# Patient Record
Sex: FEMALE | Race: WHITE | HISPANIC OR LATINO | Employment: UNEMPLOYED | URBAN - METROPOLITAN AREA
[De-identification: names, ages, dates, MRNs, and addresses within clinical notes are randomized per-mention and may not be internally consistent; named-entity substitution may affect disease eponyms.]

---

## 2017-01-04 ENCOUNTER — ALLSCRIPTS OFFICE VISIT (OUTPATIENT)
Dept: OTHER | Facility: OTHER | Age: 22
End: 2017-01-04

## 2017-01-04 ENCOUNTER — LAB REQUISITION (OUTPATIENT)
Dept: LAB | Facility: HOSPITAL | Age: 22
End: 2017-01-04
Payer: COMMERCIAL

## 2017-01-04 DIAGNOSIS — R10.9 ABDOMINAL PAIN: ICD-10-CM

## 2017-01-04 DIAGNOSIS — M54.41 LOW BACK PAIN WITH RIGHT-SIDED SCIATICA: ICD-10-CM

## 2017-01-04 DIAGNOSIS — M54.42 LOW BACK PAIN WITH LEFT-SIDED SCIATICA: ICD-10-CM

## 2017-01-04 DIAGNOSIS — R10.2 PELVIC AND PERINEAL PAIN: ICD-10-CM

## 2017-01-04 DIAGNOSIS — Z11.3 ENCOUNTER FOR SCREENING FOR INFECTIONS WITH PREDOMINANTLY SEXUAL MODE OF TRANSMISSION: ICD-10-CM

## 2017-01-04 DIAGNOSIS — N93.9 ABNORMAL UTERINE AND VAGINAL BLEEDING, UNSPECIFIED: ICD-10-CM

## 2017-01-04 LAB
CHLAMYDIA DNA CVX QL NAA+PROBE: NORMAL
CLUE CELL (HISTORICAL): NORMAL
HYPHAL YEAST (HISTORICAL): NORMAL
N GONORRHOEA DNA GENITAL QL NAA+PROBE: NORMAL
TRICHOMONAS (HISTORICAL): NORMAL
YEAST (HISTORICAL): NORMAL

## 2017-01-04 PROCEDURE — 87591 N.GONORRHOEAE DNA AMP PROB: CPT | Performed by: OBSTETRICS & GYNECOLOGY

## 2017-01-04 PROCEDURE — 87491 CHLMYD TRACH DNA AMP PROBE: CPT | Performed by: OBSTETRICS & GYNECOLOGY

## 2017-01-09 ENCOUNTER — HOSPITAL ENCOUNTER (EMERGENCY)
Facility: HOSPITAL | Age: 22
Discharge: HOME | End: 2017-01-09
Attending: EMERGENCY MEDICINE | Admitting: EMERGENCY MEDICINE
Payer: COMMERCIAL

## 2017-01-09 VITALS
DIASTOLIC BLOOD PRESSURE: 74 MMHG | TEMPERATURE: 98.6 F | BODY MASS INDEX: 16.76 KG/M2 | WEIGHT: 83 LBS | SYSTOLIC BLOOD PRESSURE: 131 MMHG | HEART RATE: 84 BPM | RESPIRATION RATE: 16 BRPM | OXYGEN SATURATION: 100 %

## 2017-01-09 DIAGNOSIS — B34.9 VIRAL ILLNESS: Primary | ICD-10-CM

## 2017-01-09 LAB
BACTERIA UR QL AUTO: ABNORMAL /HPF
BILIRUB UR QL STRIP: NEGATIVE
CLARITY UR: ABNORMAL
COLOR UR: YELLOW
GLUCOSE UR STRIP-MCNC: NEGATIVE MG/DL
HCG UR QL: NEGATIVE
HGB UR QL STRIP.AUTO: ABNORMAL
KETONES UR STRIP-MCNC: NEGATIVE MG/DL
LEUKOCYTE ESTERASE UR QL STRIP: ABNORMAL
NITRITE UR QL STRIP: NEGATIVE
NON-SQ EPI CELLS URNS QL MICRO: ABNORMAL /HPF
PH UR STRIP.AUTO: 7 [PH] (ref 4.5–8)
PROT UR STRIP-MCNC: NEGATIVE MG/DL
RBC #/AREA URNS AUTO: ABNORMAL /HPF
SP GR UR STRIP.AUTO: 1.02 (ref 1–1.03)
UROBILINOGEN UR QL STRIP.AUTO: 0.2 E.U./DL
WBC #/AREA URNS AUTO: ABNORMAL /HPF

## 2017-01-09 PROCEDURE — 81001 URINALYSIS AUTO W/SCOPE: CPT

## 2017-01-09 PROCEDURE — 87086 URINE CULTURE/COLONY COUNT: CPT

## 2017-01-09 PROCEDURE — 81002 URINALYSIS NONAUTO W/O SCOPE: CPT | Performed by: EMERGENCY MEDICINE

## 2017-01-09 PROCEDURE — 81025 URINE PREGNANCY TEST: CPT | Performed by: EMERGENCY MEDICINE

## 2017-01-09 PROCEDURE — 99283 EMERGENCY DEPT VISIT LOW MDM: CPT

## 2017-01-09 RX ORDER — METHOCARBAMOL 500 MG/1
500 TABLET, FILM COATED ORAL ONCE
Status: COMPLETED | OUTPATIENT
Start: 2017-01-09 | End: 2017-01-09

## 2017-01-09 RX ORDER — ACETAMINOPHEN 325 MG/1
650 TABLET ORAL EVERY 6 HOURS PRN
Status: DISCONTINUED | OUTPATIENT
Start: 2017-01-09 | End: 2017-01-10 | Stop reason: HOSPADM

## 2017-01-09 RX ORDER — METHOCARBAMOL 500 MG/1
500 TABLET, FILM COATED ORAL 2 TIMES DAILY
Qty: 10 TABLET | Refills: 0 | Status: ON HOLD | OUTPATIENT
Start: 2017-01-09 | End: 2017-01-19

## 2017-01-09 RX ORDER — ACETAMINOPHEN 500 MG
500 TABLET ORAL EVERY 6 HOURS PRN
Qty: 10 TABLET | Refills: 0 | Status: SHIPPED | OUTPATIENT
Start: 2017-01-09 | End: 2017-01-27

## 2017-01-09 RX ADMIN — ACETAMINOPHEN 650 MG: 325 TABLET, FILM COATED ORAL at 23:40

## 2017-01-09 RX ADMIN — METHOCARBAMOL 500 MG: 500 TABLET ORAL at 23:40

## 2017-01-10 ENCOUNTER — ALLSCRIPTS OFFICE VISIT (OUTPATIENT)
Dept: OTHER | Facility: OTHER | Age: 22
End: 2017-01-10

## 2017-01-11 ENCOUNTER — ALLSCRIPTS OFFICE VISIT (OUTPATIENT)
Dept: OTHER | Facility: OTHER | Age: 22
End: 2017-01-11

## 2017-01-12 LAB — BACTERIA UR CULT: NORMAL

## 2017-01-17 ENCOUNTER — APPOINTMENT (OUTPATIENT)
Dept: LAB | Facility: HOSPITAL | Age: 22
End: 2017-01-17
Attending: INTERNAL MEDICINE
Payer: COMMERCIAL

## 2017-01-17 ENCOUNTER — ALLSCRIPTS OFFICE VISIT (OUTPATIENT)
Dept: OTHER | Facility: OTHER | Age: 22
End: 2017-01-17

## 2017-01-17 ENCOUNTER — GENERIC CONVERSION - ENCOUNTER (OUTPATIENT)
Dept: OTHER | Facility: OTHER | Age: 22
End: 2017-01-17

## 2017-01-17 DIAGNOSIS — R10.9 ABDOMINAL PAIN: ICD-10-CM

## 2017-01-17 LAB
ALBUMIN SERPL BCP-MCNC: 4 G/DL (ref 3.5–5)
ALP SERPL-CCNC: 131 U/L (ref 46–116)
ALT SERPL W P-5'-P-CCNC: 15 U/L (ref 12–78)
ANION GAP SERPL CALCULATED.3IONS-SCNC: 8 MMOL/L (ref 4–13)
AST SERPL W P-5'-P-CCNC: 12 U/L (ref 5–45)
BASOPHILS # BLD AUTO: 0.04 THOUSANDS/ΜL (ref 0–0.1)
BASOPHILS NFR BLD AUTO: 0 % (ref 0–1)
BILIRUB SERPL-MCNC: 0.19 MG/DL (ref 0.2–1)
BUN SERPL-MCNC: 12 MG/DL (ref 5–25)
CALCIUM SERPL-MCNC: 8.9 MG/DL (ref 8.3–10.1)
CHLORIDE SERPL-SCNC: 102 MMOL/L (ref 100–108)
CO2 SERPL-SCNC: 28 MMOL/L (ref 21–32)
CREAT SERPL-MCNC: 0.57 MG/DL (ref 0.6–1.3)
EOSINOPHIL # BLD AUTO: 0.05 THOUSAND/ΜL (ref 0–0.61)
EOSINOPHIL NFR BLD AUTO: 1 % (ref 0–6)
ERYTHROCYTE [DISTWIDTH] IN BLOOD BY AUTOMATED COUNT: 12.3 % (ref 11.6–15.1)
GFR SERPL CREATININE-BSD FRML MDRD: >60 ML/MIN/1.73SQ M
GLUCOSE SERPL-MCNC: 99 MG/DL (ref 65–140)
HCT VFR BLD AUTO: 37.8 % (ref 34.8–46.1)
HGB BLD-MCNC: 13.1 G/DL (ref 11.5–15.4)
LYMPHOCYTES # BLD AUTO: 2.07 THOUSANDS/ΜL (ref 0.6–4.47)
LYMPHOCYTES NFR BLD AUTO: 21 % (ref 14–44)
MCH RBC QN AUTO: 32.4 PG (ref 26.8–34.3)
MCHC RBC AUTO-ENTMCNC: 34.7 G/DL (ref 31.4–37.4)
MCV RBC AUTO: 94 FL (ref 82–98)
MONOCYTES # BLD AUTO: 0.53 THOUSAND/ΜL (ref 0.17–1.22)
MONOCYTES NFR BLD AUTO: 5 % (ref 4–12)
NEUTROPHILS # BLD AUTO: 7.31 THOUSANDS/ΜL (ref 1.85–7.62)
NEUTS SEG NFR BLD AUTO: 73 % (ref 43–75)
PLATELET # BLD AUTO: 339 THOUSANDS/UL (ref 149–390)
PMV BLD AUTO: 9.8 FL (ref 8.9–12.7)
POTASSIUM SERPL-SCNC: 3.9 MMOL/L (ref 3.5–5.3)
PROT SERPL-MCNC: 7.8 G/DL (ref 6.4–8.2)
RBC # BLD AUTO: 4.04 MILLION/UL (ref 3.81–5.12)
SODIUM SERPL-SCNC: 138 MMOL/L (ref 136–145)
WBC # BLD AUTO: 10 THOUSAND/UL (ref 4.31–10.16)

## 2017-01-17 PROCEDURE — 85025 COMPLETE CBC W/AUTO DIFF WBC: CPT

## 2017-01-17 PROCEDURE — 83516 IMMUNOASSAY NONANTIBODY: CPT

## 2017-01-17 PROCEDURE — 80053 COMPREHEN METABOLIC PANEL: CPT

## 2017-01-17 PROCEDURE — 87389 HIV-1 AG W/HIV-1&-2 AB AG IA: CPT

## 2017-01-17 PROCEDURE — 36415 COLL VENOUS BLD VENIPUNCTURE: CPT

## 2017-01-18 ENCOUNTER — ANESTHESIA EVENT (OUTPATIENT)
Dept: GASTROENTEROLOGY | Facility: MEDICAL CENTER | Age: 22
End: 2017-01-18
Payer: COMMERCIAL

## 2017-01-18 ENCOUNTER — GENERIC CONVERSION - ENCOUNTER (OUTPATIENT)
Dept: OTHER | Facility: OTHER | Age: 22
End: 2017-01-18

## 2017-01-18 LAB
HIV 1+2 AB+HIV1 P24 AG SERPL QL IA: NORMAL
TTG IGA SER-ACNC: <2 U/ML (ref 0–3)

## 2017-01-19 ENCOUNTER — ANESTHESIA (OUTPATIENT)
Dept: GASTROENTEROLOGY | Facility: MEDICAL CENTER | Age: 22
End: 2017-01-19
Payer: COMMERCIAL

## 2017-01-19 ENCOUNTER — HOSPITAL ENCOUNTER (OUTPATIENT)
Facility: MEDICAL CENTER | Age: 22
Setting detail: OUTPATIENT SURGERY
Discharge: HOME | End: 2017-01-19
Attending: INTERNAL MEDICINE | Admitting: INTERNAL MEDICINE
Payer: COMMERCIAL

## 2017-01-19 ENCOUNTER — GENERIC CONVERSION - ENCOUNTER (OUTPATIENT)
Dept: GASTROENTEROLOGY | Facility: MEDICAL CENTER | Age: 22
End: 2017-01-19

## 2017-01-19 VITALS
HEART RATE: 64 BPM | WEIGHT: 85 LBS | BODY MASS INDEX: 17.14 KG/M2 | OXYGEN SATURATION: 100 % | HEIGHT: 59 IN | DIASTOLIC BLOOD PRESSURE: 72 MMHG | RESPIRATION RATE: 16 BRPM | TEMPERATURE: 97.8 F | SYSTOLIC BLOOD PRESSURE: 114 MMHG

## 2017-01-19 DIAGNOSIS — R10.9 ABDOMINAL PAIN: ICD-10-CM

## 2017-01-19 PROCEDURE — 88305 TISSUE EXAM BY PATHOLOGIST: CPT | Performed by: INTERNAL MEDICINE

## 2017-01-19 PROCEDURE — 88342 IMHCHEM/IMCYTCHM 1ST ANTB: CPT | Performed by: INTERNAL MEDICINE

## 2017-01-19 RX ORDER — SODIUM CHLORIDE 9 MG/ML
125 INJECTION, SOLUTION INTRAVENOUS CONTINUOUS
Status: DISCONTINUED | OUTPATIENT
Start: 2017-01-19 | End: 2017-01-19 | Stop reason: HOSPADM

## 2017-01-19 RX ORDER — PROPOFOL 10 MG/ML
INJECTION, EMULSION INTRAVENOUS AS NEEDED
Status: DISCONTINUED | OUTPATIENT
Start: 2017-01-19 | End: 2017-01-19 | Stop reason: SURG

## 2017-01-19 RX ADMIN — SODIUM CHLORIDE 125 ML/HR: 0.9 INJECTION, SOLUTION INTRAVENOUS at 12:10

## 2017-01-19 RX ADMIN — PROPOFOL 50 MG: 10 INJECTION, EMULSION INTRAVENOUS at 12:57

## 2017-01-19 RX ADMIN — PROPOFOL 50 MG: 10 INJECTION, EMULSION INTRAVENOUS at 12:52

## 2017-01-19 RX ADMIN — PROPOFOL 150 MG: 10 INJECTION, EMULSION INTRAVENOUS at 12:45

## 2017-01-19 RX ADMIN — PROPOFOL 50 MG: 10 INJECTION, EMULSION INTRAVENOUS at 12:47

## 2017-01-19 RX ADMIN — PROPOFOL 20 MG: 10 INJECTION, EMULSION INTRAVENOUS at 13:00

## 2017-01-23 ENCOUNTER — ALLSCRIPTS OFFICE VISIT (OUTPATIENT)
Dept: OTHER | Facility: OTHER | Age: 22
End: 2017-01-23

## 2017-01-24 ENCOUNTER — GENERIC CONVERSION - ENCOUNTER (OUTPATIENT)
Dept: OTHER | Facility: OTHER | Age: 22
End: 2017-01-24

## 2017-01-25 ENCOUNTER — GENERIC CONVERSION - ENCOUNTER (OUTPATIENT)
Dept: OTHER | Facility: OTHER | Age: 22
End: 2017-01-25

## 2017-01-26 ENCOUNTER — HOSPITAL ENCOUNTER (EMERGENCY)
Facility: HOSPITAL | Age: 22
Discharge: HOME | End: 2017-01-27
Attending: EMERGENCY MEDICINE
Payer: COMMERCIAL

## 2017-01-26 DIAGNOSIS — N76.0 BACTERIAL VAGINOSIS: ICD-10-CM

## 2017-01-26 DIAGNOSIS — K52.9 ENTERITIS: Primary | ICD-10-CM

## 2017-01-26 DIAGNOSIS — B96.89 BACTERIAL VAGINOSIS: ICD-10-CM

## 2017-01-26 LAB
ALBUMIN SERPL BCP-MCNC: 4.3 G/DL (ref 3.5–5)
ALP SERPL-CCNC: 106 U/L (ref 46–116)
ALT SERPL W P-5'-P-CCNC: 15 U/L (ref 12–78)
ANION GAP SERPL CALCULATED.3IONS-SCNC: 10 MMOL/L (ref 4–13)
AST SERPL W P-5'-P-CCNC: 16 U/L (ref 5–45)
BASOPHILS # BLD AUTO: 0.03 THOUSANDS/ΜL (ref 0–0.1)
BASOPHILS NFR BLD AUTO: 0 % (ref 0–1)
BILIRUB SERPL-MCNC: 0.44 MG/DL (ref 0.2–1)
BILIRUB UR QL STRIP: ABNORMAL
BUN SERPL-MCNC: 10 MG/DL (ref 5–25)
CALCIUM SERPL-MCNC: 9.4 MG/DL (ref 8.3–10.1)
CHLORIDE SERPL-SCNC: 102 MMOL/L (ref 100–108)
CLARITY UR: ABNORMAL
CO2 SERPL-SCNC: 27 MMOL/L (ref 21–32)
COLOR UR: YELLOW
COLOR, POC: NORMAL
CREAT SERPL-MCNC: 0.52 MG/DL (ref 0.6–1.3)
EOSINOPHIL # BLD AUTO: 0.04 THOUSAND/ΜL (ref 0–0.61)
EOSINOPHIL NFR BLD AUTO: 1 % (ref 0–6)
ERYTHROCYTE [DISTWIDTH] IN BLOOD BY AUTOMATED COUNT: 12.3 % (ref 11.6–15.1)
GFR SERPL CREATININE-BSD FRML MDRD: >60 ML/MIN/1.73SQ M
GLUCOSE SERPL-MCNC: 85 MG/DL (ref 65–140)
GLUCOSE UR STRIP-MCNC: NEGATIVE MG/DL
HCT VFR BLD AUTO: 36.5 % (ref 34.8–46.1)
HGB BLD-MCNC: 12.9 G/DL (ref 11.5–15.4)
HGB UR QL STRIP.AUTO: NEGATIVE
KETONES UR STRIP-MCNC: ABNORMAL MG/DL
LEUKOCYTE ESTERASE UR QL STRIP: NEGATIVE
LIPASE SERPL-CCNC: 55 U/L (ref 73–393)
LYMPHOCYTES # BLD AUTO: 1.54 THOUSANDS/ΜL (ref 0.6–4.47)
LYMPHOCYTES NFR BLD AUTO: 21 % (ref 14–44)
MCH RBC QN AUTO: 32.3 PG (ref 26.8–34.3)
MCHC RBC AUTO-ENTMCNC: 35.3 G/DL (ref 31.4–37.4)
MCV RBC AUTO: 91 FL (ref 82–98)
MONOCYTES # BLD AUTO: 0.32 THOUSAND/ΜL (ref 0.17–1.22)
MONOCYTES NFR BLD AUTO: 4 % (ref 4–12)
NEUTROPHILS # BLD AUTO: 5.47 THOUSANDS/ΜL (ref 1.85–7.62)
NEUTS SEG NFR BLD AUTO: 74 % (ref 43–75)
NITRITE UR QL STRIP: NEGATIVE
NRBC BLD AUTO-RTO: 0 /100 WBCS
PH UR STRIP.AUTO: 7 [PH] (ref 4.5–8)
PLATELET # BLD AUTO: 245 THOUSANDS/UL (ref 149–390)
PMV BLD AUTO: 10.1 FL (ref 8.9–12.7)
POTASSIUM SERPL-SCNC: 3.7 MMOL/L (ref 3.5–5.3)
PROT SERPL-MCNC: 7.9 G/DL (ref 6.4–8.2)
PROT UR STRIP-MCNC: ABNORMAL MG/DL
RBC # BLD AUTO: 4 MILLION/UL (ref 3.81–5.12)
SODIUM SERPL-SCNC: 139 MMOL/L (ref 136–145)
SP GR UR STRIP.AUTO: 1.02 (ref 1–1.03)
UROBILINOGEN UR QL STRIP.AUTO: 1 E.U./DL
WBC # BLD AUTO: 7.42 THOUSAND/UL (ref 4.31–10.16)

## 2017-01-26 PROCEDURE — 80053 COMPREHEN METABOLIC PANEL: CPT

## 2017-01-26 PROCEDURE — 85025 COMPLETE CBC W/AUTO DIFF WBC: CPT

## 2017-01-26 PROCEDURE — 81002 URINALYSIS NONAUTO W/O SCOPE: CPT

## 2017-01-26 PROCEDURE — 81001 URINALYSIS AUTO W/SCOPE: CPT

## 2017-01-26 PROCEDURE — 83690 ASSAY OF LIPASE: CPT

## 2017-01-26 PROCEDURE — 36415 COLL VENOUS BLD VENIPUNCTURE: CPT

## 2017-01-26 PROCEDURE — 96374 THER/PROPH/DIAG INJ IV PUSH: CPT

## 2017-01-26 RX ORDER — ONDANSETRON 2 MG/ML
4 INJECTION INTRAMUSCULAR; INTRAVENOUS ONCE AS NEEDED
Status: COMPLETED | OUTPATIENT
Start: 2017-01-26 | End: 2017-01-26

## 2017-01-26 RX ORDER — ONDANSETRON 2 MG/ML
INJECTION INTRAMUSCULAR; INTRAVENOUS
Status: COMPLETED
Start: 2017-01-26 | End: 2017-01-26

## 2017-01-26 RX ORDER — CLARITHROMYCIN 500 MG/1
500 TABLET, COATED ORAL 2 TIMES DAILY
COMMUNITY
End: 2017-02-06 | Stop reason: ALTCHOICE

## 2017-01-26 RX ORDER — PANTOPRAZOLE SODIUM 40 MG/1
40 TABLET, DELAYED RELEASE ORAL 2 TIMES DAILY
COMMUNITY
End: 2017-03-17

## 2017-01-26 RX ORDER — AMOXICILLIN 500 MG/1
500 CAPSULE ORAL 2 TIMES DAILY
COMMUNITY
End: 2017-02-06 | Stop reason: ALTCHOICE

## 2017-01-26 RX ADMIN — ONDANSETRON 4 MG: 2 INJECTION INTRAMUSCULAR; INTRAVENOUS at 23:13

## 2017-01-27 ENCOUNTER — APPOINTMENT (EMERGENCY)
Dept: RADIOLOGY | Facility: HOSPITAL | Age: 22
End: 2017-01-27
Payer: COMMERCIAL

## 2017-01-27 VITALS
BODY MASS INDEX: 17.17 KG/M2 | TEMPERATURE: 97.7 F | RESPIRATION RATE: 18 BRPM | DIASTOLIC BLOOD PRESSURE: 79 MMHG | OXYGEN SATURATION: 100 % | HEART RATE: 83 BPM | SYSTOLIC BLOOD PRESSURE: 118 MMHG | WEIGHT: 85 LBS

## 2017-01-27 LAB
BACTERIA UR QL AUTO: ABNORMAL /HPF
HCG UR QL: NEGATIVE
HYALINE CASTS #/AREA URNS LPF: ABNORMAL /LPF
NON-SQ EPI CELLS URNS QL MICRO: ABNORMAL /HPF
RBC #/AREA URNS AUTO: ABNORMAL /HPF
WBC #/AREA URNS AUTO: ABNORMAL /HPF

## 2017-01-27 PROCEDURE — 81025 URINE PREGNANCY TEST: CPT | Performed by: EMERGENCY MEDICINE

## 2017-01-27 PROCEDURE — 96375 TX/PRO/DX INJ NEW DRUG ADDON: CPT

## 2017-01-27 PROCEDURE — 96376 TX/PRO/DX INJ SAME DRUG ADON: CPT

## 2017-01-27 PROCEDURE — 74177 CT ABD & PELVIS W/CONTRAST: CPT

## 2017-01-27 PROCEDURE — 96361 HYDRATE IV INFUSION ADD-ON: CPT

## 2017-01-27 PROCEDURE — 99285 EMERGENCY DEPT VISIT HI MDM: CPT

## 2017-01-27 RX ORDER — SENNOSIDES 8.6 MG
650 CAPSULE ORAL EVERY 8 HOURS PRN
Qty: 30 TABLET | Refills: 0 | Status: SHIPPED | OUTPATIENT
Start: 2017-01-27 | End: 2017-02-10

## 2017-01-27 RX ORDER — METRONIDAZOLE 500 MG/1
500 TABLET ORAL 3 TIMES DAILY
Qty: 30 TABLET | Refills: 0 | Status: SHIPPED | OUTPATIENT
Start: 2017-01-27 | End: 2017-02-06 | Stop reason: ALTCHOICE

## 2017-01-27 RX ORDER — LORAZEPAM 2 MG/ML
0.5 INJECTION INTRAMUSCULAR ONCE
Status: COMPLETED | OUTPATIENT
Start: 2017-01-27 | End: 2017-01-27

## 2017-01-27 RX ORDER — MAGNESIUM HYDROXIDE/ALUMINUM HYDROXICE/SIMETHICONE 120; 1200; 1200 MG/30ML; MG/30ML; MG/30ML
15 SUSPENSION ORAL ONCE
Status: COMPLETED | OUTPATIENT
Start: 2017-01-27 | End: 2017-01-27

## 2017-01-27 RX ORDER — ONDANSETRON 4 MG/1
4 TABLET, FILM COATED ORAL EVERY 8 HOURS PRN
Qty: 8 TABLET | Refills: 0 | Status: SHIPPED | OUTPATIENT
Start: 2017-01-27 | End: 2017-02-06 | Stop reason: ALTCHOICE

## 2017-01-27 RX ADMIN — LORAZEPAM 0.5 MG: 2 INJECTION INTRAMUSCULAR; INTRAVENOUS at 00:27

## 2017-01-27 RX ADMIN — HYDROMORPHONE HYDROCHLORIDE 0.5 MG: 1 INJECTION, SOLUTION INTRAMUSCULAR; INTRAVENOUS; SUBCUTANEOUS at 00:09

## 2017-01-27 RX ADMIN — ALUMINUM HYDROXIDE, MAGNESIUM HYDROXIDE, AND SIMETHICONE 15 ML: 200; 200; 20 SUSPENSION ORAL at 00:26

## 2017-01-27 RX ADMIN — SODIUM CHLORIDE 1000 ML: 0.9 INJECTION, SOLUTION INTRAVENOUS at 00:09

## 2017-01-27 RX ADMIN — LIDOCAINE HYDROCHLORIDE 10 ML: 20 SOLUTION ORAL; TOPICAL at 00:26

## 2017-01-27 RX ADMIN — IOHEXOL 80 ML: 350 INJECTION, SOLUTION INTRAVENOUS at 00:45

## 2017-01-27 RX ADMIN — HYDROMORPHONE HYDROCHLORIDE 1 MG: 1 INJECTION, SOLUTION INTRAMUSCULAR; INTRAVENOUS; SUBCUTANEOUS at 01:55

## 2017-02-04 ENCOUNTER — HOSPITAL ENCOUNTER (EMERGENCY)
Facility: HOSPITAL | Age: 22
Discharge: HOME | End: 2017-02-04
Attending: EMERGENCY MEDICINE | Admitting: EMERGENCY MEDICINE
Payer: COMMERCIAL

## 2017-02-04 ENCOUNTER — APPOINTMENT (EMERGENCY)
Dept: CT IMAGING | Facility: HOSPITAL | Age: 22
End: 2017-02-04
Payer: COMMERCIAL

## 2017-02-04 VITALS
WEIGHT: 85 LBS | SYSTOLIC BLOOD PRESSURE: 126 MMHG | TEMPERATURE: 97.5 F | DIASTOLIC BLOOD PRESSURE: 68 MMHG | RESPIRATION RATE: 14 BRPM | OXYGEN SATURATION: 98 % | HEART RATE: 73 BPM | BODY MASS INDEX: 17.17 KG/M2

## 2017-02-04 DIAGNOSIS — R10.9 ABDOMINAL PAIN: ICD-10-CM

## 2017-02-04 DIAGNOSIS — K52.9 NONSPECIFIC COLITIS: Primary | ICD-10-CM

## 2017-02-04 DIAGNOSIS — R11.0 NAUSEA: ICD-10-CM

## 2017-02-04 DIAGNOSIS — E86.0 DEHYDRATION: ICD-10-CM

## 2017-02-04 LAB
ALBUMIN SERPL BCP-MCNC: 4.2 G/DL (ref 3.5–5)
ALP SERPL-CCNC: 108 U/L (ref 46–116)
ALT SERPL W P-5'-P-CCNC: 17 U/L (ref 12–78)
ANION GAP SERPL CALCULATED.3IONS-SCNC: 8 MMOL/L (ref 4–13)
AST SERPL W P-5'-P-CCNC: 15 U/L (ref 5–45)
BACTERIA UR QL AUTO: ABNORMAL /HPF
BASOPHILS # BLD AUTO: 0.03 THOUSANDS/ΜL (ref 0–0.1)
BASOPHILS NFR BLD AUTO: 1 % (ref 0–1)
BILIRUB SERPL-MCNC: 0.65 MG/DL (ref 0.2–1)
BILIRUB UR QL STRIP: ABNORMAL
BUN SERPL-MCNC: 13 MG/DL (ref 5–25)
CALCIUM SERPL-MCNC: 9.1 MG/DL (ref 8.3–10.1)
CHLORIDE SERPL-SCNC: 103 MMOL/L (ref 100–108)
CLARITY UR: CLEAR
CO2 SERPL-SCNC: 29 MMOL/L (ref 21–32)
COLOR UR: YELLOW
CREAT SERPL-MCNC: 0.57 MG/DL (ref 0.6–1.3)
EOSINOPHIL # BLD AUTO: 0.04 THOUSAND/ΜL (ref 0–0.61)
EOSINOPHIL NFR BLD AUTO: 1 % (ref 0–6)
ERYTHROCYTE [DISTWIDTH] IN BLOOD BY AUTOMATED COUNT: 12.8 % (ref 11.6–15.1)
GFR SERPL CREATININE-BSD FRML MDRD: >60 ML/MIN/1.73SQ M
GLUCOSE SERPL-MCNC: 87 MG/DL (ref 65–140)
GLUCOSE UR STRIP-MCNC: NEGATIVE MG/DL
HCG UR QL: NEGATIVE
HCT VFR BLD AUTO: 37.6 % (ref 34.8–46.1)
HGB BLD-MCNC: 13.5 G/DL (ref 11.5–15.4)
HGB UR QL STRIP.AUTO: ABNORMAL
KETONES UR STRIP-MCNC: NEGATIVE MG/DL
LACTATE SERPL-SCNC: 0.7 MMOL/L (ref 0.5–2)
LEUKOCYTE ESTERASE UR QL STRIP: ABNORMAL
LIPASE SERPL-CCNC: 63 U/L (ref 73–393)
LYMPHOCYTES # BLD AUTO: 1.13 THOUSANDS/ΜL (ref 0.6–4.47)
LYMPHOCYTES NFR BLD AUTO: 25 % (ref 14–44)
MCH RBC QN AUTO: 33.4 PG (ref 26.8–34.3)
MCHC RBC AUTO-ENTMCNC: 35.9 G/DL (ref 31.4–37.4)
MCV RBC AUTO: 93 FL (ref 82–98)
MONOCYTES # BLD AUTO: 0.38 THOUSAND/ΜL (ref 0.17–1.22)
MONOCYTES NFR BLD AUTO: 8 % (ref 4–12)
MUCOUS THREADS UR QL AUTO: ABNORMAL
NEUTROPHILS # BLD AUTO: 3.04 THOUSANDS/ΜL (ref 1.85–7.62)
NEUTS SEG NFR BLD AUTO: 65 % (ref 43–75)
NITRITE UR QL STRIP: NEGATIVE
NON-SQ EPI CELLS URNS QL MICRO: ABNORMAL /HPF
NRBC BLD AUTO-RTO: 0 /100 WBCS
PH UR STRIP.AUTO: 8.5 [PH] (ref 4.5–8)
PLATELET # BLD AUTO: 181 THOUSANDS/UL (ref 149–390)
PMV BLD AUTO: 10.3 FL (ref 8.9–12.7)
POTASSIUM SERPL-SCNC: 4 MMOL/L (ref 3.5–5.3)
PROT SERPL-MCNC: 7.6 G/DL (ref 6.4–8.2)
PROT UR STRIP-MCNC: ABNORMAL MG/DL
RBC # BLD AUTO: 4.04 MILLION/UL (ref 3.81–5.12)
RBC #/AREA URNS AUTO: ABNORMAL /HPF
SODIUM SERPL-SCNC: 140 MMOL/L (ref 136–145)
SP GR UR STRIP.AUTO: 1.01 (ref 1–1.03)
UROBILINOGEN UR QL STRIP.AUTO: 1 E.U./DL
WBC # BLD AUTO: 4.62 THOUSAND/UL (ref 4.31–10.16)
WBC #/AREA URNS AUTO: ABNORMAL /HPF

## 2017-02-04 PROCEDURE — 96375 TX/PRO/DX INJ NEW DRUG ADDON: CPT

## 2017-02-04 PROCEDURE — 81001 URINALYSIS AUTO W/SCOPE: CPT

## 2017-02-04 PROCEDURE — 81025 URINE PREGNANCY TEST: CPT | Performed by: EMERGENCY MEDICINE

## 2017-02-04 PROCEDURE — 80053 COMPREHEN METABOLIC PANEL: CPT | Performed by: EMERGENCY MEDICINE

## 2017-02-04 PROCEDURE — 96374 THER/PROPH/DIAG INJ IV PUSH: CPT

## 2017-02-04 PROCEDURE — 74177 CT ABD & PELVIS W/CONTRAST: CPT

## 2017-02-04 PROCEDURE — 83690 ASSAY OF LIPASE: CPT | Performed by: EMERGENCY MEDICINE

## 2017-02-04 PROCEDURE — 36415 COLL VENOUS BLD VENIPUNCTURE: CPT | Performed by: EMERGENCY MEDICINE

## 2017-02-04 PROCEDURE — 87147 CULTURE TYPE IMMUNOLOGIC: CPT

## 2017-02-04 PROCEDURE — 99284 EMERGENCY DEPT VISIT MOD MDM: CPT

## 2017-02-04 PROCEDURE — 87086 URINE CULTURE/COLONY COUNT: CPT

## 2017-02-04 PROCEDURE — 83605 ASSAY OF LACTIC ACID: CPT | Performed by: EMERGENCY MEDICINE

## 2017-02-04 PROCEDURE — 96376 TX/PRO/DX INJ SAME DRUG ADON: CPT

## 2017-02-04 PROCEDURE — 85025 COMPLETE CBC W/AUTO DIFF WBC: CPT | Performed by: EMERGENCY MEDICINE

## 2017-02-04 PROCEDURE — 96361 HYDRATE IV INFUSION ADD-ON: CPT

## 2017-02-04 RX ORDER — MORPHINE SULFATE 4 MG/ML
4 INJECTION, SOLUTION INTRAMUSCULAR; INTRAVENOUS ONCE
Status: COMPLETED | OUTPATIENT
Start: 2017-02-04 | End: 2017-02-04

## 2017-02-04 RX ORDER — ONDANSETRON 4 MG/1
4 TABLET, ORALLY DISINTEGRATING ORAL EVERY 8 HOURS PRN
Qty: 20 TABLET | Refills: 0 | Status: SHIPPED | OUTPATIENT
Start: 2017-02-04 | End: 2017-02-06 | Stop reason: ALTCHOICE

## 2017-02-04 RX ORDER — ONDANSETRON 2 MG/ML
4 INJECTION INTRAMUSCULAR; INTRAVENOUS ONCE
Status: COMPLETED | OUTPATIENT
Start: 2017-02-04 | End: 2017-02-04

## 2017-02-04 RX ORDER — FENTANYL CITRATE 50 UG/ML
50 INJECTION, SOLUTION INTRAMUSCULAR; INTRAVENOUS ONCE
Status: COMPLETED | OUTPATIENT
Start: 2017-02-04 | End: 2017-02-04

## 2017-02-04 RX ORDER — OXYCODONE HYDROCHLORIDE AND ACETAMINOPHEN 5; 325 MG/1; MG/1
1 TABLET ORAL EVERY 8 HOURS PRN
Qty: 15 TABLET | Refills: 0 | Status: SHIPPED | OUTPATIENT
Start: 2017-02-04 | End: 2017-02-11

## 2017-02-04 RX ORDER — DICYCLOMINE HCL 20 MG
20 TABLET ORAL EVERY 6 HOURS PRN
Qty: 30 TABLET | Refills: 0 | Status: SHIPPED | OUTPATIENT
Start: 2017-02-04 | End: 2017-03-22

## 2017-02-04 RX ADMIN — SODIUM CHLORIDE 1000 ML: 0.9 INJECTION, SOLUTION INTRAVENOUS at 10:28

## 2017-02-04 RX ADMIN — MORPHINE SULFATE 4 MG: 4 INJECTION, SOLUTION INTRAMUSCULAR; INTRAVENOUS at 10:32

## 2017-02-04 RX ADMIN — ONDANSETRON 4 MG: 2 INJECTION INTRAMUSCULAR; INTRAVENOUS at 10:30

## 2017-02-04 RX ADMIN — MORPHINE SULFATE 4 MG: 4 INJECTION, SOLUTION INTRAMUSCULAR; INTRAVENOUS at 11:34

## 2017-02-04 RX ADMIN — IOHEXOL 100 ML: 350 INJECTION, SOLUTION INTRAVENOUS at 12:24

## 2017-02-04 RX ADMIN — FENTANYL CITRATE 50 MCG: 50 INJECTION INTRAMUSCULAR; INTRAVENOUS at 12:32

## 2017-02-04 RX ADMIN — IOHEXOL 50 ML: 240 INJECTION, SOLUTION INTRATHECAL; INTRAVASCULAR; INTRAVENOUS; ORAL at 11:33

## 2017-02-06 ENCOUNTER — APPOINTMENT (EMERGENCY)
Dept: CT IMAGING | Facility: HOSPITAL | Age: 22
End: 2017-02-06
Payer: COMMERCIAL

## 2017-02-06 ENCOUNTER — HOSPITAL ENCOUNTER (EMERGENCY)
Facility: HOSPITAL | Age: 22
Discharge: HOME | End: 2017-02-06
Attending: EMERGENCY MEDICINE | Admitting: EMERGENCY MEDICINE
Payer: COMMERCIAL

## 2017-02-06 VITALS
HEART RATE: 84 BPM | WEIGHT: 85.32 LBS | DIASTOLIC BLOOD PRESSURE: 65 MMHG | TEMPERATURE: 97.5 F | OXYGEN SATURATION: 99 % | BODY MASS INDEX: 17.23 KG/M2 | SYSTOLIC BLOOD PRESSURE: 115 MMHG | RESPIRATION RATE: 16 BRPM

## 2017-02-06 DIAGNOSIS — R10.9 ABDOMINAL PAIN: Primary | ICD-10-CM

## 2017-02-06 LAB
ALBUMIN SERPL BCP-MCNC: 4 G/DL (ref 3.5–5)
ALP SERPL-CCNC: 98 U/L (ref 46–116)
ALT SERPL W P-5'-P-CCNC: 18 U/L (ref 12–78)
ANION GAP SERPL CALCULATED.3IONS-SCNC: 6 MMOL/L (ref 4–13)
APTT PPP: 39 SECONDS (ref 24–36)
AST SERPL W P-5'-P-CCNC: 13 U/L (ref 5–45)
BACTERIA UR CULT: NORMAL
BACTERIA UR CULT: NORMAL
BACTERIA UR QL AUTO: ABNORMAL /HPF
BASOPHILS # BLD AUTO: 0.02 THOUSANDS/ΜL (ref 0–0.1)
BASOPHILS NFR BLD AUTO: 1 % (ref 0–1)
BILIRUB SERPL-MCNC: 0.28 MG/DL (ref 0.2–1)
BILIRUB UR QL STRIP: NEGATIVE
BUN SERPL-MCNC: 9 MG/DL (ref 5–25)
CALCIUM SERPL-MCNC: 8.9 MG/DL (ref 8.3–10.1)
CHLORIDE SERPL-SCNC: 104 MMOL/L (ref 100–108)
CLARITY UR: ABNORMAL
CO2 SERPL-SCNC: 29 MMOL/L (ref 21–32)
COLOR UR: YELLOW
COLOR, POC: YELLOW
CREAT SERPL-MCNC: 0.59 MG/DL (ref 0.6–1.3)
EOSINOPHIL # BLD AUTO: 0.05 THOUSAND/ΜL (ref 0–0.61)
EOSINOPHIL NFR BLD AUTO: 1 % (ref 0–6)
ERYTHROCYTE [DISTWIDTH] IN BLOOD BY AUTOMATED COUNT: 12.7 % (ref 11.6–15.1)
GFR SERPL CREATININE-BSD FRML MDRD: >60 ML/MIN/1.73SQ M
GLUCOSE SERPL-MCNC: 91 MG/DL (ref 65–140)
GLUCOSE UR STRIP-MCNC: NEGATIVE MG/DL
HCG UR QL: NEGATIVE
HCT VFR BLD AUTO: 35.8 % (ref 34.8–46.1)
HGB BLD-MCNC: 12.5 G/DL (ref 11.5–15.4)
HGB UR QL STRIP.AUTO: NEGATIVE
INR PPP: 1.12 (ref 0.86–1.16)
KETONES UR STRIP-MCNC: NEGATIVE MG/DL
LACTATE SERPL-SCNC: 0.6 MMOL/L (ref 0.5–2)
LEUKOCYTE ESTERASE UR QL STRIP: NEGATIVE
LIPASE SERPL-CCNC: 86 U/L (ref 73–393)
LYMPHOCYTES # BLD AUTO: 1.23 THOUSANDS/ΜL (ref 0.6–4.47)
LYMPHOCYTES NFR BLD AUTO: 29 % (ref 14–44)
MCH RBC QN AUTO: 32.6 PG (ref 26.8–34.3)
MCHC RBC AUTO-ENTMCNC: 34.9 G/DL (ref 31.4–37.4)
MCV RBC AUTO: 93 FL (ref 82–98)
MONOCYTES # BLD AUTO: 0.32 THOUSAND/ΜL (ref 0.17–1.22)
MONOCYTES NFR BLD AUTO: 7 % (ref 4–12)
NEUTROPHILS # BLD AUTO: 2.69 THOUSANDS/ΜL (ref 1.85–7.62)
NEUTS SEG NFR BLD AUTO: 62 % (ref 43–75)
NITRITE UR QL STRIP: NEGATIVE
NON-SQ EPI CELLS URNS QL MICRO: ABNORMAL /HPF
NRBC BLD AUTO-RTO: 0 /100 WBCS
PH UR STRIP.AUTO: 7.5 [PH] (ref 4.5–8)
PLATELET # BLD AUTO: 159 THOUSANDS/UL (ref 149–390)
PMV BLD AUTO: 10.3 FL (ref 8.9–12.7)
POTASSIUM SERPL-SCNC: 4.1 MMOL/L (ref 3.5–5.3)
PROT SERPL-MCNC: 7.3 G/DL (ref 6.4–8.2)
PROT UR STRIP-MCNC: ABNORMAL MG/DL
PROTHROMBIN TIME: 14.4 SECONDS (ref 12–14.3)
RBC # BLD AUTO: 3.84 MILLION/UL (ref 3.81–5.12)
RBC #/AREA URNS AUTO: ABNORMAL /HPF
SODIUM SERPL-SCNC: 139 MMOL/L (ref 136–145)
SP GR UR STRIP.AUTO: 1.02 (ref 1–1.03)
UROBILINOGEN UR QL STRIP.AUTO: 2 E.U./DL
WBC # BLD AUTO: 4.31 THOUSAND/UL (ref 4.31–10.16)
WBC #/AREA URNS AUTO: ABNORMAL /HPF

## 2017-02-06 PROCEDURE — 81001 URINALYSIS AUTO W/SCOPE: CPT

## 2017-02-06 PROCEDURE — 85730 THROMBOPLASTIN TIME PARTIAL: CPT | Performed by: EMERGENCY MEDICINE

## 2017-02-06 PROCEDURE — 96375 TX/PRO/DX INJ NEW DRUG ADDON: CPT

## 2017-02-06 PROCEDURE — 99284 EMERGENCY DEPT VISIT MOD MDM: CPT

## 2017-02-06 PROCEDURE — 85610 PROTHROMBIN TIME: CPT | Performed by: EMERGENCY MEDICINE

## 2017-02-06 PROCEDURE — 96361 HYDRATE IV INFUSION ADD-ON: CPT

## 2017-02-06 PROCEDURE — 83690 ASSAY OF LIPASE: CPT | Performed by: EMERGENCY MEDICINE

## 2017-02-06 PROCEDURE — 81002 URINALYSIS NONAUTO W/O SCOPE: CPT | Performed by: EMERGENCY MEDICINE

## 2017-02-06 PROCEDURE — 74177 CT ABD & PELVIS W/CONTRAST: CPT

## 2017-02-06 PROCEDURE — 85025 COMPLETE CBC W/AUTO DIFF WBC: CPT | Performed by: EMERGENCY MEDICINE

## 2017-02-06 PROCEDURE — 80053 COMPREHEN METABOLIC PANEL: CPT | Performed by: EMERGENCY MEDICINE

## 2017-02-06 PROCEDURE — 81025 URINE PREGNANCY TEST: CPT | Performed by: EMERGENCY MEDICINE

## 2017-02-06 PROCEDURE — 36415 COLL VENOUS BLD VENIPUNCTURE: CPT | Performed by: EMERGENCY MEDICINE

## 2017-02-06 PROCEDURE — 83605 ASSAY OF LACTIC ACID: CPT | Performed by: EMERGENCY MEDICINE

## 2017-02-06 PROCEDURE — 96374 THER/PROPH/DIAG INJ IV PUSH: CPT

## 2017-02-06 RX ORDER — MORPHINE SULFATE 4 MG/ML
4 INJECTION, SOLUTION INTRAMUSCULAR; INTRAVENOUS ONCE
Status: COMPLETED | OUTPATIENT
Start: 2017-02-06 | End: 2017-02-06

## 2017-02-06 RX ORDER — ONDANSETRON 2 MG/ML
4 INJECTION INTRAMUSCULAR; INTRAVENOUS ONCE
Status: COMPLETED | OUTPATIENT
Start: 2017-02-06 | End: 2017-02-06

## 2017-02-06 RX ORDER — METOCLOPRAMIDE 10 MG/1
10 TABLET ORAL EVERY 6 HOURS PRN
Qty: 30 TABLET | Refills: 0 | Status: SHIPPED | OUTPATIENT
Start: 2017-02-06 | End: 2017-02-11

## 2017-02-06 RX ADMIN — IOHEXOL 100 ML: 350 INJECTION, SOLUTION INTRAVENOUS at 12:13

## 2017-02-06 RX ADMIN — SODIUM CHLORIDE 1000 ML: 0.9 INJECTION, SOLUTION INTRAVENOUS at 10:23

## 2017-02-06 RX ADMIN — MORPHINE SULFATE 4 MG: 4 INJECTION, SOLUTION INTRAMUSCULAR; INTRAVENOUS at 10:32

## 2017-02-06 RX ADMIN — ONDANSETRON 4 MG: 2 INJECTION INTRAMUSCULAR; INTRAVENOUS at 10:29

## 2017-02-06 RX ADMIN — IOHEXOL 50 ML: 240 INJECTION, SOLUTION INTRATHECAL; INTRAVASCULAR; INTRAVENOUS; ORAL at 12:13

## 2017-02-09 ENCOUNTER — HOSPITAL ENCOUNTER (EMERGENCY)
Facility: HOSPITAL | Age: 22
Discharge: HOME | End: 2017-02-10
Attending: EMERGENCY MEDICINE | Admitting: EMERGENCY MEDICINE
Payer: COMMERCIAL

## 2017-02-09 DIAGNOSIS — G89.29 CHRONIC ABDOMINAL PAIN: Primary | ICD-10-CM

## 2017-02-09 DIAGNOSIS — R10.9 CHRONIC ABDOMINAL PAIN: Primary | ICD-10-CM

## 2017-02-09 RX ORDER — ONDANSETRON 2 MG/ML
4 INJECTION INTRAMUSCULAR; INTRAVENOUS ONCE
Status: COMPLETED | OUTPATIENT
Start: 2017-02-10 | End: 2017-02-10

## 2017-02-09 RX ORDER — LORAZEPAM 2 MG/ML
1 INJECTION INTRAMUSCULAR ONCE
Status: COMPLETED | OUTPATIENT
Start: 2017-02-10 | End: 2017-02-10

## 2017-02-10 VITALS
RESPIRATION RATE: 20 BRPM | DIASTOLIC BLOOD PRESSURE: 68 MMHG | WEIGHT: 91.49 LBS | BODY MASS INDEX: 18.48 KG/M2 | TEMPERATURE: 98.2 F | SYSTOLIC BLOOD PRESSURE: 109 MMHG | HEART RATE: 85 BPM | OXYGEN SATURATION: 98 %

## 2017-02-10 LAB
ALBUMIN SERPL BCP-MCNC: 3.8 G/DL (ref 3.5–5)
ALP SERPL-CCNC: 83 U/L (ref 46–116)
ALT SERPL W P-5'-P-CCNC: 15 U/L (ref 12–78)
ANION GAP SERPL CALCULATED.3IONS-SCNC: 10 MMOL/L (ref 4–13)
AST SERPL W P-5'-P-CCNC: 13 U/L (ref 5–45)
BASOPHILS # BLD AUTO: 0.02 THOUSANDS/ΜL (ref 0–0.1)
BASOPHILS NFR BLD AUTO: 0 % (ref 0–1)
BILIRUB SERPL-MCNC: 0.49 MG/DL (ref 0.2–1)
BUN SERPL-MCNC: 7 MG/DL (ref 5–25)
CALCIUM SERPL-MCNC: 8.4 MG/DL (ref 8.3–10.1)
CHLORIDE SERPL-SCNC: 105 MMOL/L (ref 100–108)
CO2 SERPL-SCNC: 24 MMOL/L (ref 21–32)
CREAT SERPL-MCNC: 0.51 MG/DL (ref 0.6–1.3)
EOSINOPHIL # BLD AUTO: 0.05 THOUSAND/ΜL (ref 0–0.61)
EOSINOPHIL NFR BLD AUTO: 1 % (ref 0–6)
ERYTHROCYTE [DISTWIDTH] IN BLOOD BY AUTOMATED COUNT: 12.6 % (ref 11.6–15.1)
GFR SERPL CREATININE-BSD FRML MDRD: >60 ML/MIN/1.73SQ M
GLUCOSE SERPL-MCNC: 83 MG/DL (ref 65–140)
HCT VFR BLD AUTO: 34.2 % (ref 34.8–46.1)
HGB BLD-MCNC: 12.3 G/DL (ref 11.5–15.4)
LIPASE SERPL-CCNC: 65 U/L (ref 73–393)
LYMPHOCYTES # BLD AUTO: 2.11 THOUSANDS/ΜL (ref 0.6–4.47)
LYMPHOCYTES NFR BLD AUTO: 29 % (ref 14–44)
MCH RBC QN AUTO: 32.8 PG (ref 26.8–34.3)
MCHC RBC AUTO-ENTMCNC: 36 G/DL (ref 31.4–37.4)
MCV RBC AUTO: 91 FL (ref 82–98)
MONOCYTES # BLD AUTO: 0.43 THOUSAND/ΜL (ref 0.17–1.22)
MONOCYTES NFR BLD AUTO: 6 % (ref 4–12)
NEUTROPHILS # BLD AUTO: 4.79 THOUSANDS/ΜL (ref 1.85–7.62)
NEUTS SEG NFR BLD AUTO: 64 % (ref 43–75)
NRBC BLD AUTO-RTO: 0 /100 WBCS
PLATELET # BLD AUTO: 182 THOUSANDS/UL (ref 149–390)
PMV BLD AUTO: 10.2 FL (ref 8.9–12.7)
POTASSIUM SERPL-SCNC: 3.7 MMOL/L (ref 3.5–5.3)
PROT SERPL-MCNC: 6.8 G/DL (ref 6.4–8.2)
RBC # BLD AUTO: 3.75 MILLION/UL (ref 3.81–5.12)
SODIUM SERPL-SCNC: 139 MMOL/L (ref 136–145)
WBC # BLD AUTO: 7.4 THOUSAND/UL (ref 4.31–10.16)

## 2017-02-10 PROCEDURE — 80053 COMPREHEN METABOLIC PANEL: CPT | Performed by: EMERGENCY MEDICINE

## 2017-02-10 PROCEDURE — 96361 HYDRATE IV INFUSION ADD-ON: CPT

## 2017-02-10 PROCEDURE — 83690 ASSAY OF LIPASE: CPT | Performed by: EMERGENCY MEDICINE

## 2017-02-10 PROCEDURE — 96374 THER/PROPH/DIAG INJ IV PUSH: CPT

## 2017-02-10 PROCEDURE — 96375 TX/PRO/DX INJ NEW DRUG ADDON: CPT

## 2017-02-10 PROCEDURE — 99284 EMERGENCY DEPT VISIT MOD MDM: CPT

## 2017-02-10 PROCEDURE — 36415 COLL VENOUS BLD VENIPUNCTURE: CPT | Performed by: EMERGENCY MEDICINE

## 2017-02-10 PROCEDURE — 85025 COMPLETE CBC W/AUTO DIFF WBC: CPT | Performed by: EMERGENCY MEDICINE

## 2017-02-10 RX ADMIN — SODIUM CHLORIDE 1000 ML: 0.9 INJECTION, SOLUTION INTRAVENOUS at 00:07

## 2017-02-10 RX ADMIN — ONDANSETRON 4 MG: 2 INJECTION INTRAMUSCULAR; INTRAVENOUS at 00:07

## 2017-02-10 RX ADMIN — LORAZEPAM 1 MG: 2 INJECTION INTRAMUSCULAR; INTRAVENOUS at 00:07

## 2017-02-16 ENCOUNTER — TRANSCRIBE ORDERS (OUTPATIENT)
Dept: ADMINISTRATIVE | Facility: HOSPITAL | Age: 22
End: 2017-02-16

## 2017-02-16 ENCOUNTER — ALLSCRIPTS OFFICE VISIT (OUTPATIENT)
Dept: OTHER | Facility: OTHER | Age: 22
End: 2017-02-16

## 2017-02-16 DIAGNOSIS — K59.00 UNSPECIFIED CONSTIPATION: Primary | ICD-10-CM

## 2017-02-23 ENCOUNTER — GENERIC CONVERSION - ENCOUNTER (OUTPATIENT)
Dept: OTHER | Facility: OTHER | Age: 22
End: 2017-02-23

## 2017-02-28 ENCOUNTER — HOSPITAL ENCOUNTER (EMERGENCY)
Facility: HOSPITAL | Age: 22
Discharge: HOME | End: 2017-02-28
Attending: EMERGENCY MEDICINE
Payer: COMMERCIAL

## 2017-02-28 VITALS
RESPIRATION RATE: 16 BRPM | WEIGHT: 95 LBS | DIASTOLIC BLOOD PRESSURE: 58 MMHG | HEART RATE: 99 BPM | OXYGEN SATURATION: 99 % | SYSTOLIC BLOOD PRESSURE: 92 MMHG | BODY MASS INDEX: 19.19 KG/M2 | TEMPERATURE: 97.4 F

## 2017-02-28 DIAGNOSIS — R10.9 CHRONIC ABDOMINAL PAIN: ICD-10-CM

## 2017-02-28 DIAGNOSIS — Z33.1 PREGNANCY, INCIDENTAL: Primary | ICD-10-CM

## 2017-02-28 DIAGNOSIS — G43.909 MIGRAINE HEADACHE: ICD-10-CM

## 2017-02-28 DIAGNOSIS — J06.9 VIRAL URI WITH COUGH: ICD-10-CM

## 2017-02-28 DIAGNOSIS — G89.29 CHRONIC ABDOMINAL PAIN: ICD-10-CM

## 2017-02-28 LAB
BILIRUB UR QL STRIP: NEGATIVE
CLARITY UR: CLEAR
COLOR UR: YELLOW
GLUCOSE UR STRIP-MCNC: NEGATIVE MG/DL
HCG UR QL: POSITIVE
HGB UR QL STRIP.AUTO: NEGATIVE
KETONES UR STRIP-MCNC: NEGATIVE MG/DL
LEUKOCYTE ESTERASE UR QL STRIP: NEGATIVE
NITRITE UR QL STRIP: NEGATIVE
PH UR STRIP.AUTO: 5.5 [PH] (ref 4.5–8)
PROT UR STRIP-MCNC: NEGATIVE MG/DL
SP GR UR STRIP.AUTO: 1.02 (ref 1–1.03)
UROBILINOGEN UR QL STRIP.AUTO: 2 E.U./DL

## 2017-02-28 PROCEDURE — 81003 URINALYSIS AUTO W/O SCOPE: CPT

## 2017-02-28 PROCEDURE — 96374 THER/PROPH/DIAG INJ IV PUSH: CPT

## 2017-02-28 PROCEDURE — 81002 URINALYSIS NONAUTO W/O SCOPE: CPT | Performed by: EMERGENCY MEDICINE

## 2017-02-28 PROCEDURE — 81025 URINE PREGNANCY TEST: CPT | Performed by: EMERGENCY MEDICINE

## 2017-02-28 PROCEDURE — 96361 HYDRATE IV INFUSION ADD-ON: CPT

## 2017-02-28 PROCEDURE — 96375 TX/PRO/DX INJ NEW DRUG ADDON: CPT

## 2017-02-28 PROCEDURE — 99284 EMERGENCY DEPT VISIT MOD MDM: CPT

## 2017-02-28 RX ORDER — DICYCLOMINE HCL 20 MG
20 TABLET ORAL ONCE
Status: DISCONTINUED | OUTPATIENT
Start: 2017-02-28 | End: 2017-02-28

## 2017-02-28 RX ORDER — DIPHENHYDRAMINE HYDROCHLORIDE 50 MG/ML
25 INJECTION INTRAMUSCULAR; INTRAVENOUS ONCE
Status: COMPLETED | OUTPATIENT
Start: 2017-02-28 | End: 2017-02-28

## 2017-02-28 RX ORDER — METOCLOPRAMIDE HYDROCHLORIDE 5 MG/ML
10 INJECTION INTRAMUSCULAR; INTRAVENOUS ONCE
Status: COMPLETED | OUTPATIENT
Start: 2017-02-28 | End: 2017-02-28

## 2017-02-28 RX ADMIN — METOCLOPRAMIDE 10 MG: 5 INJECTION, SOLUTION INTRAMUSCULAR; INTRAVENOUS at 04:51

## 2017-02-28 RX ADMIN — DIPHENHYDRAMINE HYDROCHLORIDE 25 MG: 50 INJECTION, SOLUTION INTRAMUSCULAR; INTRAVENOUS at 04:51

## 2017-02-28 RX ADMIN — SODIUM CHLORIDE 1000 ML: 0.9 INJECTION, SOLUTION INTRAVENOUS at 04:44

## 2017-03-01 ENCOUNTER — GENERIC CONVERSION - ENCOUNTER (OUTPATIENT)
Dept: OTHER | Facility: OTHER | Age: 22
End: 2017-03-01

## 2017-03-01 ENCOUNTER — ALLSCRIPTS OFFICE VISIT (OUTPATIENT)
Dept: OTHER | Facility: OTHER | Age: 22
End: 2017-03-01

## 2017-03-01 ENCOUNTER — APPOINTMENT (OUTPATIENT)
Dept: LAB | Facility: HOSPITAL | Age: 22
End: 2017-03-01
Attending: OBSTETRICS & GYNECOLOGY
Payer: COMMERCIAL

## 2017-03-01 DIAGNOSIS — Z34.90 ENCOUNTER FOR SUPERVISION OF NORMAL PREGNANCY: ICD-10-CM

## 2017-03-01 DIAGNOSIS — Z33.1 PREGNANT STATE, INCIDENTAL: ICD-10-CM

## 2017-03-01 DIAGNOSIS — O26.899 OTHER SPECIFIED PREGNANCY RELATED CONDITIONS, UNSPECIFIED TRIMESTER: ICD-10-CM

## 2017-03-01 LAB
B-HCG SERPL-ACNC: ABNORMAL MIU/ML
HCG, QUALITATIVE (HISTORICAL): POSITIVE
PROGEST SERPL-MCNC: 17.5 NG/ML
TSH SERPL DL<=0.05 MIU/L-ACNC: 0.25 UIU/ML (ref 0.36–3.74)

## 2017-03-01 PROCEDURE — 36415 COLL VENOUS BLD VENIPUNCTURE: CPT

## 2017-03-01 PROCEDURE — 84144 ASSAY OF PROGESTERONE: CPT

## 2017-03-01 PROCEDURE — 84702 CHORIONIC GONADOTROPIN TEST: CPT

## 2017-03-01 PROCEDURE — 84443 ASSAY THYROID STIM HORMONE: CPT

## 2017-03-03 ENCOUNTER — GENERIC CONVERSION - ENCOUNTER (OUTPATIENT)
Dept: OTHER | Facility: OTHER | Age: 22
End: 2017-03-03

## 2017-03-17 ENCOUNTER — HOSPITAL ENCOUNTER (EMERGENCY)
Facility: HOSPITAL | Age: 22
Discharge: HOME | End: 2017-03-17
Attending: EMERGENCY MEDICINE | Admitting: EMERGENCY MEDICINE
Payer: COMMERCIAL

## 2017-03-17 VITALS
RESPIRATION RATE: 18 BRPM | OXYGEN SATURATION: 95 % | BODY MASS INDEX: 18.78 KG/M2 | DIASTOLIC BLOOD PRESSURE: 57 MMHG | TEMPERATURE: 97.4 F | WEIGHT: 93 LBS | HEART RATE: 85 BPM | SYSTOLIC BLOOD PRESSURE: 98 MMHG

## 2017-03-17 DIAGNOSIS — Z34.91 FIRST TRIMESTER PREGNANCY: Primary | ICD-10-CM

## 2017-03-17 LAB
ANION GAP SERPL CALCULATED.3IONS-SCNC: 8 MMOL/L (ref 4–13)
B-HCG SERPL-ACNC: ABNORMAL MIU/ML
BACTERIA UR QL AUTO: ABNORMAL /HPF
BASOPHILS # BLD AUTO: 0.01 THOUSANDS/ΜL (ref 0–0.1)
BASOPHILS NFR BLD AUTO: 0 % (ref 0–1)
BILIRUB UR QL STRIP: NEGATIVE
BUN SERPL-MCNC: 8 MG/DL (ref 5–25)
CALCIUM SERPL-MCNC: 9.2 MG/DL (ref 8.3–10.1)
CHLORIDE SERPL-SCNC: 102 MMOL/L (ref 100–108)
CLARITY UR: ABNORMAL
CO2 SERPL-SCNC: 27 MMOL/L (ref 21–32)
COLOR UR: YELLOW
CREAT SERPL-MCNC: 0.39 MG/DL (ref 0.6–1.3)
EOSINOPHIL # BLD AUTO: 0.03 THOUSAND/ΜL (ref 0–0.61)
EOSINOPHIL NFR BLD AUTO: 0 % (ref 0–6)
ERYTHROCYTE [DISTWIDTH] IN BLOOD BY AUTOMATED COUNT: 12.2 % (ref 11.6–15.1)
GFR SERPL CREATININE-BSD FRML MDRD: >60 ML/MIN/1.73SQ M
GLUCOSE SERPL-MCNC: 83 MG/DL (ref 65–140)
GLUCOSE UR STRIP-MCNC: NEGATIVE MG/DL
HCT VFR BLD AUTO: 33.6 % (ref 34.8–46.1)
HGB BLD-MCNC: 11.9 G/DL (ref 11.5–15.4)
HGB UR QL STRIP.AUTO: NEGATIVE
HYALINE CASTS #/AREA URNS LPF: ABNORMAL /LPF
KETONES UR STRIP-MCNC: NEGATIVE MG/DL
LEUKOCYTE ESTERASE UR QL STRIP: ABNORMAL
LYMPHOCYTES # BLD AUTO: 1.73 THOUSANDS/ΜL (ref 0.6–4.47)
LYMPHOCYTES NFR BLD AUTO: 22 % (ref 14–44)
MCH RBC QN AUTO: 32.4 PG (ref 26.8–34.3)
MCHC RBC AUTO-ENTMCNC: 35.4 G/DL (ref 31.4–37.4)
MCV RBC AUTO: 92 FL (ref 82–98)
MONOCYTES # BLD AUTO: 0.59 THOUSAND/ΜL (ref 0.17–1.22)
MONOCYTES NFR BLD AUTO: 7 % (ref 4–12)
NEUTROPHILS # BLD AUTO: 5.67 THOUSANDS/ΜL (ref 1.85–7.62)
NEUTS SEG NFR BLD AUTO: 71 % (ref 43–75)
NITRITE UR QL STRIP: NEGATIVE
NON-SQ EPI CELLS URNS QL MICRO: ABNORMAL /HPF
NRBC BLD AUTO-RTO: 0 /100 WBCS
PH UR STRIP.AUTO: 7.5 [PH] (ref 4.5–8)
PLATELET # BLD AUTO: 208 THOUSANDS/UL (ref 149–390)
PMV BLD AUTO: 10 FL (ref 8.9–12.7)
POTASSIUM SERPL-SCNC: 4 MMOL/L (ref 3.5–5.3)
PROT UR STRIP-MCNC: NEGATIVE MG/DL
RBC # BLD AUTO: 3.67 MILLION/UL (ref 3.81–5.12)
RBC #/AREA URNS AUTO: ABNORMAL /HPF
SODIUM SERPL-SCNC: 137 MMOL/L (ref 136–145)
SP GR UR STRIP.AUTO: 1.02 (ref 1–1.03)
UROBILINOGEN UR QL STRIP.AUTO: 0.2 E.U./DL
WBC # BLD AUTO: 8.04 THOUSAND/UL (ref 4.31–10.16)
WBC #/AREA URNS AUTO: ABNORMAL /HPF

## 2017-03-17 PROCEDURE — 87086 URINE CULTURE/COLONY COUNT: CPT

## 2017-03-17 PROCEDURE — 81001 URINALYSIS AUTO W/SCOPE: CPT

## 2017-03-17 PROCEDURE — 85025 COMPLETE CBC W/AUTO DIFF WBC: CPT | Performed by: EMERGENCY MEDICINE

## 2017-03-17 PROCEDURE — 36415 COLL VENOUS BLD VENIPUNCTURE: CPT | Performed by: EMERGENCY MEDICINE

## 2017-03-17 PROCEDURE — 99284 EMERGENCY DEPT VISIT MOD MDM: CPT

## 2017-03-17 PROCEDURE — 80048 BASIC METABOLIC PNL TOTAL CA: CPT | Performed by: EMERGENCY MEDICINE

## 2017-03-17 PROCEDURE — 96360 HYDRATION IV INFUSION INIT: CPT

## 2017-03-17 PROCEDURE — 84702 CHORIONIC GONADOTROPIN TEST: CPT | Performed by: EMERGENCY MEDICINE

## 2017-03-17 RX ORDER — PYRIDOXINE HCL (VITAMIN B6) 50 MG
50 TABLET ORAL ONCE
Status: COMPLETED | OUTPATIENT
Start: 2017-03-17 | End: 2017-03-17

## 2017-03-17 RX ORDER — PYRIDOXINE HCL (VITAMIN B6) 50 MG
50 TABLET ORAL EVERY 8 HOURS PRN
Qty: 30 TABLET | Refills: 0 | Status: SHIPPED | OUTPATIENT
Start: 2017-03-17 | End: 2017-03-22

## 2017-03-17 RX ADMIN — DOXYLAMINE SUCCINATE 12.5 MG: 25 TABLET ORAL at 03:20

## 2017-03-17 RX ADMIN — Medication 50 MG: at 03:04

## 2017-03-17 RX ADMIN — SODIUM CHLORIDE 1000 ML: 0.9 INJECTION, SOLUTION INTRAVENOUS at 02:38

## 2017-03-18 LAB — BACTERIA UR CULT: NORMAL

## 2017-03-22 ENCOUNTER — HOSPITAL ENCOUNTER (EMERGENCY)
Facility: HOSPITAL | Age: 22
Discharge: HOME | End: 2017-03-22
Attending: EMERGENCY MEDICINE | Admitting: EMERGENCY MEDICINE
Payer: COMMERCIAL

## 2017-03-22 ENCOUNTER — ALLSCRIPTS OFFICE VISIT (OUTPATIENT)
Dept: OTHER | Facility: OTHER | Age: 22
End: 2017-03-22

## 2017-03-22 VITALS
SYSTOLIC BLOOD PRESSURE: 92 MMHG | RESPIRATION RATE: 18 BRPM | HEART RATE: 86 BPM | TEMPERATURE: 98.4 F | WEIGHT: 89.2 LBS | BODY MASS INDEX: 18.02 KG/M2 | OXYGEN SATURATION: 99 % | DIASTOLIC BLOOD PRESSURE: 56 MMHG

## 2017-03-22 DIAGNOSIS — B34.9 VIRAL SYNDROME: ICD-10-CM

## 2017-03-22 DIAGNOSIS — N39.0 UTI (URINARY TRACT INFECTION): Primary | ICD-10-CM

## 2017-03-22 LAB
AMORPH URATE CRY URNS QL MICRO: ABNORMAL /HPF
ANION GAP SERPL CALCULATED.3IONS-SCNC: 8 MMOL/L (ref 4–13)
BACTERIA UR QL AUTO: ABNORMAL /HPF
BASOPHILS # BLD AUTO: 0 THOUSANDS/ΜL (ref 0–0.1)
BASOPHILS NFR BLD AUTO: 0 % (ref 0–1)
BILIRUB UR QL STRIP: NEGATIVE
BUN SERPL-MCNC: 6 MG/DL (ref 5–25)
CALCIUM SERPL-MCNC: 8.9 MG/DL (ref 8.3–10.1)
CHLORIDE SERPL-SCNC: 101 MMOL/L (ref 100–108)
CLARITY UR: CLEAR
CO2 SERPL-SCNC: 27 MMOL/L (ref 21–32)
COLOR UR: YELLOW
COLOR, POC: YELLOW
CREAT SERPL-MCNC: 0.4 MG/DL (ref 0.6–1.3)
EOSINOPHIL # BLD AUTO: 0.01 THOUSAND/ΜL (ref 0–0.61)
EOSINOPHIL NFR BLD AUTO: 0 % (ref 0–6)
ERYTHROCYTE [DISTWIDTH] IN BLOOD BY AUTOMATED COUNT: 11.9 % (ref 11.6–15.1)
GFR SERPL CREATININE-BSD FRML MDRD: >60 ML/MIN/1.73SQ M
GLUCOSE SERPL-MCNC: 97 MG/DL (ref 65–140)
GLUCOSE UR STRIP-MCNC: NEGATIVE MG/DL
HCT VFR BLD AUTO: 29.7 % (ref 34.8–46.1)
HGB BLD-MCNC: 10.7 G/DL (ref 11.5–15.4)
HGB UR QL STRIP.AUTO: NEGATIVE
KETONES UR STRIP-MCNC: NEGATIVE MG/DL
LEUKOCYTE ESTERASE UR QL STRIP: ABNORMAL
LYMPHOCYTES # BLD AUTO: 0.86 THOUSANDS/ΜL (ref 0.6–4.47)
LYMPHOCYTES NFR BLD AUTO: 10 % (ref 14–44)
MCH RBC QN AUTO: 32.2 PG (ref 26.8–34.3)
MCHC RBC AUTO-ENTMCNC: 36 G/DL (ref 31.4–37.4)
MCV RBC AUTO: 90 FL (ref 82–98)
MONOCYTES # BLD AUTO: 0.74 THOUSAND/ΜL (ref 0.17–1.22)
MONOCYTES NFR BLD AUTO: 8 % (ref 4–12)
MUCOUS THREADS UR QL AUTO: ABNORMAL
NEUTROPHILS # BLD AUTO: 7.45 THOUSANDS/ΜL (ref 1.85–7.62)
NEUTS SEG NFR BLD AUTO: 82 % (ref 43–75)
NITRITE UR QL STRIP: NEGATIVE
NON-SQ EPI CELLS URNS QL MICRO: ABNORMAL /HPF
NRBC BLD AUTO-RTO: 0 /100 WBCS
PH UR STRIP.AUTO: 6 [PH] (ref 4.5–8)
PLATELET # BLD AUTO: 198 THOUSANDS/UL (ref 149–390)
PMV BLD AUTO: 9.2 FL (ref 8.9–12.7)
POTASSIUM SERPL-SCNC: 3.7 MMOL/L (ref 3.5–5.3)
PROT UR STRIP-MCNC: NEGATIVE MG/DL
RBC # BLD AUTO: 3.32 MILLION/UL (ref 3.81–5.12)
RBC #/AREA URNS AUTO: ABNORMAL /HPF
S PYO AG THROAT QL: NEGATIVE
SODIUM SERPL-SCNC: 136 MMOL/L (ref 136–145)
SP GR UR STRIP.AUTO: 1.02 (ref 1–1.03)
UROBILINOGEN UR QL STRIP.AUTO: 0.2 E.U./DL
WBC # BLD AUTO: 9.06 THOUSAND/UL (ref 4.31–10.16)
WBC #/AREA URNS AUTO: ABNORMAL /HPF

## 2017-03-22 PROCEDURE — 87430 STREP A AG IA: CPT | Performed by: EMERGENCY MEDICINE

## 2017-03-22 PROCEDURE — 96374 THER/PROPH/DIAG INJ IV PUSH: CPT

## 2017-03-22 PROCEDURE — 36415 COLL VENOUS BLD VENIPUNCTURE: CPT | Performed by: EMERGENCY MEDICINE

## 2017-03-22 PROCEDURE — 81002 URINALYSIS NONAUTO W/O SCOPE: CPT | Performed by: EMERGENCY MEDICINE

## 2017-03-22 PROCEDURE — 85025 COMPLETE CBC W/AUTO DIFF WBC: CPT | Performed by: EMERGENCY MEDICINE

## 2017-03-22 PROCEDURE — 80048 BASIC METABOLIC PNL TOTAL CA: CPT | Performed by: EMERGENCY MEDICINE

## 2017-03-22 PROCEDURE — 87086 URINE CULTURE/COLONY COUNT: CPT

## 2017-03-22 PROCEDURE — 87070 CULTURE OTHR SPECIMN AEROBIC: CPT | Performed by: EMERGENCY MEDICINE

## 2017-03-22 PROCEDURE — 96375 TX/PRO/DX INJ NEW DRUG ADDON: CPT

## 2017-03-22 PROCEDURE — 99284 EMERGENCY DEPT VISIT MOD MDM: CPT

## 2017-03-22 PROCEDURE — 96361 HYDRATE IV INFUSION ADD-ON: CPT

## 2017-03-22 PROCEDURE — 81001 URINALYSIS AUTO W/SCOPE: CPT

## 2017-03-22 RX ORDER — ACETAMINOPHEN 325 MG/1
650 TABLET ORAL ONCE
Status: COMPLETED | OUTPATIENT
Start: 2017-03-22 | End: 2017-03-22

## 2017-03-22 RX ORDER — ACETAMINOPHEN 325 MG/1
TABLET ORAL
Status: COMPLETED
Start: 2017-03-22 | End: 2017-03-22

## 2017-03-22 RX ORDER — PROMETHAZINE HYDROCHLORIDE 25 MG/1
12.5 SUPPOSITORY RECTAL EVERY 6 HOURS PRN
Qty: 12 SUPPOSITORY | Refills: 0 | Status: SHIPPED | OUTPATIENT
Start: 2017-03-22 | End: 2017-05-05

## 2017-03-22 RX ORDER — CEPHALEXIN 500 MG/1
500 CAPSULE ORAL EVERY 12 HOURS
Qty: 14 CAPSULE | Refills: 0 | Status: SHIPPED | OUTPATIENT
Start: 2017-03-22 | End: 2017-03-29

## 2017-03-22 RX ORDER — ONDANSETRON 2 MG/ML
4 INJECTION INTRAMUSCULAR; INTRAVENOUS ONCE
Status: COMPLETED | OUTPATIENT
Start: 2017-03-22 | End: 2017-03-22

## 2017-03-22 RX ORDER — MORPHINE SULFATE 4 MG/ML
4 INJECTION, SOLUTION INTRAMUSCULAR; INTRAVENOUS ONCE
Status: COMPLETED | OUTPATIENT
Start: 2017-03-22 | End: 2017-03-22

## 2017-03-22 RX ADMIN — MORPHINE SULFATE 4 MG: 4 INJECTION, SOLUTION INTRAMUSCULAR; INTRAVENOUS at 10:24

## 2017-03-22 RX ADMIN — ONDANSETRON 4 MG: 2 INJECTION INTRAMUSCULAR; INTRAVENOUS at 10:22

## 2017-03-22 RX ADMIN — SODIUM CHLORIDE 1000 ML: 0.9 INJECTION, SOLUTION INTRAVENOUS at 10:17

## 2017-03-22 RX ADMIN — ACETAMINOPHEN 650 MG: 325 TABLET ORAL at 11:58

## 2017-03-22 RX ADMIN — ACETAMINOPHEN 650 MG: 325 TABLET, FILM COATED ORAL at 11:58

## 2017-03-23 LAB — BACTERIA UR CULT: NORMAL

## 2017-03-24 LAB — BACTERIA THROAT CULT: NORMAL

## 2017-03-29 ENCOUNTER — APPOINTMENT (OUTPATIENT)
Dept: LAB | Facility: HOSPITAL | Age: 22
End: 2017-03-29
Attending: OBSTETRICS & GYNECOLOGY
Payer: COMMERCIAL

## 2017-03-29 ENCOUNTER — ALLSCRIPTS OFFICE VISIT (OUTPATIENT)
Dept: OTHER | Facility: OTHER | Age: 22
End: 2017-03-29

## 2017-03-29 DIAGNOSIS — Z34.90 ENCOUNTER FOR SUPERVISION OF NORMAL PREGNANCY: ICD-10-CM

## 2017-03-29 LAB
ABO GROUP BLD: NORMAL
BACTERIA UR QL AUTO: ABNORMAL /HPF
BASOPHILS # BLD MANUAL: 0.08 THOUSAND/UL (ref 0–0.1)
BASOPHILS NFR MAR MANUAL: 1 % (ref 0–1)
BILIRUB UR QL STRIP: NEGATIVE
BLD GP AB SCN SERPL QL: NEGATIVE
CLARITY UR: ABNORMAL
COLOR UR: YELLOW
EOSINOPHIL # BLD MANUAL: 0 THOUSAND/UL (ref 0–0.4)
EOSINOPHIL NFR BLD MANUAL: 0 % (ref 0–6)
ERYTHROCYTE [DISTWIDTH] IN BLOOD BY AUTOMATED COUNT: 12.5 % (ref 11.6–15.1)
GLUCOSE UR STRIP-MCNC: NEGATIVE MG/DL
HCT VFR BLD AUTO: 33.2 % (ref 34.8–46.1)
HGB BLD-MCNC: 11.7 G/DL (ref 11.5–15.4)
HGB UR QL STRIP.AUTO: NEGATIVE
KETONES UR STRIP-MCNC: NEGATIVE MG/DL
LEUKOCYTE ESTERASE UR QL STRIP: ABNORMAL
LYMPHOCYTES # BLD AUTO: 1.72 THOUSAND/UL (ref 0.6–4.47)
LYMPHOCYTES # BLD AUTO: 22 % (ref 14–44)
MCH RBC QN AUTO: 31.8 PG (ref 26.8–34.3)
MCHC RBC AUTO-ENTMCNC: 35.2 G/DL (ref 31.4–37.4)
MCV RBC AUTO: 90 FL (ref 82–98)
MONOCYTES # BLD AUTO: 0.47 THOUSAND/UL (ref 0–1.22)
MONOCYTES NFR BLD: 6 % (ref 4–12)
NEUTROPHILS # BLD MANUAL: 5.55 THOUSAND/UL (ref 1.85–7.62)
NEUTS BAND NFR BLD MANUAL: 3 % (ref 0–8)
NEUTS SEG NFR BLD AUTO: 68 % (ref 43–75)
NITRITE UR QL STRIP: NEGATIVE
NON-SQ EPI CELLS URNS QL MICRO: ABNORMAL /HPF
NRBC BLD AUTO-RTO: 1 /100 WBCS
PH UR STRIP.AUTO: 6.5 [PH] (ref 4.5–8)
PLATELET # BLD AUTO: 289 THOUSANDS/UL (ref 149–390)
PLATELET BLD QL SMEAR: ADEQUATE
PMV BLD AUTO: 9.9 FL (ref 8.9–12.7)
PROT UR STRIP-MCNC: NEGATIVE MG/DL
RBC # BLD AUTO: 3.68 MILLION/UL (ref 3.81–5.12)
RBC #/AREA URNS AUTO: ABNORMAL /HPF
RH BLD: POSITIVE
SP GR UR STRIP.AUTO: 1.02 (ref 1–1.03)
UROBILINOGEN UR QL STRIP.AUTO: 1 E.U./DL
WBC # BLD AUTO: 7.81 THOUSAND/UL (ref 4.31–10.16)
WBC #/AREA URNS AUTO: ABNORMAL /HPF

## 2017-03-29 PROCEDURE — 87086 URINE CULTURE/COLONY COUNT: CPT

## 2017-03-29 PROCEDURE — 80081 OBSTETRIC PANEL INC HIV TSTG: CPT

## 2017-03-29 PROCEDURE — 81220 CFTR GENE COM VARIANTS: CPT

## 2017-03-29 PROCEDURE — 36415 COLL VENOUS BLD VENIPUNCTURE: CPT

## 2017-03-29 PROCEDURE — 81001 URINALYSIS AUTO W/SCOPE: CPT

## 2017-03-30 LAB
HBV SURFACE AG SER QL: NORMAL
RPR SER QL: NORMAL
RUBV IGG SERPL IA-ACNC: 13.9 IU/ML

## 2017-03-31 LAB
BACTERIA UR CULT: NORMAL
HIV 1+2 AB+HIV1 P24 AG SERPL QL IA: NORMAL

## 2017-04-04 LAB
CF COMMENT: NORMAL
CFTR MUT ANL BLD/T: NORMAL

## 2017-04-12 ENCOUNTER — ALLSCRIPTS OFFICE VISIT (OUTPATIENT)
Dept: OTHER | Facility: OTHER | Age: 22
End: 2017-04-12

## 2017-04-12 DIAGNOSIS — R55 SYNCOPE AND COLLAPSE: ICD-10-CM

## 2017-04-12 DIAGNOSIS — O09.211 SUPERVISION OF PREGNANCY WITH HISTORY OF PRE-TERM LABOR IN FIRST TRIMESTER: ICD-10-CM

## 2017-04-12 LAB
CLUE CELL (HISTORICAL): NORMAL
HYPHAL YEAST (HISTORICAL): NORMAL
PH UR STRIP.AUTO: 5 [PH]
TRICHOMONAS (HISTORICAL): NORMAL
YEAST (HISTORICAL): NORMAL

## 2017-04-12 PROCEDURE — G0145 SCR C/V CYTO,THINLAYER,RESCR: HCPCS | Performed by: OBSTETRICS & GYNECOLOGY

## 2017-04-13 ENCOUNTER — LAB REQUISITION (OUTPATIENT)
Dept: LAB | Facility: HOSPITAL | Age: 22
End: 2017-04-13
Payer: COMMERCIAL

## 2017-04-13 DIAGNOSIS — Z34.90 ENCOUNTER FOR SUPERVISION OF NORMAL PREGNANCY: ICD-10-CM

## 2017-04-13 DIAGNOSIS — Z11.3 ENCOUNTER FOR SCREENING FOR INFECTIONS WITH PREDOMINANTLY SEXUAL MODE OF TRANSMISSION: ICD-10-CM

## 2017-04-13 DIAGNOSIS — Z12.4 ENCOUNTER FOR SCREENING FOR MALIGNANT NEOPLASM OF CERVIX: ICD-10-CM

## 2017-04-13 DIAGNOSIS — N39.0 URINARY TRACT INFECTION: ICD-10-CM

## 2017-04-13 PROCEDURE — 87086 URINE CULTURE/COLONY COUNT: CPT | Performed by: OBSTETRICS & GYNECOLOGY

## 2017-04-13 PROCEDURE — 87591 N.GONORRHOEAE DNA AMP PROB: CPT | Performed by: OBSTETRICS & GYNECOLOGY

## 2017-04-13 PROCEDURE — 87491 CHLMYD TRACH DNA AMP PROBE: CPT | Performed by: OBSTETRICS & GYNECOLOGY

## 2017-04-14 LAB — BACTERIA UR CULT: NORMAL

## 2017-04-15 ENCOUNTER — HOSPITAL ENCOUNTER (EMERGENCY)
Facility: HOSPITAL | Age: 22
Discharge: HOME | End: 2017-04-16
Attending: EMERGENCY MEDICINE
Payer: COMMERCIAL

## 2017-04-15 DIAGNOSIS — R51 HEADACHE(784.0): ICD-10-CM

## 2017-04-15 DIAGNOSIS — M79.10 MYALGIA: Primary | ICD-10-CM

## 2017-04-16 VITALS
RESPIRATION RATE: 18 BRPM | TEMPERATURE: 98.4 F | HEART RATE: 80 BPM | OXYGEN SATURATION: 99 % | DIASTOLIC BLOOD PRESSURE: 51 MMHG | SYSTOLIC BLOOD PRESSURE: 106 MMHG

## 2017-04-16 LAB
ALBUMIN SERPL BCP-MCNC: 3.2 G/DL (ref 3.5–5)
ALP SERPL-CCNC: 95 U/L (ref 46–116)
ALT SERPL W P-5'-P-CCNC: 15 U/L (ref 12–78)
ANION GAP SERPL CALCULATED.3IONS-SCNC: 8 MMOL/L (ref 4–13)
AST SERPL W P-5'-P-CCNC: 14 U/L (ref 5–45)
BACTERIA UR QL AUTO: ABNORMAL /HPF
BASOPHILS # BLD AUTO: 0.02 THOUSANDS/ΜL (ref 0–0.1)
BASOPHILS NFR BLD AUTO: 0 % (ref 0–1)
BILIRUB SERPL-MCNC: 0.28 MG/DL (ref 0.2–1)
BILIRUB UR QL STRIP: NEGATIVE
BUN SERPL-MCNC: 10 MG/DL (ref 5–25)
CALCIUM SERPL-MCNC: 8.5 MG/DL (ref 8.3–10.1)
CHLORIDE SERPL-SCNC: 102 MMOL/L (ref 100–108)
CLARITY UR: CLEAR
CO2 SERPL-SCNC: 26 MMOL/L (ref 21–32)
COLOR UR: YELLOW
CREAT SERPL-MCNC: 0.38 MG/DL (ref 0.6–1.3)
EOSINOPHIL # BLD AUTO: 0.16 THOUSAND/ΜL (ref 0–0.61)
EOSINOPHIL NFR BLD AUTO: 3 % (ref 0–6)
ERYTHROCYTE [DISTWIDTH] IN BLOOD BY AUTOMATED COUNT: 13.9 % (ref 11.6–15.1)
GFR SERPL CREATININE-BSD FRML MDRD: >60 ML/MIN/1.73SQ M
GLUCOSE SERPL-MCNC: 105 MG/DL (ref 65–140)
GLUCOSE UR STRIP-MCNC: NEGATIVE MG/DL
HCT VFR BLD AUTO: 30.2 % (ref 34.8–46.1)
HGB BLD-MCNC: 10.7 G/DL (ref 11.5–15.4)
HGB UR QL STRIP.AUTO: NEGATIVE
KETONES UR STRIP-MCNC: NEGATIVE MG/DL
LEUKOCYTE ESTERASE UR QL STRIP: ABNORMAL
LYMPHOCYTES # BLD AUTO: 1.26 THOUSANDS/ΜL (ref 0.6–4.47)
LYMPHOCYTES NFR BLD AUTO: 23 % (ref 14–44)
MCH RBC QN AUTO: 32.6 PG (ref 26.8–34.3)
MCHC RBC AUTO-ENTMCNC: 35.4 G/DL (ref 31.4–37.4)
MCV RBC AUTO: 92 FL (ref 82–98)
MONOCYTES # BLD AUTO: 0.57 THOUSAND/ΜL (ref 0.17–1.22)
MONOCYTES NFR BLD AUTO: 10 % (ref 4–12)
NEUTROPHILS # BLD AUTO: 3.58 THOUSANDS/ΜL (ref 1.85–7.62)
NEUTS SEG NFR BLD AUTO: 64 % (ref 43–75)
NITRITE UR QL STRIP: NEGATIVE
NON-SQ EPI CELLS URNS QL MICRO: ABNORMAL /HPF
NRBC BLD AUTO-RTO: 0 /100 WBCS
PH UR STRIP.AUTO: 7 [PH] (ref 4.5–8)
PLATELET # BLD AUTO: 195 THOUSANDS/UL (ref 149–390)
PMV BLD AUTO: 9.6 FL (ref 8.9–12.7)
POTASSIUM SERPL-SCNC: 3.5 MMOL/L (ref 3.5–5.3)
PROT SERPL-MCNC: 6.9 G/DL (ref 6.4–8.2)
PROT UR STRIP-MCNC: NEGATIVE MG/DL
RBC # BLD AUTO: 3.28 MILLION/UL (ref 3.81–5.12)
RBC #/AREA URNS AUTO: ABNORMAL /HPF
SODIUM SERPL-SCNC: 136 MMOL/L (ref 136–145)
SP GR UR STRIP.AUTO: 1.01 (ref 1–1.03)
UROBILINOGEN UR QL STRIP.AUTO: 2 E.U./DL
WBC # BLD AUTO: 5.59 THOUSAND/UL (ref 4.31–10.16)
WBC #/AREA URNS AUTO: ABNORMAL /HPF

## 2017-04-16 PROCEDURE — 96361 HYDRATE IV INFUSION ADD-ON: CPT

## 2017-04-16 PROCEDURE — 96375 TX/PRO/DX INJ NEW DRUG ADDON: CPT

## 2017-04-16 PROCEDURE — 81002 URINALYSIS NONAUTO W/O SCOPE: CPT | Performed by: EMERGENCY MEDICINE

## 2017-04-16 PROCEDURE — 36415 COLL VENOUS BLD VENIPUNCTURE: CPT | Performed by: EMERGENCY MEDICINE

## 2017-04-16 PROCEDURE — 96374 THER/PROPH/DIAG INJ IV PUSH: CPT

## 2017-04-16 PROCEDURE — 81001 URINALYSIS AUTO W/SCOPE: CPT

## 2017-04-16 PROCEDURE — 80053 COMPREHEN METABOLIC PANEL: CPT | Performed by: EMERGENCY MEDICINE

## 2017-04-16 PROCEDURE — 85025 COMPLETE CBC W/AUTO DIFF WBC: CPT | Performed by: EMERGENCY MEDICINE

## 2017-04-16 PROCEDURE — 87086 URINE CULTURE/COLONY COUNT: CPT

## 2017-04-16 PROCEDURE — 99283 EMERGENCY DEPT VISIT LOW MDM: CPT

## 2017-04-16 RX ORDER — METOCLOPRAMIDE HYDROCHLORIDE 5 MG/ML
10 INJECTION INTRAMUSCULAR; INTRAVENOUS ONCE
Status: COMPLETED | OUTPATIENT
Start: 2017-04-16 | End: 2017-04-16

## 2017-04-16 RX ORDER — DIPHENHYDRAMINE HYDROCHLORIDE 50 MG/ML
25 INJECTION INTRAMUSCULAR; INTRAVENOUS ONCE
Status: COMPLETED | OUTPATIENT
Start: 2017-04-16 | End: 2017-04-16

## 2017-04-16 RX ADMIN — DIPHENHYDRAMINE HYDROCHLORIDE 25 MG: 50 INJECTION, SOLUTION INTRAMUSCULAR; INTRAVENOUS at 00:53

## 2017-04-16 RX ADMIN — METOCLOPRAMIDE 10 MG: 5 INJECTION, SOLUTION INTRAMUSCULAR; INTRAVENOUS at 00:58

## 2017-04-16 RX ADMIN — SODIUM CHLORIDE 1000 ML: 0.9 INJECTION, SOLUTION INTRAVENOUS at 00:53

## 2017-04-17 LAB
BACTERIA UR CULT: NORMAL
CHLAMYDIA DNA CVX QL NAA+PROBE: ABNORMAL
N GONORRHOEA DNA GENITAL QL NAA+PROBE: ABNORMAL

## 2017-04-18 ENCOUNTER — GENERIC CONVERSION - ENCOUNTER (OUTPATIENT)
Dept: OTHER | Facility: OTHER | Age: 22
End: 2017-04-18

## 2017-04-19 LAB
LAB AP GYN PRIMARY INTERPRETATION: NORMAL
Lab: NORMAL
PATH INTERP SPEC-IMP: NORMAL

## 2017-04-20 ENCOUNTER — GENERIC CONVERSION - ENCOUNTER (OUTPATIENT)
Dept: OTHER | Facility: OTHER | Age: 22
End: 2017-04-20

## 2017-04-20 ENCOUNTER — ALLSCRIPTS OFFICE VISIT (OUTPATIENT)
Dept: PERINATAL CARE | Facility: CLINIC | Age: 22
End: 2017-04-20
Payer: COMMERCIAL

## 2017-04-20 PROCEDURE — 76801 OB US < 14 WKS SINGLE FETUS: CPT | Performed by: OBSTETRICS & GYNECOLOGY

## 2017-04-20 PROCEDURE — 76813 OB US NUCHAL MEAS 1 GEST: CPT | Performed by: OBSTETRICS & GYNECOLOGY

## 2017-04-25 ENCOUNTER — GENERIC CONVERSION - ENCOUNTER (OUTPATIENT)
Dept: OTHER | Facility: OTHER | Age: 22
End: 2017-04-25

## 2017-05-05 ENCOUNTER — HOSPITAL ENCOUNTER (EMERGENCY)
Facility: HOSPITAL | Age: 22
Discharge: LEFT BEFORE DISCHARGE | End: 2017-05-05
Attending: EMERGENCY MEDICINE
Payer: COMMERCIAL

## 2017-05-05 VITALS
WEIGHT: 95.3 LBS | SYSTOLIC BLOOD PRESSURE: 107 MMHG | HEART RATE: 77 BPM | HEIGHT: 59 IN | BODY MASS INDEX: 19.21 KG/M2 | DIASTOLIC BLOOD PRESSURE: 64 MMHG | RESPIRATION RATE: 18 BRPM | OXYGEN SATURATION: 99 % | TEMPERATURE: 98.1 F

## 2017-05-05 DIAGNOSIS — R10.2 ACUTE SUPRAPUBIC PAIN: Primary | ICD-10-CM

## 2017-05-05 DIAGNOSIS — R11.2 NAUSEA AND VOMITING: ICD-10-CM

## 2017-05-05 DIAGNOSIS — R35.0 URINARY FREQUENCY: ICD-10-CM

## 2017-05-05 DIAGNOSIS — Z34.90 INTRAUTERINE PREGNANCY: ICD-10-CM

## 2017-05-05 LAB
ANION GAP BLD CALC-SCNC: 17 MMOL/L (ref 4–13)
BACTERIA UR QL AUTO: ABNORMAL /HPF
BILIRUB UR QL STRIP: NEGATIVE
BUN BLD-MCNC: <3 MG/DL (ref 5–25)
CA-I BLD-SCNC: 1.17 MMOL/L (ref 1.12–1.32)
CHLORIDE BLD-SCNC: 102 MMOL/L (ref 100–108)
CLARITY UR: ABNORMAL
COLOR UR: YELLOW
CREAT BLD-MCNC: 0.3 MG/DL (ref 0.6–1.3)
GFR SERPL CREATININE-BSD FRML MDRD: >60 ML/MIN/1.73SQ M
GLUCOSE SERPL-MCNC: 80 MG/DL (ref 65–140)
GLUCOSE UR STRIP-MCNC: NEGATIVE MG/DL
HCT VFR BLD CALC: 30 % (ref 34.8–46.1)
HGB BLDA-MCNC: 10.2 G/DL (ref 11.5–15.4)
HGB UR QL STRIP.AUTO: NEGATIVE
KETONES UR STRIP-MCNC: NEGATIVE MG/DL
LEUKOCYTE ESTERASE UR QL STRIP: ABNORMAL
NITRITE UR QL STRIP: NEGATIVE
NON-SQ EPI CELLS URNS QL MICRO: ABNORMAL /HPF
PCO2 BLD: 22 MMOL/L (ref 21–32)
PH UR STRIP.AUTO: 8.5 [PH] (ref 4.5–8)
POTASSIUM BLD-SCNC: 3.8 MMOL/L (ref 3.5–5.3)
PROT UR STRIP-MCNC: NEGATIVE MG/DL
RBC #/AREA URNS AUTO: ABNORMAL /HPF
SODIUM BLD-SCNC: 136 MMOL/L (ref 136–145)
SP GR UR STRIP.AUTO: 1.02 (ref 1–1.03)
SPECIMEN SOURCE: ABNORMAL
UROBILINOGEN UR QL STRIP.AUTO: 0.2 E.U./DL
WBC #/AREA URNS AUTO: ABNORMAL /HPF

## 2017-05-05 PROCEDURE — 81001 URINALYSIS AUTO W/SCOPE: CPT

## 2017-05-05 PROCEDURE — 85014 HEMATOCRIT: CPT

## 2017-05-05 PROCEDURE — 81002 URINALYSIS NONAUTO W/O SCOPE: CPT | Performed by: EMERGENCY MEDICINE

## 2017-05-05 PROCEDURE — 99284 EMERGENCY DEPT VISIT MOD MDM: CPT

## 2017-05-05 PROCEDURE — 80047 BASIC METABLC PNL IONIZED CA: CPT

## 2017-05-05 PROCEDURE — 96374 THER/PROPH/DIAG INJ IV PUSH: CPT

## 2017-05-05 PROCEDURE — 87086 URINE CULTURE/COLONY COUNT: CPT

## 2017-05-05 RX ORDER — CEPHALEXIN 500 MG/1
500 CAPSULE ORAL 2 TIMES DAILY
Qty: 6 CAPSULE | Refills: 0 | Status: SHIPPED | OUTPATIENT
Start: 2017-05-05 | End: 2017-05-05

## 2017-05-05 RX ORDER — METOCLOPRAMIDE HYDROCHLORIDE 5 MG/ML
10 INJECTION INTRAMUSCULAR; INTRAVENOUS ONCE
Status: COMPLETED | OUTPATIENT
Start: 2017-05-05 | End: 2017-05-05

## 2017-05-05 RX ORDER — ACETAMINOPHEN 325 MG/1
650 TABLET ORAL EVERY 6 HOURS PRN
Status: DISCONTINUED | OUTPATIENT
Start: 2017-05-05 | End: 2017-05-05 | Stop reason: HOSPADM

## 2017-05-05 RX ORDER — CEPHALEXIN 500 MG/1
500 CAPSULE ORAL 2 TIMES DAILY
Qty: 14 CAPSULE | Refills: 0 | Status: SHIPPED | OUTPATIENT
Start: 2017-05-05 | End: 2017-05-12

## 2017-05-05 RX ORDER — ONDANSETRON 4 MG/1
4 TABLET, ORALLY DISINTEGRATING ORAL EVERY 8 HOURS PRN
Status: ON HOLD | COMMUNITY
End: 2017-08-23 | Stop reason: ALTCHOICE

## 2017-05-05 RX ADMIN — METOCLOPRAMIDE 10 MG: 5 INJECTION, SOLUTION INTRAMUSCULAR; INTRAVENOUS at 14:04

## 2017-05-05 RX ADMIN — ACETAMINOPHEN 650 MG: 325 TABLET ORAL at 14:05

## 2017-05-05 RX ADMIN — SODIUM CHLORIDE 1000 ML: 0.9 INJECTION, SOLUTION INTRAVENOUS at 14:01

## 2017-05-06 LAB — BACTERIA UR CULT: NORMAL

## 2017-05-08 ENCOUNTER — GENERIC CONVERSION - ENCOUNTER (OUTPATIENT)
Dept: OTHER | Facility: OTHER | Age: 22
End: 2017-05-08

## 2017-05-09 ENCOUNTER — GENERIC CONVERSION - ENCOUNTER (OUTPATIENT)
Dept: OTHER | Facility: OTHER | Age: 22
End: 2017-05-09

## 2017-05-11 ENCOUNTER — GENERIC CONVERSION - ENCOUNTER (OUTPATIENT)
Dept: OTHER | Facility: OTHER | Age: 22
End: 2017-05-11

## 2017-05-15 ENCOUNTER — GENERIC CONVERSION - ENCOUNTER (OUTPATIENT)
Dept: OTHER | Facility: OTHER | Age: 22
End: 2017-05-15

## 2017-05-15 ENCOUNTER — APPOINTMENT (OUTPATIENT)
Dept: PERINATAL CARE | Facility: CLINIC | Age: 22
End: 2017-05-15
Payer: COMMERCIAL

## 2017-05-15 ENCOUNTER — ALLSCRIPTS OFFICE VISIT (OUTPATIENT)
Dept: PERINATAL CARE | Facility: CLINIC | Age: 22
End: 2017-05-15
Payer: COMMERCIAL

## 2017-05-15 PROCEDURE — 76817 TRANSVAGINAL US OBSTETRIC: CPT | Performed by: OBSTETRICS & GYNECOLOGY

## 2017-05-17 ENCOUNTER — APPOINTMENT (OUTPATIENT)
Dept: LAB | Facility: HOSPITAL | Age: 22
End: 2017-05-17
Attending: OBSTETRICS & GYNECOLOGY
Payer: COMMERCIAL

## 2017-05-17 DIAGNOSIS — R55 SYNCOPE AND COLLAPSE: ICD-10-CM

## 2017-05-17 LAB
ALBUMIN SERPL BCP-MCNC: 3.1 G/DL (ref 3.5–5)
ALP SERPL-CCNC: 72 U/L (ref 46–116)
ALT SERPL W P-5'-P-CCNC: 12 U/L (ref 12–78)
ANION GAP SERPL CALCULATED.3IONS-SCNC: 9 MMOL/L (ref 4–13)
AST SERPL W P-5'-P-CCNC: 19 U/L (ref 5–45)
BILIRUB SERPL-MCNC: 0.32 MG/DL (ref 0.2–1)
BUN SERPL-MCNC: 6 MG/DL (ref 5–25)
CALCIUM SERPL-MCNC: 8.6 MG/DL (ref 8.3–10.1)
CHLORIDE SERPL-SCNC: 105 MMOL/L (ref 100–108)
CO2 SERPL-SCNC: 23 MMOL/L (ref 21–32)
CREAT SERPL-MCNC: 0.32 MG/DL (ref 0.6–1.3)
ERYTHROCYTE [DISTWIDTH] IN BLOOD BY AUTOMATED COUNT: 14 % (ref 11.6–15.1)
GFR SERPL CREATININE-BSD FRML MDRD: >60 ML/MIN/1.73SQ M
GLUCOSE 1H P 50 G GLC PO SERPL-MCNC: 108 MG/DL
GLUCOSE P FAST SERPL-MCNC: 107 MG/DL (ref 65–99)
HCT VFR BLD AUTO: 30.5 % (ref 34.8–46.1)
HGB BLD-MCNC: 10.4 G/DL (ref 11.5–15.4)
MCH RBC QN AUTO: 32.4 PG (ref 26.8–34.3)
MCHC RBC AUTO-ENTMCNC: 34.1 G/DL (ref 31.4–37.4)
MCV RBC AUTO: 95 FL (ref 82–98)
PLATELET # BLD AUTO: 210 THOUSANDS/UL (ref 149–390)
PMV BLD AUTO: 10.3 FL (ref 8.9–12.7)
POTASSIUM SERPL-SCNC: 3.9 MMOL/L (ref 3.5–5.3)
PROT SERPL-MCNC: 6.3 G/DL (ref 6.4–8.2)
RBC # BLD AUTO: 3.21 MILLION/UL (ref 3.81–5.12)
SODIUM SERPL-SCNC: 137 MMOL/L (ref 136–145)
WBC # BLD AUTO: 6.85 THOUSAND/UL (ref 4.31–10.16)

## 2017-05-17 PROCEDURE — 36415 COLL VENOUS BLD VENIPUNCTURE: CPT

## 2017-05-17 PROCEDURE — 80053 COMPREHEN METABOLIC PANEL: CPT

## 2017-05-17 PROCEDURE — 82950 GLUCOSE TEST: CPT

## 2017-05-17 PROCEDURE — 85027 COMPLETE CBC AUTOMATED: CPT

## 2017-05-24 ENCOUNTER — GENERIC CONVERSION - ENCOUNTER (OUTPATIENT)
Dept: OTHER | Facility: OTHER | Age: 22
End: 2017-05-24

## 2017-05-24 ENCOUNTER — ALLSCRIPTS OFFICE VISIT (OUTPATIENT)
Dept: PERINATAL CARE | Facility: CLINIC | Age: 22
End: 2017-05-24
Payer: COMMERCIAL

## 2017-05-24 PROCEDURE — 76817 TRANSVAGINAL US OBSTETRIC: CPT | Performed by: OBSTETRICS & GYNECOLOGY

## 2017-05-24 PROCEDURE — 76815 OB US LIMITED FETUS(S): CPT | Performed by: OBSTETRICS & GYNECOLOGY

## 2017-05-24 PROCEDURE — 96372 THER/PROPH/DIAG INJ SC/IM: CPT | Performed by: OBSTETRICS & GYNECOLOGY

## 2017-05-30 ENCOUNTER — APPOINTMENT (OUTPATIENT)
Dept: LAB | Facility: HOSPITAL | Age: 22
End: 2017-05-30
Attending: OBSTETRICS & GYNECOLOGY
Payer: COMMERCIAL

## 2017-05-30 ENCOUNTER — TRANSCRIBE ORDERS (OUTPATIENT)
Dept: ADMINISTRATIVE | Facility: HOSPITAL | Age: 22
End: 2017-05-30

## 2017-05-30 DIAGNOSIS — Z34.92 SECOND TRIMESTER PREGNANCY: Primary | ICD-10-CM

## 2017-05-30 DIAGNOSIS — Z34.92 SECOND TRIMESTER PREGNANCY: ICD-10-CM

## 2017-05-30 PROCEDURE — 36415 COLL VENOUS BLD VENIPUNCTURE: CPT

## 2017-05-31 ENCOUNTER — ALLSCRIPTS OFFICE VISIT (OUTPATIENT)
Dept: PERINATAL CARE | Facility: CLINIC | Age: 22
End: 2017-05-31
Payer: COMMERCIAL

## 2017-05-31 LAB — SCAN RESULT: NORMAL

## 2017-05-31 PROCEDURE — 96372 THER/PROPH/DIAG INJ SC/IM: CPT | Performed by: OBSTETRICS & GYNECOLOGY

## 2017-06-05 ENCOUNTER — GENERIC CONVERSION - ENCOUNTER (OUTPATIENT)
Dept: OTHER | Facility: OTHER | Age: 22
End: 2017-06-05

## 2017-06-06 ENCOUNTER — GENERIC CONVERSION - ENCOUNTER (OUTPATIENT)
Dept: OTHER | Facility: OTHER | Age: 22
End: 2017-06-06

## 2017-06-08 ENCOUNTER — APPOINTMENT (OUTPATIENT)
Dept: PERINATAL CARE | Facility: CLINIC | Age: 22
End: 2017-06-08
Payer: COMMERCIAL

## 2017-06-08 ENCOUNTER — GENERIC CONVERSION - ENCOUNTER (OUTPATIENT)
Dept: OTHER | Facility: CLINIC | Age: 22
End: 2017-06-08

## 2017-06-08 ENCOUNTER — GENERIC CONVERSION - ENCOUNTER (OUTPATIENT)
Dept: OTHER | Facility: OTHER | Age: 22
End: 2017-06-08

## 2017-06-08 PROCEDURE — 76817 TRANSVAGINAL US OBSTETRIC: CPT | Performed by: OBSTETRICS & GYNECOLOGY

## 2017-06-08 PROCEDURE — 96372 THER/PROPH/DIAG INJ SC/IM: CPT | Performed by: OBSTETRICS & GYNECOLOGY

## 2017-06-08 PROCEDURE — 76811 OB US DETAILED SNGL FETUS: CPT | Performed by: OBSTETRICS & GYNECOLOGY

## 2017-06-13 ENCOUNTER — GENERIC CONVERSION - ENCOUNTER (OUTPATIENT)
Dept: OTHER | Facility: OTHER | Age: 22
End: 2017-06-13

## 2017-06-15 ENCOUNTER — ALLSCRIPTS OFFICE VISIT (OUTPATIENT)
Dept: PERINATAL CARE | Facility: CLINIC | Age: 22
End: 2017-06-15
Payer: COMMERCIAL

## 2017-06-15 ENCOUNTER — GENERIC CONVERSION - ENCOUNTER (OUTPATIENT)
Dept: OTHER | Facility: OTHER | Age: 22
End: 2017-06-15

## 2017-06-15 PROCEDURE — 96372 THER/PROPH/DIAG INJ SC/IM: CPT | Performed by: OBSTETRICS & GYNECOLOGY

## 2017-06-16 ENCOUNTER — GENERIC CONVERSION - ENCOUNTER (OUTPATIENT)
Dept: OTHER | Facility: OTHER | Age: 22
End: 2017-06-16

## 2017-06-19 ENCOUNTER — LAB REQUISITION (OUTPATIENT)
Dept: LAB | Facility: HOSPITAL | Age: 22
End: 2017-06-19
Payer: COMMERCIAL

## 2017-06-19 DIAGNOSIS — O98.819 OTHER MATERNAL INFECTIOUS AND PARASITIC DISEASES COMPLICATING PREGNANCY, UNSPECIFIED TRIMESTER: ICD-10-CM

## 2017-06-19 PROCEDURE — 87591 N.GONORRHOEAE DNA AMP PROB: CPT | Performed by: OBSTETRICS & GYNECOLOGY

## 2017-06-19 PROCEDURE — 87491 CHLMYD TRACH DNA AMP PROBE: CPT | Performed by: OBSTETRICS & GYNECOLOGY

## 2017-06-20 ENCOUNTER — GENERIC CONVERSION - ENCOUNTER (OUTPATIENT)
Dept: OTHER | Facility: OTHER | Age: 22
End: 2017-06-20

## 2017-06-21 LAB
CHLAMYDIA DNA CVX QL NAA+PROBE: ABNORMAL
N GONORRHOEA DNA GENITAL QL NAA+PROBE: ABNORMAL

## 2017-06-22 ENCOUNTER — GENERIC CONVERSION - ENCOUNTER (OUTPATIENT)
Dept: OTHER | Facility: OTHER | Age: 22
End: 2017-06-22

## 2017-06-22 ENCOUNTER — ALLSCRIPTS OFFICE VISIT (OUTPATIENT)
Dept: PERINATAL CARE | Facility: CLINIC | Age: 22
End: 2017-06-22
Payer: COMMERCIAL

## 2017-06-22 PROCEDURE — 76816 OB US FOLLOW-UP PER FETUS: CPT | Performed by: OBSTETRICS & GYNECOLOGY

## 2017-06-22 PROCEDURE — 76817 TRANSVAGINAL US OBSTETRIC: CPT | Performed by: OBSTETRICS & GYNECOLOGY

## 2017-07-13 ENCOUNTER — GENERIC CONVERSION - ENCOUNTER (OUTPATIENT)
Dept: OTHER | Facility: OTHER | Age: 22
End: 2017-07-13

## 2017-07-13 ENCOUNTER — GENERIC CONVERSION - ENCOUNTER (OUTPATIENT)
Dept: OTHER | Facility: CLINIC | Age: 22
End: 2017-07-13

## 2017-07-13 ENCOUNTER — ALLSCRIPTS OFFICE VISIT (OUTPATIENT)
Dept: PERINATAL CARE | Facility: CLINIC | Age: 22
End: 2017-07-13
Payer: COMMERCIAL

## 2017-07-13 PROCEDURE — 76816 OB US FOLLOW-UP PER FETUS: CPT | Performed by: OBSTETRICS & GYNECOLOGY

## 2017-07-20 ENCOUNTER — GENERIC CONVERSION - ENCOUNTER (OUTPATIENT)
Dept: OTHER | Facility: OTHER | Age: 22
End: 2017-07-20

## 2017-07-20 ENCOUNTER — ALLSCRIPTS OFFICE VISIT (OUTPATIENT)
Dept: PERINATAL CARE | Facility: CLINIC | Age: 22
End: 2017-07-20
Payer: COMMERCIAL

## 2017-07-20 ENCOUNTER — GENERIC CONVERSION - ENCOUNTER (OUTPATIENT)
Dept: OTHER | Facility: CLINIC | Age: 22
End: 2017-07-20

## 2017-07-20 DIAGNOSIS — O99.019 ANEMIA COMPLICATING PREGNANCY: ICD-10-CM

## 2017-07-20 DIAGNOSIS — Z34.90 ENCOUNTER FOR SUPERVISION OF NORMAL PREGNANCY: ICD-10-CM

## 2017-07-20 PROCEDURE — 76817 TRANSVAGINAL US OBSTETRIC: CPT | Performed by: OBSTETRICS & GYNECOLOGY

## 2017-07-20 PROCEDURE — 76816 OB US FOLLOW-UP PER FETUS: CPT | Performed by: OBSTETRICS & GYNECOLOGY

## 2017-07-27 ENCOUNTER — GENERIC CONVERSION - ENCOUNTER (OUTPATIENT)
Dept: OTHER | Facility: OTHER | Age: 22
End: 2017-07-27

## 2017-08-02 ENCOUNTER — ALLSCRIPTS OFFICE VISIT (OUTPATIENT)
Dept: OTHER | Facility: OTHER | Age: 22
End: 2017-08-02

## 2017-08-02 ENCOUNTER — LAB REQUISITION (OUTPATIENT)
Dept: LAB | Facility: HOSPITAL | Age: 22
End: 2017-08-02
Payer: COMMERCIAL

## 2017-08-02 DIAGNOSIS — Z34.90 ENCOUNTER FOR SUPERVISION OF NORMAL PREGNANCY: ICD-10-CM

## 2017-08-02 DIAGNOSIS — Z87.898 PERSONAL HISTORY OF OTHER SPECIFIED CONDITIONS: ICD-10-CM

## 2017-08-02 PROCEDURE — 87491 CHLMYD TRACH DNA AMP PROBE: CPT | Performed by: OBSTETRICS & GYNECOLOGY

## 2017-08-02 PROCEDURE — 87591 N.GONORRHOEAE DNA AMP PROB: CPT | Performed by: OBSTETRICS & GYNECOLOGY

## 2017-08-02 PROCEDURE — 80307 DRUG TEST PRSMV CHEM ANLYZR: CPT | Performed by: OBSTETRICS & GYNECOLOGY

## 2017-08-03 ENCOUNTER — ALLSCRIPTS OFFICE VISIT (OUTPATIENT)
Dept: PERINATAL CARE | Facility: CLINIC | Age: 22
End: 2017-08-03
Payer: COMMERCIAL

## 2017-08-03 ENCOUNTER — GENERIC CONVERSION - ENCOUNTER (OUTPATIENT)
Dept: OTHER | Facility: CLINIC | Age: 22
End: 2017-08-03

## 2017-08-03 ENCOUNTER — GENERIC CONVERSION - ENCOUNTER (OUTPATIENT)
Dept: OTHER | Facility: OTHER | Age: 22
End: 2017-08-03

## 2017-08-03 PROCEDURE — 96372 THER/PROPH/DIAG INJ SC/IM: CPT | Performed by: OBSTETRICS & GYNECOLOGY

## 2017-08-03 PROCEDURE — 76816 OB US FOLLOW-UP PER FETUS: CPT | Performed by: OBSTETRICS & GYNECOLOGY

## 2017-08-04 LAB
CHLAMYDIA DNA CVX QL NAA+PROBE: ABNORMAL
N GONORRHOEA DNA GENITAL QL NAA+PROBE: ABNORMAL

## 2017-08-11 LAB
AMPHETAMINES UR QL SCN: NEGATIVE NG/ML
BARBITURATES UR QL SCN: NEGATIVE NG/ML
BENZODIAZ UR QL SCN: NEGATIVE NG/ML
BZE UR QL SCN: NEGATIVE NG/ML
CANNABINOIDS UR QL SCN: POSITIVE
METHADONE UR QL SCN: NEGATIVE NG/ML
OPIATES UR QL: NEGATIVE NG/ML
PCP UR QL: NEGATIVE NG/ML
PROPOXYPH UR QL: NEGATIVE NG/ML

## 2017-08-17 ENCOUNTER — GENERIC CONVERSION - ENCOUNTER (OUTPATIENT)
Dept: OTHER | Facility: OTHER | Age: 22
End: 2017-08-17

## 2017-08-17 ENCOUNTER — GENERIC CONVERSION - ENCOUNTER (OUTPATIENT)
Dept: OTHER | Facility: CLINIC | Age: 22
End: 2017-08-17

## 2017-08-17 ENCOUNTER — ALLSCRIPTS OFFICE VISIT (OUTPATIENT)
Dept: PERINATAL CARE | Facility: CLINIC | Age: 22
End: 2017-08-17
Payer: COMMERCIAL

## 2017-08-17 PROCEDURE — 76816 OB US FOLLOW-UP PER FETUS: CPT | Performed by: OBSTETRICS & GYNECOLOGY

## 2017-08-23 ENCOUNTER — HOSPITAL ENCOUNTER (OUTPATIENT)
Facility: HOSPITAL | Age: 22
Discharge: HOME | End: 2017-08-23
Attending: OBSTETRICS & GYNECOLOGY | Admitting: OBSTETRICS & GYNECOLOGY
Payer: COMMERCIAL

## 2017-08-23 VITALS
RESPIRATION RATE: 16 BRPM | HEART RATE: 74 BPM | SYSTOLIC BLOOD PRESSURE: 102 MMHG | TEMPERATURE: 97.9 F | DIASTOLIC BLOOD PRESSURE: 61 MMHG
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
## 2017-08-23 LAB
AMPHETAMINES SERPL QL SCN: NEGATIVE
BARBITURATES UR QL: NEGATIVE
BENZODIAZ UR QL: NEGATIVE
COCAINE UR QL: NEGATIVE
METHADONE UR QL: NEGATIVE
OPIATES UR QL SCN: NEGATIVE
PCP UR QL: NEGATIVE
THC UR QL: POSITIVE

## 2017-08-23 PROCEDURE — 99203 OFFICE O/P NEW LOW 30 MIN: CPT

## 2017-08-23 PROCEDURE — 80307 DRUG TEST PRSMV CHEM ANLYZR: CPT | Performed by: OBSTETRICS & GYNECOLOGY

## 2017-08-23 PROCEDURE — 87491 CHLMYD TRACH DNA AMP PROBE: CPT | Performed by: OBSTETRICS & GYNECOLOGY

## 2017-08-23 RX ORDER — ACETAMINOPHEN 325 MG/1
650 TABLET ORAL ONCE
Status: COMPLETED | OUTPATIENT
Start: 2017-08-23 | End: 2017-08-23

## 2017-08-23 RX ADMIN — ACETAMINOPHEN 650 MG: 325 TABLET, FILM COATED ORAL at 10:47

## 2017-08-24 LAB — CHLAMYDIA DNA CVX QL NAA+PROBE: ABNORMAL

## 2017-08-29 ENCOUNTER — GENERIC CONVERSION - ENCOUNTER (OUTPATIENT)
Dept: OTHER | Facility: OTHER | Age: 22
End: 2017-08-29

## 2017-08-31 ENCOUNTER — GENERIC CONVERSION - ENCOUNTER (OUTPATIENT)
Dept: OTHER | Facility: CLINIC | Age: 22
End: 2017-08-31

## 2017-08-31 ENCOUNTER — GENERIC CONVERSION - ENCOUNTER (OUTPATIENT)
Dept: OTHER | Facility: OTHER | Age: 22
End: 2017-08-31

## 2017-08-31 ENCOUNTER — ALLSCRIPTS OFFICE VISIT (OUTPATIENT)
Dept: PERINATAL CARE | Facility: CLINIC | Age: 22
End: 2017-08-31
Payer: COMMERCIAL

## 2017-08-31 PROCEDURE — 76816 OB US FOLLOW-UP PER FETUS: CPT | Performed by: OBSTETRICS & GYNECOLOGY

## 2017-09-01 ENCOUNTER — GENERIC CONVERSION - ENCOUNTER (OUTPATIENT)
Dept: OTHER | Facility: OTHER | Age: 22
End: 2017-09-01

## 2017-09-03 ENCOUNTER — HOSPITAL ENCOUNTER (OUTPATIENT)
Facility: HOSPITAL | Age: 22
Discharge: HOME | End: 2017-09-03
Attending: OBSTETRICS & GYNECOLOGY | Admitting: OBSTETRICS & GYNECOLOGY
Payer: COMMERCIAL

## 2017-09-03 VITALS
TEMPERATURE: 98.5 F | HEIGHT: 59 IN | SYSTOLIC BLOOD PRESSURE: 107 MMHG | WEIGHT: 107 LBS | BODY MASS INDEX: 21.57 KG/M2 | DIASTOLIC BLOOD PRESSURE: 64 MMHG | RESPIRATION RATE: 18 BRPM | HEART RATE: 86 BPM | OXYGEN SATURATION: 100 %

## 2017-09-03 DIAGNOSIS — Z3A.32 32 WEEKS GESTATION OF PREGNANCY: ICD-10-CM

## 2017-09-03 DIAGNOSIS — O26.899 ABDOMINAL PAIN IN PREGNANCY: ICD-10-CM

## 2017-09-03 DIAGNOSIS — R10.9 ABDOMINAL PAIN IN PREGNANCY: ICD-10-CM

## 2017-09-03 LAB
AMPHETAMINES SERPL QL SCN: NEGATIVE
BACTERIA UR QL AUTO: ABNORMAL /HPF
BARBITURATES UR QL: NEGATIVE
BENZODIAZ UR QL: NEGATIVE
BILIRUB UR QL STRIP: NEGATIVE
CLARITY UR: CLEAR
COCAINE UR QL: NEGATIVE
COLOR UR: YELLOW
GLUCOSE SERPL-MCNC: 101 MG/DL (ref 65–140)
GLUCOSE UR STRIP-MCNC: ABNORMAL MG/DL
HGB UR QL STRIP.AUTO: ABNORMAL
KETONES UR STRIP-MCNC: ABNORMAL MG/DL
LEUKOCYTE ESTERASE UR QL STRIP: ABNORMAL
METHADONE UR QL: NEGATIVE
NITRITE UR QL STRIP: NEGATIVE
NON-SQ EPI CELLS URNS QL MICRO: ABNORMAL /HPF
OPIATES UR QL SCN: NEGATIVE
PCP UR QL: NEGATIVE
PH UR STRIP.AUTO: 7 [PH] (ref 4.5–8)
PROT UR STRIP-MCNC: NEGATIVE MG/DL
RBC #/AREA URNS AUTO: ABNORMAL /HPF
SP GR UR STRIP.AUTO: 1.02 (ref 1–1.03)
THC UR QL: POSITIVE
UROBILINOGEN UR QL STRIP.AUTO: 2 E.U./DL
WBC #/AREA URNS AUTO: ABNORMAL /HPF

## 2017-09-03 PROCEDURE — 81001 URINALYSIS AUTO W/SCOPE: CPT | Performed by: OBSTETRICS & GYNECOLOGY

## 2017-09-03 PROCEDURE — 99213 OFFICE O/P EST LOW 20 MIN: CPT

## 2017-09-03 PROCEDURE — 80307 DRUG TEST PRSMV CHEM ANLYZR: CPT | Performed by: OBSTETRICS & GYNECOLOGY

## 2017-09-03 PROCEDURE — 82948 REAGENT STRIP/BLOOD GLUCOSE: CPT

## 2017-09-03 RX ADMIN — SODIUM CHLORIDE, SODIUM LACTATE, POTASSIUM CHLORIDE, AND CALCIUM CHLORIDE 1000 ML: .6; .31; .03; .02 INJECTION, SOLUTION INTRAVENOUS at 20:15

## 2017-09-04 ENCOUNTER — GENERIC CONVERSION - ENCOUNTER (OUTPATIENT)
Dept: OTHER | Facility: OTHER | Age: 22
End: 2017-09-04

## 2017-09-06 ENCOUNTER — GENERIC CONVERSION - ENCOUNTER (OUTPATIENT)
Dept: OBGYN CLINIC | Facility: CLINIC | Age: 22
End: 2017-09-06

## 2017-09-07 ENCOUNTER — GENERIC CONVERSION - ENCOUNTER (OUTPATIENT)
Dept: OTHER | Facility: OTHER | Age: 22
End: 2017-09-07

## 2017-09-08 ENCOUNTER — ALLSCRIPTS OFFICE VISIT (OUTPATIENT)
Dept: OTHER | Facility: OTHER | Age: 22
End: 2017-09-08

## 2017-09-20 ENCOUNTER — GENERIC CONVERSION - ENCOUNTER (OUTPATIENT)
Dept: OTHER | Facility: OTHER | Age: 22
End: 2017-09-20

## 2017-09-21 ENCOUNTER — LAB REQUISITION (OUTPATIENT)
Dept: LAB | Facility: HOSPITAL | Age: 22
End: 2017-09-21
Payer: COMMERCIAL

## 2017-09-21 ENCOUNTER — GENERIC CONVERSION - ENCOUNTER (OUTPATIENT)
Dept: OTHER | Facility: OTHER | Age: 22
End: 2017-09-21

## 2017-09-21 DIAGNOSIS — R73.09 OTHER ABNORMAL GLUCOSE: ICD-10-CM

## 2017-09-21 DIAGNOSIS — Z34.90 ENCOUNTER FOR SUPERVISION OF NORMAL PREGNANCY: ICD-10-CM

## 2017-09-21 DIAGNOSIS — O99.019 ANEMIA COMPLICATING PREGNANCY: ICD-10-CM

## 2017-09-21 PROCEDURE — 87653 STREP B DNA AMP PROBE: CPT | Performed by: OBSTETRICS & GYNECOLOGY

## 2017-09-23 ENCOUNTER — HOSPITAL ENCOUNTER (OUTPATIENT)
Facility: HOSPITAL | Age: 22
Discharge: HOME | End: 2017-09-23
Attending: OBSTETRICS & GYNECOLOGY | Admitting: OBSTETRICS & GYNECOLOGY
Payer: COMMERCIAL

## 2017-09-23 VITALS
SYSTOLIC BLOOD PRESSURE: 110 MMHG | DIASTOLIC BLOOD PRESSURE: 65 MMHG | HEART RATE: 96 BPM | OXYGEN SATURATION: 98 % | RESPIRATION RATE: 18 BRPM | TEMPERATURE: 98.1 F

## 2017-09-23 DIAGNOSIS — O26.893 ABDOMINAL PAIN IN PREGNANCY, THIRD TRIMESTER: ICD-10-CM

## 2017-09-23 DIAGNOSIS — R10.9 ABDOMINAL PAIN IN PREGNANCY, THIRD TRIMESTER: ICD-10-CM

## 2017-09-23 LAB
BACTERIA UR QL AUTO: ABNORMAL /HPF
BILIRUB UR QL STRIP: NEGATIVE
CLARITY UR: CLEAR
COLOR UR: YELLOW
GLUCOSE UR STRIP-MCNC: NEGATIVE MG/DL
GP B STREP DNA SPEC QL NAA+PROBE: NORMAL
HGB UR QL STRIP.AUTO: NEGATIVE
KETONES UR STRIP-MCNC: NEGATIVE MG/DL
LEUKOCYTE ESTERASE UR QL STRIP: ABNORMAL
MUCOUS THREADS UR QL AUTO: ABNORMAL
NITRITE UR QL STRIP: NEGATIVE
NON-SQ EPI CELLS URNS QL MICRO: ABNORMAL /HPF
PH UR STRIP.AUTO: 7 [PH] (ref 4.5–8)
PROT UR STRIP-MCNC: NEGATIVE MG/DL
RBC #/AREA URNS AUTO: ABNORMAL /HPF
SP GR UR STRIP.AUTO: 1.01 (ref 1–1.03)
UROBILINOGEN UR QL STRIP.AUTO: 1 E.U./DL
WBC #/AREA URNS AUTO: ABNORMAL /HPF

## 2017-09-23 PROCEDURE — 81001 URINALYSIS AUTO W/SCOPE: CPT | Performed by: OBSTETRICS & GYNECOLOGY

## 2017-09-23 PROCEDURE — 87086 URINE CULTURE/COLONY COUNT: CPT | Performed by: OBSTETRICS & GYNECOLOGY

## 2017-09-23 PROCEDURE — 99213 OFFICE O/P EST LOW 20 MIN: CPT

## 2017-09-23 RX ORDER — NITROFURANTOIN 25; 75 MG/1; MG/1
100 CAPSULE ORAL 2 TIMES DAILY
Qty: 14 CAPSULE | Refills: 0 | Status: SHIPPED | OUTPATIENT
Start: 2017-09-23 | End: 2017-09-30

## 2017-09-23 RX ORDER — FERROUS SULFATE 325(65) MG
325 TABLET ORAL
COMMUNITY
End: 2018-01-17

## 2017-09-24 LAB — BACTERIA UR CULT: NORMAL

## 2017-09-27 ENCOUNTER — ALLSCRIPTS OFFICE VISIT (OUTPATIENT)
Dept: PERINATAL CARE | Facility: CLINIC | Age: 22
End: 2017-09-27
Payer: COMMERCIAL

## 2017-09-27 ENCOUNTER — APPOINTMENT (OUTPATIENT)
Dept: LAB | Facility: HOSPITAL | Age: 22
End: 2017-09-27
Attending: OBSTETRICS & GYNECOLOGY
Payer: COMMERCIAL

## 2017-09-27 ENCOUNTER — GENERIC CONVERSION - ENCOUNTER (OUTPATIENT)
Dept: OTHER | Facility: OTHER | Age: 22
End: 2017-09-27

## 2017-09-27 DIAGNOSIS — Z34.90 ENCOUNTER FOR SUPERVISION OF NORMAL PREGNANCY: ICD-10-CM

## 2017-09-27 DIAGNOSIS — O99.019 ANEMIA COMPLICATING PREGNANCY: ICD-10-CM

## 2017-09-27 LAB
ERYTHROCYTE [DISTWIDTH] IN BLOOD BY AUTOMATED COUNT: 13.2 % (ref 11.6–15.1)
GLUCOSE 1H P 50 G GLC PO SERPL-MCNC: 155 MG/DL
HCT VFR BLD AUTO: 32.9 % (ref 34.8–46.1)
HGB BLD-MCNC: 11.5 G/DL (ref 11.5–15.4)
MCH RBC QN AUTO: 33.1 PG (ref 26.8–34.3)
MCHC RBC AUTO-ENTMCNC: 35 G/DL (ref 31.4–37.4)
MCV RBC AUTO: 95 FL (ref 82–98)
PLATELET # BLD AUTO: 178 THOUSANDS/UL (ref 149–390)
PMV BLD AUTO: 11.8 FL (ref 8.9–12.7)
RBC # BLD AUTO: 3.47 MILLION/UL (ref 3.81–5.12)
WBC # BLD AUTO: 8.71 THOUSAND/UL (ref 4.31–10.16)

## 2017-09-27 PROCEDURE — 76816 OB US FOLLOW-UP PER FETUS: CPT | Performed by: OBSTETRICS & GYNECOLOGY

## 2017-09-27 PROCEDURE — 85027 COMPLETE CBC AUTOMATED: CPT

## 2017-09-27 PROCEDURE — 36415 COLL VENOUS BLD VENIPUNCTURE: CPT

## 2017-09-27 PROCEDURE — 82950 GLUCOSE TEST: CPT

## 2017-09-27 PROCEDURE — 86592 SYPHILIS TEST NON-TREP QUAL: CPT

## 2017-09-28 ENCOUNTER — GENERIC CONVERSION - ENCOUNTER (OUTPATIENT)
Dept: OTHER | Facility: OTHER | Age: 22
End: 2017-09-28

## 2017-09-28 LAB — RPR SER QL: NORMAL

## 2017-09-29 ENCOUNTER — APPOINTMENT (OUTPATIENT)
Dept: LAB | Facility: HOSPITAL | Age: 22
End: 2017-09-29
Attending: OBSTETRICS & GYNECOLOGY
Payer: COMMERCIAL

## 2017-09-29 DIAGNOSIS — R73.09 OTHER ABNORMAL GLUCOSE: ICD-10-CM

## 2017-09-29 LAB — GLUCOSE P FAST SERPL-MCNC: 76 MG/DL (ref 65–99)

## 2017-09-29 PROCEDURE — 82951 GLUCOSE TOLERANCE TEST (GTT): CPT

## 2017-09-29 PROCEDURE — 36415 COLL VENOUS BLD VENIPUNCTURE: CPT

## 2017-10-07 ENCOUNTER — ANESTHESIA (OUTPATIENT)
Dept: LABOR AND DELIVERY | Facility: HOSPITAL | Age: 22
DRG: 560 | End: 2017-10-07
Payer: COMMERCIAL

## 2017-10-07 ENCOUNTER — HOSPITAL ENCOUNTER (INPATIENT)
Facility: HOSPITAL | Age: 22
LOS: 3 days | Discharge: HOME | DRG: 560 | End: 2017-10-10
Attending: OBSTETRICS & GYNECOLOGY | Admitting: SPECIALIST
Payer: COMMERCIAL

## 2017-10-07 DIAGNOSIS — Z3A.37 37 WEEKS GESTATION OF PREGNANCY: Primary | ICD-10-CM

## 2017-10-07 LAB
ABO GROUP BLD: NORMAL
AMPHETAMINES SERPL QL SCN: NEGATIVE
BACTERIA UR QL AUTO: ABNORMAL /HPF
BARBITURATES UR QL: NEGATIVE
BASOPHILS # BLD AUTO: 0.02 THOUSANDS/ΜL (ref 0–0.1)
BASOPHILS NFR BLD AUTO: 0 % (ref 0–1)
BENZODIAZ UR QL: NEGATIVE
BILIRUB UR QL STRIP: NEGATIVE
BLD GP AB SCN SERPL QL: NEGATIVE
CLARITY UR: CLEAR
COCAINE UR QL: NEGATIVE
COLOR UR: YELLOW
EOSINOPHIL # BLD AUTO: 0.02 THOUSAND/ΜL (ref 0–0.61)
EOSINOPHIL NFR BLD AUTO: 0 % (ref 0–6)
ERYTHROCYTE [DISTWIDTH] IN BLOOD BY AUTOMATED COUNT: 13.1 % (ref 11.6–15.1)
GLUCOSE SERPL-MCNC: 77 MG/DL (ref 65–140)
GLUCOSE UR STRIP-MCNC: NEGATIVE MG/DL
HCT VFR BLD AUTO: 32.5 % (ref 34.8–46.1)
HGB BLD-MCNC: 10.9 G/DL (ref 11.5–15.4)
HGB UR QL STRIP.AUTO: NEGATIVE
KETONES UR STRIP-MCNC: NEGATIVE MG/DL
LEUKOCYTE ESTERASE UR QL STRIP: ABNORMAL
LYMPHOCYTES # BLD AUTO: 1.45 THOUSANDS/ΜL (ref 0.6–4.47)
LYMPHOCYTES NFR BLD AUTO: 21 % (ref 14–44)
MCH RBC QN AUTO: 32.3 PG (ref 26.8–34.3)
MCHC RBC AUTO-ENTMCNC: 33.5 G/DL (ref 31.4–37.4)
MCV RBC AUTO: 96 FL (ref 82–98)
METHADONE UR QL: NEGATIVE
MONOCYTES # BLD AUTO: 0.44 THOUSAND/ΜL (ref 0.17–1.22)
MONOCYTES NFR BLD AUTO: 6 % (ref 4–12)
NEUTROPHILS # BLD AUTO: 5.01 THOUSANDS/ΜL (ref 1.85–7.62)
NEUTS SEG NFR BLD AUTO: 73 % (ref 43–75)
NITRITE UR QL STRIP: NEGATIVE
NON-SQ EPI CELLS URNS QL MICRO: ABNORMAL /HPF
NRBC BLD AUTO-RTO: 0 /100 WBCS
OPIATES UR QL SCN: NEGATIVE
PCP UR QL: NEGATIVE
PH UR STRIP.AUTO: 6.5 [PH] (ref 4.5–8)
PLATELET # BLD AUTO: 154 THOUSANDS/UL (ref 149–390)
PMV BLD AUTO: 11.4 FL (ref 8.9–12.7)
PROT UR STRIP-MCNC: NEGATIVE MG/DL
RBC # BLD AUTO: 3.37 MILLION/UL (ref 3.81–5.12)
RBC #/AREA URNS AUTO: ABNORMAL /HPF
RH BLD: POSITIVE
SP GR UR STRIP.AUTO: 1.01 (ref 1–1.03)
SPECIMEN EXPIRATION DATE: NORMAL
THC UR QL: POSITIVE
UROBILINOGEN UR QL STRIP.AUTO: 0.2 E.U./DL
WBC # BLD AUTO: 6.94 THOUSAND/UL (ref 4.31–10.16)
WBC #/AREA URNS AUTO: ABNORMAL /HPF

## 2017-10-07 PROCEDURE — 86850 RBC ANTIBODY SCREEN: CPT | Performed by: OBSTETRICS & GYNECOLOGY

## 2017-10-07 PROCEDURE — 86901 BLOOD TYPING SEROLOGIC RH(D): CPT | Performed by: OBSTETRICS & GYNECOLOGY

## 2017-10-07 PROCEDURE — 87491 CHLMYD TRACH DNA AMP PROBE: CPT | Performed by: OBSTETRICS & GYNECOLOGY

## 2017-10-07 PROCEDURE — 87591 N.GONORRHOEAE DNA AMP PROB: CPT | Performed by: OBSTETRICS & GYNECOLOGY

## 2017-10-07 PROCEDURE — 87086 URINE CULTURE/COLONY COUNT: CPT | Performed by: OBSTETRICS & GYNECOLOGY

## 2017-10-07 PROCEDURE — 85025 COMPLETE CBC W/AUTO DIFF WBC: CPT | Performed by: OBSTETRICS & GYNECOLOGY

## 2017-10-07 PROCEDURE — 99213 OFFICE O/P EST LOW 20 MIN: CPT

## 2017-10-07 PROCEDURE — 86900 BLOOD TYPING SEROLOGIC ABO: CPT | Performed by: OBSTETRICS & GYNECOLOGY

## 2017-10-07 PROCEDURE — 81001 URINALYSIS AUTO W/SCOPE: CPT | Performed by: OBSTETRICS & GYNECOLOGY

## 2017-10-07 PROCEDURE — 80307 DRUG TEST PRSMV CHEM ANLYZR: CPT | Performed by: OBSTETRICS & GYNECOLOGY

## 2017-10-07 PROCEDURE — 87147 CULTURE TYPE IMMUNOLOGIC: CPT | Performed by: OBSTETRICS & GYNECOLOGY

## 2017-10-07 PROCEDURE — 86592 SYPHILIS TEST NON-TREP QUAL: CPT | Performed by: OBSTETRICS & GYNECOLOGY

## 2017-10-07 PROCEDURE — 82948 REAGENT STRIP/BLOOD GLUCOSE: CPT

## 2017-10-07 RX ORDER — MORPHINE SULFATE 10 MG/ML
10 INJECTION, SOLUTION INTRAMUSCULAR; INTRAVENOUS ONCE
Status: COMPLETED | OUTPATIENT
Start: 2017-10-07 | End: 2017-10-07

## 2017-10-07 RX ORDER — OXYTOCIN/RINGER'S LACTATE 30/500 ML
1-30 PLASTIC BAG, INJECTION (ML) INTRAVENOUS
Status: DISCONTINUED | OUTPATIENT
Start: 2017-10-07 | End: 2017-10-08

## 2017-10-07 RX ORDER — PROMETHAZINE HYDROCHLORIDE 25 MG/ML
25 INJECTION, SOLUTION INTRAMUSCULAR; INTRAVENOUS ONCE
Status: COMPLETED | OUTPATIENT
Start: 2017-10-07 | End: 2017-10-07

## 2017-10-07 RX ORDER — OXYCODONE HYDROCHLORIDE AND ACETAMINOPHEN 5; 325 MG/1; MG/1
1 TABLET ORAL ONCE
Status: DISCONTINUED | OUTPATIENT
Start: 2017-10-07 | End: 2017-10-07

## 2017-10-07 RX ORDER — PROMETHAZINE HYDROCHLORIDE 25 MG/ML
25 INJECTION, SOLUTION INTRAMUSCULAR; INTRAVENOUS ONCE
Status: DISCONTINUED | OUTPATIENT
Start: 2017-10-07 | End: 2017-10-07

## 2017-10-07 RX ORDER — ROPIVACAINE HYDROCHLORIDE 5 MG/ML
INJECTION, SOLUTION EPIDURAL; INFILTRATION; PERINEURAL AS NEEDED
Status: DISCONTINUED | OUTPATIENT
Start: 2017-10-07 | End: 2017-10-08 | Stop reason: SURG

## 2017-10-07 RX ORDER — BUTORPHANOL TARTRATE 1 MG/ML
1 INJECTION, SOLUTION INTRAMUSCULAR; INTRAVENOUS ONCE
Status: COMPLETED | OUTPATIENT
Start: 2017-10-07 | End: 2017-10-07

## 2017-10-07 RX ORDER — SODIUM CHLORIDE, SODIUM LACTATE, POTASSIUM CHLORIDE, CALCIUM CHLORIDE 600; 310; 30; 20 MG/100ML; MG/100ML; MG/100ML; MG/100ML
125 INJECTION, SOLUTION INTRAVENOUS CONTINUOUS
Status: DISCONTINUED | OUTPATIENT
Start: 2017-10-07 | End: 2017-10-08

## 2017-10-07 RX ADMIN — MORPHINE SULFATE 10 MG: 10 INJECTION, SOLUTION INTRAMUSCULAR; INTRAVENOUS at 12:48

## 2017-10-07 RX ADMIN — Medication 2 MILLI-UNITS/MIN: at 22:40

## 2017-10-07 RX ADMIN — ROPIVACAINE HYDROCHLORIDE 8 ML: 5 INJECTION, SOLUTION EPIDURAL; INFILTRATION; PERINEURAL at 22:06

## 2017-10-07 RX ADMIN — SODIUM CHLORIDE, SODIUM LACTATE, POTASSIUM CHLORIDE, AND CALCIUM CHLORIDE 125 ML/HR: .6; .31; .03; .02 INJECTION, SOLUTION INTRAVENOUS at 17:58

## 2017-10-07 RX ADMIN — PROMETHAZINE HYDROCHLORIDE 25 MG: 25 INJECTION INTRAMUSCULAR; INTRAVENOUS at 10:36

## 2017-10-07 RX ADMIN — BUTORPHANOL TARTRATE 1 MG: 1 INJECTION, SOLUTION INTRAMUSCULAR; INTRAVENOUS at 10:27

## 2017-10-07 RX ADMIN — SODIUM CHLORIDE, SODIUM LACTATE, POTASSIUM CHLORIDE, AND CALCIUM CHLORIDE 1000 ML: .6; .31; .03; .02 INJECTION, SOLUTION INTRAVENOUS at 10:21

## 2017-10-07 RX ADMIN — ROPIVACAINE HYDROCHLORIDE: 2 INJECTION, SOLUTION EPIDURAL; INFILTRATION at 17:00

## 2017-10-07 NOTE — ANESTHESIA PREPROCEDURE EVALUATION
Review of Systems/Medical History  Patient summary reviewed  Chart reviewed      Cardiovascular  Exercise tolerance: good,     Pulmonary  Negative pulmonary ROS ,        GI/Hepatic    GERD well controlled,        Negative  ROS        Endo/Other  Diabetes Diet controlled,      GYN  Negative gynecology ROS          Hematology  Anemia ,     Musculoskeletal       Neurology  Negative neurology ROS      Psychology   Anxiety,            Physical Exam    Airway    Mallampati score: II  TM Distance: <3 FB  Neck ROM: full     Dental       Cardiovascular  Rhythm: regular, Rate: normal,     Pulmonary  Breath sounds clear to auscultation,     Other Findings        Anesthesia Plan  ASA Score- 2       Anesthesia Type- general        Induction- intravenous      Informed Consent  Anesthetic plan and risks discussed with patient
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
## 2017-10-07 NOTE — ANESTHESIA PROCEDURE NOTES
Epidural Block    Patient location during procedure: OB  Start time: 10/7/2017 4:58 PM  Reason for block: at surgeon's request  Staffing  Anesthesiologist: Lizzy Patel  Performed: anesthesiologist   Preanesthetic Checklist  Completed: patient identified, site marked, surgical consent, pre-op evaluation, timeout performed, IV checked, risks and benefits discussed and monitors and equipment checked  Epidural  Patient position: sitting  Prep: Betadine  Patient monitoring: frequent blood pressure checks  Approach: midline  Location: lumbar (1-5)  Injection technique: MÓNICA air  Needle  Needle type: Tuohy   Needle gauge: 18 G  Catheter type: side hole  Catheter size: 20 G  Test dose: negative  Assessment  Sensory level: [id number] aspiration for CSF, negative aspiration for heme and no paresthesia on injection

## 2017-10-07 NOTE — PROGRESS NOTES
L&D Triage Note - OB/GYN  Melodie Wise 25 y o  female MRN: [id number]  Unit/Bed#: L&D 326-01 Encounter: [id number]      SUBJECTIVE:  Melodie Wise 25 y o  [id number] at 37w3d complaining of pelvic pain that started at midnight, reports of waves of abdominal tightening and cramping along with mild bilateral lower back pain  Denies fever, chills, dysuria, changes in urinary frequency/color, diaphoresis, or fatigue  History of Chlamydia in this pregnancy, THC use, recent transfer of care,  labor and delivery x2, fetal lung mass-CCAM     Contractions: Date/time of onset: 10/7/17 at 0000, Frequency: Every 10 minutes, Duration: 60 seconds and Intensity: strong  Leakage of fluid: None  Vaginal Bleeding: None  Fetal movement: present  OBJECTIVE:  Vitals:    10/07/17 0755   Resp: 18   Temp: 98 1 °F (36 7 °C)       SVE: 3 / 60% / -3   2hr recheck: 4/70/-2  4hr recheck: 4/70/0    FHT:  140bpm baseline / Moderate 6 - 25 bpm / cat I, reactive  [place]: irritability      ASSESSMENT:  Melodie Wise 25 y o  [id number] at 37w3d with latent labor, after therapeutic rest showing cervical change, in labor  PLAN:  1  Analgesia/therapeutic rest: stadol-phenergan/morphine  2  Continuous FHT/[place]  3  UDS - THC positive  4  GC/CT collected  5  UA - showing leuks, bacteria - follow up UCx  6   Admit for labor      Johanna Guillory MD  OB/GYN PGY-2  10/7/2017 11:48 AM

## 2017-10-08 LAB
BACTERIA UR CULT: ABNORMAL
BASE EXCESS BLDCOA CALC-SCNC: 1.9 MMOL/L (ref 3–11)
BASE EXCESS BLDCOV CALC-SCNC: 0.3 MMOL/L (ref 1–9)
HCO3 BLDCOA-SCNC: 29.4 MMOL/L (ref 17.3–27.3)
HCO3 BLDCOV-SCNC: 24.8 MMOL/L (ref 12.2–28.6)
O2 CT VFR BLDCOA CALC: 12 ML/DL
OXYHGB MFR BLDCOA: 49.6 %
OXYHGB MFR BLDCOV: 78.9 %
PCO2 BLDCOA: 56.2 MM[HG] (ref 30–60)
PCO2 BLDCOV: 39.8 MM HG (ref 27–43)
PH BLDCOA: 7.34 [PH] (ref 7.23–7.43)
PH BLDCOV: 7.41 [PH] (ref 7.19–7.49)
PO2 BLDCOA: 20.8 MM HG (ref 5–25)
PO2 BLDCOV: 31.7 MM HG (ref 15–45)
SAO2 % BLDCOV: 19.3 ML/DL

## 2017-10-08 PROCEDURE — 82805 BLOOD GASES W/O2 SATURATION: CPT | Performed by: OBSTETRICS & GYNECOLOGY

## 2017-10-08 PROCEDURE — 4A0HXCZ MEASUREMENT OF PRODUCTS OF CONCEPTION, CARDIAC RATE, EXTERNAL APPROACH: ICD-10-PCS | Performed by: OBSTETRICS & GYNECOLOGY

## 2017-10-08 RX ORDER — BISACODYL 10 MG
10 SUPPOSITORY, RECTAL RECTAL DAILY PRN
Status: DISCONTINUED | OUTPATIENT
Start: 2017-10-08 | End: 2017-10-10 | Stop reason: HOSPADM

## 2017-10-08 RX ORDER — SIMETHICONE 80 MG
80 TABLET,CHEWABLE ORAL 4 TIMES DAILY PRN
Status: DISCONTINUED | OUTPATIENT
Start: 2017-10-08 | End: 2017-10-10 | Stop reason: HOSPADM

## 2017-10-08 RX ORDER — FERROUS SULFATE 325(65) MG
325 TABLET ORAL
Status: DISCONTINUED | OUTPATIENT
Start: 2017-10-08 | End: 2017-10-10 | Stop reason: HOSPADM

## 2017-10-08 RX ORDER — DOCUSATE SODIUM 100 MG/1
100 CAPSULE, LIQUID FILLED ORAL 2 TIMES DAILY PRN
Status: DISCONTINUED | OUTPATIENT
Start: 2017-10-08 | End: 2017-10-10 | Stop reason: HOSPADM

## 2017-10-08 RX ORDER — SENNOSIDES 8.6 MG
1 TABLET ORAL
Status: DISCONTINUED | OUTPATIENT
Start: 2017-10-08 | End: 2017-10-10 | Stop reason: HOSPADM

## 2017-10-08 RX ORDER — OXYCODONE HYDROCHLORIDE AND ACETAMINOPHEN 5; 325 MG/1; MG/1
1 TABLET ORAL EVERY 4 HOURS PRN
Status: DISCONTINUED | OUTPATIENT
Start: 2017-10-08 | End: 2017-10-10 | Stop reason: HOSPADM

## 2017-10-08 RX ORDER — OXYCODONE HYDROCHLORIDE AND ACETAMINOPHEN 5; 325 MG/1; MG/1
2 TABLET ORAL EVERY 4 HOURS PRN
Status: DISCONTINUED | OUTPATIENT
Start: 2017-10-08 | End: 2017-10-10 | Stop reason: HOSPADM

## 2017-10-08 RX ORDER — MAGNESIUM HYDROXIDE/ALUMINUM HYDROXICE/SIMETHICONE 120; 1200; 1200 MG/30ML; MG/30ML; MG/30ML
15 SUSPENSION ORAL EVERY 6 HOURS PRN
Status: DISCONTINUED | OUTPATIENT
Start: 2017-10-08 | End: 2017-10-10 | Stop reason: HOSPADM

## 2017-10-08 RX ORDER — ONDANSETRON 2 MG/ML
4 INJECTION INTRAMUSCULAR; INTRAVENOUS EVERY 8 HOURS PRN
Status: DISCONTINUED | OUTPATIENT
Start: 2017-10-08 | End: 2017-10-10 | Stop reason: HOSPADM

## 2017-10-08 RX ORDER — DIAPER,BRIEF,INFANT-TODD,DISP
1 EACH MISCELLANEOUS AS NEEDED
Status: DISCONTINUED | OUTPATIENT
Start: 2017-10-08 | End: 2017-10-10 | Stop reason: HOSPADM

## 2017-10-08 RX ORDER — TRISODIUM CITRATE DIHYDRATE AND CITRIC ACID MONOHYDRATE 500; 334 MG/5ML; MG/5ML
30 SOLUTION ORAL 4 TIMES DAILY PRN
Status: DISCONTINUED | OUTPATIENT
Start: 2017-10-08 | End: 2017-10-10 | Stop reason: HOSPADM

## 2017-10-08 RX ORDER — ACETAMINOPHEN 325 MG/1
650 TABLET ORAL EVERY 6 HOURS PRN
Status: DISCONTINUED | OUTPATIENT
Start: 2017-10-08 | End: 2017-10-10 | Stop reason: HOSPADM

## 2017-10-08 RX ORDER — DIPHENHYDRAMINE HCL 25 MG
25 TABLET ORAL EVERY 6 HOURS PRN
Status: DISCONTINUED | OUTPATIENT
Start: 2017-10-08 | End: 2017-10-10 | Stop reason: HOSPADM

## 2017-10-08 RX ORDER — CALCIUM CARBONATE 200(500)MG
1000 TABLET,CHEWABLE ORAL DAILY PRN
Status: DISCONTINUED | OUTPATIENT
Start: 2017-10-08 | End: 2017-10-10 | Stop reason: HOSPADM

## 2017-10-08 RX ADMIN — Medication 1 TABLET: at 09:59

## 2017-10-08 RX ADMIN — ACETAMINOPHEN 650 MG: 325 TABLET, FILM COATED ORAL at 21:40

## 2017-10-08 RX ADMIN — FERROUS SULFATE TAB 325 MG (65 MG ELEMENTAL FE) 325 MG: 325 (65 FE) TAB at 09:59

## 2017-10-08 RX ADMIN — ACETAMINOPHEN 650 MG: 325 TABLET, FILM COATED ORAL at 15:13

## 2017-10-08 NOTE — DISCHARGE SUMMARY
Discharge Summary - Bennett Jacinto 25 y o  female MRN: [id number]    Unit/Bed#: L&D 326-01 Encounter: [id number]    Admission Date: 10/7/2017     Discharge Date: 10/10/17    Admitting Diagnosis: 37 week gestation, labor    Discharge Diagnosis: same    Procedures: spontaneous vaginal delivery    Attending: Concepcion Nick MD    Hospital Course:     Bennett Jacinto is a 25 y o  [id number] who was admitted at 39 wks for labor  She underwent an uncomplicated spontaneous vaginal delivery   was transferred to  nursery  Patient tolerated the procedure well and was transferred to recovery in stable condition  On day of discharge, she was ambulating and able to reasonably perform all ADLs  She was voiding and had appropriate bowel function  Pain was well controlled  She was discharged home on postpartum day #2 without complications  Patient was instructed to follow up with her OB as an outpatient and was given appropriate warnings to call provider if she develops signs of infection or uncontrolled pain  Complications: None    Condition at discharge: good     Discharge instructions/Information to patient and family:   See after visit summary for information provided to patient and family  Provisions for Follow-Up Care:  See after visit summary for information related to follow-up care and any pertinent home health orders        Disposition: Home    Planned Readmission: No    Discharge Medications:   START taking these medications     START taking these medications   acetaminophen 325 mg tablet   Take 1 tablet by mouth every 6 (six) hours as needed for mild pain, headaches or fever   Commonly known as: TYLENOL        calcium carbonate 500 mg chewable tablet   Chew 2 tablets daily as needed for indigestion or heartburn   Commonly known as: TUMS        docusate sodium 100 mg capsule   Take 1 capsule by mouth 2 (two) times a day as needed for constipation   Commonly known as: COLACE      witch hazel-glycerin topical pad   Apply 1 pad topically as needed for irritation or hemorrhoids   Commonly known as: TUCKS        CONTINUE taking these medications     CONTINUE taking these medications   ferrous sulfate 325 (65 Fe) mg tablet        PRENATAL + COMPLETE MULTI PO              Landon Blanc MD  OB/GYN PGY-2  10/10/2017 6:14 AM

## 2017-10-08 NOTE — OB LABOR/OXYTOCIN SAFETY PROGRESS
Labor Progress Note - Josue Fore 25 y o  female MRN: [id number]    Unit/Bed#: L&D 326-01 Encounter: [id number]    Obstetric History       T0      L2     SAB0   TAB0   Ectopic0   Multiple0   Live Births2      Gestational Age: 37w3d  Contraction Frequency (minutes): 3  Contraction Quality: Moderate  Tachysystole: No   Dilation: 5        Effacement (%): 70  Station: 0  Baseline Rate: 130 bpm     FHR Category: Category I          Notes/comments:   No cervical change on exam, will initiate pitocin titration  FHT noted have alternating periods of minimal and moderate variability, accel noted with fetal scalp stim  Continue to monitor closely        Case discussed with Dr Alhaji Torres MD 10/7/2017 11:31 PM

## 2017-10-08 NOTE — DISCHARGE INSTRUCTIONS
Vaginal Delivery   WHAT YOU SHOULD KNOW:   A vaginal delivery is the birth of your baby through your vagina (birth canal)  AFTER YOU LEAVE:   Medicines:  · NSAIDs  help decrease swelling and pain or fever  This medicine is available with or without a doctor's order  NSAIDs can cause stomach bleeding or kidney problems in certain people  If you take blood thinner medicine, always ask your healthcare provider if NSAIDs are safe for you  Always read the medicine label and follow directions  · Take your medicine as directed  Call your healthcare provider if you think your medicine is not helping or if you have side effects  Tell him if you are allergic to any medicine  Keep a list of the medicines, vitamins, and herbs you take  Include the amounts, and when and why you take them  Bring the list or the pill bottles to follow-up visits  Carry your medicine list with you in case of an emergency  Follow up with your primary healthcare provider:  Most women need to return 6 weeks after a vaginal delivery  Ask about how to care for your wounds or stitches  Write down your questions so you remember to ask them during your visits  Activity:  Rest as much as possible  Try to keep all activities short  You may be able to do some exercise soon after you have your baby  Talk with your primary healthcare provider before you start exercising  If you work outside the home, ask when you can return to your job  Kegel exercises:  Kegel exercises may help your vaginal and rectal muscles heal faster  You can do Kegel exercises by tightening and relaxing the muscles around your vagina  Kegel exercises help make the muscles stronger  Breast care:  When your milk comes in, your breasts may feel full and hard  Ask how to care for your breasts, even if you are not breastfeeding  Constipation:  Do not try to push the bowel movement out if it is too hard   High-fiber foods, extra liquids, and regular exercise can help you prevent constipation  Examples of high-fiber foods are fruit and bran  Prune juice and water are good liquids to drink  Regular exercise helps your digestive system work  You may also be told to take over-the-counter fiber and stool softener medicines  Take these items as directed  Hemorrhoids:  Pregnancy can cause severe hemorrhoids  You may have rectal pain because of the hemorrhoids  Ask how to prevent or treat hemorrhoids  Perineum care: Your perineum is the area between your vagina and anus  Keep the area clean and dry to help it heal and to prevent infection  Wash the area gently with soap and water when you bathe or shower  Rinse your perineum with warm water when you use the toilet  Your primary healthcare provider may suggest you use a warm sitz bath to help decrease pain  A sitz bath is a bathtub or basin filled to hip level  Stay in the sitz bath for 20 to 30 minutes, or as directed  Vaginal discharge: You will have vaginal discharge, called lochia, after your delivery  The lochia is bright red the first day or two after the birth  By the fourth day, the amount decreases, and it turns red-brown  Use a sanitary pad rather than a tampon to prevent a vaginal infection  It is normal to have lochia up to 8 weeks after your baby is born  Monthly periods: Your period may start again within 7 to 12 weeks after your baby is born  If you are breastfeeding, it may take longer for your period to start again  You can still get pregnant again even though you do not have your monthly period  Talk with your primary healthcare provider about a birth control method that will be good for you if you do not want to get pregnant  Mood changes: Many new mothers have some kind of mood changes after delivery  Some of these changes occur because of lack of sleep, hormone changes, and caring for a new baby  Some mood changes can be more serious, such as postpartum depression   Talk with your primary healthcare provider if you feel unable to care for yourself or your baby  Sexual activity:  You may need to avoid sex for 6 to 7 weeks after you have your baby  You may notice you have a decreased desire for sex, or sex may be painful  You may need to use a vaginal lubricant (gel) to help make sex more comfortable  Contact your primary healthcare provider if:   · You have heavy vaginal bleeding that fills 1 or more sanitary pads in 1 hour  · You have a fever  · Your pain does not go away, or gets worse  · The skin between your vagina and rectum is swollen, warm, or red  · You have swollen, hard, or painful breasts  · You feel very sad or depressed  · You feel more tired than usual      · You have questions or concerns about your condition or care  Seek care immediately or call 911 if:   · You have pus or yellow drainage coming from your vagina or wound  · You are urinating very little, or not at all  · Your arm or leg feels warm, tender, and painful  It may look swollen and red  · You feel lightheaded, have sudden and worsening chest pain, or trouble breathing  You may have more pain when you take deep breaths or cough, or you may cough up blood  © 2014 [address] is for End User's use only and may not be sold, redistributed or otherwise used for commercial purposes  All illustrations and images included in CareNotes® are the copyrighted property of [employer]  or [employer]in [employer]  The above information is an  only  It is not intended as medical advice for individual conditions or treatments  Talk to your doctor, nurse or pharmacist before following any medical regimen to see if it is safe and effective for you

## 2017-10-08 NOTE — PROGRESS NOTES
Patient states she is able to feel and move both legs without assistance  Assisted patient out of bed to bathroom with minimal assistance  Patient offered shower, but declined at this time  Advised patient to ring call bell in bathroom for assistance if needed

## 2017-10-08 NOTE — L&D DELIVERY NOTE
DELIVERY NOTE  Mickael Fleischer 25 y o  female MRN: [id number]  Unit/Bed#: L&D 326-01 Encounter: [id number]    Obstetrician:   Dr Heather Baez MD    Assistant: Dr Lakisha Grider MD    Pre-Delivery Diagnosis: Term pregnancy  Spontaneous labor  Single fetus    Post-Delivery Diagnosis: Same as above - Delivered  Viable male fetus    Procedure: Spontaneous vaginal delivery    Delivery Date and Time: 10/8/17 at 0106    Estimated Blood Loss:  270HX           Complications:  None    Brief description of labor:  Please see additional notes for details of the labor course  Description of Procedure: With maternal expulsive efforts, the patient delivered a viable male   The position at delivery was direct OA  The fetal vertex delivered spontaneously  There was a nuchal cord present, one loose loop around the fetal neck, easily reduced with gentle traction  The anterior shoulder delivered atraumatically with maternal expulsive forces and the assistance of gentle downward traction  The posterior shoulder delivered with maternal expulsive forces and the assistance of gentle upward traction  The remainder of the fetus delivered spontaneously  Upon delivery, the  was placed on the mother's abdomen and the umbilical cord was clamped and cut  The  resuscitator evaluated the  at bedside  Arterial and venous cord blood gases and cord blood were collected for analysis  These were sent to the lab  Active management of the third stage of labor included uterine massage and administration of IV Pitocin at 250 faiza-units per minute  The uterus was noted to contract down well  The placenta delivered spontaneously and was noted to have a centrally-inserted 3-vessel cord  It was sent for storage  The vagina, cervix, perineum, and rectum were inspected and there was noted to be no lacerations present      At the conclusion of the delivery, all needle, sponge, and instrument counts were noted to be correct  The patient tolerated the procedure well and was allowed to recover in labor and delivery room  Dr Pat Saldivar MD was present      Ryan Gregg MD  OB/GYN PGY-2  10/8/2017 2:45 AM

## 2017-10-08 NOTE — PROGRESS NOTES
Patient rang call bell stating she needs to urinate  Attempted to assist patient to bathroom, but patient unable to move RLE without assistance  Advised patient to stay in bed until full movement of both lower extremities returns  Patient voided 600ml of clear, blood tinged/yellow odorless urine on the bedpan  Lizzie pad with moderate rubra and green bed pad changed  Call bell within reach

## 2017-10-08 NOTE — OB LABOR/OXYTOCIN SAFETY PROGRESS
Labor Progress Note - Magalene Leventhal 25 y o  female MRN: [id number]    Unit/Bed#: L&D 326-01 Encounter: [id number]    Obstetric History       T0      L2     SAB0   TAB0   Ectopic0   Multiple0   Live Births2      Gestational Age: 37w3d     Contraction Frequency (minutes): 1 5-3  Contraction Quality: Mild  Tachysystole: No   Dilation: 5        Effacement (%): 70  Station: 0  Baseline Rate: 130 bpm     FHR Category: Category I          Notes/comments:   Comfortable with epidural, making cervical change, clear fluids leaking   Continue to monitor          Mayuri Liao MD 10/7/2017 8:19 PM

## 2017-10-09 LAB
CHLAMYDIA DNA CVX QL NAA+PROBE: ABNORMAL
N GONORRHOEA DNA GENITAL QL NAA+PROBE: ABNORMAL
RPR SER QL: NORMAL

## 2017-10-09 RX ADMIN — OXYCODONE HYDROCHLORIDE AND ACETAMINOPHEN 2 TABLET: 5; 325 TABLET ORAL at 19:05

## 2017-10-09 RX ADMIN — OXYCODONE HYDROCHLORIDE AND ACETAMINOPHEN 2 TABLET: 5; 325 TABLET ORAL at 02:23

## 2017-10-09 RX ADMIN — Medication 1 TABLET: at 08:42

## 2017-10-09 RX ADMIN — FERROUS SULFATE TAB 325 MG (65 MG ELEMENTAL FE) 325 MG: 325 (65 FE) TAB at 08:42

## 2017-10-09 NOTE — LACTATION NOTE
This note was copied from a baby's chart  CONSULT - LACTATION  Baby Boy Clari Dan 1 days male MRN: [id number]    [address] NURSERY Room / Bed: L&D 315(N)/L&D 315(N) Encounter: [id number]    Maternal Information     MOTHER:  Blaine Casas  Maternal Age: 25 y o    OB History: #: 1, Date: 04/13/13, Sex: Male, Weight: 2381 g (5 lb 4 oz), GA: 35w0d, Delivery: Vaginal, Forceps, Apgar1: None, Apgar5: None, Living: Living, Birth Comments: None    #: 2, Date: 11/14/14, Sex: Female, Weight: None, GA: 36w0d, Delivery: None, Apgar1: None, Apgar5: None, Living: Living, Birth Comments: None    #: 3, Date: 10/08/17, Sex: Male, Weight: 2608 g (5 lb 12 oz), GA: 37w4d, Delivery: Vaginal, Spontaneous Delivery, Apgar1: 9, Apgar5: 9, Living: Living, Birth Comments: None   Previouse breast reduction surgery? No    Lactation history:   Has patient previously breast fed:  (attempted with no success)   How long had patient previously breast fed:     Previous breast feeding complications:       Past Surgical History:   Procedure Laterality Date    ABDOMINAL SURGERY      APPENDECTOMY      CHOLECYSTECTOMY      [place] COLONOSCOPY FLX DX W/COLLJ SPEC WHEN PFRMD N/A 1/19/2017    Procedure: EGD AND COLONOSCOPY;  Surgeon: Alonzo Tyson DO;  Location: [facility];   Service: Gastroenterology    [place] LAP,APPENDECTOMY N/A 5/17/2016    Procedure: APPENDECTOMY LAPAROSCOPIC;  Surgeon: Griselda Amato DO;  [facility];  Service: General    [place] LAP,DIAGNOSTIC ABDOMEN N/A 3/13/2016    Procedure: LAPAROSCOPY DIAGNOSTIC;  Surgeon: Kiel Pruitt MD;  [facility];  Service: General    SMALL INTESTINE SURGERY      twisted bowels       Birth information:  YOB: 2016   Time of birth: 1:06 AM   Sex: male   Delivery type: Vaginal, Spontaneous Delivery   Birth Weight: 2608 g (5 lb 12 oz)   Percent of Weight Change: -1%     Gestational Age: 37w4d   [unfilled]    Assessment Breast and nipple assessment: flat nipple     Assessment: sleepy    Feeding assessment: no latch  LATCH:  Latch: Repeated attempts, hold nipple in mouth, stimulate to suck   Audible Swallowing: A few with stimulation   Type of Nipple: Flat   Comfort (Breast/Nipple): Soft/non-tender   Hold (Positioning): Full assist, teach one side, mother does other, staff holds   [facility] Score: 6          Feeding recommendations:  pump every 2-3 hours      Breastfeeding booklets given and reviewed with mom  Assisted with positioning and latching  Demo hand expression  Baby would latch for a suck and slip off nipple or fall asleep  We attempted for 15 minutes with no successful latch, baby just sleepy  Enc to start pumping after every feeding attempt  Enc to always attempt to latch, hand express during attempts and then pump if unsuccessful   Encouraged patient to call for assistance as needed,phone # given    Abigail Agarwal RN 10/9/2017 10:25 AM

## 2017-10-09 NOTE — PROGRESS NOTES
Progress Note - OB/GYN   Bennett Jacinto 25 y o  female MRN: [id number]  Unit/Bed#: L&D 315-01 Encounter: [id number]    Assessment:  25 y o  [id number] s/p Spontaneous Vaginal Delivery Postpartum day  1  + UDS for [facility]  Patient recovering well, Stable    Plan:  Continue routine post partum care  Pain management PRN  CM consultation for support at home and [facility] + UDS  Encourage ambulation  Encourage breastfeeding    Subjective/Objective   Chief Complaint:    Postpartum state    Subjective:   Patient doing well this morning with no complaints  She is requesting CM consult for assistance with support at home for when she is discharged      Pain: yes, cramping, improved with meds  Tolerating PO: yes  Voiding: yes  Flatus: yes  BM: no  Ambulating: yes  Breastfeeding:  yes  Chest pain: no  Shortness of breath: no  Leg pain: no  Lochia: minimal    Objective:     Vitals: Temp:  [97 7 °F (36 5 °C)-98 8 °F (37 1 °C)] 97 7 °F (36 5 °C)  HR:  [73-85] 73  Resp:  [18] 18  BP: (112-122)/(66-87) 122/68       Intake/Output Summary (Last 24 hours) at 10/09/17 1568  Last data filed at 10/08/17 1503   Gross per 24 hour   Intake                0 ml   Output              600 ml   Net             -600 ml     Physical Exam:   General: NAD, alert, oriented  Cardio: Regular rate and rhythm, no murmur  Resp: nonlabored breathing, clear to auscultation bilaterally  Abdomen: Soft, no distension/rebound/guarding/tenderness   Fundus: Firm, non-tender, fundus: 1 cm below umbilicus  G/U: minimal lochia noted on pad  Lower Extremities: Non-tender, no palpable cords    Medications:  Current Facility-Administered Medications   Medication Dose Route Frequency    acetaminophen (TYLENOL) tablet 650 mg  650 mg Oral Q6H PRN    aluminum-magnesium hydroxide-simethicone (MYLANTA) 200-200-20 mg/5 mL oral suspension 15 mL  15 mL Oral Q6H PRN    benzocaine-menthol-lanolin-aloe (DERMOPLAST) 20-0 5 % topical spray   Topical 4x Daily PRN    bisacodyl (DULCOLAX) rectal suppository 10 mg  10 mg Rectal Daily PRN    calcium carbonate (TUMS) chewable tablet 1,000 mg  1,000 mg Oral Daily PRN    diphenhydrAMINE (BENADRYL) tablet 25 mg  25 mg Oral Q6H PRN    docusate sodium (COLACE) capsule 100 mg  100 mg Oral BID PRN    ferrous sulfate tablet 325 mg  325 mg Oral Daily With Breakfast    hydrocortisone 1 % cream 1 application  1 application Topical PRN    ondansetron (ZOFRAN) injection 4 mg  4 mg Intravenous Q8H PRN    oxyCODONE-acetaminophen (PERCOCET) 5-325 mg per tablet 1 tablet  1 tablet Oral Q4H PRN    oxyCODONE-acetaminophen (PERCOCET) 5-325 mg per tablet 2 tablet  2 tablet Oral Q4H PRN    prenatal multivitamin tablet 1 tablet  1 tablet Oral Daily    senna (SENOKOT) tablet 8 6 mg  1 tablet Oral HS PRN    simethicone (MYLICON) chewable tablet 80 mg  80 mg Oral 4x Daily PRN    sod citrate-citric acid (BICITRA) oral solution 30 mL  30 mL Oral 4x Daily PRN    witch hazel-glycerin (TUCKS) topical pad 1 pad  1 pad Topical PRN       Labs:   Recent Results (from the past 24 hour(s))   Urine culture    Collection Time: 10/08/17  6:27 PM   Result Value Ref Range    Urine Culture [id number] cfu/ml Staphylococcus coagulase negative (A)        Becca Coughlin  10/9/2017  6:21 AM

## 2017-10-09 NOTE — SOCIAL WORK
CM received a consult for +UDS screen in both mom and baby for [facility]  CM met with MOB to discuss this and any other potential discharge needs  MOB is now , she gave birth to a baby boy, Arpita Silva at 37w4d  MOB reports that FOB is not involved, no name identified  MOB has two other children in the home, ages three and [de-identified]  She lives with her mother who is her best support system  Her mother will be able to help with  needs and with any transportation needed  She plans to use [address], and reports that it is a block from her house and can walk there when necessary (when her mother can not provide transportation)  She has all larger items for baby, ie  Car seat, crib, stroller, however reports that she still needs to get some of the smaller things - diapers etc  CM provided a local resource list for agencies providing these materials if the patient has financial difficulties obtaining them  As of right now she is trying to breast pump, however she reports some difficulty  She is interested in receiving a breast pump to take home  GERARDO sent a referral to [facility] for a Medela pump and delivered to the patients room  She us set up with [address] services and will schedule an appointment at their office when she is discharged; she plans to enroll baby in her gateway insurance and knows to call them within 30 days of delivery to avoid any gaps in care  Hx of bipolar/depression, reports that she last received treatment approx 2yrs ago and has not been taking any medication for it since, however she reports that it is managed well without medication  No hx of ppd/bb with her prior pregnancies  CM addressed THC use - the patient could not report the amount of times she used THC during pregnancy but that she used to assist with pain management and loss of appetite issues  CM notified her of the process to call in CYS referral for any +UDS   She is aware to be expecting a f/u from the [facility]  CM called in childline referral, spoke with [facility], [phone]  NO other needs addressed at this time

## 2017-10-09 NOTE — PLAN OF CARE

## 2017-10-10 ENCOUNTER — GENERIC CONVERSION - ENCOUNTER (OUTPATIENT)
Dept: OTHER | Facility: OTHER | Age: 22
End: 2017-10-10

## 2017-10-10 VITALS
DIASTOLIC BLOOD PRESSURE: 81 MMHG | HEIGHT: 59 IN | TEMPERATURE: 98.2 F | SYSTOLIC BLOOD PRESSURE: 106 MMHG | RESPIRATION RATE: 18 BRPM | BODY MASS INDEX: 21.77 KG/M2 | HEART RATE: 68 BPM | WEIGHT: 108 LBS

## 2017-10-10 RX ORDER — ACETAMINOPHEN 325 MG/1
325 TABLET ORAL EVERY 6 HOURS PRN
Qty: 30 TABLET | Refills: 0
Start: 2017-10-10 | End: 2018-01-17

## 2017-10-10 RX ORDER — CALCIUM CARBONATE 200(500)MG
1000 TABLET,CHEWABLE ORAL DAILY PRN
Refills: 0
Start: 2017-10-10 | End: 2018-01-17

## 2017-10-10 RX ORDER — DOCUSATE SODIUM 100 MG/1
100 CAPSULE, LIQUID FILLED ORAL 2 TIMES DAILY PRN
Qty: 10 CAPSULE | Refills: 0
Start: 2017-10-10 | End: 2018-01-17

## 2017-10-10 RX ADMIN — FERROUS SULFATE TAB 325 MG (65 MG ELEMENTAL FE) 325 MG: 325 (65 FE) TAB at 08:18

## 2017-10-10 RX ADMIN — Medication 1 TABLET: at 08:18

## 2017-10-10 RX ADMIN — ACETAMINOPHEN 650 MG: 325 TABLET, FILM COATED ORAL at 08:18

## 2017-10-10 RX ADMIN — OXYCODONE HYDROCHLORIDE AND ACETAMINOPHEN 1 TABLET: 5; 325 TABLET ORAL at 00:23

## 2017-10-10 NOTE — PLAN OF CARE
Problem: Knowledge Deficit  Goal: Patient/family/caregiver demonstrates understanding of disease process, treatment plan, medications, and discharge instructions  Complete learning assessment and assess knowledge base    Interventions:  - Provide teaching at level of understanding  - Provide teaching via preferred learning methods   Outcome: Progressing      Problem: PAIN - ADULT  Goal: Verbalizes/displays adequate comfort level or baseline comfort level  Interventions:  - Encourage patient to monitor pain and request assistance  - Assess pain using appropriate pain scale  - Administer analgesics based on type and severity of pain and evaluate response  - Implement non-pharmacological measures as appropriate and evaluate response  - Consider cultural and social influences on pain and pain management  - Notify physician/advanced practitioner if interventions unsuccessful or patient reports new pain   Outcome: Progressing      Problem: INFECTION - ADULT  Goal: Absence or prevention of progression during hospitalization  INTERVENTIONS:  - Assess and monitor for signs and symptoms of infection  - Monitor lab/diagnostic results  - Monitor all insertion sites, i e  indwelling lines, tubes, and drains  - Monitor endotracheal (as able) and nasal secretions for changes in amount and color  - [place] appropriate cooling/warming therapies per order  - Administer medications as ordered  - Instruct and encourage patient and family to use good hand hygiene technique  - Identify and instruct in appropriate isolation precautions for identified infection/condition   Outcome: Progressing      Problem: SAFETY ADULT  Goal: Maintain or return to baseline ADL function  INTERVENTIONS:  -  Assess patient's ability to carry out ADLs; assess patient's baseline for ADL function and identify physical deficits which impact ability to perform ADLs (bathing, care of mouth/teeth, toileting, grooming, dressing, etc )  - Assess/evaluate cause of self-care deficits   - Assess range of motion  - Assess patient's mobility; develop plan if impaired  - Assess patient's need for assistive devices and provide as appropriate  - Encourage maximum independence but intervene and supervise when necessary  ¯ Involve family in performance of ADLs  ¯ Assess for home care needs following discharge   ¯ Request OT consult to assist with ADL evaluation and planning for discharge  ¯ Provide patient education as appropriate   Outcome: Progressing    Goal: Maintain or return mobility status to optimal level  INTERVENTIONS:  - Assess patient's baseline mobility status (ambulation, transfers, stairs, etc )    - Identify cognitive and physical deficits and behaviors that affect mobility  - Identify mobility aids required to assist with transfers and/or ambulation (gait belt, sit-to-stand, lift, walker, cane, etc )  - [place] fall precautions as indicated by assessment  - Record patient progress and toleration of activity level on Mobility SBAR; progress patient to next Phase/Stage  - Instruct patient to call for assistance with activity based on assessment  - Request Rehabilitation consult to assist with strengthening/weightbearing, etc    Outcome: Progressing      Problem: DISCHARGE PLANNING  Goal: Discharge to home or other facility with appropriate resources  INTERVENTIONS:  - Identify barriers to discharge w/patient and caregiver  - Arrange for needed discharge resources and transportation as appropriate  - Identify discharge learning needs (meds, wound care, etc )  - Arrange for interpretive services to assist at discharge as needed  - Refer to Case Management Department for coordinating discharge planning if the patient needs post-hospital services based on physician/advanced practitioner order or complex needs related to functional status, cognitive ability, or social support system   Outcome: Progressing

## 2017-10-10 NOTE — PROGRESS NOTES
Progress Note - OB/GYN   [place] Miners 25 y o  female MRN: [id number]  Unit/Bed#: L&D 315-01 Encounter: [id number]    Assessment:  25 y o  [id number] s/p Spontaneous Vaginal Delivery Postpartum day  2  +UDS for THC- CYS to follow up with patient at home, CM signed off  Patient recovering well, Stable  Desires discharge today    Plan:  Continue routine post partum care  Pain management PRN  Encourage ambulation  Encourage breastfeeding  To follow up in clinic in 3 weeks    Subjective/Objective   Chief Complaint:    Postpartum state    Subjective:   Patient doing well this morning and has no complaints  Discussed contraception options including risks, benefits and alternatives and at this time she declines Depo Provera and would like to think about her options which we will follow up in clinic      Pain: yes, cramping, improved with meds  Tolerating PO: yes  Voiding: yes  Flatus: yes  BM: yes  Ambulating: yes  Breastfeeding:  yes  Chest pain: no  Shortness of breath: no  Leg pain: no  Lochia: minimal    Objective:     Vitals: Temp:  [97 7 °F (36 5 °C)-98 6 °F (37 °C)] 97 7 °F (36 5 °C)  HR:  [57-90] 57  Resp:  [16-18] 18  BP: ()/(60-79) 92/60     No intake or output data in the 24 hours ending 10/10/17 0509    Physical Exam:   General: NAD, alert, oriented  Cardio: Regular rate and rhythm, no murmur  Resp: nonlabored breathing, clear to auscultation bilaterally  Abdomen: Soft, no distension/rebound/guarding/tenderness   Fundus: Firm, non-tender, fundus: 2 cm below umbilicus  G/U: minimal lochia noted on pad  Lower Extremities: Non-tender, no palpable cords    Medications:  Current Facility-Administered Medications   Medication Dose Route Frequency    acetaminophen (TYLENOL) tablet 650 mg  650 mg Oral Q6H PRN    aluminum-magnesium hydroxide-simethicone (MYLANTA) 200-200-20 mg/5 mL oral suspension 15 mL  15 mL Oral Q6H PRN    benzocaine-menthol-lanolin-aloe (DERMOPLAST) 20-0 5 % topical spray   Topical 4x Daily PRN    bisacodyl (DULCOLAX) rectal suppository 10 mg  10 mg Rectal Daily PRN    calcium carbonate (TUMS) chewable tablet 1,000 mg  1,000 mg Oral Daily PRN    diphenhydrAMINE (BENADRYL) tablet 25 mg  25 mg Oral Q6H PRN    docusate sodium (COLACE) capsule 100 mg  100 mg Oral BID PRN    ferrous sulfate tablet 325 mg  325 mg Oral Daily With Breakfast    hydrocortisone 1 % cream 1 application  1 application Topical PRN    ondansetron (ZOFRAN) injection 4 mg  4 mg Intravenous Q8H PRN    oxyCODONE-acetaminophen (PERCOCET) 5-325 mg per tablet 1 tablet  1 tablet Oral Q4H PRN    oxyCODONE-acetaminophen (PERCOCET) 5-325 mg per tablet 2 tablet  2 tablet Oral Q4H PRN    prenatal multivitamin tablet 1 tablet  1 tablet Oral Daily    senna (SENOKOT) tablet 8 6 mg  1 tablet Oral HS PRN    simethicone (MYLICON) chewable tablet 80 mg  80 mg Oral 4x Daily PRN    sod citrate-citric acid (BICITRA) oral solution 30 mL  30 mL Oral 4x Daily PRN    witch hazel-glycerin (TUCKS) topical pad 1 pad  1 pad Topical PRN       Labs:   No results found for this or any previous visit (from the past 24 hour(s))        Levell Bumpers Ekonomidis  10/10/2017  6:24 AM

## 2017-10-10 NOTE — SOCIAL WORK
Discharge plan is for CYS to f/u with mom and baby in the home for further evaluation of social needs

## 2017-10-10 NOTE — PLAN OF CARE
Problem: Knowledge Deficit  Goal: Patient/family/caregiver demonstrates understanding of disease process, treatment plan, medications, and discharge instructions  Complete learning assessment and assess knowledge base    Interventions:  - Provide teaching at level of understanding  - Provide teaching via preferred learning methods   Outcome: Adequate for Discharge      Problem: PAIN - ADULT  Goal: Verbalizes/displays adequate comfort level or baseline comfort level  Interventions:  - Encourage patient to monitor pain and request assistance  - Assess pain using appropriate pain scale  - Administer analgesics based on type and severity of pain and evaluate response  - Implement non-pharmacological measures as appropriate and evaluate response  - Consider cultural and social influences on pain and pain management  - Notify physician/advanced practitioner if interventions unsuccessful or patient reports new pain   Outcome: Adequate for Discharge      Problem: INFECTION - ADULT  Goal: Absence or prevention of progression during hospitalization  INTERVENTIONS:  - Assess and monitor for signs and symptoms of infection  - Monitor lab/diagnostic results  - Monitor all insertion sites, i e  indwelling lines, tubes, and drains  - Monitor endotracheal (as able) and nasal secretions for changes in amount and color  - [place] appropriate cooling/warming therapies per order  - Administer medications as ordered  - Instruct and encourage patient and family to use good hand hygiene technique  - Identify and instruct in appropriate isolation precautions for identified infection/condition   Outcome: Adequate for Discharge      Problem: SAFETY ADULT  Goal: Maintain or return to baseline ADL function  INTERVENTIONS:  -  Assess patient's ability to carry out ADLs; assess patient's baseline for ADL function and identify physical deficits which impact ability to perform ADLs (bathing, care of mouth/teeth, toileting, grooming, dressing, etc )  - Assess/evaluate cause of self-care deficits   - Assess range of motion  - Assess patient's mobility; develop plan if impaired  - Assess patient's need for assistive devices and provide as appropriate  - Encourage maximum independence but intervene and supervise when necessary  ¯ Involve family in performance of ADLs  ¯ Assess for home care needs following discharge   ¯ Request OT consult to assist with ADL evaluation and planning for discharge  ¯ Provide patient education as appropriate   Outcome: Adequate for Discharge    Goal: Maintain or return mobility status to optimal level  INTERVENTIONS:  - Assess patient's baseline mobility status (ambulation, transfers, stairs, etc )    - Identify cognitive and physical deficits and behaviors that affect mobility  - Identify mobility aids required to assist with transfers and/or ambulation (gait belt, sit-to-stand, lift, walker, cane, etc )  - [place] fall precautions as indicated by assessment  - Record patient progress and toleration of activity level on Mobility SBAR; progress patient to next Phase/Stage  - Instruct patient to call for assistance with activity based on assessment  - Request Rehabilitation consult to assist with strengthening/weightbearing, etc    Outcome: Adequate for Discharge      Problem: DISCHARGE PLANNING  Goal: Discharge to home or other facility with appropriate resources  INTERVENTIONS:  - Identify barriers to discharge w/patient and caregiver  - Arrange for needed discharge resources and transportation as appropriate  - Identify discharge learning needs (meds, wound care, etc )  - Arrange for interpretive services to assist at discharge as needed  - Refer to Case Management Department for coordinating discharge planning if the patient needs post-hospital services based on physician/advanced practitioner order or complex needs related to functional status, cognitive ability, or social support system   Outcome: Adequate for Discharge

## 2017-10-10 NOTE — CASE MANAGEMENT
Notification of Maternity Inpatient Admission/Maternity Inpatient Authorization Request  This is a Notification of Maternity Inpatient Admission/Maternity Inpatient Authorization Request to our facility [address] [facility]  Please be advised that this patient is currently in our facility under Inpatient Status  Below you will find the Birth/[place] Summary, Attending Physician and Facilitys information including NPI# and contact for the Utilization  assigned to the [facility] where the patient is receiving services  Please feel free to contact the Utilization Review Department with any questions  Mothers Information:  Masha Aragon  MRN: [id number]  YOB: 1995  Admission Date: 10/7/2017  7:51 AM  Discharge Date: 10/10/2017  1:28 PM  Disposition: Home/Self Care  Admitting Diagnosis:   O80 VAGINAL DELIVERY  Abdominal pain during pregnancy in third trimester [O26 893, R10 9]   Information:  Estimated Date of Delivery: 10/25/17  Information for the patient's :  Satish Perez [[id number]]      Delivery Information:  Sex: male  Delivered 10/8/2017 1:06 AM by Vaginal, Spontaneous Delivery; Gestational Age: 37w1d     Measurements:  Weight: 5 lb 12 oz (2608 g); Height: 18"    APGAR 1 minute 5 minutes 10 minutes   Totals: 9 9      OB History      Para Term  AB Living    3 3 1 2   3    SAB TAB Ectopic Multiple Live Births          0 3        Attending Physician:  LUDIVINA Duron    Specialty- Obstetrics and Gynecology  [facility] ID- [id number]  Primary Office:  [address] [facility]   [address] [facility], [address] [facility]  Phone 1: [phone]  Fax: [phone]    Facility: Joya Rolon  [address] [place] [address] [facility]  [phone]  Tax ID: [id number]  NPI: [id number]    [address] [facility] in the [facility] by Sorin marino 2017  Network Utilization Review Department  Phone: [phone]; Fax [phone]  ATTENTION: The Network Utilization Review Department is now centralized for our 7 Facilities  Make a note that we have a new phone and fax numbers for our Department  Please call with any questions or concerns to [phone] and carefully follow the prompts so that you are directed to the right person  All voicemails are confidential  Fax any determinations, approvals, denials, and requests for initial or continue stay review clinical to [phone]  **Due to HIGH CALL volume, it would be easier if you could please send daily logs  This will expedite your requests and in part, help us provide discharge notifications faster   **

## 2017-10-10 NOTE — SOCIAL WORK
Return call received from Steve Machado with [facility] - plan to meet with MOB on 10/11 in the home

## 2017-10-10 NOTE — LACTATION NOTE
This note was copied from a baby's chart  Breastfeeding discharge booklet given and reviewed with patient  Baby is still not latching so mom has been pumping and bottle feeding  Offered assistance with breastfeeding  Enc to call me for next feeding

## 2017-10-16 LAB — PLACENTA IN STORAGE: NORMAL

## 2017-11-08 ENCOUNTER — HOSPITAL ENCOUNTER (EMERGENCY)
Facility: HOSPITAL | Age: 22
Discharge: HOME | End: 2017-11-08
Admitting: EMERGENCY MEDICINE
Payer: COMMERCIAL

## 2017-11-08 VITALS
BODY MASS INDEX: 22.22 KG/M2 | WEIGHT: 110 LBS | RESPIRATION RATE: 16 BRPM | TEMPERATURE: 97.4 F | DIASTOLIC BLOOD PRESSURE: 77 MMHG | HEART RATE: 99 BPM | OXYGEN SATURATION: 98 % | SYSTOLIC BLOOD PRESSURE: 127 MMHG

## 2017-11-08 DIAGNOSIS — H60.92 OTITIS EXTERNA, LEFT: Primary | ICD-10-CM

## 2017-11-08 DIAGNOSIS — H66.92 OTITIS MEDIA, LEFT: ICD-10-CM

## 2017-11-08 PROCEDURE — 99282 EMERGENCY DEPT VISIT SF MDM: CPT

## 2017-11-08 RX ORDER — NEOMYCIN SULFATE, POLYMYXIN B SULFATE AND HYDROCORTISONE 10; 3.5; 1 MG/ML; MG/ML; [USP'U]/ML
4 SUSPENSION/ DROPS AURICULAR (OTIC) 4 TIMES DAILY
Qty: 8 ML | Refills: 0 | Status: SHIPPED | OUTPATIENT
Start: 2017-11-08 | End: 2018-01-17

## 2017-11-08 RX ORDER — AMOXICILLIN 500 MG/1
500 CAPSULE ORAL EVERY 12 HOURS SCHEDULED
Qty: 20 CAPSULE | Refills: 0 | Status: SHIPPED | OUTPATIENT
Start: 2017-11-08 | End: 2017-11-18

## 2017-11-09 NOTE — ED PROVIDER NOTES
History  Chief Complaint   Patient presents with    Earache     left sided ear pain for 1 week  This is a 73-year-old female patient presents with 1 week of left ear pain and drainage  She denies any fevers she has mild cough and congestion  No headache no blurred vision or double vision  No chest pain or shortness of breath or sore throat no nausea vomiting diarrhea abdominal pain  Nothing seems to make it better or worse she takes nothing over-the-counter for the pain  She has no decreased hearing  No urinary symptoms no rash  No swelling of the ear behind the left ear ,   No fever chills  Differential diagnosis includes not limited to otitis media, otitis externa,             Prior to Admission Medications   Prescriptions Last Dose Informant Patient Reported? Taking? Tgircw-Qiwvrmdpq-Vgge-Fish Oil (PRENATAL + COMPLETE MULTI PO)   Yes No   Sig: Take 1 tablet by mouth daily   acetaminophen (TYLENOL) 325 mg tablet   No No   Sig: Take 1 tablet by mouth every 6 (six) hours as needed for mild pain, headaches or fever   calcium carbonate (TUMS) 500 mg chewable tablet   No No   Sig: Chew 2 tablets daily as needed for indigestion or heartburn   docusate sodium (COLACE) 100 mg capsule   No No   Sig: Take 1 capsule by mouth 2 (two) times a day as needed for constipation   ferrous sulfate 325 (65 Fe) mg tablet   Yes No   Sig: Take 325 mg by mouth daily with breakfast   witch hazel-glycerin (TUCKS) topical pad   No No   Sig: Apply 1 pad topically as needed for irritation or hemorrhoids      Facility-Administered Medications: None       Past Medical History:   Diagnosis Date    Abdominal pain     Abnormal uterine bleeding     Anemia     Anxiety     no treatment      Bipolar disorder ([facility] [address] [facility] )     took latuda and stopped in february when found out pregnant    Diabetes mellitus ([facility] [address] )     gestational    Hearing loss     Heart murmur     Trauma 2013    First son's father    Varicella 2000       Past Surgical History:   Procedure Laterality Date    ABDOMINAL SURGERY      APPENDECTOMY      CHOLECYSTECTOMY      WA COLONOSCOPY FLX DX W/COLLJ SPEC WHEN PFRMD N/A 1/19/2017    Procedure: EGD AND COLONOSCOPY;  Surgeon: Bob Sanders DO;  [facility]; Service: Gastroenterology    WA LAP,APPENDECTOMY N/A 5/17/2016    Procedure: APPENDECTOMY LAPAROSCOPIC;  Surgeon: Bernabe Koch DO;  [facility];  Service: General    WA LAP,DIAGNOSTIC ABDOMEN N/A 3/13/2016    Procedure: LAPAROSCOPY DIAGNOSTIC;  Surgeon: Yumiko Kahn MD;  Location[facility];  Service: General    SMALL INTESTINE SURGERY      twisted bowels       History reviewed  No pertinent family history  I have reviewed and agree with the history as documented  Social History   Substance Use Topics    Smoking status: Current Every Day Smoker     Packs/day: 0 20     Years: 5 00     Types: Cigarettes    Smokeless tobacco: Never Used    Alcohol use No      Comment: socially        Review of Systems   All other systems reviewed and are negative  Physical Exam  ED Triage Vitals [11/08/17 1929]   Temperature Pulse Respirations Blood Pressure SpO2   (!) 97 4 °F (36 3 °C) 99 16 127/77 98 %      Temp Source Heart Rate Source Patient Position - Orthostatic VS BP Location FiO2 (%)   Oral -- Sitting Right arm --      Pain Score       --           Orthostatic Vital Signs  Vitals:    11/08/17 1929   BP: 127/77   Pulse: 99   Patient Position - Orthostatic VS: Sitting       Physical Exam   Constitutional: She appears well-developed and well-nourished  HENT:   Head: Normocephalic and atraumatic  Right Ear: Tympanic membrane, external ear and ear canal normal    Left Ear: External ear normal  There is drainage  No mastoid tenderness  Tympanic membrane is injected  Nose: Nose normal    Mouth/Throat: Oropharynx is clear and moist    Eyes: Conjunctivae are normal  Pupils are equal, round, and reactive to light  Neck: Normal range of motion  Neck supple  Cardiovascular: Normal rate and regular rhythm  Pulmonary/Chest: Effort normal and breath sounds normal    Abdominal: Soft  Bowel sounds are normal  There is no tenderness  Neurological: She is alert  Skin: Skin is warm  Psychiatric: She has a normal mood and affect  Her behavior is normal    Nursing note and vitals reviewed  ED Medications  Medications - No data to display    Diagnostic Studies  Results Reviewed     None                 No orders to display              Procedures  Procedures       Phone Contacts  ED Phone Contact    ED Course  ED Course                                MDM  CritCare Time    Disposition  Final diagnoses:   Otitis externa, left   Otitis media, left     Time reflects when diagnosis was documented in both MDM as applicable and the Disposition within this note     Time User Action Codes Description Comment    11/8/2017  7:47 PM Pernell Damian Add [H60 92] Otitis externa, left     11/8/2017  7:47 PM Pernell Damian [H66 92] Otitis media, left       ED Disposition     ED Disposition Condition Comment    Discharge  Curt Mckenzie discharge to home/self care      Condition at discharge: Good        Follow-up Information     Follow up With Specialties Details Why Contact Info    Karyle Schlichter, MD Pediatrics Schedule an appointment as soon as possible for a visit  [address] [place] [address]          Patient's Medications   Discharge Prescriptions    AMOXICILLIN (AMOXIL) 500 MG CAPSULE    Take 1 capsule by mouth every 12 (twelve) hours for 10 days       Start Date: 11/8/2017 End Date: 11/18/2017       Order Dose: 500 mg       Quantity: 20 capsule    Refills: 0    NEOMYCIN-POLYMYXIN-HYDROCORTISONE (CORTISPORIN) 0 35%-10,000 UNITS/ML-1% OTIC SUSPENSION    Administer 4 drops into the left ear 4 (four) times a day for 10 days       Start Date: 11/8/2017 End Date: 11/18/2017       Order Dose: 4 drops       Quantity: 8 mL    Refills: 0     No discharge procedures on file      ED Provider  Electronically Signed by           Shelli Harper PA-C  11/08/17 1941

## 2017-11-09 NOTE — DISCHARGE INSTRUCTIONS
Otitis Externa   WHAT YOU NEED TO KNOW:   Otitis externa, or swimmer's ear, is an infection in the outer ear canal  This canal goes from the outside of the ear to the eardrum  DISCHARGE INSTRUCTIONS:   Return to the emergency department if:   · You have severe ear pain  · You are suddenly unable to hear at all  · You have new swelling in your face, behind your ears, or in your neck  · You suddenly cannot move part of your face  · Your face suddenly feels numb  Contact your healthcare provider if:   · You have a fever  · Your signs and symptoms do not get better after 2 days of treatment  · Your signs and symptoms go away for a time, but then come back  · You have questions or concerns about your condition or care  Medicines:   · NSAIDs , such as ibuprofen, help decrease swelling, pain, and fever  This medicine is available with or without a doctor's order  NSAIDs can cause stomach bleeding or kidney problems in certain people  If you take blood thinner medicine, always ask if NSAIDs are safe for you  Always read the medicine label and follow directions  Do not give these medicines to children under 10months of age without direction from your child's healthcare provider  · Acetaminophen  decreases pain and fever  It is available without a doctor's order  Ask how much to take and how often to take it  Follow directions  Acetaminophen can cause liver damage if not taken correctly  · Ear drops  that contain an antibiotic may be given  The antibiotic helps treat a bacterial infection  You may also be given steroid medicine  The steroid helps decrease redness, swelling, and pain  · Take your medicine as directed  Contact your healthcare provider if you think your medicine is not helping or if you have side effects  Tell him or her if you are allergic to any medicine  Keep a list of the medicines, vitamins, and herbs you take  Include the amounts, and when and why you take them  Bring the list or the pill bottles to follow-up visits  Carry your medicine list with you in case of an emergency  Follow up with your healthcare provider as directed:  Write down your questions so you remember to ask them during your visits  How to use eardrops:   · Lie down on your side with your infected ear facing up  · Carefully drip the correct number of eardrops into your ear  Have another person help you if possible  · Gently move the outside part of your ear back and forth to help the medicine reach your ear canal      · Stay lying down in the same position (with your ear facing up) for 3 to 5 minutes  Prevent otitis externa:   · Do not put cotton swabs or foreign objects in your ears  · Wrap a clean moist washcloth around your finger, and use it to clean your outer ear and remove extra ear wax  · Use ear plugs when you swim  Dry your outer ears completely after you swim or bathe  © 2017 [employer] Information is for End User's use only and may not be sold, redistributed or otherwise used for commercial purposes  All illustrations and images included in CareNotes® are the copyrighted property of [employer]  or [employer]  The above information is an  only  It is not intended as medical advice for individual conditions or treatments  Talk to your doctor, nurse or pharmacist before following any medical regimen to see if it is safe and effective for you  Otitis Media   WHAT YOU NEED TO KNOW:   Otitis media is an ear infection  DISCHARGE INSTRUCTIONS:   Medicines:  · Ibuprofen or acetaminophen  helps decrease your pain and fever  They are available without a doctor's order  Ask your healthcare provider which medicine is right for you  Ask how much to take and how often to take it  These medicines can cause stomach bleeding if not taken correctly  Ibuprofen can cause kidney damage   Do not take ibuprofen if you have kidney disease, an ulcer, or allergies to aspirin  Acetaminophen can cause liver damage  Do not drink alcohol if you take acetaminophen  · Ear drops  help treat your ear pain  · Antibiotics  help treat a bacterial infection that caused your ear infection  · Take your medicine as directed  Contact your healthcare provider if you think your medicine is not helping or if you have side effects  Tell him or her if you are allergic to any medicine  Keep a list of the medicines, vitamins, and herbs you take  Include the amounts, and when and why you take them  Bring the list or the pill bottles to follow-up visits  Carry your medicine list with you in case of an emergency  Heat or ice:   · Heat  may be used to decrease your pain  Place a warm, moist washcloth on your ear  Apply for 15 to 20 minutes, 3 to 4 times a day    · Ice  helps decrease swelling and pain  Use an ice pack or put crushed ice in a plastic bag  Cover the ice pack with a towel and place it on your ear for 15 to 20 minutes, 3 to 4 times a day for 2 days  Prevent otitis media:   · Wash your hands often  Use soap and water  Wash your hands after you use the bathroom, change a child's diapers, or sneeze  Wash your hands before you prepare or eat food  · Stay away from people who are ill  Some germs are easily and quickly spread through contact  Return to work or school: You may return to work or school when your fever is gone  Follow up with your healthcare provider as directed:  Write down your questions so you remember to ask them during your visits  Contact your healthcare provider if:   · Your ear pain gets worse or does not go away, even after treatment  · The outside of your ear is red or swollen  · You have vomiting or diarrhea  · You have fluid coming from your ear  · You have questions or concerns about your condition or care  Return to the emergency department if:   · You have a seizure  · You have a fever and a stiff neck    © 2017 [facility] Information is for End User's use only and may not be sold, redistributed or otherwise used for commercial purposes  All illustrations and images included in CareNotes® are the copyrighted property of [employer]  or [employer]  The above information is an  only  It is not intended as medical advice for individual conditions or treatments  Talk to your doctor, nurse or pharmacist before following any medical regimen to see if it is safe and effective for you

## 2017-11-09 NOTE — ED NOTES
Patient assessed and discharged by provider before RN assessment      Christel Giordano, RN  11/08/17 1457

## 2018-01-09 NOTE — PROGRESS NOTES
2017         RE: Julius Pang                                 To: [facility]   MR#: [id number]                                    [address]   : 1995                                  [address]   ENC:                                              Kathy Pringle [address]   (Exam #: [id number])                           Fax: [phone]      The LMP of this 25year old,  G3, P1-1-0-2 patient was 2017, her   working RACHEL is OCT 25 2017 and the current gestational age is 25 weeks 1   day by [address] [place] [address]  A sonographic examination was performed on 2017 using real time equipment  The ultrasound examination was performed   using abdominal & vaginal techniques  The patient has a BMI of 20 0  Her   blood pressure today was 109/74  Earliest ultrasound found in her record: 3/22/17  9w0d  RACHEL 10/25/17            Sherrie's recently completed Sequential Screen revealed a Down syndrome   risk of 1:9,200, Trisomy 18 risk of 1:10,000, and ONTD risk of 1:6,000  Evaluation of the individual analyte levels reveals no increased risk for   abnormal placentation  Julio Cesar Barber has no complaints  She denies abdominal or   pelvic pain, vaginal bleeding, mucoid vaginal discharge, or suspected   PPROM  She Is currently treated with weekly IM progesterone given her   history of a prior spontaneous  birth  Screening for gestational   diabetes on May 17 revealed a normal one-hour post Glucola value of 108   mg/dL        Cardiac motion was observed at 143 bpm       INDICATIONS      previous  delivery   fetal anatomical survey      Exam Types      LEVEL II   Transvaginal      RESULTS      Fetus # 1 of 1   Vertex presentation   Fetal growth appeared normal   Placenta Location = Posterior, right lateral   No placenta previa   Placenta Grade = I      MEASUREMENTS (* Included In Average GA)      AC              15 1 cm        20 weeks 1 day * (52%)   BPD              4 9 cm        20 weeks 6 days* (73%)   HC              18 3 cm        20 weeks 4 days* (63%)   Femur            3 5 cm        21 weeks 1 day * (65%)      Nuchal Fold      3 6 mm   NBL              5 4 mm      Humerus          3 2 cm        20 weeks 4 days  (65%)      Cerebellum       2 1 cm        21 weeks 0 days   Biorbit          3 2 cm        20 weeks 6 days   CisternaMagna    2 8 mm      HC/AC           1 21   FL/AC           0 23   FL/BPD          0 71   EFW (Ac/Fl/Hc)   367 grams - 0 lbs 13 oz      THE AVERAGE GESTATIONAL AGE is 20 weeks 5 days +/- 10 days  AMNIOTIC FLUID         Largest Vertical Pocket = 3 6 cm   Amniotic Fluid: Normal      CERVICAL EVALUATION      The cervix appeared normal (Ultrasound Examination)  SUPINE      Cervical Length: 3 60 cm      OTHER TEST RESULTS           Funneling?: No             Dynamic Changes?: No        Resp  To TFP?: No                      Debris?: No      ANATOMY      Head                                    Normal   Face/Neck                               Normal   Th  Cav  Abnormal   Heart                                   See Details   Abd  Cav  Normal   Stomach                                 Normal   Right Kidney                            Normal   Left Kidney                             Normal   Bladder                                 Normal   Abd  Wall                               Normal   Spine                                   Normal   Extrems                                 Normal   Genitalia                               Normal   Placenta                                Normal   Umbl   Cord                              Normal   Uterus                                  Normal   PCI                                     Normal      ANATOMY DETAILS      Visualized Appearing Sonographically Normal:   HEAD: (Calvarium, BPD Level, Cavum, Lateral Ventricles, Choroid Plexus,   Cerebellum, Cisterna Magna);    FACE/NECK: (Neck, Nuchal Fold, Profile,   Orbits, Nose/Lips, Palate, Face);    TH  CAV  : (Lungs, Diaphragm); HEART: (Four Chamber View, Proximal Left Outflow, 3VV, Ductal Arch, Aortic   Arch, IVC, SVC, Cardiac Axis, Cardiac Position);    ABD  CAV : (Liver);      STOMACH, RIGHT KIDNEY, LEFT KIDNEY, BLADDER, ABD  WALL, SPINE: (Cervical   Spine, Thoracic Spine, Lumbar Spine, Sacrum);    EXTREMS: (Lt Humerus, Rt   Humerus, Lt Forearm, Rt Forearm, Lt Hand, Rt Hand, Lt Femur, Rt Femur, Lt   Low Leg, Rt Low Leg, Lt Foot, Rt Foot);    GENITALIA (Male), PLACENTA,   UMBL  CORD, UTERUS, PCI      Not Visualized:   HEART: (Proximal Right Outflow, 3 Vessel Trachea, Short Axis of Greater   Vessels, Interventricular Septum, Interatrial Septum)      Abnormal:   TH  CAV  ADNEXA      The left ovary appeared normal and measured 2 9 x 2 3 x 1 2 cm with a   volume of 4 2 cc  The right ovary appeared normal and measured 2 4 x 3 2 x   1 5 cm with a volume of 6 0 cc  IMPRESSION      Pederson IUP   20 weeks and 5 days by this ultrasound  (RACHEL=OCT 21 2017)   20 weeks and 1 day by 1st Tri Sono  (RACHEL=OCT 25 2017)   Vertex presentation   Fetal growth appeared normal   Regular fetal heart rate of 143 bpm   Cystic adenomatoid malformation   Posterior, right lateral placenta   No placenta previa      GENERAL COMMENT      A hyperechoic right-sided lung mass is identified, without macrocystic   components, measuring 2 1 x 1 8 x 2 9 cm in size  A systemic feeding   vessel is not identified  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)  The calculated CVR is   0 31 cm2  The lung mass deviates the heart a bit more to the left side   than its usual location  There is no suspicion of evolving hydrops  Neither pericardial or pleural effusion is present  The left lung is   normal in appearance   No fetal structural abnormality or ultrasound marker   for aneuploidy is otherwise identified on the Level II ultrasound study   today  Imaging of several cardiac targets is suboptimal secondary to   constraints related to unfavorable fetal position  Fetal growth and   amniotic fluid volume are normal   The placenta is normal in appearance  The cervix is normal in appearance by transvaginal sonography  The   cervical length is normal   Cervical debris is not present  Cervical   funneling is not present  Neither provocative nor dynamic change is   appreciated  Today's ultrasound findings and suggested follow-up were discussed in   detail with Mika Cary  We discussed that prenatal ultrasound cannot rule   out all congenital abnormalities  Her Sequential Screen results were   discussed in detail  We we will refer Tami Burton to [address] [place] [address] for Fetal   Diagnosis and Treatment at [facility] for further evaluation, including repeat   ultrasound evaluation and fetal echocardiography and, possibly, fetal MRI,   along with all appropriate subspecialty consultations including Pediatric   Surgery  Mika Cary will return to [address] [place] [address] in 2 weeks for cervical   sonography and follow-up fetal evaluation  Cervical cerclage is   recommended with cervical shortening to 25 mm or less prior to 24 weeks   gestation  Continuation of weekly 17-OHPC is recommended through 36   completed weeks gestation  The face to face time, in addition to time spent discussing ultrasound   results, was approximately 15 minutes, greater than 50% of which was spent   during counseling and coordination of care  JORY Lucas M D     Maternal-Fetal Medicine   Electronically signed 06/09/17 16:08

## 2018-01-09 NOTE — PROGRESS NOTES
AUG 17 2017         RE: Yumiko Karimi                                 To: [facility]   MR#: [id number]                                    [address]   : [address]   ENC: [id number]:[facility]                             Yary, Kathy [place]    (Exam #: [id number])                           Fax: [phone]      The LMP of this 25year old,  G3, P1-1-0-2 patient was 2017, her   working RACHEL is OCT 25 2017 and the current gestational age is 31 weeks 1   day by  [address]  A sonographic examination was performed on AUG   17 2017 using real time equipment  The ultrasound examination was   performed using abdominal technique  The patient has a BMI of 21 0  Her   blood pressure today was 104/70  Earliest ultrasound found in her record: 3/22/17  9w0d  RACHEL 10/25/17      Problem list:   1  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  She has been   seen by Dr Kasi Negron from surgery at [facility] in consultation on   17 where a fetal echo showed a mildly dilated and hypertrophied right   ventricle and a fetal MRI comfirmed her diagnosis  Plan is for follow up   postnatally with surgical resection planned at 3 months  2  Hx of a 36 week PTD on Neena through our office  3  Hx of GDMa2- an early glucola was normal      Cardiac motion was observed at 143 bpm       INDICATIONS      fetal lung mass   previous  delivery      Exam Types      Level I      RESULTS      Fetus # 1 of 1   Vertex presentation   Placenta Location = Posterior   No placenta previa   Placenta Grade = II      AMNIOTIC FLUID      Q1: 4 4      Q2: 5 0      Q3: 3 3      Q4: 5 0   MENDEL Total = 17 8 cm   Amniotic Fluid: Normal      ANATOMY DETAILS      Visualized Appearing Sonographically Normal:   TH  CAV  : (Lungs); HEART: (Populr, Cardiac Axis, Cardiac   Position);     PLACENTA      IMPRESSION      Pederson IUP   30 weeks and 1 day by 1st Tri Sono  (RACHEL=OCT 25 2017)   Vertex presentation   Regular fetal heart rate of 143 bpm   Posterior placenta   No placenta previa      GENERAL COMMENT      On exam, the patient appears well, in no acute distress, and her abdomen   is nontender  Octavio Richey presents today for follow-up evaluation for the suspected   congenital pulmonary airway malformation  Her previous evaluation could   not identify the suspected lung mass  Again today, a discrete lung mass   cannot be well-visualized  The fetal heart is not deviated and appears to   have a normal position and axis  The amniotic fluid evaluation is normal       The patient received jovani in the office today  We discussed follow-up in detail and I recommend she return in 2 weeks for   fetal growth evaluation  Thank you very much for allowing us to participate in the care of this   very nice patient  Should you have any questions, please do not hesitate   to contact our office  Please note, in addition to the time spent discussing the results of the   ultrasound, I spent approximately 10 minutes of face-to-face time with the   patient, greater than 50% of which was spent in counseling and the   coordination of care for this patient  JORY Arriaza , LUDIVINA Russell     Electronically signed 08/17/17 16:22

## 2018-01-10 NOTE — MISCELLANEOUS
Provider Comments  Provider Comments:   Pt no showed today 04/24/2017 pt phone stated it is not taking calls at this time      Signatures   Electronically signed by : Pawan Saldana, ; Apr 25 2017  1:46PM EST                       (Author)

## 2018-01-10 NOTE — PROGRESS NOTES
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 in [place]   pregnancy, antepartum (655 83) (O35 8XX0)   5  Encounter for supervision of normal pregnancy (V22 1) (Z34 90)   6  History of drug use (305 93) (Z87 898)   7  History of  delivery, currently pregnant in second trimester (V23 41) (O09 212)   8  Nausea/vomiting in pregnancy (643 90) (O21 9)   9  Pregnancy complicated by fetal lung lesion (655 80) (O35 8XX0)   10  Pregnancy, obstetrical care (V22 1) (Z34 90)    Current Meds    1  Colace 100 MG Oral Capsule; TAKE 1 CAPSULE TWICE DAILY; Therapy: 19BYA4053 to (Evaluate:2017)  Requested for: 79WBC3183; Last   Rx:44Zbn2223 Ordered   2  Ferrous Sulfate 325 (65 Fe) MG Oral Tablet; TAKE 1 TABLET TWICE DAILY WITH MEALS; Therapy: 00ZXU4254 to (Evaluate:21Mcj1045)  Requested for: 77UNV6753; Last   Rx:2017 Ordered    3  Ondansetron 4 MG Oral Tablet Disintegrating; 1 tablet po every 4-6 hours as needed; Therapy: 87NKJ6772 to (Last Rx:2017)  Requested for: 98CFY0309 Ordered   4  Phenergan 25 MG Rectal Suppository; INSERT 1 SUPPOSITORY RECTALLY EVERY 12   HOURS AS NEEDED FOR NAUSEA AND VOMITING; Therapy: 70KRY6760 to (Camachotroy Braden)  Requested for: 39PDY9764; Last   Rx:2017 Ordered    5  Methocarbamol 500 MG Oral Tablet; TAKE 1 TABLET Every 8 hours PRN;    Therapy: 02KEB6799 to (Evaluate:2017)  Requested for: 66OPH9906; Last   FB:07PLF9131 Ordered    6  Carafate 1 GM/10ML Oral Suspension; TAKE 10 ML 3 TIMES DAILY; Therapy: 71XWO3845 to (Evaluate:24Jqi6277)  Requested for: 71AFQ3441; Last   Rx:41Yur0740 Ordered    7  Dicyclomine HCl - 20 MG Oral Tablet; TAKE 1 TABLET EVERY 6 HOURS AS NEEDED; Therapy: 26ZVS8960 to (Evaluate:02Gfm3426)  Requested for: 68BCX4650; Last   Rx:2017 Ordered   8  TriLyte 420 GM Oral Solution Reconstituted; TAKE AS DIRECTED; Therapy: 08POE1389 to (Evaluate:2017)  Requested for: 25NWQ2324; Last   Rx:2017; Status: ACTIVE - Transmit to [facility] Ordered    9  Pantoprazole Sodium 40 MG Oral Tablet Delayed Release; TAKE 1 TABLET TWICE DAILY   30 MINUTES BEFORE BREAKFAST AND DINNER; Therapy: 98QHI3997 to (WKMWFZ:68VSO7257)  Requested for: 31WET5938; Last   Rx:2017 Ordered    10  Prenatal Plus 27-1 MG Oral Tablet; TAKE 1 TABLET DAILY; Therapy: 34LCI6363 to (Evaluate:62Rjk6862)  Requested for: 15UOH0612; Last    Rx:2017 Ordered    11  Tylenol TABS; TAKE 1 TO 2 TABLETS EVERY 4 HOURS AS NEEDED; Therapy: (Recorded:2017) to Recorded    Allergies    1  ibuprofen    2  No Known Environmental Allergies   3  No Known Food Allergies    Results/Data  [id number] Transvaginal Ultrasound OB [facility]:   Procedure: 61445-ZDRJXJZXCG pregnant uterus real time with image documentation, fetal and maternal evaluation, first trimester (<14 weeks 0 days), transvaginal approach: single or first gestation  Indication: EDC gestational age weeks  Findings:   Impression: CL 32-38 mm no funneling        Future Appointments    Date/Time Provider Specialty Site   2017 11:00 AM  [facility], [address] [facility]   2017 [facility] [place] [address]   08/10/2017 11:30 AM [place] [id number] [facility] 2017 03:30 PM  [place], Schedule  [address]   2017 03:30 PM  [facility], Schedule  [address]   2017 11:30 AM  [place], Schedule  [address]   2017 02:00 PM  [place], Schedule  [address]   2017 02:00 PM  [place], Schedule  [address]   2017 02:00 PM Milton Klein, [facility] Adult [address]     Signatures   Electronically signed by : LUDIVINA London ; Aug  2 2017 12:07PM EST                       (Author)

## 2018-01-10 NOTE — MISCELLANEOUS
Message  I called patient with positive chlamydia results from her visit in the ER last Wednesday on August 23, 2017  She states that she does not know if her partner ever gets treated but she is no longer with him  I will treat the patient again with is azithromycin 500 mg p o  Ã1 and perform a test of cure to confirm  Also stressed importance of prenatal care and follow-up since she has missed several appointments        Plan  Chlamydia infection, current pregnancy    · Azithromycin 500 MG Oral Tablet; TAKE 2 TABLET ONCE    Signatures   Electronically signed by : LUDIVINA Dawson ; Aug 29 2017  9:22AM EST                       (Author)

## 2018-01-10 NOTE — MISCELLANEOUS
Provider Comments  Provider Comments:   PATIEN NO SHOWED FOR OB APPOINTMENT TODAY, PATIENT DID CALL YESTERDAY AND ASKED WHAT TIME HER APPOINTMENT WAS AND SAID SHE WAS GOING TO BE HERE, I CALLED PATIENT TODAY, NO ANSWER, LEFT MESSAGE ON PATIENTS VOICE MAIL TO CALL THE OFFICE      Signatures   Electronically signed by : Libertad Sarmiento, ; May  9 2017  1:42PM EST                       (Author)

## 2018-01-10 NOTE — MISCELLANEOUS
Provider Comments  Provider Comments:   Fatou Hughes did not show to her scheduled appointment with Sherrine Prader, PA  Our office did attempt to reschedule, a message was left on her voicemail        Signatures   Electronically signed by : Bruce Allen, ; Sep 11 2017  2:04PM EST                       (Author)

## 2018-01-11 NOTE — MISCELLANEOUS
Provider Comments  Provider Comments:   PT NO SHOW 01/25/17 PTS VOICE MAIL NOT ACCEPT MESSAGES         Signatures   Electronically signed by :  [facility], ; Jan 25 2017 11:09AM EST                       (Author)

## 2018-01-11 NOTE — MISCELLANEOUS
Message  GI Reminder Recall [address]:   Date: 03/03/2017   Dear Miguel Booker:     Review of our records shows you are due for the following: Our office has tried to contact you  Please call us at [phone] for your results  Please call the following office to schedule your appointment:     We look forward to hearing from you!      Sincerely,     [facility]      Signatures   Electronically signed by : Neela Quezada, ; Mar  3 2017  3:34PM EST                       (Author)

## 2018-01-11 NOTE — PROGRESS NOTES
AUG 3 2017         RE: Omayra Trinh                                 To: Maine Hobson GYN   MR#: [id number]                                    [address]   : [address]   ENC: [id number]                             [place], [address]   (Exam #: [id number])                           Fax: [phone]      The LMP of this 25year old,  G3, P1-1-0-2 patient was 2017, her   working RACHEL is OCT 25 2017 and the current gestational age is 35 weeks 1   day by  [facility] [address]  A sonographic examination was performed on AUG   3 2017 using real time equipment  The ultrasound examination was performed   using abdominal technique  The patient has a BMI of 20 8  Her blood   pressure today was 109/76  Earliest ultrasound found in her record: 3/22/17  9w0d  ARCHEL 10/25/17      Problem list:   1  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  She has been   seen by Dr Antonia Ching from surgery at [facility] in consultation on   17 where a fetal echo showed a mildly dilated and hypertrophied right   ventricle and a fetal MRI comfirmed her diagnosis  Plan is for follow up   postnatally with surgical resection planned at 3 months  2  Hx of a 36 week PTD on [place] through our office  3  Hx of GDMa2- an early glucola was normal            Sherrie complains of occasional lower abdominal cramping, not associated   with vaginal bleeding or leakage of amniotic fluid from the vagina  She   reports regular fetal movements  She remains treated with weekly IM   progesterone given her history of a prior spontaneous  birth        Cardiac motion was observed at 130 bpm       INDICATIONS      fetal lung mass   previous  delivery      Exam Types      Level I      RESULTS      Fetus # 1 of 1   Vertex presentation   Fetal growth appeared normal   Placenta Location = Posterior   No placenta previa   Placenta Grade = I      MEASUREMENTS (* Included In Average GA)      AC              23 0 cm        27 weeks 2 days* (33%)   BPD              6 6 cm        26 weeks 4 days* (12%)   HC              25 8 cm        27 weeks 6 days* (32%)   Femur            5 0 cm        27 weeks 0 days* (20%)      Cerebellum       3 4 cm        30 weeks 1 day      HC/AC           1 12   FL/AC           0 22   FL/BPD          0 76   EFW (Ac/Fl/Hc)  1057 grams - 2 lbs 5 oz                 (38%)      THE AVERAGE GESTATIONAL AGE is 27 weeks 1 day +/- 14 days  AMNIOTIC FLUID      Q1: 5 5      Q2: 7 4      Q3: 4 2      Q4: 4 8   MENDEL Total = 22 0 cm   Amniotic Fluid: Normal      ANATOMY DETAILS      Visualized Appearing Sonographically Normal:   HEAD: (Calvarium, BPD Level, Cavum, Lateral Ventricles, Choroid Plexus,   Cerebellum, Cisterna Magna);    FACE/NECK: (Profile, Nose/Lips);    TH    CAV  : (Lungs, Diaphragm); HEART: (Four Chamber View, Proximal Left   Outflow, Proximal Right Outflow, 3VV); STOMACH, RIGHT KIDNEY, LEFT   KIDNEY, BLADDER, PLACENTA      IMPRESSION      Pederson IUP   27 weeks and 1 day by this ultrasound  (RACHEL=NOV 1 2017)   28 weeks and 1 day by 1st Tri Sono  (RACHEL=OCT 25 2017)   Vertex presentation   Fetal growth appeared normal   Regular fetal heart rate of 130 bpm   Posterior placenta   No placenta previa      GENERAL COMMENT      The previously identified fetal right-sided thoracic mass cannot be   clearly identified today and appears isoechoic with respect to the   surrounding lung  There is no evidence of pericardial or pleural effusion  The cardiac axis appears normal   No fetal structural abnormality is   otherwise identified on the Level I survey today  Fetal interval growth   and amniotic fluid volume are normal       Sherrie was given an IM injection of 250 mg of 17-OHPC at her visit today  Today's ultrasound findings and suggested follow-up were discussed in   detail with [facility]   She will return to [facility]  in 2 weeks to   reassess fetal anatomy, including assessment of the thorax  Daily third   trimester fetal kick counting was discussed at the visit today  Continuation of weekly 17-OHPC is recommended through 36 completed weeks   gestation  The face to face time, in addition to time spent discussing ultrasound   results, was approximately 10 minutes, greater than 50% of which was spent   during counseling and coordination of care  LUDIVINA Grimes     Maternal-Fetal Medicine   Electronically signed 08/04/17 19:20

## 2018-01-11 NOTE — PROGRESS NOTES
AUG 31 2017         RE: Stacey Sepulveda                                 To: Jatin Juarez GYN   MR#: [id number]                                    [address]   : [address] [place] [address]   ENC: [id number]                             [place], [address]   (Exam #: [id number])                          Fax: [phone]      The LMP of this 25year old,  G3, P1-1-0-2 patient was 2017, her   working RACHEL is OCT 25 2017 and the current gestational age is 26 weeks 1   day by 1st Trimester Sono  A sonographic examination was performed on AUG   31 2017 using real time equipment  The ultrasound examination was   performed using abdominal technique  The patient has a BMI of 21 0  Her   blood pressure today was 91/66  Earliest ultrasound found in her record: 3/22/17  9w0d  RACHEL 10/25/17      Problem list:   1  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  She has been   seen by Dr Dequan Valdes from surgery at [facility] in consultation on   17 where a fetal echo showed a mildly dilated and hypertrophied right   ventricle and a fetal MRI comfirmed her diagnosis  Plan is for follow up   postnatally with surgical resection planned at 3 months  2  Hx of a 36 week PTD on [place] through our office     3  Hx of GDMa2- an early glucola was normal      Cardiac motion was observed at 131 bpm       INDICATIONS      fetal lung mass   previous  delivery   fetal growth      Exam Types      Level I      RESULTS      Fetus # 1 of 1   Vertex presentation   Fetal growth appeared normal   Placenta Location = Posterior   No placenta previa   Placenta Grade = II      MEASUREMENTS (* Included In Average GA)      AC              27 1 cm        31 weeks 1 day * (33%)   BPD              7 8 cm        31 weeks 3 days* (29%)   HC              29 2 cm        31 weeks 6 days* (29%)   Femur            5 8 cm        30 weeks 1 day * (18%)      Cerebellum       4 1 cm        33 weeks 6 days      HC/AC           1 08   FL/AC           0 21   FL/BPD          0 74   EFW (Ac/Fl/Hc)  1684 grams - 3 lbs 11 oz                 (31%)      THE AVERAGE GESTATIONAL AGE is 31 weeks 1 day +/- 18 days  AMNIOTIC FLUID      Q1: 3 6      Q2: 2 2      Q3: 4 4      Q4: 2 4   MENDEL Total = 12 7 cm   Amniotic Fluid: Normal      ANATOMY DETAILS      Visualized Appearing Sonographically Normal:   HEAD: (Calvarium, BPD Level, Cavum, Lateral Ventricles, Choroid Plexus,   Cerebellum);    TH  CAV  : (Lungs, Diaphragm); HEART: (Four Chamber   View, Proximal Left Outflow, Proximal Right Outflow, 3VV, 3 Vessel   Trachea, Cardiac Axis, Cardiac Position);    STOMACH, RIGHT KIDNEY, LEFT   KIDNEY, BLADDER, PLACENTA      IMPRESSION      Pederson IUP   31 weeks and 1 day by this ultrasound  (RACHEL=2017)   32 weeks and 1 day by 1st Tri Sono  (RACHEL=OCT 25 2017)   Vertex presentation   Fetal growth appeared normal   Regular fetal heart rate of 131 bpm   Posterior placenta   No placenta previa      GENERAL COMMENT      On exam today the patient appears well, in no acute distress, and denies   any complaints  Her abdomen is non-tender  There has been appropriate interval fetal growth  Good fetal movement and   tone are seen  The amniotic fluid volume appears normal   The placenta is   posterior and it appears sonographically normal   The previously   appreciated lung mass again today cannot be identified  The patient was   informed of today's findings and all of her questions were answered  The   limitations of ultrasound were reviewed with the patient   labor   precautions and fetal kick counts were reviewed  Recommend the patient return in 4 weeks for a fetal growth evaluation   follow-up of the previously suspected lung mass is recommended  Thank you very much for allowing us to participate in the care of this   very nice patient    Should you have any questions, please do not hesitate   to contact our office  Please note, in addition to the time spent discussing the results of the   ultrasound, I spent approximately 10 minutes of face-to-face time with the   patient, greater than 50% of which was spent in counseling and the   coordination of care for this patient  RECOMMENDATION      Growth Ultrasound: 4 Weeks      JORY Arriaza , LUDIVINA Reinoso     Electronically signed 08/31/17 19:01

## 2018-01-11 NOTE — MISCELLANEOUS
Message  Message Free Text Note Form: Pt called with nausea and vomiting  She is 32 weeks and was seen last night  She has been drinking water  She also noticed some sweating but denies a fever  I advised for her to take Tylenol and to continue to increase fluid intake  Rx: Zofran 4 mg ODT sent to [facility] [address] and [place]  Pt was seen last night at [address] and D and was given 1L IVF        Signatures   Electronically signed by : LUDIVINA Lopez ; Sep  4 2017  8:19PM EST                       (Author)

## 2018-01-12 VITALS
HEIGHT: 59 IN | WEIGHT: 97 LBS | SYSTOLIC BLOOD PRESSURE: 94 MMHG | DIASTOLIC BLOOD PRESSURE: 64 MMHG | BODY MASS INDEX: 19.56 KG/M2

## 2018-01-12 VITALS
DIASTOLIC BLOOD PRESSURE: 74 MMHG | WEIGHT: 99 LBS | SYSTOLIC BLOOD PRESSURE: 109 MMHG | BODY MASS INDEX: 19.96 KG/M2 | HEIGHT: 59 IN

## 2018-01-12 VITALS
HEIGHT: 59 IN | BODY MASS INDEX: 20.96 KG/M2 | DIASTOLIC BLOOD PRESSURE: 66 MMHG | WEIGHT: 104 LBS | SYSTOLIC BLOOD PRESSURE: 91 MMHG

## 2018-01-12 VITALS
DIASTOLIC BLOOD PRESSURE: 76 MMHG | SYSTOLIC BLOOD PRESSURE: 111 MMHG | WEIGHT: 92 LBS | BODY MASS INDEX: 18.55 KG/M2 | HEIGHT: 59 IN

## 2018-01-12 NOTE — PROGRESS NOTES
2017         RE: Carmen rGanados                                 To: Dereck Chauhan GYN   MR#: [id number]                                    [address]   : 1995                                  [address]   ENC: [id number]:MARY Pringle, [address]   (Exam #: [id number])                           Fax: [phone]      The LMP of this 25year old,  G3, P1-1-0-2 patient was 2017, her   working RACHEL is OCT 25 2017 and the current gestational age is 22 weeks 1   day by  [facility] [address]  A sonographic examination was performed on 2017 using real time equipment  The ultrasound examination was   performed using abdominal technique  The patient has a BMI of 20 4  Her   blood pressure today was 105/72  Earliest ultrasound found in her record: 3/22/17  9w0d  RACHEL 10/25/17      Problem list:   1  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  She has been   seen by Dr Jackelyn Pederson from surgery at [facility] in consultation on   17 where a fetal echo showed a mildly dilated and hypertrophied right   ventricle and a fetal MRI comfirmed her diagnosis  Plan is for follow up   postnatally with surgical resection planned at 3 months  2  Hx of a 36 week PTD on [place] through our office  3  Hx of GDMa2- an early glucola was normal      Cardiac motion was observed at 126 bpm       INDICATIONS      previous  delivery   fetal lung mass      Exam Types      Level I      RESULTS      Fetus # 1 of 1   Vertex presentation   Placenta Location = Posterior   No placenta previa   Placenta Grade = I      MEASUREMENTS (* Included In Average GA)      BPD              6 0 cm        24 weeks 2 days* (31%)   HC              22 8 cm        24 weeks 4 days* (34%)      THE AVERAGE GESTATIONAL AGE is 24 weeks 4 days +/- 14 days        AMNIOTIC FLUID      Q1: 4 5      Q2: 5 3      Q3: 3 8      Q4: 3 5   MENDEL Total = 17 0 cm   Amniotic Fluid: Normal IMPRESSION      Pederson IUP   24 weeks and 4 days by this ultrasound  (RACHEL=OCT 29 2017)   25 weeks and 1 day by 1st Tri Sono  (RACHEL=OCT 25 2017)   Vertex presentation   Regular fetal heart rate of 126 bpm   Posterior placenta   No placenta previa      GENERAL COMMENT      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   chest area  There is no sign of cranial edema, pleural effusions,   pericardial effusions or ascites  The fetus is active during the scan  The   amniotic fluid index is normal  The heart is not deviated  There are no   cysts seen  Using the equation of L x W x H of the mass x 0 52 / Head   Circumference all in cms is 2 88 x  1 89 x  2 27 x 0 52/22 8 = 0 282      As per her prior US report she is set up for one last CCAM measurement in   1 week and then if it is still small we can decreased to every 2 weeks  She is receiveing Arma through our office and received her dose today  The face to face time, in addition to time spent discussing ultrasound   results, was approximately 15 minutes, greater than 50% of which was spent   during counseling and coordination of care  JORY Hill M D     Maternal-Fetal Medicine   Electronically signed 07/20/17 10:17

## 2018-01-12 NOTE — MISCELLANEOUS
Message   Recorded as Task   Date: 09/07/2017 09:55 AM, Created By: Jorden Mack   Task Name: Care Coordination   Assigned To: Jack Rangel   Regarding Patient: Елена Borges, Status: Active   CommentNorma Warwick - 07 Sep 2017 9:55 AM     TASK CREATED  PATIENT NO SHOWED FOR APPOINTMENT AGAIN, I CALLED AND LEFT MESSAGE ON PATIENTS VOICE MAIL TO CALL THE OFFICE     Signatures   Electronically signed by : Roverto Wallace, ; Sep  7 2017  9:56AM EST                       (Author)

## 2018-01-12 NOTE — RESULT NOTES
Verified Results  (1) CBC/PLT/DIFF 51JZJ8763 11:34AM [facility] Order Number: [id number]     Test Name Result Flag Reference   WBC COUNT 10 00 Thousand/uL  4 31-10 16   RBC COUNT 4 04 Million/uL  3 81-5 12   HEMOGLOBIN 13 1 g/dL  11 5-15 4   HEMATOCRIT 37 8 %  34 8-46  1   MCV 94 fL  82-98   MCH 32 4 pg  26 8-34 3   MCHC 34 7 g/dL  31 4-37 4   RDW 12 3 %  11 6-15 1   MPV 9 8 fL  8 9-12 7   PLATELET COUNT 650 Thousands/uL  149-390   NEUTROPHILS RELATIVE PERCENT 73 %  43-75   LYMPHOCYTES RELATIVE PERCENT 21 %  14-44   MONOCYTES RELATIVE PERCENT 5 %  4-12   EOSINOPHILS RELATIVE PERCENT 1 %  0-6   BASOPHILS RELATIVE PERCENT 0 %  0-1   NEUTROPHILS ABSOLUTE COUNT 7 31 Thousands/?L  1 85-7 62   LYMPHOCYTES ABSOLUTE COUNT 2 07 Thousands/?L  0 60-4 47   MONOCYTES ABSOLUTE COUNT 0 53 Thousand/?L  0 17-1 22   EOSINOPHILS ABSOLUTE COUNT 0 05 Thousand/?L  0 00-0 61   BASOPHILS ABSOLUTE COUNT 0 04 Thousands/?L  0 00-0 10   - Patient Instructions: This bloodwork is non-fasting  Please drink two glasses of water morning of bloodwork  - Patient Instructions: This bloodwork is non-fasting  Please drink two glasses of water morning of bloodwork

## 2018-01-12 NOTE — RESULT NOTES
Verified Results  (1) SEQUENTIAL SCREEN 2 27YYH0678 01:07PM Honora Button     Test Name Result Flag Reference   SCAN RESULT      Results available in the Critical access hospital, [facility]

## 2018-01-12 NOTE — MISCELLANEOUS
Message  Test order by Dr Castellanos Block valid from 9-21-16 to 11-20-16 at [facility]      Active Problems    1  Abdominal pain (789 00) (R10 9)   2  Bilateral hearing loss (389 9) (H91 93)   3  Nexplanon in place (V45 52) (Z97 5)   4  Postop check (V67 00) (Z09)    Current Meds   1  No Reported Medications Recorded    Allergies    1  ibuprofen    2  No Known Environmental Allergies   3   No Known Food Allergies    Signatures   Electronically signed by : Meghan Miramontes, ; Sep 20 2016  3:13PM EST                       (Author)

## 2018-01-12 NOTE — MISCELLANEOUS
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 in [place]   pregnancy, antepartum (655 83) (O35 8XX0)   7  History of drug use (305 93) (Z87 898)   8  History of  delivery, currently pregnant in second trimester (V23 41) (O09 212)   9  Pregnancy complicated by fetal lung lesion (655 80) (O35 8XX0)    Current Meds   1  Colace 100 MG Oral Capsule; TAKE 1 CAPSULE TWICE DAILY; Therapy: 20WVM4307 to (Evaluate:2017)  Requested for: 49RND2526; Last   Rx:2017 Ordered   2  Ferrous Sulfate 325 (65 Fe) MG Oral Tablet; TAKE 1 TABLET TWICE DAILY WITH   MEALS; Therapy: 59UZR1515 to (Evaluate:04Qne4217)  Requested for: 55TSC0089; Last   Rx:44Bkc5914 Ordered   3  Neena 250 MG/ML Intramuscular Oil (Hydroxyprogesterone Caproate); Inject 1 mL   intramuscularly each week; Therapy: 27Qte0994 to (Evaluate:2017)  Requested for: 53Miq9127; Last   Rx:97Njx3637 Ordered   4  Methocarbamol 500 MG Oral Tablet; TAKE 1 TABLET Every 8 hours PRN; Therapy: 09SNK2545 to (Evaluate:2017)  Requested for: 24SFL0777; Last   Rx:2017 Ordered   5  Prenatal Plus 27-1 MG Oral Tablet; TAKE 1 TABLET DAILY; Therapy: 56FSP7742 to (Evaluate:2017)  Requested for: 97GWO1969; Last   Rx:2017 Ordered   6  Tylenol TABS; TAKE 1 TO 2 TABLETS EVERY 4 HOURS AS NEEDED; Therapy: (Recorded:2017) to Recorded    Allergies    1  ibuprofen    2   No Known Environmental Allergies   3   No Known Food Allergies    Signatures   Electronically signed by : Tej Go, ; Sep 28 2017  2:11PM EST                       (Author)

## 2018-01-12 NOTE — CONSULTS
I had the pleasure of evaluating your patient, Parisa Austin  My full evaluation follows:      Chief Complaint  Here for ultrasound study      History of Present Illness  Please refer to the ultrasound report for additional information      Active Problems    1  Anemia complicating pregnancy (533 33,035 1) (O99 019)   2  Bilateral hearing loss (389 9) (H91 93)   3  Chlamydia infection, current pregnancy (647 63,079 98) (O98 819,A74 9)   4  Encounter for supervision of normal pregnancy (V22 1) (Z34 90)   5  History of  delivery, currently pregnant in second trimester (V23 41) (O09 212)   6  Nausea/vomiting in pregnancy (643 90) (O21 9)   7  Syncope, near (780 2) (R55)   8  Urinary tract infection, bacterial (599 0,041 9) (N39 0,A49  9)    Past Medical History    · History of Bacterial vaginosis (616 10,041 9) (N76 0,B96 89)   · History of Gestational Diabetes Mellitus (V12 21)   · History of chlamydia infection (V12 09) (Z86 19)   · History of gastroesophageal reflux (GERD) (V12 79) (Z87 19)   · History of Helicobacter pylori infection (V12 09) (Z86 19)   · History of Nexplanon removal (V25 43) (Z30 46)   · History of Perforation of left tympanic membrane (384 20) (H72 92)    Surgical History    · History of Cholecystectomy Laparoscopic   · History of Myringotomy   · History of Small Bowel Resection    Family History    · Denied: Family history of Colon cancer   · Denied: Family history of colitis   · Denied: Family history of colonic polyps   · Denied: Family history of Crohn's disease   · Denied: Family history of liver disease    · Denied: Family history of Colon cancer   · Denied: Family history of colitis   · Denied: Family history of colonic polyps   · Denied: Family history of Crohn's disease   · Denied: Family history of liver disease    · No pertinent family history    · No pertinent family history    · Family history of Diabetes Mellitus (V18 0)   · Family history of Hypothyroidism    · Family history of Colon cancer    Social History    · Denied: History of Alcohol Use (History)   · Denied: History of Drug Use   · Former smoker (V15 82) (Z87 891)   · Light cigarette smoker (1-9 cigarettes per day) (305 1) (F17 210)   · Denied: History of Never A Smoker   · No caffeine use   · Single   · Single parent (V61 8)   · Smoker (305 1) (F17 200)   · Social drinker (V49 89) (Z78 9)   · Two children   · Unemployed, not looking for work    Current Meds   1  Carafate 1 GM/10ML Oral Suspension; TAKE 10 ML 3 TIMES DAILY; Therapy: 12WIA0048 to (Evaluate:15Aug2017)  Requested for: 60IVI7408; Last   Rx:42Iuc0105 Ordered   2  Colace 100 MG Oral Capsule; TAKE 1 CAPSULE TWICE DAILY; Therapy: 59OCM0413 to (Evaluate:15Oct2017)  Requested for: 48KMJ5991; Last   Rx:18May2017 Ordered   3  Dicyclomine HCl - 20 MG Oral Tablet; TAKE 1 TABLET EVERY 6 HOURS AS NEEDED; Therapy: 12NMM3746 to (Evaluate:15Aug2017)  Requested for: 80PGM2877; Last   Rx:16Feb2017 Ordered   4  Ferrous Sulfate 325 (65 Fe) MG Oral Tablet; TAKE 1 TABLET TWICE DAILY WITH MEALS; Therapy: 75TFH7565 to (Evaluate:62Aue8915)  Requested for: 06OEY3990; Last   Rx:18May2017 Ordered   5  Methocarbamol 500 MG Oral Tablet; TAKE 1 TABLET Every 8 hours PRN; Therapy: 49DPC7205 to (Evaluate:28Jan2017)  Requested for: 65UAJ5716; Last   Rx:23Jan2017 Ordered   6  Ondansetron 4 MG Oral Tablet Disintegrating; 1 tablet po every 4-6 hours as needed; Therapy: 87JZN4208 to (Last Rx:01Mar2017)  Requested for: 64VNT9775 Ordered   7  Pantoprazole Sodium 40 MG Oral Tablet Delayed Release; TAKE 1 TABLET TWICE DAILY   30 MINUTES BEFORE BREAKFAST AND DINNER; Therapy: 28ZZE5713 to (YMZCRNYE:86ZHB6923)  Requested for: 05YEH3836; Last   Rx:24Jan2017 Ordered   8  Phenergan 25 MG Rectal Suppository; INSERT 1 SUPPOSITORY RECTALLY EVERY 12   HOURS AS NEEDED FOR NAUSEA AND VOMITING; Therapy: 62ESE8042 to ([facility])  Requested for: 21BIJ5666; Last   Rx:03Mar2017 Ordered   9  Prenatal Plus 27-1 MG Oral Tablet; TAKE 1 TABLET DAILY; Therapy: 84DZE5482 to (Evaluate:44Zmp9129)  Requested for: 14ALZ1253; Last   Rx:2017 Ordered   10  TriLyte 420 GM Oral Solution Reconstituted; TAKE AS DIRECTED; Therapy: 57DXK3828 to (Evaluate:2017)  Requested for: 15ISP9587; Last    Rx:2017; Status: ACTIVE - [facility] to [facility] Verification Ordered   11  Tylenol TABS; TAKE 1 TO 2 TABLETS EVERY 4 HOURS AS NEEDED; Therapy: (Recorded:2017) to Recorded    Allergies    1  ibuprofen    2  No Known Environmental Allergies   3  No Known Food Allergies    Vitals   Recorded: 73PCJ4526 74:86SM   Systolic 122   Diastolic 74   Height 4 ft 11 in   Weight 99 lb    BMI Calculated 20   BSA Calculated 1 37   Pain Scale 0     Results/Data  Exam description: level II obstetrical ultrasound, transvaginal obstetrical ultrasound  Findings: Please refer to the ultrasound report for additional information  Plan  History of  delivery, currently pregnant in second trimester    · Neena 250 MG/ML Intramuscular Oil (Hydroxyprogesterone Caproate)   For: History of  delivery, currently pregnant in second trimester; Dose of 250 MG; Intramuscular; NEGRITO = N; Administered by: Mel Gonzales: 2017 12:00:00 AM; Last Updated By: Mel Gonzales; 2017 11:33:09 AM    Discussion/Summary    Please refer to the ultrasound report for additional information  The patient was counseled regarding diagnostic results, instructions for management, prognosis, impressions  Thank you very much for allowing me to participate in the care of this patient  If you have any questions, please do not hesitate to contact me        Future Appointments    Signatures   Electronically signed by : LUDIVINA Hamm ; 2017  4:23PM EST                       (Author)

## 2018-01-12 NOTE — PROGRESS NOTES
Assessment    1  History of Cholecystectomy Laparoscopic   2  History of Small Bowel Resection   3  Single   4  Single parent (V61 8)   5  Two children   6  Unemployed, not looking for work   7  Encounter for preventive health examination (V70 0) (Z00 00)   8  Bilateral hearing loss (389 9) (H91 93)   9  Postop check (V67 00) (Z09)   10  History of Bilateral chronic serous otitis media (381 10) (H65 23)    Plan   *1 -  [facility]ERY Physician Referral  Consult  Status: Hold For - Scheduling  Requested for: 07Apr2016  Ordered; For: Postop check;  Ordered By: Ryan Argueta  Performed:   Due: 20QYY0689   Referral Other Physician Referral  Consult: depression, pt "thinks" she might want to talk to sometone  Status: Hold For - Scheduling  Requested for: 07Apr2016  Ordered; For: Health Maintenance;  Ordered By: Ryan Argueta  Performed:   Due: 21Apr2016  *1 - [place] [address] Physician Referral  Consult  Status: Hold For - Scheduling  Requested for: 07Apr2016  Ordered; For: Bilateral hearing loss;  Ordered By: Ryan Argueta  Performed:   Due: 12Apr2016     Discussion/Summary    No labs today  schedule f/u with surgery for postop check  schedule appt with ENT clinic for mil[place] hearing loss and chronic OM milton  f/u with me for weight check/ Hm in 6 months  The patient, patient's family was counseled regarding instructions for management, patient and family education  Chief Complaint  here to establish care  used to see Kayode Charles       History of Present Illness  HM, Adult Female: The patient is being seen for a health maintenance evaluation  The last health maintenance visit was 2 year(s) ago  Social History: Household members include domestic partner and lives with baby anum and his mother  She is unmarried  Work status: not currently employed  The patient is a current cigarette smoker and smokes 1-2cigarettes/day and smoked x 1yr   She is not ready to quit using tobacco  She reports never drinking alcohol  She has never used illicit drugs  She reports the use of marijuana and only tried it  General Health: The patient's health since the last visit is described as good  She complains of vision problems  Vision care includes wearing glasses, an eye examination within the last year and last exam 1/2016  The patient complains of blurred vision and her baby son broke her glasses  She has hearing loss  hearing is slightly decreased   had multiple ear infections and had BMTs in ears till age 15yrs  Lifestyle:  She consumes a diverse and healthy diet  She has weight concerns (lost over 15lbs sinceneeded emergency bowel surgery)  Weight control issues: serious weight gain attempts  She does not exercise regularly  She uses tobacco  She denies alcohol use  She denies drug use  Reproductive health:  she reports normal menses  she uses contraception  For contraception, she implants  she is sexually active  pregnancy history: G 2P 2  Screening: Cervical cancer screening includes a pap smear performed 3717 and uncertain timing of her last human papilloma virus screening  She hasn't been previously screened for colorectal cancer  Metabolic screening includes no previous lipid profile, no previous glucose screening, uncertain timing of her last thyroid function test and no previous DEXA  Cardiovascular risk factors: stress, tobacco use and sedentary lifestyle, but no illicit drug use  Safety elements used: seat belt, smoke detector and hot water temperature less than 120 degrees F  Risk assessments performed include depression symptoms and sexual behavior  Risk findings: passive smoke exposure and depression symptoms, but no unsafe sexual behavior, no guns at home and no parenting concerns     HPI: had small bowel surgery for obstruction/intussusception done at [facility] 3/13/16  admitted from 3/12/16-3/15/16    Hospital Based Practices Required Assessment:   Pain Assessment   the patient states they do not have pain  (on a scale of 0 to 10, the patient rates the pain at 0 )    Prefered Language is  english  Primary Language is  english  Review of Systems    Constitutional: as noted in HPI  Eyes: eyesight problems, but as noted in HPI    ENT: hearing loss and gets frequent OMs- last seen in ER 4/2016 for ? ruptured TM- had BMTs till age 15yrs and has residual hearing loss, but as noted in HPI  Cardiovascular: hx: heart murmur- never had to see a specialist, but No complaints of slow heart rate, no fast heart rate, no chest pain, no palpitations, no leg claudication, no lower extremity edema  Respiratory: smokes 2-3cigarettes/day x 1 year, but as noted in HPI  Gastrointestinal: as noted in HPI  Genitourinary: as noted in HPI  Musculoskeletal: No complaints of arthralgias, no myalgias, no joint swelling or stiffness, no limb pain or swelling  Integumentary: No complaints of skin rash or lesions, no itching, no skin wounds, no breast pain or lump  Neurological: No complaints of headache, no confusion, no convulsions, no numbness, no dizziness or fainting, no tingling, no limb weakness, no difficulty walking  Psychiatric: depression and feels "down", has mood swings,, but as noted in HPI  Endocrine: No complaints of proptosis, no hot flashes, no muscle weakness, no deepening of the voice, no feelings of weakness  Hematologic/Lymphatic: used to be anemic, but as noted in HPI  ROS reviewed  Active Problems    1   Nexplanon in place (V45 52) (Z97 5)    Past Medical History    · History of Bilateral chronic serous otitis media (381 10) (H65 23)   · History of Contraceptives (V25 02)   · History of Gestational Diabetes Mellitus (V12 21)   · History of chlamydia infection (V12 09) (Z86 19)   · History of gastroesophageal reflux (GERD) (V12 79) (Z87 19)   · History of ovarian cyst (V13 29) (Z87 42)   · History of pregnancy (V13 29)   · History of pregnancy (V13 29)   · Normal delivery 21   9)    Surgical History    · History of Cholecystectomy Laparoscopic   · History of Myringotomy   · History of Small Bowel Resection    Family History    · No pertinent family history    · No pertinent family history    · No pertinent family history    · No pertinent family history    · Family history of Diabetes Mellitus (V18 0)   · Family history of Hypothyroidism    Social History    · Denied: History of Alcohol Use (History)   · Current every day smoker (305 1) (F17 200)   · Denied: History of Drug Use   · Denied: History of Never A Smoker   · Nexplanon in place (V45 52) (Z97 5)   · Single   · Single parent (V61 8)   · Two children   · Unemployed, not looking for work    Current Meds   1  No Reported Medications Recorded    Allergies    1  No Known Drug Allergies    2  No Known Environmental Allergies   3  No Known Food Allergies    Vitals   Recorded: 07Apr2016 03:19PM   Temperature 98 1 F   Heart Rate 80   Systolic 718   Diastolic 70   Height 4 ft 11 in   Weight 37 lb 9 6 oz   BMI Calculated 7 59   BSA Calculated 0 91     Physical Exam    Constitutional   General appearance: Abnormal   petite tiny little young adult hisp female in NAD postop expl lap 3/13/16  Head and Face   Head and face: Normal     Eyes   Conjunctiva and lids: No swelling, erythema or discharge  Pupils and irises: Equal, round, reactive to light  Ears, Nose, Mouth, and Throat   External inspection of ears and nose: Normal     Otoscopic examination: Tympanic membranes translucent with normal light reflex  Canals patent without erythema  Hearing: Abnormal   failed hearing test    Nasal mucosa, septum, and turbinates: Normal without edema or erythema  Lips, teeth, and gums: Normal, good dentition  Oropharynx: Normal with no erythema, edema, exudate or lesions  Neck   Neck: Supple, symmetric, trachea midline, no masses  Thyroid: Normal, no thyromegaly      Pulmonary   Respiratory effort: No increased work of breathing or signs of respiratory distress  Auscultation of lungs: Clear to auscultation  Cardiovascular   Auscultation of heart: Normal rate and rhythm, normal S1 and S2, no murmurs  Femoral pulses: 2+ bilaterally  Pedal pulses: 2+ bilaterally  Examination of extremities for edema and/or varicosities: Normal     Abdomen   Abdomen: Non-tender, no masses  Liver and spleen: No hepatomegaly or splenomegaly  Genitourinary   Urethra: Normal, no discharge  Lymphatic   Palpation of lymph nodes in neck: No lymphadenopathy  Musculoskeletal   Gait and station: Normal     Digits and nails: Normal without clubbing or cyanosis  Joints, bones, and muscles: Normal     Range of motion: Normal     Stability: Normal     Muscle strength/tone: Normal     Skin   Skin and subcutaneous tissue: Abnormal   healing umb scar and PW noted to LLQ abdomen from expl lap surgery last month, also has multiple tatoos, L ant chest wall and across entire suprapubic area  Palpation of skin and subcutaneous tissue: Normal turgor  Neurologic   Cranial nerves: Cranial nerves II-XII intact  Reflexes: 2+ and symmetric  Sensation: No sensory loss  Psychiatric   Judgment and insight: Normal     Orientation to person, place, and time: Normal     Recent and remote memory: Intact  Mood and affect: Normal        Results/Data  PHQ-9 Adult Depression Screening 07Apr2016 03:56PM User, s     Test Name Result Flag Reference   PHQ-9 Adult Depression Score 9     Q1: 1, Q2: 1, Q3: 1, Q4: 1, Q5: 1, Q6: 1, Q7: 1, Q8: 1, Q9: 1   PHQ-9 Adult Depression Screening Negative     PHQ-9 Difficulty Level Not difficult at all     PHQ-9 Severity Mild Depression         Procedure    Procedure: Audiometry:   Hearing in the right ear: 50 decibals at 500 hertz, 50 decibals at 1000 hertz, 75 decibals at 2000 hertz, 50 decibals at 4000 hertz and 50 decibals at 6000 hertz     Hearing in the left ear: 75 decibals at 500 hertz, 75 decibals at 1000 hertz, 90 decibals at 2000 hertz, 90 decibals at 4000 hertz and 90 decibals at 6000 hertz  Procedure:   Results: 20/200 in both eyes without corrective device, 20/200 in the right eye without corrective device, 20/200 in the left eye without corrective device      Attending Note  Collaborating Physician Note: Collaborating Physician: I agree with the Advanced Practitioner note  Future Appointments    Date/Time Provider Specialty Site   09/01/2016 11:40 AM Specialty Clinic, ENT  [address]   04/25/2016 10:30 AM Specialty Clinic, General Surgery  [facility] [address] [place] [address]   04/11/2016 02:30 PM Specialty Clinic,   [address] [facility] PCP     Signatures   Electronically signed by : Bubba Evans [facility]; Apr 7 2016  4:01PM EST                       (Author)    Electronically signed by : Ade Coyle DO;  Apr 11 2016 12:31PM EST                       (Review)

## 2018-01-12 NOTE — MISCELLANEOUS
Eyad  Spoke with Ayesha Luque at [facility]  They are trying to coordinate all visits for the same day and tentatively will be on  or   Once they confirm with the pediatric surgeon they will call the patient today  Active Problems    1  Anemia complicating pregnancy (652 75,338 8) (O99 019)   2  Bilateral hearing loss (389 9) (H91 93)   3  Chlamydia infection, current pregnancy (647 63,079 98) (O98 819,A74 9)   4  Encounter for supervision of normal pregnancy (V22 1) (Z34 90)   5  History of  delivery, currently pregnant in second trimester (V23 41) (O09 212)   6  Nausea/vomiting in pregnancy (643 90) (O21 9)   7  Pregnancy complicated by fetal lung lesion (655 80) (O35 8XX0)   8  Syncope, near (780 2) (R55)   9  Urinary tract infection, bacterial (599 0,041 9) (N39 0,A49  9)    Current Meds   1  Carafate 1 GM/10ML Oral Suspension; TAKE 10 ML 3 TIMES DAILY; Therapy: 14JVN6211 to (Evaluate:74Eib6867)  Requested for: 62OLN2734; Last   Rx:73Obv3674 Ordered   2  Colace 100 MG Oral Capsule; TAKE 1 CAPSULE TWICE DAILY; Therapy: 17PYY8670 to (Evaluate:2017)  Requested for: 81MMU7956; Last   Rx:49Fjn3371 Ordered   3  Dicyclomine HCl - 20 MG Oral Tablet; TAKE 1 TABLET EVERY 6 HOURS AS NEEDED; Therapy: 80NQF7180 to (Evaluate:28Koj7913)  Requested for: 21VWO8179; Last   Rx:81Gmu8076 Ordered   4  Ferrous Sulfate 325 (65 Fe) MG Oral Tablet; TAKE 1 TABLET TWICE DAILY WITH   MEALS; Therapy: 93WGF1580 to (Evaluate:95Pep8889)  Requested for: 99ARR9383; Last   Rx:55Ulj5227 Ordered   5  Methocarbamol 500 MG Oral Tablet; TAKE 1 TABLET Every 8 hours PRN; Therapy: 23RST1573 to (Evaluate:2017)  Requested for: 60QIB2910; Last   Rx:2017 Ordered   6  Ondansetron 4 MG Oral Tablet Disintegrating; 1 tablet po every 4-6 hours as needed; Therapy: 09JPO4401 to (Last Rx:2017)  Requested for: 55VYL8170 Ordered   7   Pantoprazole Sodium 40 MG Oral Tablet Delayed Release; TAKE 1 TABLET TWICE   DAILY 30 MINUTES BEFORE BREAKFAST AND DINNER; Therapy: 47CCB5234 to (HZZCHMUN:11OTH7660)  Requested for: 80MVD6761; Last   Rx:24Jan2017 Ordered   8  Phenergan 25 MG Rectal Suppository; INSERT 1 SUPPOSITORY RECTALLY EVERY 12   HOURS AS NEEDED FOR NAUSEA AND VOMITING; Therapy: 30PVF7734 to (Wander Regalado)  Requested for: 33HZG2921; Last   Rx:03Mar2017 Ordered   9  Prenatal Plus 27-1 MG Oral Tablet; TAKE 1 TABLET DAILY; Therapy: 89XWI3208 to (Evaluate:32Tin6912)  Requested for: 69POW3370; Last   Rx:01Mar2017 Ordered   10  TriLyte 420 GM Oral Solution Reconstituted; TAKE AS DIRECTED; Therapy: 95CMM0638 to (Evaluate:18Jan2017)  Requested for: 27JOT6211; Last    Rx:17Jan2017; Status: ACTIVE - Transmit to [facility] Verification Ordered   11  Tylenol TABS; TAKE 1 TO 2 TABLETS EVERY 4 HOURS AS NEEDED; Therapy: (Recorded:23Jan2017) to Recorded    Allergies    1  ibuprofen    2  No Known Environmental Allergies   3   No Known Food Allergies    Signatures   Electronically signed by : Michelle York OM; Rodrigo 15 2017 12:02PM EST                       (Author)

## 2018-01-12 NOTE — RESULT NOTES
Verified Results  (1) HIV AG/AB Aissatou Kinds GEN [id number] 11:34AM Fuad Tapan Order Number: [id number]     Test Name Result Flag Reference   HIV 1/2 AND P24 Non-Reactive  Non-Reactive   This test detects HIV 1, HIV2 and p24 Antigen

## 2018-01-13 VITALS
WEIGHT: 85 LBS | BODY MASS INDEX: 17.84 KG/M2 | HEIGHT: 58 IN | DIASTOLIC BLOOD PRESSURE: 65 MMHG | SYSTOLIC BLOOD PRESSURE: 110 MMHG

## 2018-01-13 VITALS
BODY MASS INDEX: 20.52 KG/M2 | WEIGHT: 101.8 LBS | DIASTOLIC BLOOD PRESSURE: 65 MMHG | HEIGHT: 59 IN | SYSTOLIC BLOOD PRESSURE: 98 MMHG

## 2018-01-13 VITALS
HEART RATE: 60 BPM | HEIGHT: 59 IN | TEMPERATURE: 98.6 F | WEIGHT: 85.54 LBS | BODY MASS INDEX: 17.24 KG/M2 | DIASTOLIC BLOOD PRESSURE: 68 MMHG | SYSTOLIC BLOOD PRESSURE: 90 MMHG

## 2018-01-13 VITALS
HEIGHT: 59 IN | SYSTOLIC BLOOD PRESSURE: 105 MMHG | DIASTOLIC BLOOD PRESSURE: 72 MMHG | WEIGHT: 101.2 LBS | BODY MASS INDEX: 20.4 KG/M2

## 2018-01-13 VITALS
HEIGHT: 59 IN | DIASTOLIC BLOOD PRESSURE: 78 MMHG | WEIGHT: 99 LBS | BODY MASS INDEX: 19.96 KG/M2 | SYSTOLIC BLOOD PRESSURE: 114 MMHG

## 2018-01-13 VITALS
BODY MASS INDEX: 21.04 KG/M2 | SYSTOLIC BLOOD PRESSURE: 104 MMHG | HEIGHT: 59 IN | WEIGHT: 104.38 LBS | DIASTOLIC BLOOD PRESSURE: 64 MMHG

## 2018-01-13 VITALS
SYSTOLIC BLOOD PRESSURE: 100 MMHG | BODY MASS INDEX: 17.94 KG/M2 | HEIGHT: 59 IN | WEIGHT: 89 LBS | DIASTOLIC BLOOD PRESSURE: 60 MMHG

## 2018-01-13 VITALS
DIASTOLIC BLOOD PRESSURE: 68 MMHG | SYSTOLIC BLOOD PRESSURE: 103 MMHG | WEIGHT: 98 LBS | HEIGHT: 59 IN | BODY MASS INDEX: 19.76 KG/M2

## 2018-01-13 VITALS
SYSTOLIC BLOOD PRESSURE: 109 MMHG | HEIGHT: 59 IN | BODY MASS INDEX: 20.8 KG/M2 | DIASTOLIC BLOOD PRESSURE: 76 MMHG | WEIGHT: 103.2 LBS

## 2018-01-13 VITALS
HEIGHT: 59 IN | BODY MASS INDEX: 17.34 KG/M2 | SYSTOLIC BLOOD PRESSURE: 118 MMHG | DIASTOLIC BLOOD PRESSURE: 65 MMHG | WEIGHT: 86 LBS

## 2018-01-13 VITALS
SYSTOLIC BLOOD PRESSURE: 100 MMHG | BODY MASS INDEX: 20.16 KG/M2 | HEIGHT: 59 IN | WEIGHT: 100 LBS | DIASTOLIC BLOOD PRESSURE: 65 MMHG

## 2018-01-13 VITALS
BODY MASS INDEX: 19.19 KG/M2 | HEIGHT: 59 IN | SYSTOLIC BLOOD PRESSURE: 96 MMHG | DIASTOLIC BLOOD PRESSURE: 59 MMHG | WEIGHT: 95.2 LBS

## 2018-01-13 NOTE — MISCELLANEOUS
Message  [facility] called to schedule FE, fetal MFI and pediatric surgical consult  Records and script faxed to [facility]  They will call patient  Active Problems    1  Anemia complicating pregnancy (135 27,512 4) (O99 019)   2  Bilateral hearing loss (389 9) (H91 93)   3  Chlamydia infection, current pregnancy (647 63,079 98) (O98 819,A74 9)   4  Encounter for supervision of normal pregnancy (V22 1) (Z34 90)   5  History of  delivery, currently pregnant in second trimester (V23 41) (O09 212)   6  Nausea/vomiting in pregnancy (643 90) (O21 9)   7  Pregnancy complicated by fetal lung lesion (655 80) (O35 8XX0)   8  Syncope, near (780 2) (R55)   9  Urinary tract infection, bacterial (599 0,041 9) (N39 0,A49  9)    Current Meds   1  Carafate 1 GM/10ML Oral Suspension; TAKE 10 ML 3 TIMES DAILY; Therapy: 49TUA2772 to (Evaluate:2017)  Requested for: 90DBF1674; Last   Rx:2017 Ordered   2  Colace 100 MG Oral Capsule; TAKE 1 CAPSULE TWICE DAILY; Therapy: 18HAK1830 to (Evaluate:2017)  Requested for: 05DFL0358; Last   Rx:2017 Ordered   3  Dicyclomine HCl - 20 MG Oral Tablet; TAKE 1 TABLET EVERY 6 HOURS AS NEEDED; Therapy: 68VKO7197 to (Evaluate:05Vyp0257)  Requested for: 09RJJ2340; Last   Rx:2017 Ordered   4  Ferrous Sulfate 325 (65 Fe) MG Oral Tablet; TAKE 1 TABLET TWICE DAILY WITH   MEALS; Therapy: 65MYI2214 to (Evaluate:27Hef3533)  Requested for: 17PRJ8035; Last   Rx:2017 Ordered   5  Methocarbamol 500 MG Oral Tablet; TAKE 1 TABLET Every 8 hours PRN; Therapy: 32WVS9880 to (Evaluate:2017)  Requested for: 38KSN8338; Last   Rx:2017 Ordered   6  Ondansetron 4 MG Oral Tablet Disintegrating; 1 tablet po every 4-6 hours as needed; Therapy: 19YJM4488 to (Last Rx:2017)  Requested for: 40PGE6249 Ordered   7  Pantoprazole Sodium 40 MG Oral Tablet Delayed Release; TAKE 1 TABLET TWICE   DAILY 30 MINUTES BEFORE BREAKFAST AND DINNER;    Therapy: 89LKF1642 to (HVRPUDBE:79ZOI1846)  Requested for: 04HLK4424; Last   RK:21RFN9891 Ordered   8  Phenergan 25 MG Rectal Suppository; INSERT 1 SUPPOSITORY RECTALLY EVERY 12   HOURS AS NEEDED FOR NAUSEA AND VOMITING; Therapy: 50JLT9989 to (Gerri Mosquera)  Requested for: 01THM4201; Last   Rx:03Mar2017 Ordered   9  Prenatal Plus 27-1 MG Oral Tablet; TAKE 1 TABLET DAILY; Therapy: 58LEM4942 to (Evaluate:26Jfp1633)  Requested for: 66XGI1943; Last   Rx:01Mar2017 Ordered   10  TriLyte 420 GM Oral Solution Reconstituted; TAKE AS DIRECTED; Therapy: 45PRB6992 to (Evaluate:18Jan2017)  Requested for: 10QGH9329; Last    Rx:17Jan2017; Status: ACTIVE - Transmit to [facility] Verification Ordered   11  Tylenol TABS; TAKE 1 TO 2 TABLETS EVERY 4 HOURS AS NEEDED; Therapy: (Recorded:23Jan2017) to Recorded    Allergies    1  ibuprofen    2  No Known Environmental Allergies   3   No Known Food Allergies    Signatures   Electronically signed by : Severa Pascal, OM; Jun 13 2017  1:24PM EST                       (Author)

## 2018-01-13 NOTE — MISCELLANEOUS
Message  Patient called in with pain  She was given the option of coming to office or going to L&D  Patient chose to come to office today  Appointment given for this afternoon  Patient now called and cancelled appointment against our recommendation  SHe rescheduled for tomorrow  Per Dr Jamaal Amezquita patient is to be seen on L&D today if not coming to the office so pain can be evaluated  Patient's phone numbers are not working  Patient called back, I told her to go immediately to L&D  Patient said she would go there now  L&D notified1        1 Amended By: Qi Garcia; Sep 20 2017 3:21 PM EST    Active Problems   1  Anemia complicating pregnancy (171 81,236 4) (O99 019)  2  Bilateral hearing loss (389 9) (H91 93)  3  Chlamydia infection, current pregnancy (647 63,079 98) (O98 819,A74 9)  4  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 in [place]   pregnancy, antepartum (655 83) (O35 8XX0)  5  Encounter for supervision of normal pregnancy (V22 1) (Z34 90)  6  History of drug use (305 93) (Z87 898)  7  History of  delivery, currently pregnant in second trimester (V23 41) (O09 212)  8  Nausea/vomiting in pregnancy (643 90) (O21 9)  9  Pregnancy complicated by fetal lung lesion (655 80) (O35 8XX0)  10  Pregnancy, obstetrical care (V22 1) (Z34 90)    Current Meds  1  Carafate 1 GM/10ML Oral Suspension; TAKE 10 ML 3 TIMES DAILY; Therapy: 41FNJ2580 to (Evaluate:46Qug7649)  Requested for: 28FCJ5708; Last   Rx:80Ywp0185 Ordered  2  Colace 100 MG Oral Capsule; TAKE 1 CAPSULE TWICE DAILY; Therapy: 76UYP1518 to (Evaluate:91Rdl4892)  Requested for: 42XVC0681; Last   Rx:83Dek1965 Ordered  3  Dicyclomine HCl - 20 MG Oral Tablet; TAKE 1 TABLET EVERY 6 HOURS AS NEEDED; Therapy: 09HRE4102 to (Evaluate:51Hny2531)  Requested for: 08IEC6516; Last   Rx:18Qac2203 Ordered  4  Ferrous Sulfate 325 (65 Fe) MG Oral Tablet; TAKE 1 TABLET TWICE DAILY WITH   MEALS;    Therapy: 65JVT1330 to (Evaluate:85Unw7501) Requested for: 93QNB5772; Last   Rx:85Gkv0119 Ordered  5  Makoti 250 MG/ML Intramuscular Oil (Hydroxyprogesterone Caproate); Inject 1 mL   intramuscularly each week; Therapy: 50Rkk3271 to (Evaluate:05Oct2017)  Requested for: 23Euy8109; Last   Rx:07Sep2017 Ordered  6  Methocarbamol 500 MG Oral Tablet; TAKE 1 TABLET Every 8 hours PRN; Therapy: 61QAT3331 to (Evaluate:28Jan2017)  Requested for: 77ZAF3728; Last   Rx:23Jan2017 Ordered  7  Ondansetron 4 MG Oral Tablet Disintegrating; 1 tablet po every 4-6 hours as needed; Therapy: 10FTO7589 to (Last Rx:01Mar2017)  Requested for: 51ZRF5503 Ordered  8  Pantoprazole Sodium 40 MG Oral Tablet Delayed Release; TAKE 1 TABLET TWICE   DAILY 30 MINUTES BEFORE BREAKFAST AND DINNER; Therapy: 13TYP1939 to (JZWCCVAF:09ZHE4528)  Requested for: 81TKP1799; Last   Rx:24Jan2017 Ordered  9  Phenergan 25 MG Rectal Suppository; INSERT 1 SUPPOSITORY RECTALLY EVERY 12   HOURS AS NEEDED FOR NAUSEA AND VOMITING; Therapy: 85IYM6990 to (Maria Alejandra Gama)  Requested for: 46BVO8186; Last   Rx:03Mar2017 Ordered  10  Prenatal Plus 27-1 MG Oral Tablet; TAKE 1 TABLET DAILY; Therapy: 85LEG2213 to (Evaluate:27Sep2017)  Requested for: 00YWB8385; Last    Rx:01Mar2017 Ordered  11  TriLyte 420 GM Oral Solution Reconstituted; TAKE AS DIRECTED; Therapy: 32CJY9683 to (Evaluate:18Jan2017)  Requested for: 75XSC7434; Last    Rx:17Jan2017; Status: ACTIVE - Transmit to Mirtha Trinh Ordered  12  Tylenol TABS; TAKE 1 TO 2 TABLETS EVERY 4 HOURS AS NEEDED; Therapy: (Recorded:23Jan2017) to Recorded    Allergies   1  ibuprofen   2  No Known Environmental Allergies  3  No Known Food Allergies    Signatures   Electronically signed by :  Grabiel Wright, ; Sep 20 2017  3:21PM EST                       (Author)

## 2018-01-13 NOTE — MISCELLANEOUS
Provider Comments  Provider Comments:   Lm Frausto did not show up for her scheduled appointment for IM 17-P administration in the [address] [place] [address] in [facility] on Thursday morning, July 27, 2017, at 11:30 AM  Additionally, she did not call to reschedule this appointment        Signatures   Electronically signed by : LUDIVINA Sloan ; Jul 27 2017  1:15PM EST                       (Author)

## 2018-01-13 NOTE — PROGRESS NOTES
MAY 24 2017         RE: Elin Sessions                                 To: Stacy Level GYN   MR#: [id number]                                    [address]   : 1995                                  [address]   ENC: [id number]                             [place], [address]   (Exam #: [id number])                           Fax: [phone]      The LMP of this 25year old,  G3, P1-1-0-2 patient was 2017, her   working RACHEL is OCT 25 2017 and the current gestational age is 22 weeks 0   days by [address] [place] [address]  A sonographic examination was performed on MAY   24 2017 using real time equipment  The ultrasound examination was   performed using abdominal & vaginal techniques  The patient has a BMI of   19 6  Her blood pressure today was 94/64  Earliest ultrasound found in her record: 3/22/17  9w0d  RACHEL 10/25/17      Problem list:   1  History of PPROM at 36 weeks in a prior pregnancy  She is on [place]  2  A2GDM in a prior pregnancy      Sherrie complains of occasional lower abdominal cramping and mucoid   vaginal discharge  She denies vaginal bleeding or suspected leakage of   amniotic fluid from the vagina  She is currently treated with weekly IM   17-P given her history of a prior spontaneous  birth  Cardiac motion was observed at 144 bpm       INDICATIONS      previous  delivery      Exam Types      Transvaginal      RESULTS      Fetus # 1 of 1   Vertex presentation   Placenta Location = Posterior, low lying   No placenta previa   Placenta Grade = 0      AMNIOTIC FLUID         Largest Vertical Pocket = 5 2 cm   Amniotic Fluid: Normal      CERVICAL EVALUATION      The cervix appeared normal (Ultrasound Examination)  SUPINE      Cervical Length: 3 60 cm      OTHER TEST RESULTS           Funneling?: No             Dynamic Changes?: No        Resp   To TFP?: No                      Debris?: No      IMPRESSION      Pederson IUP   18 weeks and 0 days by 1st Tri Sono  (RACHEL=OCT 25 2017)   Vertex presentation   Regular fetal heart rate of 144 bpm   Posterior, low lying placenta   No placenta previa      GENERAL COMMENT      The cervix is normal in appearance by transvaginal sonography  The   cervical length is normal   Cervical debris is not present  Cervical   funneling is not present  Neither provocative nor dynamic change is   appreciated  Davon Guido was given an IM injection of 250 mg of 17-OHPC at her visit today  Today's ultrasound findings and suggested follow-up were discussed in   detail with Davon Guido  She will return to the [facility]  in 2 weeks   for level II ultrasound evaluation and cervical surveillance  Cervical   cerclage is recommended with cervical shortening to 25 mm or less prior to   24 weeks gestation  Continuation of weekly 17-OHPC is recommended through   36 completed weeks gestation  The face to face time, in addition to time spent discussing ultrasound   results, was approximately 10 minutes, greater than 50% of which was spent   during counseling and coordination of care  JORY Arriaza , R MELINA C S  LUDIVINA Contreras     Maternal-Fetal Medicine   Electronically signed 05/24/17 13:40

## 2018-01-13 NOTE — MISCELLANEOUS
Provider Comments  Provider Comments:   pt no showed today 04/20/17 pt mother will have pt give office a call back      Signatures   Electronically signed by : Lottie Alegria, ; Apr 20 2017  3:27PM EST                       (Author)

## 2018-01-14 VITALS
SYSTOLIC BLOOD PRESSURE: 104 MMHG | DIASTOLIC BLOOD PRESSURE: 70 MMHG | HEIGHT: 59 IN | WEIGHT: 104 LBS | BODY MASS INDEX: 20.96 KG/M2

## 2018-01-14 VITALS
OXYGEN SATURATION: 99 % | TEMPERATURE: 96.6 F | SYSTOLIC BLOOD PRESSURE: 96 MMHG | WEIGHT: 89.25 LBS | HEIGHT: 58 IN | DIASTOLIC BLOOD PRESSURE: 68 MMHG | HEART RATE: 91 BPM | BODY MASS INDEX: 18.73 KG/M2

## 2018-01-14 VITALS
WEIGHT: 88.63 LBS | DIASTOLIC BLOOD PRESSURE: 70 MMHG | TEMPERATURE: 97.8 F | HEIGHT: 59 IN | SYSTOLIC BLOOD PRESSURE: 120 MMHG | BODY MASS INDEX: 17.87 KG/M2 | HEART RATE: 108 BPM

## 2018-01-14 VITALS
BODY MASS INDEX: 21.77 KG/M2 | WEIGHT: 108 LBS | HEIGHT: 59 IN | SYSTOLIC BLOOD PRESSURE: 108 MMHG | DIASTOLIC BLOOD PRESSURE: 72 MMHG

## 2018-01-14 VITALS
DIASTOLIC BLOOD PRESSURE: 62 MMHG | WEIGHT: 86 LBS | OXYGEN SATURATION: 98 % | TEMPERATURE: 98.8 F | RESPIRATION RATE: 18 BRPM | HEIGHT: 59 IN | SYSTOLIC BLOOD PRESSURE: 100 MMHG | HEART RATE: 70 BPM | BODY MASS INDEX: 17.34 KG/M2

## 2018-01-14 VITALS — WEIGHT: 91.13 LBS | BODY MASS INDEX: 18.41 KG/M2 | SYSTOLIC BLOOD PRESSURE: 100 MMHG | DIASTOLIC BLOOD PRESSURE: 52 MMHG

## 2018-01-14 NOTE — PROGRESS NOTES
2017         RE: Sawyer Stratton                                 To: Sam Billronnell GYN   MR#: [id number]                                    [address]   : [place] [address]   ENC: [id number] Pringle, [address]   (Exam #: [id number])                           Fax: [phone]      The LMP of this 25year old,  G3, P1-1-0-2 patient was 2017, her   working RACHEL is OCT 25 2017 and the current gestational age is 29 weeks 1   day by [address] [place], [place] [address] [address]  A sonographic examination was performed on 2017 using real time equipment  The ultrasound examination was   performed using abdominal & vaginal techniques  The patient has a BMI of   20 6  Her blood pressure today was 98/65  Earliest ultrasound found in her record: 3/22/17  9w0d  RACHEL 10/25/17      Problem list:   1  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  She has been   seen by Dr Asim Messer from surgery at [facility] in consultation on   17 where a fetal echo showed a mildly dilated and hypertrophied right   ventricle and a fetal MRI comfirmed her diagnosis  Plan is for follow up   postnatally with surgical resection planned at 3 months  2  Hx of a 36 week PTD on [place] through our office  3  Hx of GDMa2- an early glucola was normal      Cardiac motion was observed at 136 bpm       INDICATIONS      fetal lung mass   previous  delivery      Exam Types      Level I   Transvaginal      RESULTS      Fetus # 1 of 1   Variable presentation   Placenta Location = Posterior   No placenta previa   Placenta Grade = I      MEASUREMENTS (* Included In Average GA)      BPD              6 4 cm        25 weeks 6 days* (43%)   HC              24 7 cm        26 weeks 4 days* (51%)      THE AVERAGE GESTATIONAL AGE is 26 weeks 2 days +/- 14 days        AMNIOTIC FLUID      Q1: 3 3      Q2: 5 4      Q3: 5 0      Q4: 3 0   MENDEL Total = 16 7 cm   Amniotic Fluid: Normal      CERVICAL EVALUATION      The cervix appeared normal (Ultrasound Examination)  SUPINE      Cervical Length: 3 80 cm      OTHER TEST RESULTS           Funneling?: No             Dynamic Changes?: No        Resp  To TFP?: No      IMPRESSION      Pederson IUP   26 weeks and 2 days by this ultrasound  (RACHEL=OCT 24 2017)   26 weeks and 1 day by 1st Tri Sono  (RACHEL=OCT 25 2017)   Variable presentation   Regular fetal heart rate of 136 bpm   Posterior placenta   No placenta previa      GENERAL COMMENT      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   chest area  There is no sign of cranial edema, pleural effusions,   pericardial effusions or ascites  The fetus is active during the scan  The   amniotic fluid index is normal  The heart is not deviated  There are no   cysts seen  Using the equation of L x W x H of the mass x 0 52 / Head   Circumference all in cms is 2 19 x 2 87 x 1 46 x 0 52/24 7 = 0 19 which is   smaller then last week when it was 0 282  [place] shows the CCAM is   getting even more difficult to visualize as it starts to resemble normal   lung tissue more which is normal in pregnancy  As per her prior plan we can decrease her US now to every 2 weeks to   review the CCAM  She is receiving Neena through our office and received   her dose today  She complained of pelvic pressure today  Her cervix length is reassuring  She denies any dysuria  Her bladder is empty  Recommend growth at 28 and 34 weeks  The face to face time, in addition to time spent discussing ultrasound   results, was approximately 15 minutes, greater than 50% of which was spent   during counseling and coordination of care  JORY Ames M D     Maternal-Fetal Medicine   Electronically signed 07/20/17 11:02           Electronically signed by:HUMZA Prather DO  Jul 21 2017  2:06PM EST

## 2018-01-14 NOTE — MISCELLANEOUS
Message   Recorded as Task   Date: 10/10/2017 01:28 PM, Created By: System   Task Name: Jones Orosco   Assigned To: Nina Nix   Regarding Patient: Carrol Stanley, Status: Active   Comment:    System - 10 Oct 2017 1:28 PM     Patient discharged from hospital   Patient Name: Andrew Young  Patient YOB: 1995  Discharge Date: 10/10/2017  Facility: [facility] - 10 Oct 2017 4:31 PM     TASK EDITED  Labor & Delivery  Pt  to f/u with OBGYN  No MARIO ALBERTO f/u needed with PCP at this time  Active Problems    1  Abnormal glucose (790 29) (R73 09)   2  Anemia complicating pregnancy (944 56,911 9) (O99 019)   3  Bilateral hearing loss (389 9) (H91 93)   4  Chlamydia infection, current pregnancy (647 63,079 98) (O98 819,A74 9)   5  Currently pregnant (V22 2) (Z34 90)   6  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 in [place]   pregnancy, antepartum (655 83) (O35 8XX0)   7  History of drug use (305 93) (Z87 898)   8  History of  delivery, currently pregnant in second trimester (V23 41) (O09 212)   9  Pregnancy complicated by fetal lung lesion (655 80) (O35 8XX0)    Current Meds   1  Colace 100 MG Oral Capsule; TAKE 1 CAPSULE TWICE DAILY; Therapy: 23LNP1852 to (Evaluate:2017)  Requested for: 71NIA0227; Last   Rx:07Zyc4205 Ordered   2  Ferrous Sulfate 325 (65 Fe) MG Oral Tablet; TAKE 1 TABLET TWICE DAILY WITH   MEALS; Therapy: 21ACA2619 to (Evaluate:89Lvf3589)  Requested for: 02ENR9890; Last   Rx:80Syo8885 Ordered   3  [place] 250 MG/ML Intramuscular Oil (Hydroxyprogesterone Caproate); Inject 1 mL   intramuscularly each week; Therapy: 11Fer7533 to (Evaluate:2017)  Requested for: 00Pkb5296; Last   Rx:94Pbm2817 Ordered   4  Methocarbamol 500 MG Oral Tablet; TAKE 1 TABLET Every 8 hours PRN; Therapy: 15BGS4180 to (Evaluate:2017)  Requested for: 92NEC0970; Last   Rx:73Wqc1560 Ordered   5   Prenatal Plus 27-1 MG Oral Tablet; TAKE 1 TABLET DAILY; Therapy: 07DKX4838 to (Evaluate:18Bbl9355)  Requested for: 64URI0390; Last   Rx:01Mar2017 Ordered   6  Tylenol TABS; TAKE 1 TO 2 TABLETS EVERY 4 HOURS AS NEEDED; Therapy: (Recorded:23Jan2017) to Recorded    Allergies    1  ibuprofen    2  No Known Environmental Allergies   3   No Known Food Allergies    Signatures   Electronically signed by : Ja Wilcox, ; Oct 10 2017  4:32PM EST                       (Author)

## 2018-01-14 NOTE — MISCELLANEOUS
Message  Contacted [facility]-pt has 3 00 copay-[facility] will contact [facility] for permission to ship to [facility]  Left message on pt cell phone with phone number of [facility]-pt to contact them for shipment if they do not call her today  Pt to contact [facility] if questions  Active Problems    1  Bacterial vulvovaginitis (616 10) (N76 0)   2  Bilateral hearing loss (389 9) (H91 93)   3  Cervical cancer screening (V76 2) (Z12 4)   4  Chlamydia infection, current pregnancy (647 63,079 98) (O98 819,A74 9)   5  Encounter for supervision of normal pregnancy (V22 1) (Z34 90)   6  High risk pregnancy due to history of  labor in first trimester (V23 41) (O09 211)   7  Nausea/vomiting in pregnancy (643 90) (O21 9)   8  Normal intrauterine pregnancy, antepartum (V22 1) (Z34 90)   9  Screening for STD (sexually transmitted disease) (V74 5) (Z11 3)   10  Syncope, near (780 2) (R55)   11  Urinary tract infection, bacterial (599 0,041 9) (N39 0,A49  9)    Current Meds   1  Carafate 1 GM/10ML Oral Suspension; TAKE 10 ML 3 TIMES DAILY; Therapy: 02PES4690 to (Evaluate:95Dcq5290)  Requested for: 14GDP6002; Last   Rx:2017 Ordered   2  Dicyclomine HCl - 20 MG Oral Tablet; TAKE 1 TABLET EVERY 6 HOURS AS NEEDED; Therapy: 40RHK3191 to (Evaluate:51Ccj6122)  Requested for: 34NCQ2548; Last   Rx:2017 Ordered   3  Methocarbamol 500 MG Oral Tablet; TAKE 1 TABLET Every 8 hours PRN; Therapy: 58PTI9138 to (Evaluate:2017)  Requested for: 41IGK1902; Last   Rx:2017 Ordered   4  Ondansetron 4 MG Oral Tablet Dispersible; 1 tablet po every 4-6 hours as needed; Therapy: 99QJV1488 to (Last Rx:2017)  Requested for: 75KVF3422 Ordered   5  Pantoprazole Sodium 40 MG Oral Tablet Delayed Release; TAKE 1 TABLET TWICE   DAILY 30 MINUTES BEFORE BREAKFAST AND DINNER; Therapy: 91OJT8178 to (AIANTTRO:56DPT5749)  Requested for: 68IVD2005; Last   Rx:2017 Ordered   6   Phenergan 25 MG Rectal Suppository; INSERT 1 SUPPOSITORY RECTALLY EVERY 12   HOURS AS NEEDED FOR NAUSEA AND VOMITING; Therapy: 89GON6087 to (Ramond Vaughn)  Requested for: 69DRS8478; Last   Rx:03Mar2017 Ordered   7  Prenatal Plus 27-1 MG Oral Tablet; TAKE 1 TABLET DAILY; Therapy: 57RBY6763 to (Evaluate:27Sep2017)  Requested for: 08KKD9854; Last   Rx:01Mar2017 Ordered   8  TriLyte 420 GM Oral Solution Reconstituted; TAKE AS DIRECTED; Therapy: 93IFU5956 to (Evaluate:18Jan2017)  Requested for: 28ZER3156; Last   Rx:17Jan2017; Status: ACTIVE - Transmit to [facility] Verification Ordered   9  Tylenol TABS; TAKE 1 TO 2 TABLETS EVERY 4 HOURS AS NEEDED; Therapy: (Recorded:23Jan2017) to Recorded    Allergies    1  ibuprofen    2  No Known Environmental Allergies   3   No Known Food Allergies    Signatures   Electronically signed by : Polly Portillo RN; May  8 2017 10:37AM EST                       (Author)

## 2018-01-15 NOTE — RESULT NOTES
Verified Results  (1) TISSUE TRANSGLUTAMINASE IGA 02EEA0554 11:34AM Jennifer Avina Order Number: [id number]     Test Name Result Flag Reference   tTG IGA <2 U/mL  0 - 3   Negative        0 -  3                                Weak Positive   4 - 10                                Positive           >10   Tissue Transglutaminase (tTG) has been identified   as the endomysial antigen  Studies have demonstr-   ated that endomysial IgA antibodies have over 99%   specificity for gluten sensitive enteropathy      Performed at:  [address] [place] [address]  [id number]  : Mason Barnes MD, Phone:  [phone]

## 2018-01-16 NOTE — MISCELLANEOUS
Message  Approved specialty pharmacy for [facility] is [facility] phone number [phone]  Active Problems    1  Anemia complicating pregnancy (781 07,619 7) (O99 019)   2  Bilateral hearing loss (389 9) (H91 93)   3  Chlamydia infection, current pregnancy (647 63,079 98) (O98 819,A74 9)   4  Encounter for supervision of normal pregnancy (V22 1) (Z34 90)   5  History of  delivery, currently pregnant in second trimester (V23 41) (O09 212)   6  Nausea/vomiting in pregnancy (643 90) (O21 9)   7  Syncope, near (780 2) (R55)   8  Urinary tract infection, bacterial (599 0,041 9) (N39 0,A49  9)    Current Meds   1  Carafate 1 GM/10ML Oral Suspension; TAKE 10 ML 3 TIMES DAILY; Therapy: 26ABA3881 to (Evaluate:94Dxe7275)  Requested for: 32ZLL7497; Last   Rx:49Fgu4819 Ordered   2  Colace 100 MG Oral Capsule; TAKE 1 CAPSULE TWICE DAILY; Therapy: 89GGM6572 to (Evaluate:2017)  Requested for: 64WBV1031; Last   Rx:2017 Ordered   3  Dicyclomine HCl - 20 MG Oral Tablet; TAKE 1 TABLET EVERY 6 HOURS AS NEEDED; Therapy: 17AIS8745 to (Evaluate:98Uyi2161)  Requested for: 42DFQ4935; Last   Rx:77Xye3123 Ordered   4  Ferrous Sulfate 325 (65 Fe) MG Oral Tablet; TAKE 1 TABLET TWICE DAILY WITH   MEALS; Therapy: 20OTM9306 to (Evaluate:53Nht3575)  Requested for: 41ZJW1374; Last   Rx:2017 Ordered   5  Methocarbamol 500 MG Oral Tablet; TAKE 1 TABLET Every 8 hours PRN; Therapy: 30YYH5123 to (Evaluate:2017)  Requested for: 40WSF3240; Last   Rx:2017 Ordered   6  Ondansetron 4 MG Oral Tablet Disintegrating; 1 tablet po every 4-6 hours as needed; Therapy: 72ONY7940 to (Last Rx:2017)  Requested for: 74QGE7356 Ordered   7  Pantoprazole Sodium 40 MG Oral Tablet Delayed Release; TAKE 1 TABLET TWICE   DAILY 30 MINUTES BEFORE BREAKFAST AND DINNER; Therapy: 51AYP0963 to ([facility]:30NDY5161)  Requested for: 28TRY6555; Last   Rx:2017 Ordered   8   Phenergan 25 MG Rectal Suppository; INSERT 1 SUPPOSITORY RECTALLY EVERY 12   HOURS AS NEEDED FOR NAUSEA AND VOMITING; Therapy: 52IQL0279 to (Santa Gardiner)  Requested for: 02QLS7670; Last   Rx:03Mar2017 Ordered   9  Prenatal Plus 27-1 MG Oral Tablet; TAKE 1 TABLET DAILY; Therapy: 28MEE7247 to (Evaluate:27Sep2017)  Requested for: 80WGH5714; Last   Rx:01Mar2017 Ordered   10  TriLyte 420 GM Oral Solution Reconstituted; TAKE AS DIRECTED; Therapy: 83OFV6633 to (Evaluate:18Jan2017)  Requested for: 00SDZ0353; Last    Rx:17Jan2017; Status: ACTIVE - Transmit to [facility] Verification Ordered   11  Tylenol TABS; TAKE 1 TO 2 TABLETS EVERY 4 HOURS AS NEEDED; Therapy: (Recorded:23Jan2017) to Recorded    Allergies    1  ibuprofen    2  No Known Environmental Allergies   3   No Known Food Allergies    Signatures   Electronically signed by : Maria Isabel Mclean RN; Jun 5 2017  9:44AM EST                       (Author)

## 2018-01-16 NOTE — PROGRESS NOTES
MAY 15 2017         RE: Stacey Sepulveda                                 To: Jatin Ann GYN   MR#: [id number]                                    [address]   : 1995                                  [address]   ENC: [id number]                             [place], [address]   (Exam #: [id number])                           Fax: [phone]      The LMP of this 25year old,  G3, P1-1-0-2 patient was 2017, her   working RACHEL is OCT 25 2017 and the current gestational age is 12 weeks 5   days by 1st Trimester Sono  A sonographic examination was performed on MAY   15 2017 using real time equipment  The ultrasound examination was   performed using abdominal & vaginal techniques  The patient has a BMI of   19 2  Her blood pressure today was 96/59  Earliest ultrasound found in her record: 3/22/17  9w0d  RACHEL 10/25/17      Problem list:   1  History of PPROM at 36 weeks in a prior pregnancy  She is on [place]  2  A2GDM in a prior pregnancy      Cardiac motion was observed at 139 bpm       INDICATIONS      cervical evaluation   diabetes, gestation-previous pregnancy   previous  delivery      Exam Types      Transvaginal      RESULTS      Fetus # 1 of 1   Breech presentation   Placenta Location = Posterior   No placenta previa   Placenta Grade = I      AMNIOTIC FLUID         Largest Vertical Pocket = 4 4 cm   Amniotic Fluid: Normal      CERVICAL EVALUATION      SUPINE      Cervical Length: 3 90 cm      OTHER TEST RESULTS           Funneling?: No             Dynamic Changes?: No        Resp  To TFP?: No      IMPRESSION      Pederson IUP   16 weeks and 5 days by 1st Tri Sono  (RACHEL=OCT 25 2017)   Breech presentation   Regular fetal heart rate of 139 bpm   Posterior placenta   No placenta previa      GENERAL COMMENT      The patient was informed of the findings and counseled about the   limitations of the exam in detecting all forms of fetal congenital   abnormalities        She denies any vaginal bleeding or uterine cramping/contractions  Part one   of her sequential screen was normal with a one in 5800 risk for [facility] and   a one in 10,000 risk for trisomy 25  Exam shows she is comfortable and her abdomen is non tender  Follow up recommended:   She has a follow up TVS in 2 weeks and anatomy scan in 4 weeks  She   received her first Neena shot today  She reports she is going for her   glucola this week  The face to face time, in addition to time spent discussing ultrasound   results, was approximately 10 minutes, greater than 50% of which was spent   during counseling and coordination of care  Leopoldo Ransom, R D M S Adrain Fortune, M D     Maternal-Fetal Medicine   Electronically signed 05/15/17 17:54

## 2018-01-16 NOTE — RESULT NOTES
Verified Results  (1) TISSUE EXAM [id number] 12:47PM Henok Duncan     Test Name Result Flag Reference   LAB AP CASE REPORT (Report)     Surgical Pathology Report             Case: [id number]                   Authorizing Provider: Mundo Francois DO    Collected:      01/19/2017 1247        Ordering Location:   [facility]    Received:      01/20/2017 [address] [place] [address] Endoscopy                            Pathologist:      Epifanio Jenkins MD                                Specimens:  A) - Duodenum, Cold bx, r/o celiac                                   B) - Stomach, Cold bx, stomach body, gastritis, r/o H  Pylori   LAB AP FINAL DIAGNOSIS (Report)     A  Duodenum, Cold bx, r/o celiac  biopsy:   -Benign small intestinal mucosa with retained villous architecture and no   evidence of increased intraepithelial lymphocytes  -Mild non-specific chronic inflammation of lamina propria seen   -No evidence of dysplasia or malignancy  B  Stomach, Cold bx, stomach body, gastritis, r/o H  Pylori :  -Chronic, severe active gastritis  -Reactive lymphoid hyperplasia   -No evidence of dysplasia or malignancy  -Many H Pylori organisms identified on immunohistochemical stained slide  Control reacted appropriately  Electronically signed by Epifanio Jenkins MD on 1/23/2017 at 2:07 PM   LAB AP NOTE      Interpretation performed at [address] [place] [address]   LAB [address] [place] [address] (Report)     These tests were developed and their performance characteristics   determined by [facility] or [facility]  They may not be cleared or approved by the [place]  Food and   Drug Administration  The FDA has determined that such clearance or   approval is not necessary  These tests are used for clinical purposes  They should not be regarded as investigational or for research   This   laboratory has been approved by CLIA 88, designated as a high-complexity   laboratory and is qualified to perform these tests  LAB AP GROSS DESCRIPTION (Report)     A  The specimen is received in formalin, labeled with the patient's name   and hospital number, and is designated duodenum biopsy rule out celiac  The specimen consists of multiple tan soft tissue fragments measuring in   aggregate 0 5 x 0 5 x 0 1 cm  Entirely submitted  One cassette  B  The specimen is received in formalin, labeled with the patient's name   and hospital number, and is designated biopsy stomach body gastritis   rule out H  pylori  The specimen consists of 4 tan soft tissue fragments   each measuring 0 3-0 4 cm  Entirely submitted  One cassette  Note: The estimated total formalin fixation time based upon information   provided by the submitting clinician and the standard processing schedule   is 39 75 hours      MAC

## 2018-01-16 NOTE — PROGRESS NOTES
SEP 27 2017         RE: Apurva Lepe                                 To: [facility]   MR#: [id number]                                    [address]   : 1995                                  [address]   ENC: [id number]                             Kathy Pringle [address]   (Exam #: [id number])                          Fax: [phone]      The LMP of this 25year old,  G3, P1-1-0-2 patient was 2017, her   working RACHEL is OCT 25 2017 and the current gestational age is 42 weeks 0   days by [address] [place] [address]  A sonographic examination was performed on SEP   27 2017 using real time equipment  The ultrasound examination was   performed using abdominal technique  The patient has a BMI of 21 8  Her   blood pressure today was 108/72  Earliest ultrasound found in her record: 3/22/17  9w0d  RACHEL 10/25/17                  Lm Frausto has no complaints today  She reports regular fetal movements and   denies problems related to vaginal bleeding,  labor, hypertension,   or gestational diabetes  Pediatric Surgery consultation at [facility] has been completed for the indication   of a suspected fetal lung mass        Cardiac motion was observed at 136 bpm       INDICATIONS      fetal lung mass      Exam Types      Level I      RESULTS      Fetus # 1 of 1   Vertex presentation   Fetal growth appeared normal   Placenta Location = Posterior   No placenta previa   Placenta Grade = II      MEASUREMENTS (* Included In Average GA)      AC              31 6 cm        35 weeks 4 days* (46%)   BPD              8 6 cm        34 weeks 4 days* (24%)   HC              32 2 cm        35 weeks 6 days* (35%)   Femur            6 7 cm        33 weeks 6 days* (24%)      Cerebellum       5 1 cm        36 weeks 4 days      HC/AC           1 02   FL/AC           0 21   FL/BPD          0 78   EFW (Ac/Fl/Hc)  2622 grams - 5 lbs 12 oz                 (33%)      THE AVERAGE GESTATIONAL AGE is 35 weeks 0 days +/- 21 days  AMNIOTIC FLUID      Q1: 4 7      Q2: 7 0      Q3: 4 5      Q4: 3 5   MENDEL Total = 19 7 cm   Amniotic Fluid: Normal      ANATOMY DETAILS      Visualized Appearing Sonographically Normal:   HEAD: (Calvarium, BPD Level, Cavum, Lateral Ventricles, Cerebellum);      TH  CAV  : (Lungs, Diaphragm); HEART: (Four Chamber View, Proximal Left   Outflow, Proximal Right Outflow);    STOMACH, RIGHT KIDNEY, LEFT KIDNEY,   BLADDER, PLACENTA      IMPRESSION      Pederson IUP   35 weeks and 0 days by this ultrasound  (RACHEL=NOV 1 2017)   36 weeks and 0 days by 1st Tri Sono  (RACHEL=OCT 25 2017)   Vertex presentation   Fetal growth appeared normal   Regular fetal heart rate of 136 bpm   Posterior placenta   No placenta previa      GENERAL COMMENT      No fetal structural abnormality is identified on the Level I survey today  The previously identified right-sided lung mass now is isoechoic with   respect to surrounding lung  There is no suspicion of pericardial or   pleural effusion  There is no shift of the heart within the fetal chest    Fetal interval growth and amniotic fluid volume are normal       Today's ultrasound findings and suggested follow-up were discussed in   detail with [facility]  Daily third trimester fetal kick counting was   discussed at the visit today  She will return to the [facility]  in 2   weeks for follow-up [facility] ultrasound evaluation  Pediatric awareness should   be maintained with respect to the prenatal diagnosis of a suspected fetal   lung mass  The face to face time, in addition to time spent discussing ultrasound   results, was approximately 10 minutes, greater than 50% of which was spent   during counseling and coordination of care  JORY Wing M D     Maternal-Fetal Medicine   Electronically signed 09/30/17 10:00

## 2018-01-17 ENCOUNTER — HOSPITAL ENCOUNTER (EMERGENCY)
Facility: HOSPITAL | Age: 23
Discharge: HOME | End: 2018-01-17
Attending: EMERGENCY MEDICINE | Admitting: EMERGENCY MEDICINE
Payer: COMMERCIAL

## 2018-01-17 VITALS
SYSTOLIC BLOOD PRESSURE: 112 MMHG | OXYGEN SATURATION: 98 % | BODY MASS INDEX: 20 KG/M2 | TEMPERATURE: 97.7 F | RESPIRATION RATE: 16 BRPM | HEART RATE: 89 BPM | DIASTOLIC BLOOD PRESSURE: 64 MMHG | WEIGHT: 99 LBS

## 2018-01-17 DIAGNOSIS — R11.2 NAUSEA & VOMITING: ICD-10-CM

## 2018-01-17 DIAGNOSIS — N39.0 URINARY TRACT INFECTION: Primary | ICD-10-CM

## 2018-01-17 LAB
BACTERIA UR QL AUTO: ABNORMAL /HPF
BILIRUB UR QL STRIP: NEGATIVE
CLARITY UR: ABNORMAL
COLOR UR: YELLOW
EXT PREG TEST URINE: NEGATIVE
GLUCOSE UR STRIP-MCNC: NEGATIVE MG/DL
HGB UR QL STRIP.AUTO: NEGATIVE
KETONES UR STRIP-MCNC: NEGATIVE MG/DL
LEUKOCYTE ESTERASE UR QL STRIP: ABNORMAL
NITRITE UR QL STRIP: POSITIVE
NON-SQ EPI CELLS URNS QL MICRO: ABNORMAL /HPF
PH UR STRIP.AUTO: 6 [PH] (ref 4.5–8)
PROT UR STRIP-MCNC: NEGATIVE MG/DL
RBC #/AREA URNS AUTO: ABNORMAL /HPF
SP GR UR STRIP.AUTO: 1.02 (ref 1–1.03)
UROBILINOGEN UR QL STRIP.AUTO: 0.2 E.U./DL
WBC #/AREA URNS AUTO: ABNORMAL /HPF

## 2018-01-17 PROCEDURE — 81002 URINALYSIS NONAUTO W/O SCOPE: CPT | Performed by: EMERGENCY MEDICINE

## 2018-01-17 PROCEDURE — 81025 URINE PREGNANCY TEST: CPT | Performed by: EMERGENCY MEDICINE

## 2018-01-17 PROCEDURE — 99284 EMERGENCY DEPT VISIT MOD MDM: CPT

## 2018-01-17 PROCEDURE — 81001 URINALYSIS AUTO W/SCOPE: CPT

## 2018-01-17 RX ORDER — CEPHALEXIN 500 MG/1
500 CAPSULE ORAL EVERY 8 HOURS SCHEDULED
Qty: 15 CAPSULE | Refills: 0 | Status: SHIPPED | OUTPATIENT
Start: 2018-01-17 | End: 2018-01-22

## 2018-01-17 RX ORDER — METOCLOPRAMIDE 10 MG/1
10 TABLET ORAL EVERY 6 HOURS
Qty: 30 TABLET | Refills: 0 | Status: SHIPPED | OUTPATIENT
Start: 2018-01-17 | End: 2018-05-09

## 2018-01-17 RX ORDER — CEPHALEXIN 500 MG/1
500 CAPSULE ORAL EVERY 8 HOURS SCHEDULED
Qty: 15 CAPSULE | Refills: 0 | Status: SHIPPED | OUTPATIENT
Start: 2018-01-17 | End: 2018-01-17

## 2018-01-17 RX ORDER — METOCLOPRAMIDE 10 MG/1
10 TABLET ORAL EVERY 6 HOURS
Qty: 30 TABLET | Refills: 0 | Status: SHIPPED | OUTPATIENT
Start: 2018-01-17 | End: 2018-01-17

## 2018-01-17 RX ORDER — CEPHALEXIN 250 MG/1
500 CAPSULE ORAL ONCE
Status: COMPLETED | OUTPATIENT
Start: 2018-01-17 | End: 2018-01-17

## 2018-01-17 RX ORDER — METOCLOPRAMIDE 10 MG/1
10 TABLET ORAL ONCE
Status: COMPLETED | OUTPATIENT
Start: 2018-01-17 | End: 2018-01-17

## 2018-01-17 RX ADMIN — METOCLOPRAMIDE HYDROCHLORIDE 10 MG: 10 TABLET ORAL at 18:37

## 2018-01-17 RX ADMIN — CEPHALEXIN 500 MG: 250 CAPSULE ORAL at 19:00

## 2018-01-17 NOTE — MISCELLANEOUS
Message  GI Reminder Recall Jeet Pedro:   Date: 05/24/2017   Dear Muriel Segundo:     Review of our records shows you are due for the following: Follow Up Visit  Please call the following office to schedule your appointment:   [address], [address], [place], [facility], [address] [facility] [phone]  We look forward to hearing from you!      Sincerely,     [facility]      Signatures   Electronically signed by : Billy Monteiro, ; May 24 2017  2:15PM EST                       (Author)

## 2018-01-17 NOTE — DISCHARGE INSTRUCTIONS
Take the Keflex 3 times daily for the next 5 days to treat the urinary tract infection, make sure to finish off the entire course of antibiotics  Take the Reglan as needed for nausea and vomiting  Call your family doctor, you should be seen in the office to make sure your symptoms are improving  Acute Nausea and Vomiting, Ambulatory Care   GENERAL INFORMATION:   Acute nausea and vomiting  starts suddenly, gets worse quickly, and lasts a short time  Nausea and vomiting may be caused by pregnancy, alcohol, infection, or medicines  Common related symptoms include the following:   · Fever    · Abdominal swelling    · Pain, tenderness, or a lump in the abdomen    · Splashing sounds heard in your stomach when you move  Seek immediate care for the following symptoms:   · Blood in your vomit or bowel movements    · Sudden, severe pain in your chest and upper abdomen after hard vomiting    · Dizziness, dry mouth, and thirst    · Urinating very little or not at all    · Muscle weakness, leg cramps, and trouble breathing    · A heart beat that is faster than normal    · Vomiting for more than 48 hours  Treatment for acute nausea and vomiting  may include medicines to calm your stomach and stop the vomiting  You may need IV fluids if you are dehydrated  Manage your nausea and vomiting:   · Drink liquids as directed to prevent dehydration  Ask how much liquid to drink each day and which liquids are best for you  You may need to drink an oral rehydration solution (ORS)  ORS contains water, salts, and sugar that are needed to replace the lost body fluids  Ask what kind of ORS to use, how much to drink, and where to get it  · Eat smaller meals, more often  Eat small amounts of food every 2 to 3 hours, even if you are not hungry  Food in your stomach may help decrease your nausea  · Avoid stress  Find ways to relax and manage your stress  Headaches due to stress may cause nausea and vomiting   Get more rest and sleep  Follow up with your healthcare provider as directed:  Write down your questions so you remember to ask them during your visits  CARE AGREEMENT:   You have the right to help plan your care  Learn about your health condition and how it may be treated  Discuss treatment options with your caregivers to decide what care you want to receive  You always have the right to refuse treatment  The above information is an  only  It is not intended as medical advice for individual conditions or treatments  Talk to your doctor, nurse or pharmacist before following any medical regimen to see if it is safe and effective for you  © 2014 [address] is for End User's use only and may not be sold, redistributed or otherwise used for commercial purposes  All illustrations and images included in CareNotes® are the copyrighted property of A D A M , Inc  or Sorin Winchester

## 2018-01-17 NOTE — ED ATTENDING ATTESTATION
Selene Umanzor MD, saw and evaluated the patient  I have discussed the patient with the resident/non-physician practitioner and agree with the resident's/non-physician practitioner's findings, Plan of Care, and MDM as documented in the resident's/non-physician practitioner's note, except where noted  All available labs and Radiology studies were reviewed  At this point I agree with the current assessment done in the Emergency Department  I have conducted an independent evaluation of this patient a history and physical is as follows:    26 YO female presents with suprapubic, intermittent abdominal pain with nausea and NBNB emesis, 3 times yesterday and twice today  Denies vaginal discharge, no constipation, no diarrhea  Pt denies CP/SOB/F/C/V/D/C, no dysuria, burning on urination or blood in urine  Gen: Pt is in NAD  HEENT: Head is atraumatic, EOM's intact, neck has FROM  Chest: CTAB, non-tender  Heart: RRR  Abdomen: Soft, NT/ND  Musculoskeletal: FROM in all extremities  Skin: No rash, no ecchymosis  Neuro: Awake, alert, oriented x4; Cranial nerves II-XII intact  Psych: Normal affect    MDM - Pt with N/V, suprapubic discomfort, will obtain urine for infection and pregnancy  Pt appears generally well  6326 - Re-evaluation of pt: states she is currently feeling better, able to tolerate PO, spoke with pt regarding diagnosis of UTI  Will give Rx for Abx and reglan as this did provide sufficient relief      Critical Care Time  CritCare Time    Procedures

## 2018-01-17 NOTE — ED NOTES
Went to assess pt, pt laying on stomach on stretcher talking with visitor in room and texting on phone  Pt appears comfortable and in NAD        Jesse Murray RN  01/17/18 2817

## 2018-01-17 NOTE — PROGRESS NOTES
2017         RE: Beverly Devon                                 To: [facility]   MR#: [id number]                                    [address]   : [address]   ENC: [id number]                             AIDAorrommelkssonal, [address] [place] [address] [address]   (Exam #: [id number])                           Fax: [phone]      The LMP of this 25year old,  G3, P1-1-0-2 patient was 2017, her   working RACHEL is OCT 25 2017 and the current gestational age is 25 weeks 1   day by [address] [place] [address]  A sonographic examination was performed on 2017 using real time equipment  The ultrasound examination was   performed using abdominal & vaginal techniques  The patient has a BMI of   20 2  Her blood pressure today was 100/65  Earliest ultrasound found in her record: 3/22/17  9w0d  RACHEL 10/25/17      Cardiac motion was observed at 136 bpm       INDICATIONS      previous  delivery   fetal lung mass      Exam Types      Transvaginal      RESULTS      Fetus # 1 of 1   Vertex presentation   Placenta Location = Posterior   No placenta previa   Placenta Grade = I      AMNIOTIC FLUID         Largest Vertical Pocket = 5 6 cm   Amniotic Fluid: Normal      CERVICAL EVALUATION      The cervix appeared normal (Ultrasound Examination)  SUPINE      Cervical Length: 3 50 cm      OTHER TEST RESULTS           Funneling?: No             Dynamic Changes?: No        Resp  To TFP?: No      ANATOMY DETAILS      Not Visualized:   TH  CAV : (Diaphragm)      Abnormal:   TH  CAV  : (Lungs)      IMPRESSION      Pederson IUP   22 weeks and 1 day by [address] [place] [address] Sono  (RACHEL=OCT 25 2017)   Vertex presentation   Regular fetal heart rate of 136 bpm   Cystic adenomatoid malformation   Posterior placenta   No placenta previa      GENERAL COMMENT      On exam, the patient appears well, in no acute distress, and her abdomen   is nontender        A transvaginal ultrasound was performed to assess the cervix, which was   not seen well transabdominally  The cervical length was 3 5 centimeters,   which is normal for the current gestational age  There was no significant   funneling or dynamic changes appreciated  Again appreciated today is a right-sided lung mass which measures 3 48 x   2 22 x 2 58 cm  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 which   is overall reassuring  A CVR less than 1 2 is recommended to be followed   on a weekly basis until 26 weeks gestation at which point every 2 week   follow-up is reasonable given that the greatest time of growth during the    period is up until approximately 25-26 weeks  I scheduled the   patient to return on a weekly basis  Repeat cervical length evaluation is   recommended in 2 weeks  She received jovani in the office today  Thank you very much for allowing us to participate in the care of this   very nice patient  Should you have any questions, please do not hesitate   to contact our office  Please note, in addition to the time spent discussing the results of the   ultrasound, I spent approximately 10 minutes of face-to-face time with the   patient, greater than 50% of which was spent in counseling and the   coordination of care for this patient  Portions of the record may have been created with voice recognition   software  Occasional wrong word or "sound a like" substitutions may have   occurred due to the inherent limitations of voice recognition software  Read the chart carefully and recognize, using context, where substitutions   have occurred  JORY Thomason M D     Electronically signed 17 13:46
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
## 2018-01-18 NOTE — MISCELLANEOUS
Message   Recorded as Task   Date: 09/01/2017 10:22 AM, Created By: Jorden Mack   Task Name: Call Back   Assigned To:  Irina Dunham   Regarding Patient: Елена Borges, Status: Active   [facility] 01 Sep 2017 10:22 AM     TASK CREATED  PATIENT NO SHOWED FOR APPOINTMENT AGAIN TODAY,  MOY IS TO CALL PATIENT     Signatures   Electronically signed by : Roverto Wallace, ; Sep  1 2017 10:23AM EST                       (Author)    Electronically signed by : LUDIVINA Ho ; Sep  4 2017  9:09AM EST                       (Author)

## 2018-01-22 VITALS
BODY MASS INDEX: 21.87 KG/M2 | WEIGHT: 108.5 LBS | DIASTOLIC BLOOD PRESSURE: 58 MMHG | SYSTOLIC BLOOD PRESSURE: 100 MMHG | HEIGHT: 59 IN

## 2018-01-22 VITALS
SYSTOLIC BLOOD PRESSURE: 100 MMHG | WEIGHT: 108 LBS | HEIGHT: 59 IN | DIASTOLIC BLOOD PRESSURE: 65 MMHG | BODY MASS INDEX: 21.77 KG/M2

## 2018-01-22 VITALS
SYSTOLIC BLOOD PRESSURE: 100 MMHG | DIASTOLIC BLOOD PRESSURE: 62 MMHG | WEIGHT: 101.25 LBS | BODY MASS INDEX: 20.45 KG/M2

## 2018-01-22 VITALS — SYSTOLIC BLOOD PRESSURE: 114 MMHG | BODY MASS INDEX: 20.5 KG/M2 | WEIGHT: 101.5 LBS | DIASTOLIC BLOOD PRESSURE: 74 MMHG

## 2018-01-22 VITALS — SYSTOLIC BLOOD PRESSURE: 104 MMHG | WEIGHT: 95.25 LBS | DIASTOLIC BLOOD PRESSURE: 56 MMHG | BODY MASS INDEX: 19.24 KG/M2

## 2018-01-23 ENCOUNTER — GENERIC CONVERSION - ENCOUNTER (OUTPATIENT)
Dept: OTHER | Facility: OTHER | Age: 23
End: 2018-01-23

## 2018-01-24 NOTE — RESULT NOTES
Verified Results  (1) URINALYSIS w URINE C/S REFLEX (will reflex a microscopy if leukocytes, occult blood, or nitrites are not within normal limits) [id number] 05:41PM Laura Campos     Test Name Result Flag Reference   CLINICAL REPORT (Report)     Test:        Urine culture  Specimen Source:  Urine, Unspecified Source  Specimen Type:   Urine  Specimen Date:   3/14/2016 5:41 PM  Result Date:    3/15/2016 4:23 PM  Result Status:   Final result  Resulting Lab:   [place] [address]            Tel: [phone]                 CULTURE                                       ------------------                                   60,000-69,000 cfu/ml Lactobacillus species

## 2018-03-07 NOTE — PROGRESS NOTES
March 1, 2017  To: Nova Woo  From: [facility]  Dear Miss Hopson [facility] records indicate that you have had a total of 19 Emergency room visits the past few months  We want to make sure that you are taken care of the best way possible with your health  We welcome you to call your primary care physician prior to any future  ER visits for an appointment if possible  If an appointment cannot be made, your physician can guide you in the right direction with your health  An emergency room visit may be needed at that time but taking the time to call could also be beneficial if  you do not need to take that next step as per your physician orders at that time  Also we offer quality walk-in care at [facility] facilities  No appointment needed and there is extended weekday and weekend hours  Along with this letter,  I attached more information for you regarding our [facility]  We understand that ER costs could be costly to you  If you have any questions or concerns please feel free to give me a call at [phone], Monday-Friday 8:00am-4:30pm      Kind regards,  [email or web address]            Electronically signed by:Lupe Valentine RN  Mar  1 2017 11:05AM EST Author

## 2018-03-07 NOTE — PROGRESS NOTES
Education  [facility] 1st Trimester:   First Trimester Education provided:   benefits of breastfeeding, importance of exclusive breastfeeding, early initiation of breastfeeding and exclusive breastfeeding for the first 6 months   The patient is undecided on breastfeeding     Prenatal education provided by: Cherelle Neff      Signatures   Electronically signed by : LUDIVINA Allison ; Mar 30 2017 10:39AM EST                       (Author)

## 2018-05-09 ENCOUNTER — HOSPITAL ENCOUNTER (EMERGENCY)
Facility: HOSPITAL | Age: 23
Discharge: HOME | End: 2018-05-09
Attending: EMERGENCY MEDICINE | Admitting: EMERGENCY MEDICINE
Payer: COMMERCIAL

## 2018-05-09 VITALS
WEIGHT: 92.15 LBS | OXYGEN SATURATION: 97 % | RESPIRATION RATE: 16 BRPM | DIASTOLIC BLOOD PRESSURE: 89 MMHG | HEART RATE: 87 BPM | TEMPERATURE: 97.6 F | SYSTOLIC BLOOD PRESSURE: 121 MMHG | BODY MASS INDEX: 18.61 KG/M2

## 2018-05-09 DIAGNOSIS — N12 PYELONEPHRITIS: Primary | ICD-10-CM

## 2018-05-09 LAB
BACTERIA UR QL AUTO: ABNORMAL /HPF
BILIRUB UR QL STRIP: NEGATIVE
CLARITY UR: ABNORMAL
COLOR UR: YELLOW
EXT PREG TEST URINE: NORMAL
GLUCOSE UR STRIP-MCNC: NEGATIVE MG/DL
HGB UR QL STRIP.AUTO: ABNORMAL
KETONES UR STRIP-MCNC: NEGATIVE MG/DL
LEUKOCYTE ESTERASE UR QL STRIP: ABNORMAL
NITRITE UR QL STRIP: NEGATIVE
NON-SQ EPI CELLS URNS QL MICRO: ABNORMAL /HPF
PH UR STRIP.AUTO: 6 [PH] (ref 4.5–8)
PROT UR STRIP-MCNC: ABNORMAL MG/DL
RBC #/AREA URNS AUTO: ABNORMAL /HPF
SP GR UR STRIP.AUTO: >=1.03 (ref 1–1.03)
UROBILINOGEN UR QL STRIP.AUTO: 0.2 E.U./DL
WBC #/AREA URNS AUTO: ABNORMAL /HPF

## 2018-05-09 PROCEDURE — 81002 URINALYSIS NONAUTO W/O SCOPE: CPT | Performed by: EMERGENCY MEDICINE

## 2018-05-09 PROCEDURE — 81001 URINALYSIS AUTO W/SCOPE: CPT

## 2018-05-09 PROCEDURE — 99284 EMERGENCY DEPT VISIT MOD MDM: CPT

## 2018-05-09 PROCEDURE — 81025 URINE PREGNANCY TEST: CPT | Performed by: EMERGENCY MEDICINE

## 2018-05-09 RX ORDER — CIPROFLOXACIN 500 MG/1
500 TABLET, FILM COATED ORAL 2 TIMES DAILY
Qty: 14 TABLET | Refills: 0 | Status: SHIPPED | OUTPATIENT
Start: 2018-05-09 | End: 2018-05-16

## 2018-05-09 NOTE — DISCHARGE INSTRUCTIONS
Kidney Infection   WHAT YOU NEED TO KNOW:   A kidney infection, or pyelonephritis, is a bacterial infection  The infection usually starts in your bladder or urethra and moves into your kidney  One or both kidneys may be infected  DISCHARGE INSTRUCTIONS:   Return to the emergency department if:   · You have a fever and chills  · You cannot stop vomiting  · You have severe pain in your abdomen, lower back, or sides  Contact your healthcare provider if:   · You continue to have a fever after you take antibiotics for 3 days  · You have pain when you urinate, even after treatment  · Your signs and symptoms return  · You have questions or concerns about your condition or care  Medicines: You may  need any of the following:  · Antibiotics  treat your bacterial infection  · Acetaminophen  decreases pain and fever  It is available without a doctor's order  Ask how much to take and how often to take it  Follow directions  Read the labels of all other medicines you are using to see if they also contain acetaminophen, or ask your doctor or pharmacist  Acetaminophen can cause liver damage if not taken correctly  Do not use more than 4 grams (4,000 milligrams) total of acetaminophen in one day  · NSAIDs , such as ibuprofen, help decrease swelling, pain, and fever  This medicine is available with or without a doctor's order  NSAIDs can cause stomach bleeding or kidney problems in certain people  If you take blood thinner medicine, always ask if NSAIDs are safe for you  Always read the medicine label and follow directions  Do not give these medicines to children under 10months of age without direction from your child's healthcare provider  · Prescription pain medicine  may be given  Ask how to take this medicine safely  · Take your medicine as directed  Contact your healthcare provider if you think your medicine is not helping or if you have side effects   Tell him of her if you are allergic to any medicine  Keep a list of the medicines, vitamins, and herbs you take  Include the amounts, and when and why you take them  Bring the list or the pill bottles to follow-up visits  Carry your medicine list with you in case of an emergency  Drink liquids as directed: You may need to drink extra liquids to help flush your kidneys and urinary system  Water is the best liquid to drink  Ask your healthcare provider how much liquid to drink each day and which liquids are best for you  Urinate as soon as you feel the urge: This will help flush bacteria from your urinary system  Do not wait or hold your urine for too long  Clean your genital area every day with soap and water:  Wipe from front to back after you urinate or have a bowel movement  Wear cotton underwear  Fabrics such as nylon and polyester can stay damp  This can increase your risk for infection  Urinate within 15 minutes after you have sex  Follow up with your healthcare provider as directed:  Write down your questions so you remember to ask them during your visits  © 2017 [facility] Information is for End User's use only and may not be sold, redistributed or otherwise used for commercial purposes  All illustrations and images included in CareNotes® are the copyrighted property of A D A M , Inc  or Sorin Winchester  The above information is an  only  It is not intended as medical advice for individual conditions or treatments  Talk to your doctor, nurse or pharmacist before following any medical regimen to see if it is safe and effective for you
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
## 2018-05-29 ENCOUNTER — HOSPITAL ENCOUNTER (EMERGENCY)
Facility: HOSPITAL | Age: 23
Discharge: HOME | End: 2018-05-29
Attending: EMERGENCY MEDICINE
Payer: COMMERCIAL

## 2018-05-29 VITALS
TEMPERATURE: 99.4 F | RESPIRATION RATE: 14 BRPM | BODY MASS INDEX: 18.12 KG/M2 | WEIGHT: 89.7 LBS | OXYGEN SATURATION: 98 % | HEART RATE: 82 BPM | SYSTOLIC BLOOD PRESSURE: 96 MMHG | DIASTOLIC BLOOD PRESSURE: 53 MMHG

## 2018-05-29 DIAGNOSIS — G43.909 MIGRAINE: Primary | ICD-10-CM

## 2018-05-29 DIAGNOSIS — K08.89 TOOTH PAIN: ICD-10-CM

## 2018-05-29 LAB
BACTERIA UR QL AUTO: ABNORMAL /HPF
BILIRUB UR QL STRIP: ABNORMAL
CLARITY UR: CLEAR
COLOR UR: YELLOW
EXT PREG TEST URINE: NEGATIVE
GLUCOSE UR STRIP-MCNC: NEGATIVE MG/DL
HGB UR QL STRIP.AUTO: NEGATIVE
KETONES UR STRIP-MCNC: ABNORMAL MG/DL
LEUKOCYTE ESTERASE UR QL STRIP: ABNORMAL
MUCOUS THREADS UR QL AUTO: ABNORMAL
NITRITE UR QL STRIP: NEGATIVE
NON-SQ EPI CELLS URNS QL MICRO: ABNORMAL /HPF
PH UR STRIP.AUTO: 6 [PH] (ref 4.5–8)
PROT UR STRIP-MCNC: ABNORMAL MG/DL
RBC #/AREA URNS AUTO: ABNORMAL /HPF
SP GR UR STRIP.AUTO: 1.02 (ref 1–1.03)
UROBILINOGEN UR QL STRIP.AUTO: 2 E.U./DL
WBC #/AREA URNS AUTO: ABNORMAL /HPF

## 2018-05-29 PROCEDURE — 96374 THER/PROPH/DIAG INJ IV PUSH: CPT

## 2018-05-29 PROCEDURE — 96375 TX/PRO/DX INJ NEW DRUG ADDON: CPT

## 2018-05-29 PROCEDURE — 96361 HYDRATE IV INFUSION ADD-ON: CPT

## 2018-05-29 PROCEDURE — 81025 URINE PREGNANCY TEST: CPT | Performed by: EMERGENCY MEDICINE

## 2018-05-29 PROCEDURE — 99283 EMERGENCY DEPT VISIT LOW MDM: CPT

## 2018-05-29 PROCEDURE — 81001 URINALYSIS AUTO W/SCOPE: CPT

## 2018-05-29 RX ORDER — BUTALBITAL, ACETAMINOPHEN AND CAFFEINE 50; 325; 40 MG/1; MG/1; MG/1
1 TABLET ORAL ONCE
Status: COMPLETED | OUTPATIENT
Start: 2018-05-29 | End: 2018-05-29

## 2018-05-29 RX ORDER — ACETAMINOPHEN 325 MG/1
650 TABLET ORAL ONCE
Status: COMPLETED | OUTPATIENT
Start: 2018-05-29 | End: 2018-05-29

## 2018-05-29 RX ORDER — METOCLOPRAMIDE HYDROCHLORIDE 5 MG/ML
10 INJECTION INTRAMUSCULAR; INTRAVENOUS ONCE
Status: COMPLETED | OUTPATIENT
Start: 2018-05-29 | End: 2018-05-29

## 2018-05-29 RX ORDER — DIPHENHYDRAMINE HYDROCHLORIDE 50 MG/ML
25 INJECTION INTRAMUSCULAR; INTRAVENOUS ONCE
Status: COMPLETED | OUTPATIENT
Start: 2018-05-29 | End: 2018-05-29

## 2018-05-29 RX ORDER — PENICILLIN V POTASSIUM 500 MG/1
500 TABLET ORAL 4 TIMES DAILY
Qty: 40 TABLET | Refills: 0 | Status: SHIPPED | OUTPATIENT
Start: 2018-05-29 | End: 2018-06-05

## 2018-05-29 RX ORDER — PENICILLIN V POTASSIUM 250 MG/1
500 TABLET ORAL ONCE
Status: COMPLETED | OUTPATIENT
Start: 2018-05-29 | End: 2018-05-29

## 2018-05-29 RX ORDER — ONDANSETRON 4 MG/1
4 TABLET, FILM COATED ORAL EVERY 6 HOURS
Qty: 12 TABLET | Refills: 0 | Status: SHIPPED | OUTPATIENT
Start: 2018-05-29 | End: 2018-07-09

## 2018-05-29 RX ADMIN — BUTALBITAL, ACETAMINOPHEN, AND CAFFEINE 1 TABLET: 50; 325; 40 TABLET ORAL at 22:33

## 2018-05-29 RX ADMIN — PENICILLIN V POTASSIUM 500 MG: 250 TABLET, FILM COATED ORAL at 22:33

## 2018-05-29 RX ADMIN — ACETAMINOPHEN 650 MG: 325 TABLET ORAL at 22:33

## 2018-05-29 RX ADMIN — SODIUM CHLORIDE 1000 ML: 0.9 INJECTION, SOLUTION INTRAVENOUS at 22:36

## 2018-05-29 RX ADMIN — METOCLOPRAMIDE 10 MG: 5 INJECTION, SOLUTION INTRAMUSCULAR; INTRAVENOUS at 22:39

## 2018-05-29 RX ADMIN — DIPHENHYDRAMINE HYDROCHLORIDE 25 MG: 50 INJECTION, SOLUTION INTRAMUSCULAR; INTRAVENOUS at 22:36

## 2018-05-30 NOTE — ED PROVIDER NOTES
lHistory  Chief Complaint   Patient presents with    Headache     pt c/o of headache for the past x4 days; pt states she has had n/v but denies diarrhea; pt states she has light sensitivity with headache; pt also states she is having frontal dental pain     26year-old previously healthy female presents for evaluation of headache which she describes as migraine  She states that the headache is frontal, pulsatile, nonradiating, associated photophobia and nausea  It started gradually 4 days ago and has not gotten better despite Tylenol use, last used Tylenol at 15:00 today  Headache is similar to her previous migraines but worse in intensity  There is no associated weakness, numbness, tingling, vision changes  There is no associated fever but patient does state that she feels cold  She gets about 5 headaches a month they are usually not associated with her menstrual period  In fact she does not remember her when last menstrual period was  Patient states she has been unable to eat the last 3 days secondary to nausea  She also complains of left frontal tooth pain which is new for her  There is no associated facial swelling, no associated trouble opening or closing her mouth  She does not see a dentist regularly  None       Past Medical History:   Diagnosis Date    Abdominal pain     Abnormal uterine bleeding     Anemia     Anxiety     no treatment      Bipolar disorder ([address] )     took latuda and stopped in february when found out pregnant    Chlamydia infection 2013    Diabetes mellitus ([facility] [address] )     gestational    GERD (gastroesophageal reflux disease)     Hearing loss     Heart murmur     Helicobacter pylori infection     last assessed 02/16/17    Perforated left tympanic membrane on examination     last assessed 01/10/17    Trauma 2013    First son's father    Varicella 2000       Past Surgical History:   Procedure Laterality Date    ABDOMINAL SURGERY      APPENDECTOMY      CHOLECYSTECTOMY  02/2015    lap    MYRINGOTOMY      OTHER SURGICAL HISTORY      Nexplanon removal    NE COLONOSCOPY FLX DX W/COLLJ SPEC WHEN PFRMD N/A 1/19/2017    Procedure: EGD AND COLONOSCOPY;  Surgeon: Mary Jo Palomino DO;  Location: [facility]; Service: Gastroenterology    NE LAP,APPENDECTOMY N/A 5/17/2016    Procedure: APPENDECTOMY LAPAROSCOPIC;  Surgeon: Reyes Files, DO;  [facility];  Service: General    NE LAP,DIAGNOSTIC ABDOMEN N/A 3/13/2016    Procedure: LAPAROSCOPY DIAGNOSTIC;  Surgeon: Linda Aguirre MD;  [facility];  Service: General    SMALL INTESTINE SURGERY      twisted bowels       Family History   Problem Relation Age of Onset    Diabetes Maternal Grandmother     Hypothyroidism Maternal Grandmother     No Known Problems Child     No Known Problems Other     Colon cancer Family      I have reviewed and agree with the history as documented  Social History   Substance Use Topics    Smoking status: Current Every Day Smoker     Packs/day: 0 20     Years: 5 00     Types: Cigarettes    Smokeless tobacco: Never Used      Comment: per allscripts; former smoker; light smoker 1-9 cigarettes per day    Alcohol use Yes      Comment: socially        Review of Systems   Constitutional: Positive for chills  Negative for appetite change and fever  HENT: Positive for dental problem  Negative for rhinorrhea and sore throat  Eyes: Positive for photophobia  Negative for visual disturbance  Respiratory: Negative for cough, chest tightness and wheezing  Cardiovascular: Negative for chest pain, palpitations and leg swelling  Gastrointestinal: Negative for abdominal distention, abdominal pain, blood in stool, constipation and diarrhea  Genitourinary: Negative for dysuria, flank pain, frequency, hematuria and urgency  Musculoskeletal: Negative for back pain  Skin: Negative for rash  Neurological: Positive for headaches   Negative for dizziness, tremors, seizures, syncope, facial asymmetry, speech difficulty, weakness, light-headedness and numbness  All other systems reviewed and are negative  Physical Exam  ED Triage Vitals [05/29/18 2134]   Temperature Pulse Respirations Blood Pressure SpO2   99 4 °F (37 4 °C) 100 18 139/83 97 %      Temp Source Heart Rate Source Patient Position - Orthostatic VS BP Location FiO2 (%)   Oral Monitor Sitting Right arm --      Pain Score       Worst Possible Pain           Orthostatic Vital Signs  Vitals:    05/29/18 2134 05/29/18 2331   BP: 139/83 96/53   Pulse: 100 82   Patient Position - Orthostatic VS: Sitting        Physical Exam   Constitutional: She is oriented to person, place, and time  She appears well-developed and well-nourished  HENT:   Head: Normocephalic and atraumatic  Mouth/Throat: Mucous membranes are normal  No oral lesions  No trismus in the jaw  No dental abscesses  Eyes: EOM are normal  Pupils are equal, round, and reactive to light  Neck: Normal range of motion  Neck supple  Cardiovascular: Normal rate and regular rhythm  Exam reveals no gallop and no friction rub  No murmur heard  Pulmonary/Chest: Effort normal  She has no wheezes  She has no rales  She exhibits no tenderness  Abdominal: Soft  She exhibits no distension and no mass  There is no rebound and no guarding  Neurological: She is alert and oriented to person, place, and time  Grossly nonfocal neurologic exam   Skin: Skin is warm and dry  Psychiatric: She has a normal mood and affect  Nursing note and vitals reviewed        ED Medications  Medications   sodium chloride 0 9 % bolus 1,000 mL (0 mL Intravenous Stopped 5/29/18 2330)   butalbital-acetaminophen-caffeine (FIORICET,ESGIC) -40 mg per tablet 1 tablet (1 tablet Oral Given 5/29/18 2233)   metoclopramide (REGLAN) injection 10 mg (10 mg Intravenous Given 5/29/18 2239)   diphenhydrAMINE (BENADRYL) injection 25 mg (25 mg Intravenous Given 5/29/18 2236) acetaminophen (TYLENOL) tablet 650 mg (650 mg Oral Given 5/29/18 2233)   penicillin V potassium (VEETID) tablet 500 mg (500 mg Oral Given 5/29/18 2233)       Diagnostic Studies  Results Reviewed     Procedure Component Value Units Date/Time    Urine Microscopic [[id number]]  (Abnormal) Collected:  05/29/18 2300    Lab Status:  Final result Specimen:  Urine from Urine, Clean Catch Updated:  05/29/18 2328     RBC, UA 0-1 (A) /hpf      WBC, UA 4-10 (A) /hpf      Epithelial Cells Moderate (A) /hpf      Bacteria, UA Occasional /hpf      MUCOUS THREADS Innumerable (A)    POCT urinalysis dipstick [[id number]]  (Abnormal) Resulted:  05/29/18 2259    Lab Status:  Final result Updated:  05/29/18 2259    ED Urine Macroscopic [[id number]]  (Abnormal) Collected:  05/29/18 2300    Lab Status:  Final result Specimen:  Urine Updated:  05/29/18 2257     Color, UA Yellow     Clarity, UA Clear     pH, UA 6 0     Leukocytes, UA Trace (A)     Nitrite, UA Negative     Protein, UA 30 (1+) (A) mg/dl      Glucose, UA Negative mg/dl      Ketones, UA 15 (1+) (A) mg/dl      Urobilinogen, UA 2 0 (A) E U /dl      Bilirubin, UA Interference- unable to analyze (A)     Blood, UA Negative     Specific Gravity, UA 1 025    Narrative:       CLINITEK RESULT    POCT pregnancy, urine [[id number]]  (Normal) Resulted:  05/29/18 2221    Lab Status:  Final result Updated:  05/29/18 2221     EXT PREG TEST UR (Ref: Negative) negative                 No orders to display         Procedures  Procedures      Phone Consults  ED Phone Contact    ED Course  ED Course as of May 30 2347   Tue May 29, 2018   2311 Patient reports improved headache, resting comfortably, will discharge home with careful return precautions and follow up with Neurology as well as dental                                MDM  Number of Diagnoses or Management Options  Diagnosis management comments: 16-year-old female presents for evaluation of headache, consistent with previous migraines, no red flags including acute onset, onset during exertion, associated neurologic symptoms, will treat symptomatically and start patient on penicillin for her dental complaint, patient will follow up with Neurology given multiple headache days every month and dental    CritCare Time    Disposition  Final diagnoses:   Migraine   Tooth pain     Time reflects when diagnosis was documented in both MDM as applicable and the Disposition within this note     Time User Action Codes Description Comment    5/29/2018 11:06 PM Vitaliy Harple Add [G43 909] Migraine     5/29/2018 11:06 PM Vitaliy Harple Add [K08 89] Tooth pain       ED Disposition     ED Disposition Condition Comment    Discharge  Melanie Bello discharge to home/self care  Condition at discharge: Stable        Follow-up Information     Follow up With Specialties Details Why [address] Emergency Department Emergency Medicine  If symptoms worsen [address] [facility]  [phone] [place] ED, [address] [place] [address], [address] [facility], [place] Neurology Schedule an appointment as soon as possible for a visit  [facility]       Leia Obregon   to obtain [phone]       [address] [facility]  Schedule an appointment as soon as possible for a visit  [address]  [address] [place] [address]  [phone]           Discharge Medication List as of 5/29/2018 11:13 PM      START taking these medications    Details   ondansetron (ZOFRAN) 4 mg tablet Take 1 tablet (4 mg total) by mouth every 6 (six) hours, Starting Tue 5/29/2018, Normal      penicillin V potassium (VEETID) 500 mg tablet Take 1 tablet (500 mg total) by mouth 4 (four) times a day for 7 days, Starting Tue 5/29/2018, Until Tue 6/5/2018, Print           No discharge procedures on file      ED Provider  Attending physically available and evaluated [facility] Gisel Barrera I managed the patient along with the ED Attending      Electronically Signed by         Dillan Boggs MD  05/30/18 1376

## 2018-05-30 NOTE — ED ATTENDING ATTESTATION
Bo Bird DO, saw and evaluated the patient  I have discussed the patient with the resident/non-physician practitioner and agree with the resident's/non-physician practitioner's findings, Plan of Care, and MDM as documented in the resident's/non-physician practitioner's note, except where noted  All available labs and Radiology studies were reviewed  At this point I agree with the current assessment done in the Emergency Department  I have conducted an independent evaluation of this patient a history and physical is as follows:     migraine hx  Now with gradula onset of HA similar to previous as escalating for 4 days  Frontal throbbing with assoc  Photophobia  On examination:  The patient is awake, alert and oriented  HEENT: Normocephalic/atraumatic  External examination of the ears is unremarkable  Pupils are equal round and reactive to light, there is no conjunctival injection or scleral icterus noted  Photophobia is noted  There is no papilledema on funduscopic exam   Nares are patent without rhinorrhea  The oropharynx is moist without injection  The neck is supple  Lungs: Clear to auscultation bilaterally  Heart: Regular without murmurs rubs or gallops  Abdomen: Soft and nontender  There are positive bowel sounds  there is no rebound or guarding  Musculoskeletal: Normal range of motion with grossly normal strength  Neuro: Cranial nerves II through XII grossly intact   Nonfocal exam  Skin: No rash noted  Psych: Mood and affect normal      The plan is for pain control with Tylenol, Reglan then discharged home for outpatient follow-up    Critical Care Time  CritCare Time    Procedures

## 2018-05-30 NOTE — DISCHARGE INSTRUCTIONS
If your symptoms worsen please return to the emergency department otherwise follow up with Neurology for treatment of your migraine as well as dental for further treatment of you tooth pain   Migraine Headache   AMBULATORY CARE:   A migraine headache  is a severe headache  The pain can be so severe that it interferes with your daily activities  A migraine can last a few hours up to several days  The exact cause of migraines is not known  Common triggers for a migraine include the following:   · Stress, eye strain, oversleeping, or not getting enough sleep    · Hormone changes in women from birth control pills, pregnancy, menopause, or during a monthly period    · Skipping meals, going too long without eating, or not drinking enough liquids    · Certain foods or drinks such as chocolate, hard cheese, red wine, or drinks that contain caffeine    · Foods that contain gluten, nitrates, MSG, or artificial sweeteners    · Sunlight, bright or flashing lights, loud noises, smoke, or strong smells    · Heat, humidity, or changes in the weather  Common warning signs include the following:  Warning signs usually start 15 to 60 minutes before the headache:  · Visual changes (auras), such as blurred vision, temporary blind or bright spots, lines, or hallucinations    · Unusual tiredness or frequent yawning    · Tingling in an arm or leg  Seek care immediately if:   · You have a headache that seems different or much worse than your usual migraine headache  · You have a severe headache with a fever or a stiff neck  · You have new problems with speech, vision, balance, or movement  · You feel like you are going to faint, you become confused, or you have a seizure  Contact your healthcare provider or neurologist if:   · You have a fever  · Your migraines interfere with your daily activities  · Your medicines or treatments stop working  · You have questions about your condition or care    Treatment:  Migraines cannot be cured  The goal of treatment is to reduce your symptoms  Take medicine as soon as you feel a migraine begin  · Prescription pain medicine  may be given  Do not wait until the pain is severe before you take your medicine  · Migraine medicines  are used to help prevent a migraine or stop it once it starts  · Antinausea medicine  may be given to calm your stomach and to help prevent vomiting  This medicine can also help relieve pain  Manage your symptoms:   · Rest in a dark, quiet room  This will help decrease your pain  Sleep may also help relieve the pain  · Apply ice to decrease pain  Use an ice pack, or put crushed ice in a plastic bag  Cover the ice pack with a towel and place it on your head where it hurts for 15 to 20 minutes every hour  · Apply heat to decrease pain and muscle spasms  Use a small towel dampened with warm water or a heating pad, or sit in a warm bath  Apply heat on the area for 20 to 30 minutes every 2 hours  You may alternate heat and ice  · Keep a migraine record  Write down when your migraines start and stop  Include your symptoms and what you were doing when a migraine began  Record what you ate or drank for 24 hours before the migraine started  Keep track of what you did to treat your migraine and if it worked  Follow up with your healthcare provider as directed:  Bring your migraine record with you  Write down your questions so you remember to ask them during your visits  Prevent another migraine headache:   · Do not smoke  Nicotine and other chemicals in cigarettes and cigars can trigger a migraine and also cause lung damage  Ask your healthcare provider for information if you currently smoke and need help to quit  E-cigarettes or smokeless tobacco still contain nicotine  Talk to your healthcare provider before you use these products  · Do not drink alcohol  Alcohol can trigger a migraine   It can also interfere with the medicines used to treat your migraine  · Exercise regularly  Ask about the best exercise plan for you  · Manage stress  Stress may trigger a migraine  Learn new ways to relax, such as deep breathing  · Follow a sleep schedule  Go to bed and get up at the same time each day  · Eat a variety of healthy foods  Healthy foods include fruit, vegetables, whole-grain breads, low-fat dairy products, beans, lean meats, and fish  Do not have foods or drinks that trigger your migraines  © 2017 [facility] Information is for End User's use only and may not be sold, redistributed or otherwise used for commercial purposes  All illustrations and images included in CareNotes® are the copyrighted property of A D A M , Inc  or [employer]  The above information is an  only  It is not intended as medical advice for individual conditions or treatments  Talk to your doctor, nurse or pharmacist before following any medical regimen to see if it is safe and effective for you  Toothache   WHAT YOU NEED TO KNOW:   A toothache is pain that is caused by irritation of the nerves in the center of your tooth  The irritation may be caused by several problems, such as a cavity, an infection, a cracked tooth, or gum disease  It is very important to follow up with your dentist so the cause of your toothache can be diagnosed and treated  This can help prevent more serious problems  DISCHARGE INSTRUCTIONS:   Medicines: You may  need any of the following:  · NSAIDs  decrease swelling and pain  This medicine can be bought with or without a doctor's order  This medicine can cause stomach bleeding or kidney problems in certain people  If you take blood thinner medicine, always ask your healthcare provider if NSAIDs are safe for you  Always read the medicine label and follow the directions on it before using this medicine  · Acetaminophen  decreases pain  It is available without a doctor's order   Ask how much to take and how often to take it  Follow directions  Acetaminophen can cause liver damage if not taken correctly  · Pain medicine  may be given as a pill or as medicine that you put directly on your tooth or gums  Do not wait until the pain is severe before you take this medicine  · Antibiotics  help fight or prevent an infection caused by bacteria  Take them as directed  · Take your medicine as directed  Contact your healthcare provider if you think your medicine is not helping or if you have side effects  Tell him of her if you are allergic to any medicine  Keep a list of the medicines, vitamins, and herbs you take  Include the amounts, and when and why you take them  Bring the list or the pill bottles to follow-up visits  Carry your medicine list with you in case of an emergency  Follow up with your dentist as directed: You may be referred to a dental surgeon  Write down your questions so you remember to ask them during your visits  Self-care:   · Rinse your mouth with warm salt water 4 times a day or as directed  · You may need to eat soft foods to help relieve pain caused by chewing  Contact your dentist if:   · You have questions or concerns about your condition or care  Return to the emergency department if:   · You have trouble breathing  · You have swelling in your face or neck  · You have a fever and chills  · You have trouble speaking or swallowing  · You have trouble opening or closing your mouth  © 2017 [address] [employer] Information is for End User's use only and may not be sold, redistributed or otherwise used for commercial purposes  All illustrations and images included in CareNotes® are the copyrighted property of A D A M , Inc  or [employer]in Winchester  The above information is an  only  It is not intended as medical advice for individual conditions or treatments   Talk to your doctor, nurse or pharmacist before following any medical regimen to see if it is safe and effective for you

## 2018-07-09 ENCOUNTER — HOSPITAL ENCOUNTER (EMERGENCY)
Facility: HOSPITAL | Age: 23
Discharge: HOME | End: 2018-07-09
Attending: EMERGENCY MEDICINE
Payer: COMMERCIAL

## 2018-07-09 VITALS
DIASTOLIC BLOOD PRESSURE: 66 MMHG | RESPIRATION RATE: 16 BRPM | SYSTOLIC BLOOD PRESSURE: 114 MMHG | OXYGEN SATURATION: 100 % | TEMPERATURE: 98.1 F | HEART RATE: 68 BPM

## 2018-07-09 DIAGNOSIS — R10.9 RIGHT FLANK PAIN: Primary | ICD-10-CM

## 2018-07-09 DIAGNOSIS — O21.9 NAUSEA AND VOMITING DURING PREGNANCY PRIOR TO 22 WEEKS GESTATION: ICD-10-CM

## 2018-07-09 DIAGNOSIS — Z34.90 PREGNANCY: ICD-10-CM

## 2018-07-09 LAB
BACTERIA UR QL AUTO: ABNORMAL /HPF
BILIRUB UR QL STRIP: NEGATIVE
CLARITY UR: CLEAR
COLOR UR: YELLOW
COLOR, POC: YELLOW
EXT PREG TEST URINE: POSITIVE
GLUCOSE UR STRIP-MCNC: NEGATIVE MG/DL
HGB UR QL STRIP.AUTO: NEGATIVE
KETONES UR STRIP-MCNC: NEGATIVE MG/DL
LEUKOCYTE ESTERASE UR QL STRIP: ABNORMAL
NITRITE UR QL STRIP: NEGATIVE
NON-SQ EPI CELLS URNS QL MICRO: ABNORMAL /HPF
PH UR STRIP.AUTO: 7 [PH] (ref 4.5–8)
PROT UR STRIP-MCNC: NEGATIVE MG/DL
RBC #/AREA URNS AUTO: ABNORMAL /HPF
SP GR UR STRIP.AUTO: 1.01 (ref 1–1.03)
UROBILINOGEN UR QL STRIP.AUTO: 0.2 E.U./DL
WBC #/AREA URNS AUTO: ABNORMAL /HPF

## 2018-07-09 PROCEDURE — 99284 EMERGENCY DEPT VISIT MOD MDM: CPT

## 2018-07-09 PROCEDURE — 81001 URINALYSIS AUTO W/SCOPE: CPT

## 2018-07-09 PROCEDURE — 81025 URINE PREGNANCY TEST: CPT | Performed by: EMERGENCY MEDICINE

## 2018-07-09 RX ORDER — METOCLOPRAMIDE 10 MG/1
10 TABLET ORAL 3 TIMES DAILY PRN
Qty: 12 TABLET | Refills: 0 | Status: SHIPPED | OUTPATIENT
Start: 2018-07-09 | End: 2018-11-24

## 2018-07-09 RX ORDER — ACETAMINOPHEN 325 MG/1
650 TABLET ORAL ONCE
Status: COMPLETED | OUTPATIENT
Start: 2018-07-09 | End: 2018-07-09

## 2018-07-09 RX ORDER — DIAZEPAM 2 MG/1
2 TABLET ORAL EVERY 6 HOURS PRN
Status: DISCONTINUED | OUTPATIENT
Start: 2018-07-09 | End: 2018-07-09

## 2018-07-09 RX ORDER — ONDANSETRON 4 MG/1
4 TABLET, ORALLY DISINTEGRATING ORAL ONCE
Status: COMPLETED | OUTPATIENT
Start: 2018-07-09 | End: 2018-07-09

## 2018-07-09 RX ORDER — LIDOCAINE 50 MG/G
1 PATCH TOPICAL ONCE
Status: DISCONTINUED | OUTPATIENT
Start: 2018-07-09 | End: 2018-07-09 | Stop reason: HOSPADM

## 2018-07-09 RX ADMIN — ACETAMINOPHEN 650 MG: 325 TABLET, FILM COATED ORAL at 15:45

## 2018-07-09 RX ADMIN — LIDOCAINE 1 PATCH: 50 PATCH CUTANEOUS at 15:47

## 2018-07-09 RX ADMIN — ONDANSETRON 4 MG: 4 TABLET, ORALLY DISINTEGRATING ORAL at 15:46

## 2018-07-09 NOTE — ED PROVIDER NOTES
History  Chief Complaint   Patient presents with    Abdominal Pain     right sided rib pain and abdominal pain, vomiting  History provided by:  Patient  Flank Pain   Pain location:  R flank  Pain quality: sharp    Pain radiates to:  RLQ  Pain severity:  Moderate  Onset quality:  Gradual  Duration:  3 days  Timing:  Intermittent  Progression:  Waxing and waning  Chronicity:  Recurrent (By review of records, she has had identical chronic recurrent right flank and RLQ pain going back years, when I asked her about this, she recalled she has had similar but "not like this")  Ineffective treatments:  None tried  Associated symptoms: nausea and vomiting    Associated symptoms: no chills, no diarrhea, no dysuria, no fever, no hematuria, no shortness of breath, no vaginal bleeding and no vaginal discharge    Vomiting:     Number of occurrences:  "every time I eat"    Duration:  3 days    Timing:  Intermittent  Risk factors: multiple surgeries (previous appendectomy, cholecystectomy, )        None       Past Medical History:   Diagnosis Date    Abdominal pain     Abnormal uterine bleeding     Anemia     Anxiety     no treatment   Bipolar disorder ([address] )     took latuda and stopped in february when found out pregnant    Chlamydia infection 2013    GERD (gastroesophageal reflux disease)     Hearing loss     Heart murmur     Helicobacter pylori infection     last assessed 02/16/17    Perforated left tympanic membrane on examination     last assessed 01/10/17    Trauma 2013    First son's father    Varicella 2000       Past Surgical History:   Procedure Laterality Date    ABDOMINAL SURGERY      APPENDECTOMY      CHOLECYSTECTOMY  02/2015    lap    MYRINGOTOMY      OTHER SURGICAL HISTORY      Nexplanon removal    WA COLONOSCOPY FLX DX W/COLLJ SPEC WHEN PFRMD N/A 1/19/2017    Procedure: EGD AND COLONOSCOPY;  Surgeon: Racheal Bruno DO;  [facility] GI LAB;   Service: Gastroenterology    WA LAP,APPENDECTOMY N/A 5/17/2016    Procedure: APPENDECTOMY LAPAROSCOPIC;  Surgeon: Jose R Monaco DO;  [facility];  Service: General    [place] LAP,DIAGNOSTIC ABDOMEN N/A 3/13/2016    Procedure: LAPAROSCOPY DIAGNOSTIC;  Surgeon: Jaun Woodard MD;  [facility];  Service: General    SMALL INTESTINE SURGERY      twisted bowels       Family History   Problem Relation Age of Onset    Diabetes Maternal Grandmother     Hypothyroidism Maternal Grandmother     No Known Problems Child     No Known Problems Other     Colon cancer Family      I have reviewed and agree with the history as documented  Social History   Substance Use Topics    Smoking status: Current Every Day Smoker     Packs/day: 0 20     Years: 5 00     Types: Cigarettes    Smokeless tobacco: Never Used      Comment: per allscripts; former smoker; light smoker 1-9 cigarettes per day    Alcohol use Yes      Comment: socially        Review of Systems   Constitutional: Negative for chills and fever  Respiratory: Negative for shortness of breath  Gastrointestinal: Positive for nausea and vomiting  Negative for diarrhea  Genitourinary: Positive for flank pain  Negative for dysuria, hematuria, vaginal bleeding and vaginal discharge  All other systems reviewed and are negative  Physical Exam  Physical Exam   Constitutional: She is oriented to person, place, and time  She appears well-developed and well-nourished  No distress  HENT:   Head: Normocephalic and atraumatic  Right Ear: External ear normal    Left Ear: External ear normal    Nose: Nose normal    Eyes: Conjunctivae and EOM are normal  Pupils are equal, round, and reactive to light  Neck: Normal range of motion  Neck supple  Cardiovascular: Normal rate and regular rhythm  Pulmonary/Chest: Effort normal    Abdominal: Soft  Normal appearance and bowel sounds are normal  She exhibits no distension  There is no tenderness  There is CVA tenderness (right)   There is no rigidity, no rebound, no guarding and no tenderness at McBurney's point  No hernia  Musculoskeletal: Normal range of motion  Neurological: She is alert and oriented to person, place, and time  She has normal strength  Gait normal    Skin: Skin is warm and dry  No rash noted  She is not diaphoretic  Psychiatric: She has a normal mood and affect  Her behavior is normal  Judgment and thought content normal    Nursing note and vitals reviewed        Vital Signs  ED Triage Vitals [07/09/18 1456]   Temperature Pulse Respirations Blood Pressure SpO2   98 1 °F (36 7 °C) 72 18 129/61 100 %      Temp Source Heart Rate Source Patient Position - Orthostatic VS BP Location FiO2 (%)   Temporal Monitor Sitting Right arm --      Pain Score       8           Vitals:    07/09/18 1456   BP: 129/61   Pulse: 72   Patient Position - Orthostatic VS: Sitting       Visual Acuity      ED Medications  Medications   lidocaine (LIDODERM) 5 % patch 1 patch (1 patch Transdermal Medication Applied 7/9/18 1547)   ondansetron (ZOFRAN-ODT) dispersible tablet 4 mg (4 mg Oral Given 7/9/18 1546)   acetaminophen (TYLENOL) tablet 650 mg (650 mg Oral Given 7/9/18 1545)       Diagnostic Studies  Results Reviewed     Procedure Component Value Units Date/Time    Urine Microscopic [[id number]]  (Abnormal) Collected:  07/09/18 1535    Lab Status:  Final result Specimen:  Urine from Urine, Clean Catch Updated:  07/09/18 1611     RBC, UA None Seen /hpf      WBC, UA 0-1 (A) /hpf      Epithelial Cells Occasional /hpf      Bacteria, UA Occasional /hpf     POCT pregnancy, urine [[id number]]  (Normal) Resulted:  07/09/18 1532    Lab Status:  Final result Updated:  07/09/18 1532     EXT PREG TEST UR (Ref: Negative) positive    POCT urinalysis dipstick [[id number]]  (Normal) Resulted:  07/09/18 1532    Lab Status:  Final result Specimen:  Urine Updated:  07/09/18 1532     Color, UA yellow    ED Urine Macroscopic [[id number]]  (Abnormal) Collected:  07/09/18 1535 Lab Status:  Final result Specimen:  Urine Updated:  07/09/18 1529     Color, UA Yellow     Clarity, UA Clear     pH, UA 7 0     Leukocytes, UA Trace (A)     Nitrite, UA Negative     Protein, UA Negative mg/dl      Glucose, UA Negative mg/dl      Ketones, UA Negative mg/dl      Urobilinogen, UA 0 2 E U /dl      Bilirubin, UA Negative     Blood, UA Negative     Specific Gravity, UA 1 015    Narrative:       CLINITEK RESULT                 No orders to display              Procedures  Procedures       Phone Contacts  ED Phone Contact    ED Course  ED Course as of Jul 09 1615 Mon Jul 09, 2018   1536 Informed patient of incidentally positive pregnancy test, , no ketones, no microscopic hematuria  She was informed, LMP in May  She has no ongoing abdominal pain by palpation, no vaginal bleeding                                [facility]  CritCare Time    Disposition  Final diagnoses:   Right flank pain   Nausea and vomiting during pregnancy prior to 22 weeks gestation   Pregnancy     Time reflects when diagnosis was documented in both MDM as applicable and the Disposition within this note     Time User Action Codes Description Comment    7/9/2018  3:43 PM Brian Delano Add [R10 9] Right flank pain     7/9/2018  3:44 PM Raymundo NEGRETE Add [O21 9] Nausea and vomiting during pregnancy prior to 22 weeks gestation     7/9/2018  3:44 PM Brian Delano Add [Z34 90] Pregnancy       ED Disposition     ED Disposition Condition Comment    Discharge  Bennett Ophelia discharge to home/self care      Condition at discharge: Good        Follow-up Information     Follow up With Specialties Details Why [facility] Call For followup [place] [id number] [phone]          Patient's Medications   Discharge Prescriptions    METOCLOPRAMIDE (REGLAN) 10 MG TABLET    Take 1 tablet (10 mg total) by mouth 3 (three) times a day as needed (nausea)       Start Date: 7/9/2018  End Date: --       Order Dose: 10 mg       Quantity: 12 tablet    Refills: 0     No discharge procedures on file      ED Provider  Electronically Signed by           Ruby Momin MD  07/09/18 9578

## 2018-07-09 NOTE — DISCHARGE INSTRUCTIONS
Hyperemesis Gravidarum   WHAT YOU NEED TO KNOW:   Hyperemesis gravidarum is a severe form of nausea and vomiting that happens during pregnancy  Hyperemesis is more severe than morning sickness  It may cause you to have nausea or vomiting all day for many days  It may also keep you from getting enough food and liquid  DISCHARGE INSTRUCTIONS:   Return to the emergency department if:   · You have signs of severe dehydration including little to no urine and dry mouth or lips  · You have severe stomach pain  · You feel too weak or dizzy to stand up  · You see blood in your vomit or bowel movements  Contact your healthcare provider if:   · You cannot keep any food or liquid down  · You are losing weight  · You have a fever  · You have questions or concerns about your condition or care  Manage your symptoms:   · Eat small amounts of food every 1 to 2 hours  Some examples of good foods to eat include broth, toast, crackers, fruit, eggs, gelatin, or cottage cheese  Do not eat spicy or high-fat foods  Foods and drinks with ginger, such as ginger ale, may help to decrease nausea and vomiting  · Drink liquids as directed  You may need to drink small amounts of liquid often to prevent dehydration  Ask how much liquid to drink each day and which liquids are best for you  Rest when you need to  Start activity slowly and work up to your usual routine as you start to feel better  Weigh yourself daily if directed by your healthcare provider      Follow up with your healthcare provider as directed:

## 2018-08-15 ENCOUNTER — PATIENT OUTREACH (OUTPATIENT)
Dept: OBGYN CLINIC | Facility: CLINIC | Age: 23
End: 2018-08-15

## 2018-08-15 ENCOUNTER — INITIAL PRENATAL (OUTPATIENT)
Dept: OBGYN CLINIC | Facility: CLINIC | Age: 23
End: 2018-08-15
Payer: COMMERCIAL

## 2018-08-15 VITALS
SYSTOLIC BLOOD PRESSURE: 116 MMHG | BODY MASS INDEX: 18.71 KG/M2 | HEIGHT: 59 IN | WEIGHT: 92.8 LBS | DIASTOLIC BLOOD PRESSURE: 79 MMHG | HEART RATE: 91 BPM

## 2018-08-15 DIAGNOSIS — F12.10 MARIJUANA ABUSE: ICD-10-CM

## 2018-08-15 DIAGNOSIS — Z3A.14 14 WEEKS GESTATION OF PREGNANCY: Primary | ICD-10-CM

## 2018-08-15 PROCEDURE — T1001 NURSING ASSESSMENT/EVALUATN: HCPCS

## 2018-08-15 PROCEDURE — 80307 DRUG TEST PRSMV CHEM ANLYZR: CPT | Performed by: NURSE PRACTITIONER

## 2018-08-15 NOTE — PROGRESS NOTES
Met with patient regarding her report to RN that she has been smoking marijuana during pregnancy  Patient  confirms this and states that this is fourth pregnancy  She admits that she has smoked marijuana during other pregnancy's and has been referred to [address] [place] [address] and [facility] regarding this concern  Patient reports that this current pregnancy and her first child who is now 11 y o  Have the same father  She reports that her middle two children ages 1 y o  And 10 months have the same but a different father  Patient reports residing with her mother, her brother and her children  She reports that FOB resides with his mother and is supportive to her both emotionally and financially as he is employed  Patient reports she is not working at present as she stays at home to care for her children  She reports that her mother is a great source of support to her as well  Patient does admit to h/o Bi-polar Disorder and states that she has been involved with counseling [facility]  She reports that she no longer attends however her son does attend  Patient states that she had been experiencing anxiety and feeling over whelmed when she decided to pursue counseling  She denies feeling anxious or depressed at this time, although she does state that FOB of her middle children often causes her stress  Reviewed with patient need for [place] Childline referral secondary to marijuana use with minor children at home  Patient verbalizes an understanding and referral made to [facility] at [facility] [id number]  Patient denies further needs at this time  Supportive counseling provided  Will continue to be available to provide support

## 2018-08-15 NOTE — PROGRESS NOTES
OB Intake  o Patient presents for OB intake interview  o Accompanied by: Self  o [id number]  - Hx of  delivery prior to 36 weeks 6 days: yes  o LMP: Patient's last menstrual period was 2018 (exact date)  o Estimated Date of Delivery: 19   - confirmed by LMP   o Signs and Symptoms of pregnancy:  - morning sickness, nausea and positive home pregnancy test  - Constipation: no  - Headaches: no  - Cramping/spotting: no  - PICA cravings: no  - Diabetes: If you answer yes, please order 1 hr gtt testing, 50grams   History of gestational diabetes yes   BMI >35  no   Advance maternal age >35 no   First degree relative with type 2 diabetes no   History of PCOS no   Current metformin use no   Prior history of macrosomia or LGA no  o Immunization Record  Immunization History   Administered Date(s) Administered    Influenza TIV (IM) 2016    Tdap 2014   -   o Tdap:  - Counseled to be given after 28 weeks  - Influenza vaccine discussed  o MRSA questionnaire: negative  o Dental visit within last 6 months  - yes     Nurse Family Partnership: No  ONAF form submitted: Yes    Interview education:  Viktoria Hernandez Pregnancy Essentials booklet given to patient  Reviewed and explained   Handouts given at todays visit  o Hernan Chin & me phone application guide  o [facility] & Me support center  o CDCs Response to [address] [place] [address] Maternal Fetal Medicine  - Sequential screening pamphlet  o RACHEL letter given  - [address]  - Work       Scripts given for prenatal panel, hemoglobin electrophoresis, HgbA1C and BMP (hx gestational diabetes)     Referral placed for  center to get a dating U/S and sequential screen if dating is appropriate for testing  I called the  center to make the patient an apt, their schedule is full  They are going to reach out to the patient and schedule her an apt ASAP due to being a higher risk pregnancy   She has a hx of 2  delivers at 35 weeks and 36 weeks  Her last pregnancy in 2017 she received Neena through the  center     Computer system has a hx of patient having MRSA  She states that when she was young she was sick but the MRSA testing came back negative     Patient admits to smoking marijuana 3-4 weeks ago  Toxicology screen sent today and a referral to social work was made  Patient spoke to Allen Lawrence the  today       PAP smear done 17 Negative  Cystic Fibrosis 3/29/17 Negative

## 2018-08-21 DIAGNOSIS — R82.5 POSITIVE URINE DRUG SCREEN: Primary | ICD-10-CM

## 2018-08-21 LAB
AMPHETAMINES UR QL SCN: NEGATIVE NG/ML
BARBITURATES UR QL SCN: NEGATIVE NG/ML
BENZODIAZ UR QL SCN: NEGATIVE NG/ML
BZE UR QL: NEGATIVE NG/ML
CANNABINOIDS UR QL SCN: POSITIVE
METHADONE UR QL SCN: NEGATIVE NG/ML
OPIATES UR QL: NEGATIVE NG/ML
PCP UR QL: NEGATIVE NG/ML
PROPOXYPH UR QL: NEGATIVE NG/ML

## 2018-08-22 ENCOUNTER — PATIENT OUTREACH (OUTPATIENT)
Dept: OBGYN CLINIC | Facility: CLINIC | Age: 23
End: 2018-08-22

## 2018-08-22 ENCOUNTER — ULTRASOUND (OUTPATIENT)
Dept: PERINATAL CARE | Facility: OTHER | Age: 23
End: 2018-08-22
Payer: COMMERCIAL

## 2018-08-22 VITALS
HEIGHT: 59 IN | HEART RATE: 77 BPM | WEIGHT: 95 LBS | BODY MASS INDEX: 19.15 KG/M2 | DIASTOLIC BLOOD PRESSURE: 76 MMHG | SYSTOLIC BLOOD PRESSURE: 109 MMHG

## 2018-08-22 DIAGNOSIS — Z36.82 NUCHAL TRANSLUCENCY OF FETUS ON PRENATAL ULTRASOUND: Primary | ICD-10-CM

## 2018-08-22 DIAGNOSIS — Z3A.12 12 WEEKS GESTATION OF PREGNANCY: ICD-10-CM

## 2018-08-22 DIAGNOSIS — O36.80X1 ENCOUNTER TO DETERMINE FETAL VIABILITY OF PREGNANCY, FETUS 1: ICD-10-CM

## 2018-08-22 PROCEDURE — 76801 OB US < 14 WKS SINGLE FETUS: CPT | Performed by: OBSTETRICS & GYNECOLOGY

## 2018-08-22 PROCEDURE — 76813 OB US NUCHAL MEAS 1 GEST: CPT | Performed by: OBSTETRICS & GYNECOLOGY

## 2018-08-22 NOTE — PROGRESS NOTES
Received report that patient's UDS is positive for marijuana  Call to [facility] and informed patient's  Blue Carrillo  Will continue to be available to provide support

## 2018-10-02 ENCOUNTER — ROUTINE PRENATAL (OUTPATIENT)
Dept: OBGYN CLINIC | Facility: CLINIC | Age: 23
End: 2018-10-02
Payer: COMMERCIAL

## 2018-10-02 VITALS — DIASTOLIC BLOOD PRESSURE: 67 MMHG | WEIGHT: 102.4 LBS | BODY MASS INDEX: 20.68 KG/M2 | SYSTOLIC BLOOD PRESSURE: 102 MMHG

## 2018-10-02 DIAGNOSIS — Z34.92 PRENATAL CARE IN SECOND TRIMESTER: ICD-10-CM

## 2018-10-02 DIAGNOSIS — Z00.00 HEALTH CARE MAINTENANCE: ICD-10-CM

## 2018-10-02 DIAGNOSIS — Z3A.18 18 WEEKS GESTATION OF PREGNANCY: Primary | ICD-10-CM

## 2018-10-02 PROCEDURE — 87591 N.GONORRHOEAE DNA AMP PROB: CPT | Performed by: OBSTETRICS & GYNECOLOGY

## 2018-10-02 PROCEDURE — 90471 IMMUNIZATION ADMIN: CPT | Performed by: OBSTETRICS & GYNECOLOGY

## 2018-10-02 PROCEDURE — 99213 OFFICE O/P EST LOW 20 MIN: CPT | Performed by: OBSTETRICS & GYNECOLOGY

## 2018-10-02 PROCEDURE — 90686 IIV4 VACC NO PRSV 0.5 ML IM: CPT | Performed by: OBSTETRICS & GYNECOLOGY

## 2018-10-02 PROCEDURE — 87491 CHLMYD TRACH DNA AMP PROBE: CPT | Performed by: OBSTETRICS & GYNECOLOGY

## 2018-10-02 NOTE — PROGRESS NOTES
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 on 18  Pt states she has her Anatomy scan scheduled on 10/17/18  Pt has not had her Prenatal labs or HbA1c & BMP labs drawn yet because she lost the slip  She was informed that the lab has her information electronically  She states she will go after this visit today  She denies vaginal bleeding, cramping, leakage, abnormal discharge  Review of Systems    Past Medical History:   Diagnosis Date    Anemia     Anxiety     no treatment   Bipolar disorder ([facility] [address] )     took latuda and stopped in february when found out pregnant    Chlamydia infection     GERD (gastroesophageal reflux disease)     Hearing loss     Heart murmur     Helicobacter pylori infection     last assessed 17    Perforated left tympanic membrane on examination     last assessed 01/10/17    Trauma 2013    First son's father    Varicella        Past Surgical History:   Procedure Laterality Date    ABDOMINAL SURGERY      APPENDECTOMY      CHOLECYSTECTOMY  2015    lap    MYRINGOTOMY      OTHER SURGICAL HISTORY      Nexplanon removal    ME COLONOSCOPY FLX DX W/COLLJ SPEC WHEN PFRMD N/A 2017    Procedure: EGD AND COLONOSCOPY;  Surgeon: Sunitha Patterson DO;  Location: [facility] GI LAB;   Service: Gastroenterology    ME LAP,APPENDECTOMY N/A 5/17/2016    Procedure: APPENDECTOMY LAPAROSCOPIC;  Surgeon: Anna Barone DO;  [facility];  Service: General    [place] LAP,DIAGNOSTIC ABDOMEN N/A 3/13/2016    Procedure: LAPAROSCOPY DIAGNOSTIC;  Surgeon: Trinidad Uriarte MD;  [facility];  Service: General    SMALL INTESTINE SURGERY      twisted bowels       Social History     Social History    Marital status: Single     Spouse name: N/A    Number of children: 2    Years of education: N/A     Occupational History    unemployed, looking for work      Social History Main Topics    Smoking status: Current Every Day Smoker     Packs/day: 0 20     Years: 5 00     Types: Cigarettes    Smokeless tobacco: Never Used      Comment: 2 ciggs     Alcohol use No    Drug use: Yes     Types: Marijuana      Comment: marijuanna use 3-4 weeks ago     Sexual activity: Yes     Partners: Male     Other Topics Concern    Not on file     Social History Narrative    No caffeine use    Single Parent    3 yr boy and 1 5 yr girl       OBJECTIVE  Vitals:    10/02/18 0944   BP: 102/67     Physical Exam   Constitutional: She is oriented to person, place, and time  She appears well-developed and well-nourished  No distress  Genitourinary: Vagina normal    HENT:   Head: Normocephalic and atraumatic  Cardiovascular: Normal rate, regular rhythm and normal heart sounds  Exam reveals no gallop and no friction rub  No murmur heard  Pulmonary/Chest: Effort normal and breath sounds normal  No respiratory distress  She has no wheezes  She has no rales  Abdominal: Soft  She exhibits distension (gravid)  She exhibits no mass  There is no tenderness  There is no rebound and no guarding  Neurological: She is alert and oriented to person, place, and time  Skin: Skin is warm and dry  No rash noted  She is not diaphoretic  No erythema  No pallor  Psychiatric: She has a normal mood and affect   Her behavior is normal  Judgment and thought content normal    Vitals reviewed  Physical Exam   Constitutional: She is oriented to person, place, and time  She appears well-developed and well-nourished  No distress  HENT:   Head: Normocephalic and atraumatic  Cardiovascular: Normal rate, regular rhythm and normal heart sounds  Exam reveals no gallop and no friction rub  No murmur heard  Pulmonary/Chest: Effort normal and breath sounds normal  No respiratory distress  She has no wheezes  She has no rales  Abdominal: Soft  She exhibits distension (gravid)  She exhibits no mass  There is no tenderness  There is no rebound and no guarding  Genitourinary: Vagina normal    Neurological: She is alert and oriented to person, place, and time  Skin: Skin is warm and dry  No rash noted  She is not diaphoretic  No erythema  No pallor  Psychiatric: She has a normal mood and affect  Her behavior is normal  Judgment and thought content normal    Vitals reviewed  ASSESSMENT AND PLAN    26 y o , [id number], with /67   Wt 46 4 kg (102 lb 6 4 oz)   LMP 2018 (Exact Date) , at 18w3d here for her prenatal H&P       1  Pregnancy: H&P completed today  Encouraged pt to have prenatal labs drawn as soon as possible  Final dating via [address] on 18  Labor expectations discussed with patient, including appointment schedule, nutrition, weight gain, exercise, medications, sexual intercourse, and nausea/vomiting  2  Screening: Pap smear was performed 2017 and WNL  GC/CT was collected  Sequential screening reviewed with patient - will proceed with NIPT  Pt had Nuchal translucency performed at 18   3  Consents: Delivery process including potential OVD and  reviewed  Consents signed today  4  Labor: For analgesia, patient plans on natural, however, she had epidurals for her prior labors and is not opposed  5  Postpartum: Patient plans on  breastfeeding   Different methods of contraception were discussed with patient, including progesterone only oral pills, depo provera, nexplanon, mirena, and paragard  Patient undecided, but interested in IUD  She has previously been on OCPs, Nexplanon and Depo  6  Follow up: RTC in 4 weeks  Precautions regarding labor, leakage, bleeding, and fetal movement reviewed  7  THC use: Pt spoke with  at last vist, urine toxicology was positive for THC  Will f/u UDS in third trimester  Encouraged discontinuation  8  Tobacco use: Pt smokes 2 cigarettes per day  Encouraged cessation   9  Immunziation due: Flu vaccine given today  10   Hx of AGDM: f/u hgb A1c    Kip Hines MD  10/2/2018  10:22 AM

## 2018-10-04 LAB
CHLAMYDIA DNA CVX QL NAA+PROBE: NORMAL
N GONORRHOEA DNA GENITAL QL NAA+PROBE: NORMAL

## 2018-10-17 ENCOUNTER — ROUTINE PRENATAL (OUTPATIENT)
Dept: PERINATAL CARE | Facility: CLINIC | Age: 23
End: 2018-10-17
Payer: COMMERCIAL

## 2018-10-17 VITALS
WEIGHT: 103 LBS | SYSTOLIC BLOOD PRESSURE: 112 MMHG | HEIGHT: 59 IN | DIASTOLIC BLOOD PRESSURE: 79 MMHG | HEART RATE: 86 BPM | BODY MASS INDEX: 20.76 KG/M2

## 2018-10-17 DIAGNOSIS — Z36.86 ENCOUNTER FOR ANTENATAL SCREENING FOR CERVICAL LENGTH: ICD-10-CM

## 2018-10-17 DIAGNOSIS — O99.332 TOBACCO SMOKING AFFECTING PREGNANCY IN SECOND TRIMESTER: Primary | ICD-10-CM

## 2018-10-17 DIAGNOSIS — Z36.3 ENCOUNTER FOR ANTENATAL SCREENING FOR MALFORMATIONS: ICD-10-CM

## 2018-10-17 DIAGNOSIS — Z3A.20 20 WEEKS GESTATION OF PREGNANCY: ICD-10-CM

## 2018-10-17 PROCEDURE — 76817 TRANSVAGINAL US OBSTETRIC: CPT | Performed by: OBSTETRICS & GYNECOLOGY

## 2018-10-17 PROCEDURE — 76811 OB US DETAILED SNGL FETUS: CPT | Performed by: OBSTETRICS & GYNECOLOGY

## 2018-10-17 PROCEDURE — 99212 OFFICE O/P EST SF 10 MIN: CPT | Performed by: OBSTETRICS & GYNECOLOGY

## 2018-10-17 NOTE — PROGRESS NOTES
JORY Diane 53: Ms Sheldon Kelley was seen today at 20w4d for anatomic survey and cervical length screening ultrasound  See ultrasound report under "OB Procedures" tab  Please don't hesitate to contact our office with any concerns or questions    Sawyer Izquierdo MD

## 2018-10-17 NOTE — PROGRESS NOTES
A transvaginal ultrasound was performed  Sonographer note on use of High Level Disinfection Process (Trophon) for transvaginal probe# 2 used, serial [id number]    Shereen Frank RDMS

## 2018-10-17 NOTE — PATIENT INSTRUCTIONS
Thank you for choosing [facility] for your  care today  If you have any questions about your ultrasound or care, please do not hesitate to contact us or your primary obstetrician  Please continue to try to cut down on tobacco use as this will help you and baby  Congratulations on getting your influenza vaccine and please get your entire household vaccinated

## 2018-10-23 ENCOUNTER — HOSPITAL ENCOUNTER (OUTPATIENT)
Facility: HOSPITAL | Age: 23
Discharge: HOME | End: 2018-10-23
Attending: OBSTETRICS & GYNECOLOGY | Admitting: OBSTETRICS & GYNECOLOGY
Payer: COMMERCIAL

## 2018-10-23 VITALS
HEART RATE: 78 BPM | DIASTOLIC BLOOD PRESSURE: 61 MMHG | SYSTOLIC BLOOD PRESSURE: 101 MMHG | TEMPERATURE: 98.1 F | WEIGHT: 105 LBS | BODY MASS INDEX: 21.21 KG/M2 | RESPIRATION RATE: 17 BRPM | OXYGEN SATURATION: 98 %

## 2018-10-23 PROCEDURE — 99213 OFFICE O/P EST LOW 20 MIN: CPT

## 2018-10-23 PROCEDURE — 76817 TRANSVAGINAL US OBSTETRIC: CPT | Performed by: OBSTETRICS & GYNECOLOGY

## 2018-10-23 NOTE — DISCHARGE INSTRUCTIONS
Placenta Previa   WHAT YOU NEED TO KNOW:   What is placenta previa? Placenta previa is a condition in which your placenta grows near or over your cervix (opening of your uterus)  The placenta forms during pregnancy and provides oxygen and nutrition to your unborn baby  The placenta also removes waste products from the fetus  Normally, your placenta grows in the upper part of your uterus  When your placenta grows near your cervix, it may block the opening to your vagina  You may have vaginal bleeding that could harm you and your unborn baby  What increases my risk for placenta previa? The exact cause of placenta previa is not clear  Your risk increases as you get older  Your risk also increases if you are of  descent  The following may also increase your risk:  · Previous placenta previa:  Your risk for placenta previa increases if you have had it during previous pregnancies  · Infertility treatments:  Treatments used to increase your ability to get pregnant may make you more likely to have placenta previa  · Pregnancy: The more times you have been pregnant, the higher your risk for this condition  Your risk increases if your pregnancies are less than 1 year apart  Being pregnant with more than 1 baby, such as twins, also increases your risk  Past pregnancies that resulted in an  or miscarriage may also increase your risk for placenta previa  · Past :  A  can cause changes in your uterine tissue that increase your risk for placenta previa  · Smoking and street drug use:  Smoking before and during pregnancy increases your risk for placenta previa  Use of street drugs such as cocaine also may lead to placenta previa  · Uterine scarring:  Placenta previa is more likely to occur if you have scarring in your uterus  Scarring may occur from a past surgery or pregnancy   Scarring may increase the risk that your placenta will grow in the lower part of your uterus  What are the signs and symptoms of placenta previa? · Vaginal bleeding:  Bleeding usually occurs in the late second or early third trimester of pregnancy, but can occur at any time  You may have small or large amounts of bleeding that normally does not cause pain  You may have bleeding after sex  Bleeding may also occur suddenly  · Contractions:  Contractions may cause abdominal pain or cramping  How is placenta previa diagnosed? Placenta previa is often found during a normal pregnancy visit with your healthcare provider  The later in your pregnancy it is found, the higher the risk that it will not go away  Your healthcare provider will ask about your health and any previous pregnancies  Tell him if you have had past uterine surgeries or procedures  Your healthcare provider may check your cervix by gently putting a speculum into your vagina  A speculum is a tool that opens your vagina to help your healthcare provider see your cervix  You may also need any of the following:  · Ultrasound:      ¨ Transvaginal ultrasound:  A small tube is placed inside your vagina so your healthcare provider can see your uterus  It may show if your placenta lies in the lower part of your uterus  It may also show how close your placenta is to the edge or top of your cervix  ¨ Abdominal ultrasound:  A small device will be moved around your abdomen to show pictures of your uterus  Your healthcare provider may want you to have a full bladder for this test  It may show if your placenta is blocking the opening of your uterus  It may also show problems with your baby, such as slow growth  ¨ Doppler ultrasound:  A Doppler ultrasound may be done to check if your placenta has grown into the wall of your uterus  · MRI:  This scan uses powerful magnets and a computer to take pictures of your pelvis  MRI pictures may show where your placenta is in your uterus   An MRI may also show if and how deep your placenta has grown into your uterine wall  You may be given dye to help the placenta show up better in the pictures  Tell the healthcare provider if you have ever had an allergic reaction to contrast dye  Do not enter the MRI room with anything made of metal  Metal can cause serious injury  Tell the healthcare provider if you have any metal in or on your body  How is placenta previa treated? Placenta previa may go away later in your pregnancy, and you may not need treatment  Your placenta may move when your uterus changes shape as you get closer to delivery  If you need treatment, it may depend on how far along you are in your pregnancy  Your treatment also depends on how much of your cervix is covered by your placenta  You may need any of the following:  · Bed rest:  You may need to be on bedrest until your baby is ready to be born  You may be able to rest at home, or you may need to stay in the hospital  If you are in the hospital, your healthcare provider may keep you on a monitor  A monitor is used to watch your vital signs (heart rate, breathing, and blood pressure)  You may also need to wear a fetal monitor  Ask your healthcare provider which activities you may do while you are on bedrest     · Blood transfusion:  You may need a blood transfusion if you lose a large amount of blood  During a blood transfusion, you will get donated blood through an IV  You may need a transfusion while you are still pregnant or after your baby is born  · Medicines:      ¨ Steroids:  Steroids may be given if you need to deliver your baby earlier than expected  These medicines help your baby's lungs to mature and prevent breathing problems after he is born  ¨ Rh immune globulin shot:  You may be given an Rh immune globulin injection (RhIG) before your baby is born  RhIG is needed if you and your baby have different Rh blood types (incompatibility)  Rh incompatibility means that your baby has a protein in his blood that you do not   Your body may make antibodies against your baby's blood and destroy his blood cells  RhIG injections prevent this from happening  ¨ Tocolytics: Tocolytics are given to stop contractions if your baby is not ready to be born  Contractions are when the muscles of your uterus tighten and loosen  · Amniocentesis: An amniocentesis test may be done between weeks 36 and 37 of your pregnancy if you are not bleeding  This test is done to check your unborn baby's lungs if you have a  date planned  Ask your healthcare provider for more information about amniocentesis  · Delivery of your baby:  Early delivery of your baby may be needed  If your due date is close and your placenta does not cover your cervix, you may be able to give birth vaginally  If your placenta covers most or all of your cervix, a  will be done  A  may also be done if you have heavy bleeding or you or your baby is in danger  What are the risks of placenta previa? · Medicines used to treat placenta previa may cause you to have a fast heartbeat  You may be at risk for blood clots  Your placenta may pull away from your uterus when you have contractions  You may have sudden, large amounts of bleeding  You may need a blood transfusion to replace the blood you have lost  Large amounts of blood loss may be life-threatening  Placenta previa increases your risk of placenta accreta  Placenta accreta is when your placenta attaches deep into the wall of your uterus  The deep attachment makes it hard for your placenta to come out after your baby is born  Placenta accreta increases your risk for bleeding even further  · Your baby may not grow as he should inside your uterus  He may be forced into an abnormal birthing position  Your unborn baby may also have problems with his heartbeat  Your baby may be born too early and his lungs may not be fully developed  He may also be very small  This could be life-threatening for your baby   If you need surgery to deliver your baby, you may get an infection or bleed more than expected  After you give birth, you may need surgery to remove your uterus if you have bleeding that cannot be stopped  You may get a blood clot in your leg or arm  The clot may travel to your heart or brain and cause life-threatening problems, such as a heart attack or stroke  How can I manage placenta previa? · Have a safety plan and someone to take you to the hospital if needed  · Stay within a short distance to the hospital so you can get there quickly  · Do not douche or have sex  These may cause bleeding  When should I contact my healthcare provider? · You feel abdominal cramps, pressure, or tightening  · Your heart is beating faster than normal for you  · You have questions or concerns about your condition or care  When should I seek immediate care or call 911? · You have any amount of bleeding from your vagina  · You are having severe abdominal pain or contractions  · You feel faint or too weak to stand up  · You suddenly feel lightheaded and short of breath  · You have chest pain when you take a deep breath or cough  · You cough up blood  CARE AGREEMENT:   You have the right to help plan your care  Learn about your health condition and how it may be treated  Discuss treatment options with your caregivers to decide what care you want to receive  You always have the right to refuse treatment  The above information is an  only  It is not intended as medical advice for individual conditions or treatments  Talk to your doctor, nurse or pharmacist before following any medical regimen to see if it is safe and effective for you  © 2017 [employer] Information is for End User's use only and may not be sold, redistributed or otherwise used for commercial purposes   All illustrations and images included in CareNotes® are the copyrighted property of A D [employer] M [employer]  or [employer]

## 2018-10-23 NOTE — PROGRESS NOTES
L&D Triage Note - OB/GYN  Roverto Jackson 26 y o  female MRN: [id number]  Unit/Bed#: L&D 329-02 Encounter: [id number]      Assessment:  26 y o   at 21w3d who is here with vaginal pressure     Plan:  1  Vaginal pressure   -  labor work up    - KOH: no hyphae     - NSS: no trichomonads, Clue cells    - TVUS: 32mm consistent with MFM scan on 10/17    2  Possible low lying placenta    - Follow up appointment with MFM on     - Patient advised on pelvic rest and instructed to return if she has bright red bleeding     3  Nausea and vomitting     - Patient declined medical management     Management discussed with Dr Bam Lou, Dr Artem Cruz and Dr Tabitha Naylor    ______________________________________________________________________  Chief Compliant: vaginal pressure and groin pain     TIME: 11:15  Subjective: Griffin Covington is a 26 y o   at 21w3d who presents with vaginal pressure, groin pain and nausea and vomiting  The pain started on Saturday, when she was hosting a birthday party for her one year old  She states that she was busy and moving around a lot more than usual  Yesterday she started to have some emesis and has had 3 episodes of green, liquidy vomiting  She is tender to examination and transvaginal probe  She denies diarrhea and constipation  She denies any trauma to the abdomen or recent falls       Pregnancy complications: patient reports history of  labor in two previous pregnancies     Leakage of fluid: no   Fetal movement: yes, minimal  Vaginal bleeding: no   Contractions: no     Objective:  Vitals:    10/23/18 1052   BP: 101/61   Pulse: 78   Resp: 17   Temp: 98 1 °F (36 7 °C)   SpO2:        Physical Exam   Abdomen: soft, non distended, non-tender to palpation    Vagina: normal mucosa, non-erythematous, yellow liquid discharge   Cervix: pink  KOH: no hyphae  NSS: no trichomonads, no Clue cells     FHT:  140 / Moderate 6 - 25 bpm / accelerations, no decelerations  [place]: none Ismael Islas MD 10/23/2018 11:19 AM

## 2018-10-23 NOTE — PROGRESS NOTES
Patient discharged to home by doctor  Discharge instructions/precautions reviewed with patient by Dr Charley Cummings  Patient verbalizes understanding & denies further questions at this time

## 2018-11-24 ENCOUNTER — HOSPITAL ENCOUNTER (EMERGENCY)
Facility: HOSPITAL | Age: 23
Discharge: HOME | End: 2018-11-24
Attending: EMERGENCY MEDICINE | Admitting: EMERGENCY MEDICINE
Payer: COMMERCIAL

## 2018-11-24 ENCOUNTER — APPOINTMENT (EMERGENCY)
Dept: RADIOLOGY | Facility: HOSPITAL | Age: 23
End: 2018-11-24
Payer: COMMERCIAL

## 2018-11-24 ENCOUNTER — APPOINTMENT (EMERGENCY)
Dept: ULTRASOUND IMAGING | Facility: HOSPITAL | Age: 23
End: 2018-11-24
Payer: COMMERCIAL

## 2018-11-24 VITALS
HEART RATE: 86 BPM | RESPIRATION RATE: 16 BRPM | TEMPERATURE: 97.5 F | OXYGEN SATURATION: 100 % | DIASTOLIC BLOOD PRESSURE: 84 MMHG | SYSTOLIC BLOOD PRESSURE: 127 MMHG

## 2018-11-24 DIAGNOSIS — M54.9 ACUTE BACK PAIN: Primary | ICD-10-CM

## 2018-11-24 DIAGNOSIS — O26.893 ABDOMINAL PAIN DURING PREGNANCY IN THIRD TRIMESTER: ICD-10-CM

## 2018-11-24 DIAGNOSIS — R10.9 ABDOMINAL PAIN DURING PREGNANCY IN THIRD TRIMESTER: ICD-10-CM

## 2018-11-24 LAB
ALBUMIN SERPL BCP-MCNC: 2.7 G/DL (ref 3.5–5)
ALP SERPL-CCNC: 108 U/L (ref 46–116)
ALT SERPL W P-5'-P-CCNC: 18 U/L (ref 12–78)
ANION GAP SERPL CALCULATED.3IONS-SCNC: 7 MMOL/L (ref 4–13)
AST SERPL W P-5'-P-CCNC: 26 U/L (ref 5–45)
ATRIAL RATE: 69 BPM
BACTERIA UR QL AUTO: ABNORMAL /HPF
BILIRUB DIRECT SERPL-MCNC: 0.06 MG/DL (ref 0–0.2)
BILIRUB SERPL-MCNC: 0.29 MG/DL (ref 0.2–1)
BILIRUB UR QL STRIP: NEGATIVE
BUN SERPL-MCNC: 5 MG/DL (ref 5–25)
CALCIUM SERPL-MCNC: 8.5 MG/DL (ref 8.3–10.1)
CHLORIDE SERPL-SCNC: 102 MMOL/L (ref 100–108)
CLARITY UR: ABNORMAL
CO2 SERPL-SCNC: 25 MMOL/L (ref 21–32)
COLOR UR: YELLOW
CREAT SERPL-MCNC: 0.31 MG/DL (ref 0.6–1.3)
GFR SERPL CREATININE-BSD FRML MDRD: 161 ML/MIN/1.73SQ M
GLUCOSE SERPL-MCNC: 82 MG/DL (ref 65–140)
GLUCOSE UR STRIP-MCNC: NEGATIVE MG/DL
HGB UR QL STRIP.AUTO: NEGATIVE
KETONES UR STRIP-MCNC: NEGATIVE MG/DL
LEUKOCYTE ESTERASE UR QL STRIP: ABNORMAL
LIPASE SERPL-CCNC: 74 U/L (ref 73–393)
NITRITE UR QL STRIP: NEGATIVE
NON-SQ EPI CELLS URNS QL MICRO: ABNORMAL /HPF
P AXIS: 56 DEGREES
PH UR STRIP.AUTO: 7.5 [PH] (ref 4.5–8)
POTASSIUM SERPL-SCNC: 4.3 MMOL/L (ref 3.5–5.3)
PR INTERVAL: 132 MS
PROT SERPL-MCNC: 6.8 G/DL (ref 6.4–8.2)
PROT UR STRIP-MCNC: NEGATIVE MG/DL
QRS AXIS: 65 DEGREES
QRSD INTERVAL: 74 MS
QT INTERVAL: 374 MS
QTC INTERVAL: 400 MS
RBC #/AREA URNS AUTO: ABNORMAL /HPF
SODIUM SERPL-SCNC: 134 MMOL/L (ref 136–145)
SP GR UR STRIP.AUTO: 1.01 (ref 1–1.03)
T WAVE AXIS: 27 DEGREES
UROBILINOGEN UR QL STRIP.AUTO: 0.2 E.U./DL
VENTRICULAR RATE: 69 BPM
WBC #/AREA URNS AUTO: ABNORMAL /HPF

## 2018-11-24 PROCEDURE — 80048 BASIC METABOLIC PNL TOTAL CA: CPT | Performed by: EMERGENCY MEDICINE

## 2018-11-24 PROCEDURE — 80076 HEPATIC FUNCTION PANEL: CPT | Performed by: EMERGENCY MEDICINE

## 2018-11-24 PROCEDURE — 87086 URINE CULTURE/COLONY COUNT: CPT

## 2018-11-24 PROCEDURE — 99285 EMERGENCY DEPT VISIT HI MDM: CPT

## 2018-11-24 PROCEDURE — 96375 TX/PRO/DX INJ NEW DRUG ADDON: CPT

## 2018-11-24 PROCEDURE — 93010 ELECTROCARDIOGRAM REPORT: CPT | Performed by: INTERNAL MEDICINE

## 2018-11-24 PROCEDURE — 76770 US EXAM ABDO BACK WALL COMP: CPT

## 2018-11-24 PROCEDURE — 96374 THER/PROPH/DIAG INJ IV PUSH: CPT

## 2018-11-24 PROCEDURE — 93005 ELECTROCARDIOGRAM TRACING: CPT

## 2018-11-24 PROCEDURE — 36415 COLL VENOUS BLD VENIPUNCTURE: CPT | Performed by: EMERGENCY MEDICINE

## 2018-11-24 PROCEDURE — 81001 URINALYSIS AUTO W/SCOPE: CPT

## 2018-11-24 PROCEDURE — 83690 ASSAY OF LIPASE: CPT | Performed by: EMERGENCY MEDICINE

## 2018-11-24 RX ORDER — ONDANSETRON 2 MG/ML
4 INJECTION INTRAMUSCULAR; INTRAVENOUS ONCE
Status: COMPLETED | OUTPATIENT
Start: 2018-11-24 | End: 2018-11-24

## 2018-11-24 RX ORDER — LIDOCAINE 50 MG/G
1 PATCH TOPICAL EVERY 24 HOURS
Qty: 7 PATCH | Refills: 0 | Status: SHIPPED | OUTPATIENT
Start: 2018-11-24 | End: 2018-12-12

## 2018-11-24 RX ORDER — MORPHINE SULFATE 4 MG/ML
4 INJECTION, SOLUTION INTRAMUSCULAR; INTRAVENOUS ONCE
Status: COMPLETED | OUTPATIENT
Start: 2018-11-24 | End: 2018-11-24

## 2018-11-24 RX ORDER — MAGNESIUM HYDROXIDE/ALUMINUM HYDROXICE/SIMETHICONE 120; 1200; 1200 MG/30ML; MG/30ML; MG/30ML
30 SUSPENSION ORAL ONCE
Status: COMPLETED | OUTPATIENT
Start: 2018-11-24 | End: 2018-11-24

## 2018-11-24 RX ADMIN — ALUMINUM HYDROXIDE, MAGNESIUM HYDROXIDE, AND SIMETHICONE 30 ML: 200; 200; 20 SUSPENSION ORAL at 15:17

## 2018-11-24 RX ADMIN — LIDOCAINE HYDROCHLORIDE 15 ML: 20 SOLUTION ORAL; TOPICAL at 15:17

## 2018-11-24 RX ADMIN — ONDANSETRON 4 MG: 2 INJECTION INTRAMUSCULAR; INTRAVENOUS at 14:07

## 2018-11-24 RX ADMIN — MORPHINE SULFATE 4 MG: 4 INJECTION, SOLUTION INTRAMUSCULAR; INTRAVENOUS at 14:07

## 2018-11-24 NOTE — ED NOTES
Pt complaining of sudden onset epigastric pain   Dr Carlee Grey notifed     Mohinder Kennedy, RN  11/24/18 7909

## 2018-11-24 NOTE — DISCHARGE INSTRUCTIONS
Abdominal Pain in Pregnancy   WHAT YOU NEED TO KNOW:   Abdominal pain during pregnancy is common  Some of the causes include heartburn, constipation, gas, false labor, and round ligament pain  Round ligament pain is caused by stretching of the ligaments that support your uterus  Abdominal pain may be caused by a health problem, such as a stomach virus or appendicitis (inflammation of the appendix)  The pain may also be caused by a problem with your pregnancy, such as a threatened miscarriage or  labor  DISCHARGE INSTRUCTIONS:   Follow up with your obstetrician within 3 days:  Write down your questions so you remember to ask them during your visits  Self-care:   · Rest may help to relieve round ligament pain  Ask your healthcare provider about other ways to relieve this pain, such as a supportive belt or pregnancy exercises  · Use a heating pad set to the lowest setting or a hot compress to apply heat to your abdomen  Do this for 20 to 30 minutes every 2 hours for as many days as directed  · Avoid quick changes in position or movements that cause pain  · Do not lie flat in bed or bend over if you have heartburn  Ask your obstetrician if you should make any changes to the foods you eat  Ask if you can take any medicines for heartburn  · Eat more fiber and drink more liquids to relieve constipation  Fiber is found in fruits, vegetables, and whole-grain foods, such as whole-wheat bread and cereals  Ask how much liquid to drink each day and which liquids are best for you  Contact your obstetrician if:  · You continue to have abdominal pain that cannot be relieved  · You have a fever  · You have questions or concerns about your condition or care  Return to the emergency department if:   · You have sudden, severe pain or cramps that are so bad that you cannot walk or talk  · You have a fast heartbeat, shortness of breath, and you feel lightheaded or faint       · You have vaginal bleeding or discharge  · You have nausea, vomiting, fever, and severe pain on your right side  © 2017 [employer] Information is for End User's use only and may not be sold, redistributed or otherwise used for commercial purposes  All illustrations and images included in CareNotes® are the copyrighted property of [employer]  or [employer]  The above information is an  only  It is not intended as medical advice for individual conditions or treatments  Talk to your doctor, nurse or pharmacist before following any medical regimen to see if it is safe and effective for you  Flank Pain   WHAT YOU NEED TO KNOW:   Flank pain is felt in the area below your ribcage and above your hip bones, often in the lower back  Your pain may be dull or so severe that you cannot get comfortable  The pain may stay in one area or radiate to another area  It may worsen and lighten in waves  Flank pain is often a sign of problems with your urinary tract, such as a kidney stone or infection  DISCHARGE INSTRUCTIONS:   Return to the emergency department if:   · You have a fever  · Your heart is fluttering or jumping  · You see blood in your urine  · Your pain radiates into your lower abdomen and genital area  · You have intense pain in your low back next to your spine  · You are much more tired than usual and have no desire to eat  · You have a headache and your muscles jerk  Contact your healthcare provider if:   · You have an upset stomach and are vomiting  · You have to urinate more often, and with urgency  · Your pain worsens or does not improve, and you cannot get comfortable  · You pass a stone when you urinate  · You have questions or concerns about your condition or care  Medicines: The following medicines may be ordered for you:  · Pain medicine  may help decrease or relieve your pain   Do not wait until the pain is severe before you take your medicine  · Antibiotics  may help treat a urinary tract infection caused by bacteria  · Take your medicine as directed  Contact your healthcare provider if you think your medicine is not helping or if you have side effects  Tell him of her if you are allergic to any medicine  Keep a list of the medicines, vitamins, and herbs you take  Include the amounts, and when and why you take them  Bring the list or the pill bottles to follow-up visits  Carry your medicine list with you in case of an emergency  Follow up with your healthcare provider in 1 to 2 days or as directed:  Write down your questions so you remember to ask them during your visits  © 2017 [employer] Information is for End User's use only and may not be sold, redistributed or otherwise used for commercial purposes  All illustrations and images included in CareNotes® are the copyrighted property of [employer]  or [employer]  The above information is an  only  It is not intended as medical advice for individual conditions or treatments  Talk to your doctor, nurse or pharmacist before following any medical regimen to see if it is safe and effective for you

## 2018-11-24 NOTE — ED NOTES
Pt reports to have been seen and evaluated by ED physician  Awaiting further orders       Ariel Wray RN  11/24/18 1196

## 2018-11-24 NOTE — ED NOTES
Pt reports left flank pain worsening over the last 3 days with urinary frequency  Pt denies abdominal pain, fever, nausea, vomiting or radiation of pain  Pt reports she is approximately 6 months pregnant , denies vaginal bleeding or discharge   Pt appears uncomfortable     Christiano Banerjee RN  18 1026 Jeremy Pond RN  18 [id number]

## 2018-11-24 NOTE — ED PROVIDER NOTES
History  Chief Complaint   Patient presents with    Flank Pain     left sided flank pain for the past two days  History provided by:  Patient  Flank Pain   Pain location:  L flank  Pain quality: sharp and shooting    Pain radiation: L buttocks  Pain severity:  Severe  Onset quality:  Gradual  Duration:  3 days  Timing:  Constant  Progression:  Waxing and waning  Chronicity:  New  Context comment:  No precipitating event/trauma  pt is 6 months pregnant  denies contractions, vaginal complaints, decreased fetal movement  Relieved by:  Nothing  Worsened by: Movement  Ineffective treatments:  Acetaminophen  Associated symptoms: no anorexia, no chest pain, no chills, no constipation, no cough, no diarrhea, no dysuria, no fatigue, no fever, no hematemesis, no hematochezia, no hematuria, no melena, no nausea, no shortness of breath, no sore throat, no vaginal bleeding, no vaginal discharge and no vomiting        Prior to Admission Medications   Prescriptions Last Dose Informant Patient Reported? Taking? Prenatal Multivit-Min-Fe-FA (PRENATAL VITAMINS PO)  Self Yes No   Sig: Take by mouth      Facility-Administered Medications: None       Past Medical History:   Diagnosis Date    Anemia     Anxiety     no treatment      Bipolar disorder ([facility] [address] )     took latuda and stopped in february when found out pregnant    Chlamydia infection 2013    GERD (gastroesophageal reflux disease)     Hearing loss     Heart murmur     Helicobacter pylori infection     last assessed 02/16/17    Perforated left tympanic membrane on examination     last assessed 01/10/17    Trauma 2013    First son's father    Varicella 2000       Past Surgical History:   Procedure Laterality Date    ABDOMINAL SURGERY      APPENDECTOMY      CHOLECYSTECTOMY  02/2015    lap    MYRINGOTOMY      OTHER SURGICAL HISTORY      Nexplanon removal    HI COLONOSCOPY FLX DX W/COLLJ SPEC WHEN PFRMD N/A 1/19/2017    Procedure: EGD AND COLONOSCOPY; Surgeon: Kelly Moss DO;  Location: [facility]; Service: Gastroenterology    SC LAP,APPENDECTOMY N/A 5/17/2016    Procedure: Joe Salomon;  Surgeon: Harsha Alejandro DO;  Location[facility];  Service: General    SC LAP,DIAGNOSTIC ABDOMEN N/A 3/13/2016    Procedure: LAPAROSCOPY DIAGNOSTIC;  Surgeon: Danial Hunter MD;  Location[facility];  Service: General    SMALL INTESTINE SURGERY      twisted bowels       Family History   Problem Relation Age of Onset    Diabetes Maternal Grandmother     Hypothyroidism Maternal Grandmother     No Known Problems Child     No Known Problems Other     Colon cancer Family     Asthma Mother     No Known Problems Father      I have reviewed and agree with the history as documented  Social History   Substance Use Topics    Smoking status: Current Every Day Smoker     Packs/day: 0 20     Years: 5 00     Types: Cigarettes    Smokeless tobacco: Never Used      Comment: 2 ciggs     Alcohol use No        Review of Systems   Constitutional: Negative for activity change, appetite change, chills, fatigue and fever  HENT: Negative for congestion, dental problem, ear pain, rhinorrhea and sore throat  Eyes: Negative for pain and redness  Respiratory: Negative for cough, chest tightness, shortness of breath and wheezing  Cardiovascular: Negative for chest pain and palpitations  Gastrointestinal: Negative for abdominal pain, anorexia, blood in stool, constipation, diarrhea, hematemesis, hematochezia, melena, nausea and vomiting  Endocrine: Negative for cold intolerance and heat intolerance  Genitourinary: Positive for flank pain and frequency  Negative for decreased urine volume, difficulty urinating, dysuria, enuresis, genital sores, hematuria, pelvic pain, urgency, vaginal bleeding, vaginal discharge and vaginal pain  Musculoskeletal: Negative for arthralgias and myalgias  Skin: Negative for color change, pallor and rash  Neurological: Negative for weakness and numbness  Hematological: Does not bruise/bleed easily  Psychiatric/Behavioral: Negative for agitation, hallucinations and suicidal ideas  Physical Exam  Physical Exam   Constitutional: She is oriented to person, place, and time  She appears well-developed and well-nourished  HENT:   Mouth/Throat: No oropharyngeal exudate  TMs normal bilaterally no pharyngeal erythema no rhinorrhea nontender palpation of sinuses, normal looking turbinates   Eyes: Conjunctivae and EOM are normal    Neck: Normal range of motion  Neck supple  No meningeal signs   Cardiovascular: Normal rate, regular rhythm, normal heart sounds and intact distal pulses  Pulmonary/Chest: Effort normal and breath sounds normal  No respiratory distress  She has no wheezes  She has no rales  She exhibits no tenderness  Abdominal: Soft  Bowel sounds are normal  She exhibits no distension and no mass  There is no tenderness  No hernia  nttp over t/l spines  +ttp over L cva/paravetebral musculature w/o hyperonticity  nvi bilatera lower extremities  Neg slr    Musculoskeletal: Normal range of motion  She exhibits no edema  Lymphadenopathy:     She has no cervical adenopathy  Neurological: She is alert and oriented to person, place, and time  No cranial nerve deficit  Skin: No rash noted  No erythema  No edema   Psychiatric: She has a normal mood and affect  Her behavior is normal    Nursing note and vitals reviewed        Vital Signs  ED Triage Vitals   Temperature Pulse Respirations Blood Pressure SpO2   11/24/18 1243 11/24/18 1243 11/24/18 1243 11/24/18 1245 11/24/18 1243   97 5 °F (36 4 °C) 80 16 109/53 99 %      Temp Source Heart Rate Source Patient Position - Orthostatic VS BP Location FiO2 (%)   11/24/18 1243 11/24/18 1243 11/24/18 1243 11/24/18 1243 --   Temporal Monitor Sitting Left arm       Pain Score       11/24/18 1243       8           Vitals:    11/24/18 1243 11/24/18 1245 11/24/18 1501   BP:  109/53 127/84   Pulse: 80  86   Patient Position - Orthostatic VS: Sitting  Sitting       Visual Acuity      ED Medications  Medications   ondansetron (ZOFRAN) injection 4 mg (4 mg Intravenous Given 11/24/18 1407)   morphine (PF) 4 mg/mL injection 4 mg (4 mg Intravenous Given 11/24/18 1407)   aluminum-magnesium hydroxide-simethicone (MYLANTA) 200-200-20 mg/5 mL oral suspension 30 mL (30 mL Oral Given 11/24/18 1517)   lidocaine viscous (XYLOCAINE) 2 % mucosal solution 15 mL (15 mL Oral Given 11/24/18 1517)       Diagnostic Studies  Results Reviewed     Procedure Component Value Units Date/Time    Hepatic function panel [[id number]]  (Abnormal) Collected:  11/24/18 1406    Lab Status:  Final result Specimen:  Blood from Arm, Right Updated:  11/24/18 1542     Total Bilirubin 0 29 mg/dL      Bilirubin, Direct 0 06 mg/dL      Alkaline Phosphatase 108 U/L      AST 26 U/L      ALT 18 U/L      Total Protein 6 8 g/dL      Albumin 2 7 (L) g/dL     Lipase [[id number]]  (Normal) Collected:  11/24/18 1406    Lab Status:  Final result Specimen:  Blood from Arm, Right Updated:  11/24/18 1542     Lipase 74 u/L     Basic metabolic panel [[id number]]  (Abnormal) Collected:  11/24/18 1406    Lab Status:  Final result Specimen:  Blood from Arm, Right Updated:  11/24/18 1430     Sodium 134 (L) mmol/L      Potassium 4 3 mmol/L      Chloride 102 mmol/L      CO2 25 mmol/L      ANION GAP 7 mmol/L      BUN 5 mg/dL      Creatinine 0 31 (L) mg/dL      Glucose 82 mg/dL      Calcium 8 5 mg/dL      eGFR 161 ml/min/1 73sq m     Narrative:         National Kidney Disease Education Program recommendations are as follows:  GFR calculation is accurate only with a steady state creatinine  Chronic Kidney disease less than 60 ml/min/1 73 sq  meters  Kidney failure less than 15 ml/min/1 73 sq  meters      Urine Microscopic [[id number]]  (Abnormal) Collected:  11/24/18 1316    Lab Status:  Final result Specimen:  Urine from Urine, Clean Catch Updated:  11/24/18 1344     RBC, UA 0-1 (A) /hpf      WBC, UA 2-4 (A) /hpf      Epithelial Cells Moderate (A) /hpf      Bacteria, UA None Seen /hpf     Urine culture [[id number]] Collected:  11/24/18 1316    Lab Status: In process Specimen:  Urine from Urine, Clean Catch Updated:  11/24/18 1305    POCT urinalysis dipstick [[id number]]  (Abnormal) Resulted:  11/24/18 1303    Lab Status:  Final result Specimen:  Urine from Urine, Other Updated:  11/24/18 1303    ED Urine Macroscopic [[id number]]  (Abnormal) Collected:  11/24/18 1316    Lab Status:  Final result Specimen:  Urine Updated:  11/24/18 1302     Color, UA Yellow     Clarity, UA Cloudy     pH, UA 7 5     Leukocytes, UA Moderate (A)     Nitrite, UA Negative     Protein, UA Negative mg/dl      Glucose, UA Negative mg/dl      Ketones, UA Negative mg/dl      Urobilinogen, UA 0 2 E U /dl      Bilirubin, UA Negative     Blood, UA Negative     Specific Gravity, UA 1 010    Narrative:       CLINITEK RESULT                 XR chest 1 view   ED Interpretation by Lenora Arriaga MD (11/24 1605)   Primary reviewed  No acute abnormality  by Benjamin Berumen (11/24 1557)      US kidney and bladder   ED Interpretation by Lenora Arriaga MD (11/24 1543)   No evidence of hydronephrosis or nephrolithiasis  Final Result by Dee Klein MD (11/24 1540)      No evidence of hydronephrosis or nephrolithiasis         Workstation performed: [id number]                    Procedures  ECG 12 Lead Documentation  Date/Time: 11/24/2018 3:10 PM  Performed by: Glenroy Lancaster by: Miguel Reid     ECG reviewed by me, the ED Provider: yes    Patient location:  ED  Previous ECG:     Previous ECG:  Compared to current    Comparison ECG info:  10/14/18    Similarity:  No change  Rate:     ECG rate:  69    ECG rate assessment: normal    Rhythm:     Rhythm: sinus rhythm    Ectopy:     Ectopy: none    QRS:     QRS axis:  Normal    QRS intervals:  Normal  Conduction: Conduction: normal    ST segments:     ST segments:  Normal  T waves:     T waves: inverted    Q waves:     Q waves:  V1, V2 and V3           Phone Contacts  ED Phone Contact    ED Course  ED Course as of 1629   Sat 2018   1500 Patient now complaining of sharp severe epigastric pain  She does have a history of a cholecystectomy  She states this pain is constant, nonradiating, without modifying factors  Benign abd exam  Will add on EKG, chest x-ray, liver function tests, lipase, treat for gastritis    1618 Patient's workup is reviewed and benign  Patient's symptoms have improved  Will reassure, counseled, discharged home  [facility]  Number of Diagnoses or Management Options  Diagnosis management comments: Liz Nam left flank pain which radiates towards the left lower back without red flag signs or symptoms and the setting pregnancy without history exam findings suggest  labor-will do fetal heart tones, urine dip, renal ultrasound, pain medications, reassess    CritCare Time    Disposition  Final diagnoses:   Acute back pain   Abdominal pain during pregnancy in third trimester     Time reflects when diagnosis was documented in both MDM as applicable and the Disposition within this note     Time User Action Codes Description Comment    2018  4:14 PM Johana Boo Add [M54 9] Acute back pain     2018  4:14 PM Wolfgang Cornejo Add [O26 893,  R10 9] Abdominal pain during pregnancy in third trimester       ED Disposition     ED Disposition Condition Comment    Discharge  Aaron Saini discharge to home/self care      Condition at discharge: Good        Follow-up Information     Follow up With Specialties Details Why Contact Info Additional [address] [facility] [place] [facility] [place] [facility] Schedule an appointment as soon as possible for a visit in 2 days  [place] [phone]  [address], [place], [place], [phone] [address] [facility] Schedule an appointment as soon as possible for a visit in 2 days  [address] [place] [address]  [phone] [address]  [address], [place], [place], [phone]          Patient's Medications   Discharge Prescriptions    LIDOCAINE (LIDODERM) 5 %    Apply 1 patch topically every 24 hours for 7 doses Remove & Discard patch within 12 hours or as directed by MD       Start Date: 11/24/2018End Date: 12/1/2018       Order Dose: 1 patch       Quantity: 7 patch    Refills: 0     No discharge procedures on file      ED Provider  Electronically Signed by           Elva Mccartney MD  11/24/18 237 [address] MD Flash  11/24/18 6320

## 2018-11-25 LAB — BACTERIA UR CULT: NORMAL

## 2018-12-09 ENCOUNTER — HOSPITAL ENCOUNTER (OUTPATIENT)
Facility: HOSPITAL | Age: 23
Discharge: HOME | End: 2018-12-09
Attending: OBSTETRICS & GYNECOLOGY | Admitting: OBSTETRICS & GYNECOLOGY
Payer: COMMERCIAL

## 2018-12-09 VITALS
SYSTOLIC BLOOD PRESSURE: 105 MMHG | OXYGEN SATURATION: 100 % | DIASTOLIC BLOOD PRESSURE: 65 MMHG | TEMPERATURE: 97.9 F | RESPIRATION RATE: 18 BRPM | HEART RATE: 99 BPM

## 2018-12-09 PROBLEM — Z3A.28 28 WEEKS GESTATION OF PREGNANCY: Status: ACTIVE | Noted: 2017-10-07

## 2018-12-09 LAB
ABO GROUP BLD: NORMAL
AMPHETAMINES SERPL QL SCN: NEGATIVE
BARBITURATES UR QL: NEGATIVE
BASOPHILS # BLD AUTO: 0.04 THOUSANDS/ΜL (ref 0–0.1)
BASOPHILS NFR BLD AUTO: 0 % (ref 0–1)
BENZODIAZ UR QL: NEGATIVE
BILIRUB UR QL STRIP: NEGATIVE
BLD GP AB SCN SERPL QL: NEGATIVE
CLARITY UR: CLEAR
COCAINE UR QL: NEGATIVE
COLOR UR: YELLOW
EOSINOPHIL # BLD AUTO: 0.06 THOUSAND/ΜL (ref 0–0.61)
EOSINOPHIL NFR BLD AUTO: 1 % (ref 0–6)
ERYTHROCYTE [DISTWIDTH] IN BLOOD BY AUTOMATED COUNT: 13.4 % (ref 11.6–15.1)
FLUAV AG SPEC QL IA: NEGATIVE
FLUBV AG SPEC QL IA: NEGATIVE
GLUCOSE 1H P 50 G GLC PO SERPL-MCNC: 155 MG/DL
GLUCOSE UR STRIP-MCNC: NEGATIVE MG/DL
HCT VFR BLD AUTO: 31.5 % (ref 34.8–46.1)
HGB BLD-MCNC: 10.7 G/DL (ref 11.5–15.4)
HGB UR QL STRIP.AUTO: NEGATIVE
IMM GRANULOCYTES # BLD AUTO: 0.05 THOUSAND/UL (ref 0–0.2)
IMM GRANULOCYTES NFR BLD AUTO: 1 % (ref 0–2)
KETONES UR STRIP-MCNC: NEGATIVE MG/DL
LEUKOCYTE ESTERASE UR QL STRIP: NEGATIVE
LYMPHOCYTES # BLD AUTO: 1.19 THOUSANDS/ΜL (ref 0.6–4.47)
LYMPHOCYTES NFR BLD AUTO: 11 % (ref 14–44)
MCH RBC QN AUTO: 33.9 PG (ref 26.8–34.3)
MCHC RBC AUTO-ENTMCNC: 34 G/DL (ref 31.4–37.4)
MCV RBC AUTO: 100 FL (ref 82–98)
METHADONE UR QL: NEGATIVE
MONOCYTES # BLD AUTO: 0.48 THOUSAND/ΜL (ref 0.17–1.22)
MONOCYTES NFR BLD AUTO: 5 % (ref 4–12)
NEUTROPHILS # BLD AUTO: 8.87 THOUSANDS/ΜL (ref 1.85–7.62)
NEUTS SEG NFR BLD AUTO: 82 % (ref 43–75)
NITRITE UR QL STRIP: NEGATIVE
NRBC BLD AUTO-RTO: 0 /100 WBCS
OPIATES UR QL SCN: NEGATIVE
PCP UR QL: NEGATIVE
PH UR STRIP.AUTO: 7.5 [PH] (ref 4.5–8)
PLATELET # BLD AUTO: 141 THOUSANDS/UL (ref 149–390)
PMV BLD AUTO: 10.3 FL (ref 8.9–12.7)
PROT UR STRIP-MCNC: NEGATIVE MG/DL
RBC # BLD AUTO: 3.16 MILLION/UL (ref 3.81–5.12)
RH BLD: POSITIVE
SP GR UR STRIP.AUTO: 1.01 (ref 1–1.03)
SPECIMEN EXPIRATION DATE: NORMAL
THC UR QL: POSITIVE
UROBILINOGEN UR QL STRIP.AUTO: 0.2 E.U./DL
WBC # BLD AUTO: 10.69 THOUSAND/UL (ref 4.31–10.16)

## 2018-12-09 PROCEDURE — 82950 GLUCOSE TEST: CPT | Performed by: OBSTETRICS & GYNECOLOGY

## 2018-12-09 PROCEDURE — 80081 OBSTETRIC PANEL INC HIV TSTG: CPT | Performed by: OBSTETRICS & GYNECOLOGY

## 2018-12-09 PROCEDURE — 99212 OFFICE O/P EST SF 10 MIN: CPT

## 2018-12-09 PROCEDURE — 87631 RESP VIRUS 3-5 TARGETS: CPT | Performed by: OBSTETRICS & GYNECOLOGY

## 2018-12-09 PROCEDURE — 81003 URINALYSIS AUTO W/O SCOPE: CPT | Performed by: OBSTETRICS & GYNECOLOGY

## 2018-12-09 PROCEDURE — 76817 TRANSVAGINAL US OBSTETRIC: CPT | Performed by: OBSTETRICS & GYNECOLOGY

## 2018-12-09 PROCEDURE — 80307 DRUG TEST PRSMV CHEM ANLYZR: CPT | Performed by: OBSTETRICS & GYNECOLOGY

## 2018-12-09 PROCEDURE — 87086 URINE CULTURE/COLONY COUNT: CPT | Performed by: OBSTETRICS & GYNECOLOGY

## 2018-12-09 RX ORDER — ACETAMINOPHEN 325 MG/1
650 TABLET ORAL EVERY 6 HOURS PRN
Status: DISCONTINUED | OUTPATIENT
Start: 2018-12-09 | End: 2018-12-09 | Stop reason: HOSPADM

## 2018-12-09 RX ADMIN — ACETAMINOPHEN 650 MG: 325 TABLET, FILM COATED ORAL at 15:26

## 2018-12-09 NOTE — PROGRESS NOTES
L&D Triage Note - OB/GYN  Keesha Snyder 26 y o  female MRN: [id number]  Unit/Bed#: L&D 329-01 Encounter: [id number]      Assessment:  26 y o   at 28w1d, lower back pain    Plan:  1   labor workup WNL  No evidence of bacterial vaginosis, trichomoniasis, yeast infection  Urine dip completely WNL  Cervical length 29 7-30 2mm without funnel  Cervical examination closed, thick, high, firm, posterior  2  Limited prenatal care  Prenatal panel, 1hr glucose test performed today  No showed for appointment   Tasked RN team to assist with scheduling outpatient follow up  3  No sick contacts  Influenza swab performed, negative  4  Hx + urine drug screen, will repeat today  5  Pt has scheduled outpatient follow up on  with the  office  Strongly encouraged outpatient follow up in the [facility] clinic  6  Encouraged use of Tylenol, heating pads, gentle stretching, PO hydration    Discussed with Dr Eden Sharp      ______________________________________________________________________      Chief Compliant: I'm having a lot of pain  TIME: 1430  Subjective:  26 y o   at 28w1d presents to labor and delivery with abdominal pain  Pt reports abdominal pain and pressure that started yesterday  Pt denies trauma or a change from her usual activities  She reports that the pain is worse with urination  Abdominal pain is located primarily suprapubically, but does radiate towards the right side and flank  Denies fevers, chills, night sweats but does endorse recent URTI symptoms  Denies recent sick contacts  Pelvic pressure also started yesterday, also worse with urination  Endorses good fetal movement  Denies LOF, VB, cramping, contractions         Objective:  Vitals:    18 1400   BP: 105/65   Pulse: 99   Resp: 18   Temp: 97 9 °F (36 6 °C)   SpO2:        Physical Exam:    SVE: 0 / 0% / -5  FHT:  140 / Moderate 6 - 25 bpm / +15x15 accels, -decels  [place]: Quiet    SSE: Thin physiologic appearing vaginal discharge appreciated within the vaginal vault  Cervix visualized, appears closed  No blood within vault  [place]et [place]/KOH: Negative clue cells, trichomonads, hyphae  Negative whiff  TVUS: Vertex presentation  Low-lying anterior placenta appreciated 6 3-10 1mm from internal os  Cervical length 29 7-30 2cm without funneling or dynamic changes        [address] [facility], DO 12/9/2018 2:58 PM

## 2018-12-09 NOTE — PROCEDURES
nader Robbins [id number] at 28w1d with an RACHEL of 3/2/2019, by Ultrasound, was seen at [facility] for the following procedure(s): $Procedure Type: US - Transvaginal]                   Ultrasound Other  Fetal Presentation: Vertex  Cervical Length: 30 2  Funnel: No  Debris: No  Placenta Location: Anterior         Good fetal movement  Low lying placenta 6 3-10 1mm from internal os          Anant Murphy DO  OB/GYN, PGY3  12/9/2018, 5:45 PM

## 2018-12-10 LAB
BACTERIA UR CULT: NORMAL
FLUAV AG SPEC QL: NORMAL
FLUBV AG SPEC QL: NORMAL
HBV SURFACE AG SER QL: NORMAL
HIV 1+2 AB+HIV1 P24 AG SERPL QL IA: NORMAL
RPR SER QL: NORMAL
RSV B RNA SPEC QL NAA+PROBE: NORMAL
RUBV IGG SERPL IA-ACNC: 13.8 IU/ML

## 2018-12-11 ENCOUNTER — ROUTINE PRENATAL (OUTPATIENT)
Dept: OBGYN CLINIC | Facility: CLINIC | Age: 23
End: 2018-12-11
Payer: COMMERCIAL

## 2018-12-11 VITALS
DIASTOLIC BLOOD PRESSURE: 69 MMHG | SYSTOLIC BLOOD PRESSURE: 103 MMHG | BODY MASS INDEX: 22.78 KG/M2 | HEART RATE: 98 BPM | HEIGHT: 59 IN | WEIGHT: 113 LBS

## 2018-12-11 DIAGNOSIS — Z3A.28 28 WEEKS GESTATION OF PREGNANCY: Primary | ICD-10-CM

## 2018-12-11 DIAGNOSIS — R73.09 ELEVATED GLUCOSE: ICD-10-CM

## 2018-12-11 DIAGNOSIS — R82.5 POSITIVE URINE DRUG SCREEN: ICD-10-CM

## 2018-12-11 DIAGNOSIS — Z34.93 PRENATAL CARE IN THIRD TRIMESTER: ICD-10-CM

## 2018-12-11 PROCEDURE — 99213 OFFICE O/P EST LOW 20 MIN: CPT | Performed by: NURSE PRACTITIONER

## 2018-12-11 NOTE — PROGRESS NOTES
Assessment & Plan  26 y o  [id number] at 28w3d presenting for routine prenatal visit  Explained elevated1 hr PG and slip for 3 hr GTT provided  Discussed positive urine drug screen , pt states she thought she could stop marijuana ,  Explained Latrell Philip from social work would F/U, referral was sent  Problem List Items Addressed This Visit        Other    28 weeks gestation of pregnancy - Primary    Positive urine drug screen    Relevant Orders    Ambulatory referral to social work care management program    Elevated glucose    Relevant Orders    Glucose LASHELL 3HR 100GM PREG PTS    Prenatal care in third trimester        ____________________________________________________________  Subjective  She is without complaint  She denies contractions, loss of fluid, or vaginal bleeding  She feels regular fetal movements  Objective  /69   Pulse 98   Ht 4' 11" (1 499 m)   Wt 51 3 kg (113 lb)   LMP 2018 (Exact Date)   BMI 22 82 kg/m²   FHR: 140     Patient's Active Problem List  Patient Active Problem List   Diagnosis    Acute appendicitis    Intra-abdominal abscess post-procedure    Fall, accidental    Abdominal pain in pregnancy    28 weeks gestation of pregnancy    Positive urine drug screen    Tobacco smoking affecting pregnancy in second trimester    Elevated glucose    Prenatal care in third trimester     Plan  Return to  center for follow up  Call with vaginal bleeding, loss of fluid, regular contractions, decreased fetal movement, other needs or concerns  Latrell Philip from social work will call to follow up  Return here in 2 weeks   Pt verbalized understanding of all discussed

## 2018-12-11 NOTE — PATIENT INSTRUCTIONS
Return to  center for follow up  Call with vaginal bleeding, loss of fluid, regular contractions, decreased fetal movement, other needs or concerns    Maritza Veronica from social work will call to follow up  Return here in 2 weeks

## 2018-12-12 ENCOUNTER — ULTRASOUND (OUTPATIENT)
Dept: PERINATAL CARE | Facility: CLINIC | Age: 23
End: 2018-12-12
Payer: COMMERCIAL

## 2018-12-12 ENCOUNTER — PATIENT OUTREACH (OUTPATIENT)
Dept: OBGYN CLINIC | Facility: CLINIC | Age: 23
End: 2018-12-12

## 2018-12-12 VITALS
SYSTOLIC BLOOD PRESSURE: 95 MMHG | HEART RATE: 91 BPM | DIASTOLIC BLOOD PRESSURE: 64 MMHG | BODY MASS INDEX: 23.43 KG/M2 | HEIGHT: 59 IN | WEIGHT: 116.2 LBS

## 2018-12-12 DIAGNOSIS — Z36.2 ENCOUNTER FOR OTHER ANTENATAL SCREENING FOLLOW-UP: ICD-10-CM

## 2018-12-12 DIAGNOSIS — O99.333 TOBACCO SMOKING AFFECTING PREGNANCY IN THIRD TRIMESTER: ICD-10-CM

## 2018-12-12 DIAGNOSIS — Z3A.28 28 WEEKS GESTATION OF PREGNANCY: Primary | ICD-10-CM

## 2018-12-12 PROCEDURE — 76816 OB US FOLLOW-UP PER FETUS: CPT | Performed by: OBSTETRICS & GYNECOLOGY

## 2018-12-12 PROCEDURE — 99212 OFFICE O/P EST SF 10 MIN: CPT | Performed by: OBSTETRICS & GYNECOLOGY

## 2018-12-12 RX ORDER — ACETAMINOPHEN 325 MG/1
650 TABLET ORAL EVERY 6 HOURS PRN
Status: ON HOLD | COMMUNITY
End: 2019-01-24

## 2018-12-12 NOTE — PATIENT INSTRUCTIONS
Kick Counts in Pregnancy   AMBULATORY CARE:   Kick counts  measure how much your baby is moving in your womb  A kick from your baby can be felt as a twist, turn, swish, roll, or jab  It is common to feel your baby kicking at 26 to 28 weeks of pregnancy  You may feel your baby kick as early as 20 weeks of pregnancy  Seek care immediately if:   · You feel your baby kick less as the day goes on      · You do not feel any kicks in a day  Contact your healthcare provider if:   · You feel a change in the number of kicks or movements of your baby  · You feel fewer than 10 kicks within 2 hours after counting  · You have questions or concerns about your baby's movements  Why measure kick counts:  Your baby's movement may provide information about your baby's health  He may move less, or not at all, if there are problems  He may move less if he does not have enough room to grow in your uterus (womb)  He may also move less if he is not getting enough oxygen or nutrition from the placenta  Tell your healthcare provider as soon as you feel a change in your baby's movements  Problems that are found earlier are easier to treat  When to measure kick counts:   · Measure kick counts at the same time every day  · Measure kick counts when your baby is awake and most active  Your baby may be most active in the evening  · Measure kick counts after a meal or snack or when your baby is usually most active  Your baby may be more active after you eat or in the evening  · You should not smoke while you are pregnant  Smoking increases the risk of health problems for you and for your baby during your pregnancy  If you do smoke, wait 2 hours to measure kick counts  Nicotine can make your baby more active than usual   How to measure kick counts:  Check that your baby is awake before you measure kick counts  You can wake up your baby by lightly pushing on your belly, walking, or drinking something cold   Your healthcare provider may tell you different ways to measure kick counts  He/She may tell you to do the following:  · Use a chart or clock to keep track of the time you start and finish counting  · Sit in a chair or lie on your left side  · Place your hands on the largest part of your belly  · Count until you reach 10 kicks  Write down how much time it takes to count 10 kicks  · It may take 30 minutes to 2 hours to count 10 kicks  It should not take more than 2 hours to count 10 kicks  If you do not feel 10 kicks within 2 hours, Call your Obstetrician    Follow up with your healthcare provider as directed:  Write down your questions so you remember to ask them during your visits  © 2017 [address] [employer] Information is for End User's use only and may not be sold, redistributed or otherwise used for commercial purposes  All illustrations and images included in CareNotes® are the copyrighted property of A D A M , Inc  or Sorin Winchester  The above information is an  only  It is not intended as medical advice for individual conditions or treatments  Talk to your doctor, nurse or pharmacist before following any medical regimen to see if it is safe and effective for you

## 2018-12-12 NOTE — PROGRESS NOTES
The patient was seen today for an ultrasound  Please see ultrasound report (located under Ob Procedures) for additional details  Thank you very much for allowing us to participate in the care of this very nice patient  Should you have any questions, please do not hesitate to contact me  Damon Wright MD [facility] [facility]  Attending Physician, Santhosh
No complaints

## 2018-12-13 ENCOUNTER — PATIENT OUTREACH (OUTPATIENT)
Dept: OBGYN CLINIC | Facility: CLINIC | Age: 23
End: 2018-12-13

## 2018-12-13 DIAGNOSIS — R82.5 POSITIVE URINE DRUG SCREEN: Primary | ICD-10-CM

## 2018-12-18 ENCOUNTER — HOSPITAL ENCOUNTER (EMERGENCY)
Facility: HOSPITAL | Age: 23
Discharge: HOME | End: 2018-12-18
Attending: EMERGENCY MEDICINE | Admitting: EMERGENCY MEDICINE
Payer: COMMERCIAL

## 2018-12-18 ENCOUNTER — APPOINTMENT (EMERGENCY)
Dept: ULTRASOUND IMAGING | Facility: HOSPITAL | Age: 23
End: 2018-12-18
Payer: COMMERCIAL

## 2018-12-18 VITALS
HEART RATE: 96 BPM | TEMPERATURE: 97.3 F | DIASTOLIC BLOOD PRESSURE: 59 MMHG | RESPIRATION RATE: 18 BRPM | SYSTOLIC BLOOD PRESSURE: 107 MMHG | OXYGEN SATURATION: 100 %

## 2018-12-18 DIAGNOSIS — R10.9 LEFT FLANK PAIN: Primary | ICD-10-CM

## 2018-12-18 LAB
BACTERIA UR QL AUTO: ABNORMAL /HPF
BILIRUB UR QL STRIP: NEGATIVE
CLARITY UR: ABNORMAL
COLOR UR: YELLOW
COLOR, POC: YELLOW
GLUCOSE UR STRIP-MCNC: NEGATIVE MG/DL
HGB UR QL STRIP.AUTO: NEGATIVE
KETONES UR STRIP-MCNC: NEGATIVE MG/DL
LEUKOCYTE ESTERASE UR QL STRIP: ABNORMAL
NITRITE UR QL STRIP: NEGATIVE
NON-SQ EPI CELLS URNS QL MICRO: ABNORMAL /HPF
OTHER STN SPEC: ABNORMAL
PH UR STRIP.AUTO: 7 [PH] (ref 4.5–8)
PROT UR STRIP-MCNC: NEGATIVE MG/DL
RBC #/AREA URNS AUTO: ABNORMAL /HPF
SP GR UR STRIP.AUTO: 1.01 (ref 1–1.03)
UROBILINOGEN UR QL STRIP.AUTO: 0.2 E.U./DL
WBC #/AREA URNS AUTO: ABNORMAL /HPF

## 2018-12-18 PROCEDURE — 76770 US EXAM ABDO BACK WALL COMP: CPT

## 2018-12-18 PROCEDURE — 87086 URINE CULTURE/COLONY COUNT: CPT

## 2018-12-18 PROCEDURE — 81001 URINALYSIS AUTO W/SCOPE: CPT

## 2018-12-18 PROCEDURE — 99284 EMERGENCY DEPT VISIT MOD MDM: CPT

## 2018-12-18 RX ORDER — ACETAMINOPHEN 325 MG/1
650 TABLET ORAL ONCE
Status: COMPLETED | OUTPATIENT
Start: 2018-12-18 | End: 2018-12-18

## 2018-12-18 RX ORDER — LIDOCAINE 50 MG/G
1 PATCH TOPICAL DAILY
Qty: 30 PATCH | Refills: 0 | Status: ON HOLD | OUTPATIENT
Start: 2018-12-18 | End: 2019-01-24

## 2018-12-18 RX ADMIN — ACETAMINOPHEN 650 MG: 325 TABLET, FILM COATED ORAL at 11:04

## 2018-12-18 NOTE — ED PROVIDER NOTES
History  Chief Complaint   Patient presents with    Back Pain     left side with radiation down the leg, took tylenol without relief  Pt is a 26year old  29 weeks pregnant presenting with left flank pain for 1 day  Pt states the pain is 9/10 sharp and radiates down her left leg  She denies numbness and tingling  Pt states this pain came on abruptly and has been constant  Pt was seen 1 week prior for similar complaint in the ED  Pt states she took Tylenol prior to coming into the ED but it did not alleviate her symptoms  Pt states she has good fetal movement, denies abdominal pain, fever, vaginal bleeding or discharge  Pt states she is nauseous and vomited once this morning, no hematochezia but vomitus was bilious  Pt denies dysuria, hematuria, urgency, but admits to urinary frequency  Denies complications in pregnancy thus far, and denies history of miscarriage  Prior to Admission Medications   Prescriptions Last Dose Informant Patient Reported? Taking? Prenatal Multivit-Min-Fe-FA (PRENATAL VITAMINS PO)  Self Yes No   Sig: Take by mouth   acetaminophen (TYLENOL) 325 mg tablet  Self Yes No   Sig: Take 650 mg by mouth every 6 (six) hours as needed for mild pain      Facility-Administered Medications: None       Past Medical History:   Diagnosis Date    Anemia     Anxiety     no treatment      Bipolar disorder ([facility] [address] )     took latuda and stopped in february when found out pregnant    Chlamydia infection     GERD (gastroesophageal reflux disease)     Hearing loss     Heart murmur     Helicobacter pylori infection     last assessed 17    Perforated left tympanic membrane on examination     last assessed 01/10/17    Trauma 2013    First son's father    Varicella 2000       Past Surgical History:   Procedure Laterality Date    ABDOMINAL SURGERY      APPENDECTOMY      CHOLECYSTECTOMY  2015    lap    MYRINGOTOMY      OTHER SURGICAL HISTORY      Nexplanon removal    AK COLONOSCOPY FLX DX W/COLLJ SPEC WHEN PFRMD N/A 1/19/2017    Procedure: EGD AND COLONOSCOPY;  Surgeon: Alicia Klein DO;  Location: [facility] GI [facility]; Service: Gastroenterology    AR LAP,APPENDECTOMY N/A 5/17/2016    Procedure: Mancel Yadav;  Surgeon: Itz Burns DO;  Location: [facility];  Service: General    AR LAP,DIAGNOSTIC ABDOMEN N/A 3/13/2016    Procedure: LAPAROSCOPY DIAGNOSTIC;  Surgeon: Luther Paz MD;  Location: [facility];  Service: General    SMALL INTESTINE SURGERY      twisted bowels       Family History   Problem Relation Age of Onset    Diabetes Maternal Grandmother     Hypothyroidism Maternal Grandmother     No Known Problems Child     No Known Problems Other     Colon cancer Family     Asthma Mother     No Known Problems Father      I have reviewed and agree with the history as documented  Social History   Substance Use Topics    Smoking status: Current Every Day Smoker     Packs/day: 0 20     Years: 5 00     Types: Cigarettes    Smokeless tobacco: Never Used      Comment: 2 ciggs     Alcohol use No        Review of Systems   Constitutional: Negative for activity change, appetite change, chills, diaphoresis, fatigue and fever  Eyes: Negative for visual disturbance  Respiratory: Negative for cough, chest tightness and shortness of breath  Cardiovascular: Negative for chest pain  Gastrointestinal: Positive for nausea and vomiting  Negative for abdominal pain, constipation and diarrhea  Genitourinary: Positive for flank pain and frequency  Negative for decreased urine volume, difficulty urinating, dysuria, hematuria, pelvic pain, urgency, vaginal bleeding, vaginal discharge and vaginal pain  Neurological: Negative for dizziness, weakness, light-headedness and headaches  Physical Exam  Physical Exam   Constitutional: She is oriented to person, place, and time  She appears well-developed and well-nourished  No distress     HENT:   Head: Normocephalic and atraumatic  Right Ear: External ear normal    Left Ear: External ear normal    Nose: Nose normal    Mouth/Throat: Oropharynx is clear and moist  No oropharyngeal exudate  Eyes: Pupils are equal, round, and reactive to light  Conjunctivae and EOM are normal  Right eye exhibits no discharge  Left eye exhibits no discharge  Neck: Normal range of motion  Neck supple  No tracheal deviation present  Cardiovascular: Normal rate, regular rhythm, normal heart sounds and intact distal pulses  Pulmonary/Chest: Effort normal and breath sounds normal  No stridor  No respiratory distress  She has no wheezes  She has no rales  Abdominal: Soft  Bowel sounds are normal  She exhibits no distension  There is no tenderness  Left sided CVA tenderness  Musculoskeletal: Normal range of motion  Lymphadenopathy:     She has no cervical adenopathy  Neurological: She is alert and oriented to person, place, and time  Skin: Skin is warm and dry  Capillary refill takes less than 2 seconds  She is not diaphoretic  Vital Signs  ED Triage Vitals [12/18/18 0846]   Temperature Pulse Respirations Blood Pressure SpO2   (!) 97 3 °F (36 3 °C) 96 18 107/59 100 %      Temp Source Heart Rate Source Patient Position - Orthostatic VS BP Location FiO2 (%)   Oral Monitor Sitting Left arm --      Pain Score       9           Vitals:    12/18/18 0846   BP: 107/59   Pulse: 96   Patient Position - Orthostatic VS: Sitting       Visual Acuity      ED Medications  Medications - No data to display    Diagnostic Studies  Results Reviewed     Procedure Component Value Units Date/Time    Urine culture [[id number]] Collected:  12/18/18 0909    Lab Status: In process Specimen:  Urine from Urine, Clean Catch Updated:  12/18/18 0859    Urine Microscopic [[id number]] Collected:  12/18/18 0909    Lab Status:   In process Specimen:  Urine from Urine, Clean Catch Updated:  12/18/18 0859    POCT urinalysis dipstick [[id number]] (Abnormal) Resulted:  12/18/18 0856    Lab Status:  Final result Specimen:  Urine from Urine, Other Updated:  12/18/18 0856     Color, UA yellow    ED Urine Macroscopic [[id number]]  (Abnormal) Collected:  12/18/18 0909    Lab Status:  Final result Specimen:  Urine Updated:  12/18/18 0854     Color, UA Yellow     Clarity, UA Cloudy     pH, UA 7 0     Leukocytes, UA Small (A)     Nitrite, UA Negative     Protein, UA Negative mg/dl      Glucose, UA Negative mg/dl      Ketones, UA Negative mg/dl      Urobilinogen, UA 0 2 E U /dl      Bilirubin, UA Negative     Blood, UA Negative     Specific Gravity, UA 1 015    Narrative:       CLINITEK RESULT                 US retroperitoneal complete    (Results Pending)              Procedures  Procedures       Phone Contacts  ED Phone Contact    ED Course                               MDM  Number of Diagnoses or Management Options  Left flank pain:   Diagnosis management comments: UA was unremarkable for UTI or pyelonephritis  Renal US negative for hydronephrosis or stone  Will be discharged with lidocaine patches  CritCare Time    Disposition  Final diagnoses:   None     ED Disposition     None      Follow-up Information    None         Patient's Medications   Discharge Prescriptions    No medications on file     No discharge procedures on file      ED Provider  Electronically Signed by           Danielle Alberto PA-C  12/18/18 2373

## 2018-12-18 NOTE — ED NOTES
pts significant other comes to nurses station and states "she's been in pain for over an hour and you haven't been in to give her anything yet" he was advised that nothing for pain was ordered by provided but I would make PA aware of request for medication and be in with ordered meds shortly          Lucas Elder RN  12/18/18 7479

## 2018-12-18 NOTE — DISCHARGE INSTRUCTIONS
Flank Pain   WHAT YOU NEED TO KNOW:   Flank pain is felt in the area below your ribcage and above your hip bones, often in the lower back  Your pain may be dull or so severe that you cannot get comfortable  The pain may stay in one area or radiate to another area  It may worsen and lighten in waves  Flank pain is often a sign of problems with your urinary tract, such as a kidney stone or infection  DISCHARGE INSTRUCTIONS:   Return to the emergency department if:   · You have a fever  · Your heart is fluttering or jumping  · You see blood in your urine  · Your pain radiates into your lower abdomen and genital area  · You have intense pain in your low back next to your spine  · You are much more tired than usual and have no desire to eat  · You have a headache and your muscles jerk  Contact your healthcare provider if:   · You have an upset stomach and are vomiting  · You have to urinate more often, and with urgency  · Your pain worsens or does not improve, and you cannot get comfortable  · You pass a stone when you urinate  · You have questions or concerns about your condition or care  Medicines: The following medicines may be ordered for you:  · Pain medicine  may help decrease or relieve your pain  Do not wait until the pain is severe before you take your medicine  · Antibiotics  may help treat a urinary tract infection caused by bacteria  · Take your medicine as directed  Contact your healthcare provider if you think your medicine is not helping or if you have side effects  Tell him of her if you are allergic to any medicine  Keep a list of the medicines, vitamins, and herbs you take  Include the amounts, and when and why you take them  Bring the list or the pill bottles to follow-up visits  Carry your medicine list with you in case of an emergency    Follow up with your healthcare provider in 1 to 2 days or as directed:  Write down your questions so you remember to ask them during your visits  © 2017 [address] [employer] [employer] Information is for End User's use only and may not be sold, redistributed or otherwise used for commercial purposes  All illustrations and images included in CareNotes® are the copyrighted property of A D A M [employer] Inc  or [employer]  The above information is an  only  It is not intended as medical advice for individual conditions or treatments  Talk to your doctor, nurse or pharmacist before following any medical regimen to see if it is safe and effective for you

## 2018-12-19 ENCOUNTER — PATIENT OUTREACH (OUTPATIENT)
Dept: OBGYN CLINIC | Facility: CLINIC | Age: 23
End: 2018-12-19

## 2018-12-19 LAB — BACTERIA UR CULT: NORMAL

## 2018-12-19 NOTE — PROGRESS NOTES
Received return call from patient and she reports she is aware that UDS was positive for marijuana  Patient reports that [facility] was already out to her home to conduct an investigation  Patient admits that she uses marijuana  She declines referral to drug rehabilitation program stating that she plans to quite on her own  Encouraged patient to consider a referral to outpatient drug rehabilitation program and she will advise if interested  Patient reports she is residing with her mother and states that she would like to move out of her home however she does not have an income at the present time  She reports however that FOMARTHA does have an income  Reviewed local housing resources with patient and she will consider these options  Supportive counseling provided to patient  She denies further needs at this time  Patient verbalizes agreement to contact CM for further assistance as needed  Will continue to be available to provide support

## 2019-01-07 ENCOUNTER — HOSPITAL ENCOUNTER (OUTPATIENT)
Facility: HOSPITAL | Age: 24
Discharge: HOME | End: 2019-01-08
Attending: OBSTETRICS & GYNECOLOGY | Admitting: OBSTETRICS & GYNECOLOGY
Payer: COMMERCIAL

## 2019-01-07 ENCOUNTER — APPOINTMENT (OUTPATIENT)
Dept: ULTRASOUND IMAGING | Facility: HOSPITAL | Age: 24
End: 2019-01-07
Payer: COMMERCIAL

## 2019-01-07 PROBLEM — Z90.49 HX OF APPENDECTOMY: Status: ACTIVE | Noted: 2019-01-07

## 2019-01-07 PROBLEM — Z3A.32 32 WEEKS GESTATION OF PREGNANCY: Status: ACTIVE | Noted: 2017-10-07
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
## 2019-01-07 LAB
ABO GROUP BLD: NORMAL
ALBUMIN SERPL BCP-MCNC: 2.6 G/DL (ref 3.5–5)
ALP SERPL-CCNC: 199 U/L (ref 46–116)
ALT SERPL W P-5'-P-CCNC: 20 U/L (ref 12–78)
AMPHETAMINES SERPL QL SCN: NEGATIVE
ANION GAP SERPL CALCULATED.3IONS-SCNC: 11 MMOL/L (ref 4–13)
APTT PPP: 32 SECONDS (ref 26–38)
AST SERPL W P-5'-P-CCNC: 23 U/L (ref 5–45)
BARBITURATES UR QL: NEGATIVE
BASOPHILS # BLD AUTO: 0.01 THOUSANDS/ΜL (ref 0–0.1)
BASOPHILS # BLD AUTO: 0.03 THOUSANDS/ΜL (ref 0–0.1)
BASOPHILS NFR BLD AUTO: 0 % (ref 0–1)
BASOPHILS NFR BLD AUTO: 0 % (ref 0–1)
BENZODIAZ UR QL: NEGATIVE
BILIRUB SERPL-MCNC: 0.39 MG/DL (ref 0.2–1)
BILIRUB UR QL STRIP: NEGATIVE
BLD GP AB SCN SERPL QL: NEGATIVE
BUN SERPL-MCNC: 6 MG/DL (ref 5–25)
CALCIUM SERPL-MCNC: 8.8 MG/DL (ref 8.3–10.1)
CHLORIDE SERPL-SCNC: 100 MMOL/L (ref 100–108)
CLARITY UR: CLEAR
CO2 SERPL-SCNC: 24 MMOL/L (ref 21–32)
COCAINE UR QL: NEGATIVE
COLOR UR: ABNORMAL
CREAT SERPL-MCNC: 0.42 MG/DL (ref 0.6–1.3)
EOSINOPHIL # BLD AUTO: 0 THOUSAND/ΜL (ref 0–0.61)
EOSINOPHIL # BLD AUTO: 0.03 THOUSAND/ΜL (ref 0–0.61)
EOSINOPHIL NFR BLD AUTO: 0 % (ref 0–6)
EOSINOPHIL NFR BLD AUTO: 0 % (ref 0–6)
ERYTHROCYTE [DISTWIDTH] IN BLOOD BY AUTOMATED COUNT: 13.4 % (ref 11.6–15.1)
ERYTHROCYTE [DISTWIDTH] IN BLOOD BY AUTOMATED COUNT: 13.5 % (ref 11.6–15.1)
FIBRINOGEN PPP-MCNC: 505 MG/DL (ref 227–495)
GFR SERPL CREATININE-BSD FRML MDRD: 146 ML/MIN/1.73SQ M
GLUCOSE SERPL-MCNC: 87 MG/DL (ref 65–140)
GLUCOSE UR STRIP-MCNC: NEGATIVE MG/DL
HCT VFR BLD AUTO: 29.8 % (ref 34.8–46.1)
HCT VFR BLD AUTO: 31.1 % (ref 34.8–46.1)
HGB BLD-MCNC: 10.1 G/DL (ref 11.5–15.4)
HGB BLD-MCNC: 10.7 G/DL (ref 11.5–15.4)
HGB UR QL STRIP.AUTO: NEGATIVE
IMM GRANULOCYTES # BLD AUTO: 0.03 THOUSAND/UL (ref 0–0.2)
IMM GRANULOCYTES # BLD AUTO: 0.04 THOUSAND/UL (ref 0–0.2)
IMM GRANULOCYTES NFR BLD AUTO: 1 % (ref 0–2)
IMM GRANULOCYTES NFR BLD AUTO: 1 % (ref 0–2)
INR PPP: 1 (ref 0.86–1.17)
KETONES UR STRIP-MCNC: NEGATIVE MG/DL
LEUKOCYTE ESTERASE UR QL STRIP: NEGATIVE
LYMPHOCYTES # BLD AUTO: 0.64 THOUSANDS/ΜL (ref 0.6–4.47)
LYMPHOCYTES # BLD AUTO: 1.58 THOUSANDS/ΜL (ref 0.6–4.47)
LYMPHOCYTES NFR BLD AUTO: 12 % (ref 14–44)
LYMPHOCYTES NFR BLD AUTO: 19 % (ref 14–44)
MCH RBC QN AUTO: 33.6 PG (ref 26.8–34.3)
MCH RBC QN AUTO: 34.3 PG (ref 26.8–34.3)
MCHC RBC AUTO-ENTMCNC: 33.9 G/DL (ref 31.4–37.4)
MCHC RBC AUTO-ENTMCNC: 34.4 G/DL (ref 31.4–37.4)
MCV RBC AUTO: 100 FL (ref 82–98)
MCV RBC AUTO: 99 FL (ref 82–98)
METHADONE UR QL: NEGATIVE
MONOCYTES # BLD AUTO: 0.14 THOUSAND/ΜL (ref 0.17–1.22)
MONOCYTES # BLD AUTO: 0.73 THOUSAND/ΜL (ref 0.17–1.22)
MONOCYTES NFR BLD AUTO: 3 % (ref 4–12)
MONOCYTES NFR BLD AUTO: 9 % (ref 4–12)
NEUTROPHILS # BLD AUTO: 4.35 THOUSANDS/ΜL (ref 1.85–7.62)
NEUTROPHILS # BLD AUTO: 6.12 THOUSANDS/ΜL (ref 1.85–7.62)
NEUTS SEG NFR BLD AUTO: 71 % (ref 43–75)
NEUTS SEG NFR BLD AUTO: 84 % (ref 43–75)
NITRITE UR QL STRIP: NEGATIVE
NRBC BLD AUTO-RTO: 0 /100 WBCS
NRBC BLD AUTO-RTO: 0 /100 WBCS
OPIATES UR QL SCN: NEGATIVE
PCP UR QL: NEGATIVE
PH UR STRIP.AUTO: 8 [PH] (ref 4.5–8)
PLATELET # BLD AUTO: 144 THOUSANDS/UL (ref 149–390)
PLATELET # BLD AUTO: 163 THOUSANDS/UL (ref 149–390)
PMV BLD AUTO: 10.9 FL (ref 8.9–12.7)
PMV BLD AUTO: 11.1 FL (ref 8.9–12.7)
POTASSIUM SERPL-SCNC: 3.5 MMOL/L (ref 3.5–5.3)
PROT SERPL-MCNC: 6.7 G/DL (ref 6.4–8.2)
PROT UR STRIP-MCNC: NEGATIVE MG/DL
PROTHROMBIN TIME: 13.3 SECONDS (ref 11.8–14.2)
RBC # BLD AUTO: 3.01 MILLION/UL (ref 3.81–5.12)
RBC # BLD AUTO: 3.12 MILLION/UL (ref 3.81–5.12)
RH BLD: POSITIVE
SODIUM SERPL-SCNC: 135 MMOL/L (ref 136–145)
SP GR UR STRIP.AUTO: 1.01 (ref 1–1.03)
SPECIMEN EXPIRATION DATE: NORMAL
THC UR QL: NEGATIVE
UROBILINOGEN UR QL STRIP.AUTO: >=8 E.U./DL
WBC # BLD AUTO: 5.17 THOUSAND/UL (ref 4.31–10.16)
WBC # BLD AUTO: 8.53 THOUSAND/UL (ref 4.31–10.16)

## 2019-01-07 PROCEDURE — 85384 FIBRINOGEN ACTIVITY: CPT | Performed by: OBSTETRICS & GYNECOLOGY

## 2019-01-07 PROCEDURE — 86901 BLOOD TYPING SEROLOGIC RH(D): CPT | Performed by: OBSTETRICS & GYNECOLOGY

## 2019-01-07 PROCEDURE — 86850 RBC ANTIBODY SCREEN: CPT | Performed by: OBSTETRICS & GYNECOLOGY

## 2019-01-07 PROCEDURE — 85730 THROMBOPLASTIN TIME PARTIAL: CPT | Performed by: OBSTETRICS & GYNECOLOGY

## 2019-01-07 PROCEDURE — 87086 URINE CULTURE/COLONY COUNT: CPT | Performed by: OBSTETRICS & GYNECOLOGY

## 2019-01-07 PROCEDURE — 76815 OB US LIMITED FETUS(S): CPT | Performed by: OBSTETRICS & GYNECOLOGY

## 2019-01-07 PROCEDURE — 87653 STREP B DNA AMP PROBE: CPT | Performed by: OBSTETRICS & GYNECOLOGY

## 2019-01-07 PROCEDURE — 80053 COMPREHEN METABOLIC PANEL: CPT | Performed by: OBSTETRICS & GYNECOLOGY

## 2019-01-07 PROCEDURE — 85025 COMPLETE CBC W/AUTO DIFF WBC: CPT | Performed by: STUDENT IN AN ORGANIZED HEALTH CARE EDUCATION/TRAINING PROGRAM

## 2019-01-07 PROCEDURE — 86900 BLOOD TYPING SEROLOGIC ABO: CPT | Performed by: OBSTETRICS & GYNECOLOGY

## 2019-01-07 PROCEDURE — 99212 OFFICE O/P EST SF 10 MIN: CPT | Performed by: OBSTETRICS & GYNECOLOGY

## 2019-01-07 PROCEDURE — 80307 DRUG TEST PRSMV CHEM ANLYZR: CPT | Performed by: OBSTETRICS & GYNECOLOGY

## 2019-01-07 PROCEDURE — 76770 US EXAM ABDO BACK WALL COMP: CPT

## 2019-01-07 PROCEDURE — 85025 COMPLETE CBC W/AUTO DIFF WBC: CPT | Performed by: OBSTETRICS & GYNECOLOGY

## 2019-01-07 PROCEDURE — 81003 URINALYSIS AUTO W/O SCOPE: CPT | Performed by: OBSTETRICS & GYNECOLOGY

## 2019-01-07 PROCEDURE — 76817 TRANSVAGINAL US OBSTETRIC: CPT | Performed by: OBSTETRICS & GYNECOLOGY

## 2019-01-07 PROCEDURE — 85610 PROTHROMBIN TIME: CPT | Performed by: OBSTETRICS & GYNECOLOGY

## 2019-01-07 RX ORDER — ACETAMINOPHEN 325 MG/1
650 TABLET ORAL EVERY 6 HOURS PRN
Status: DISCONTINUED | OUTPATIENT
Start: 2019-01-07 | End: 2019-01-08 | Stop reason: HOSPADM

## 2019-01-07 RX ORDER — MORPHINE SULFATE 10 MG/ML
10 INJECTION, SOLUTION INTRAMUSCULAR; INTRAVENOUS ONCE
Status: COMPLETED | OUTPATIENT
Start: 2019-01-07 | End: 2019-01-07

## 2019-01-07 RX ORDER — SODIUM CHLORIDE, SODIUM LACTATE, POTASSIUM CHLORIDE, CALCIUM CHLORIDE 600; 310; 30; 20 MG/100ML; MG/100ML; MG/100ML; MG/100ML
125 INJECTION, SOLUTION INTRAVENOUS CONTINUOUS
Status: DISCONTINUED | OUTPATIENT
Start: 2019-01-07 | End: 2019-01-08 | Stop reason: HOSPADM

## 2019-01-07 RX ORDER — BUTORPHANOL TARTRATE 1 MG/ML
1 INJECTION, SOLUTION INTRAMUSCULAR; INTRAVENOUS ONCE
Status: COMPLETED | OUTPATIENT
Start: 2019-01-07 | End: 2019-01-07

## 2019-01-07 RX ORDER — BETAMETHASONE SODIUM PHOSPHATE AND BETAMETHASONE ACETATE 3; 3 MG/ML; MG/ML
12 INJECTION, SUSPENSION INTRA-ARTICULAR; INTRALESIONAL; INTRAMUSCULAR; SOFT TISSUE EVERY 24 HOURS
Status: COMPLETED | OUTPATIENT
Start: 2019-01-07 | End: 2019-01-08

## 2019-01-07 RX ORDER — CYCLOBENZAPRINE HCL 10 MG
10 TABLET ORAL 3 TIMES DAILY PRN
Status: DISCONTINUED | OUTPATIENT
Start: 2019-01-07 | End: 2019-01-08 | Stop reason: HOSPADM

## 2019-01-07 RX ADMIN — SODIUM CHLORIDE 2.5 MILLION UNITS: 9 INJECTION, SOLUTION INTRAVENOUS at 21:29

## 2019-01-07 RX ADMIN — CYCLOBENZAPRINE HYDROCHLORIDE 10 MG: 10 TABLET, FILM COATED ORAL at 21:30

## 2019-01-07 RX ADMIN — ACETAMINOPHEN 650 MG: 325 TABLET, FILM COATED ORAL at 18:12

## 2019-01-07 RX ADMIN — SODIUM CHLORIDE, SODIUM LACTATE, POTASSIUM CHLORIDE, AND CALCIUM CHLORIDE 125 ML/HR: .6; .31; .03; .02 INJECTION, SOLUTION INTRAVENOUS at 05:36

## 2019-01-07 RX ADMIN — SODIUM CHLORIDE, SODIUM LACTATE, POTASSIUM CHLORIDE, AND CALCIUM CHLORIDE 125 ML/HR: .6; .31; .03; .02 INJECTION, SOLUTION INTRAVENOUS at 12:54

## 2019-01-07 RX ADMIN — BETAMETHASONE SODIUM PHOSPHATE AND BETAMETHASONE ACETATE 12 MG: 3; 3 INJECTION, SUSPENSION INTRA-ARTICULAR; INTRALESIONAL; INTRAMUSCULAR at 08:26

## 2019-01-07 RX ADMIN — CYCLOBENZAPRINE HYDROCHLORIDE 10 MG: 10 TABLET, FILM COATED ORAL at 15:31

## 2019-01-07 RX ADMIN — ACETAMINOPHEN 650 MG: 325 TABLET, FILM COATED ORAL at 04:49

## 2019-01-07 RX ADMIN — SODIUM CHLORIDE 2.5 MILLION UNITS: 9 INJECTION, SOLUTION INTRAVENOUS at 12:43

## 2019-01-07 RX ADMIN — SODIUM CHLORIDE 5 MILLION UNITS: 0.9 INJECTION, SOLUTION INTRAVENOUS at 08:29

## 2019-01-07 RX ADMIN — SODIUM CHLORIDE, SODIUM LACTATE, POTASSIUM CHLORIDE, AND CALCIUM CHLORIDE 1000 ML: .6; .31; .03; .02 INJECTION, SOLUTION INTRAVENOUS at 03:55

## 2019-01-07 RX ADMIN — ACETAMINOPHEN 650 MG: 325 TABLET, FILM COATED ORAL at 10:01

## 2019-01-07 RX ADMIN — SODIUM CHLORIDE 2.5 MILLION UNITS: 9 INJECTION, SOLUTION INTRAVENOUS at 17:47

## 2019-01-07 RX ADMIN — BUTORPHANOL TARTRATE 1 MG: 1 INJECTION, SOLUTION INTRAMUSCULAR; INTRAVENOUS at 14:06

## 2019-01-07 RX ADMIN — MORPHINE SULFATE 10 MG: 10 INJECTION INTRAVENOUS at 05:32

## 2019-01-07 NOTE — OB LABOR/OXYTOCIN SAFETY PROGRESS
Evangelina Garcia 26 y o  female MRN: [id number]    Unit/Bed#: L&D 328-01 Encounter: [id number]    Obstetric History       T3      L3     SAB0   TAB0   Ectopic0   Multiple0   Live Births3    Obstetric Comments   Normal pregnancy     Gestational Age: 32w2d     Contraction Frequency (minutes): occasional     Tachysystole: No   Dilation: 2        Effacement (%): 70  Station: -2  Baseline Rate: 120 bpm       Notes/comments:   Patient is reporting increased lower abdominal/pelvic pain at this time  States that she feels like she is having irregular contractions occurring every 2-3 minutes  No cervical change on exam, however, patient appears to be very uncomfortable at this time  Will initiate course of betamethasone and penicillin for GBS prophylaxis  Will obtain GBS swab prior to antibiotic initiation         Conception MD Lynne 2019 8:03 AM

## 2019-01-07 NOTE — PROGRESS NOTES
L&D Triage Note - OB/GYN  Elle Friendly 26 y o  female MRN: [id number]  Unit/Bed#: L&D 329-02 Encounter: [id number]      Assessment:  26 y o   at 32w2d, bilateral lower quadrants and lower back pain    Plan:  1  1L LR bolus, followed by maintenance fluids  2  CBC, CMP, UA, UDS pending  3  Serial cervical examinations resulting in no cervical change; unlikely labor  4  10mg IM morphine for pain control    Discussed with Dr Jose Eduardo Pacheco      ______________________________________________________________________      Chief Compliant: I'm having a lot of pain    TIME: 033  Subjective:  26 y o  [id number] at [email or web address] 81 presents to labor and delivery with concern for  labor  Pt reports that she has been having abdominal pain and lower back pain since yesterday  She reports that it was initially tolerable, but, starting approximately 1 hour prior to arrival, the pain became unbearable  Pt reports that the pain is constant and does not wax/wane  Pain is primarily located in her lower back bilaterally, and wraps to the front of her abdomen  She reports dysuria, frequency, urgency, and small amounts of voided urine  -hematuria  Endorses good fetal movement  Denies LOF, VB, cramping, contractions    Complications in this pregnancy include: Poor prenatal care, +UDS this pregnancy, Elevated 1hr glucola, hx appendectomy, hx cholecystectomy, hx diagnostic laparoscopy      Objective:  Vitals:    19 0333   BP:    Pulse:    Resp: 17   Temp: 98 °F (36 7 °C)   SpO2:        Physical Exam:    SVE: 2 / 70% / -2, 1hr recheck unchanged  FHT:  135 / Moderate 6 - 25 bpm / +accels, -decels  [place]: Irritable, rare contractions  SSE: Small amount of physiologic appearing discharge within the vaginal vault  No blood  Cervix visually soft, but closed  Negative clue cells, trichomonads, hyphae  Negative pooling, ferning, nitrazine  TVUS: Cervical length 25 4-26 9mm in length  No funneling, no dynamic changes   1 month ago, cervical length 30mm        [facility], DO 1/7/2019 4:35 AM

## 2019-01-07 NOTE — PROCEDURES
Izabella Hagan, a [id number] at [place] [address] with an RACHEL of 3/2/2019, by Ultrasound, was seen at [facility] for the following procedure(s): $Procedure Type: MENDEL, US - Transvaginal]         4 Quadrant MENDEL  MENDEL Q1 (cm): 2 3 cm  MENDEL Q2 (cm): 2 cm  MENDEL Q3 (cm): 3 1 cm  MENDEL Q4 (cm): 1 6 cm  MENDEL TOTAL (cm): 9 cm  LVP (cm): 3 1 cm         Ultrasound Other  Fetal Presentation: Vertex  Cervical Length: 25 4-26 9mm  Funnel: No  Debris: No               Ana Screen, DO  OB/GYN, PGY3  1/7/2019, 5:54 AM

## 2019-01-07 NOTE — PROGRESS NOTES
Confirmed with Dr Rob Bueno that patient can go to ultrasound with this change of baseline, this was confirmed

## 2019-01-07 NOTE — PROGRESS NOTES
Patient was noted to have a prolonged decel x2 minutes  Recovered after maternal repositioning to right lateral decubitus position, IV fluid bolus, oxygen administration through facemask  On cervical exam patient remains unchanged from her evaluation on admission, however, states that her pain has been increasing since arrival   Tocometry is been showing contractions occurring every 1-3 minutes with periods of uterine irritability  Will continue to monitor closely  FHT now reactive reassuring 130bpm, moderate variability, with accels      Elaine Phillips MD  OB/GYN PGY-3  1/7/2019 12:15 PM

## 2019-01-08 VITALS
HEIGHT: 59 IN | HEART RATE: 81 BPM | TEMPERATURE: 97.5 F | RESPIRATION RATE: 18 BRPM | OXYGEN SATURATION: 97 % | SYSTOLIC BLOOD PRESSURE: 110 MMHG | BODY MASS INDEX: 23.18 KG/M2 | WEIGHT: 115 LBS | DIASTOLIC BLOOD PRESSURE: 63 MMHG

## 2019-01-08 LAB — BACTERIA UR CULT: NORMAL

## 2019-01-08 PROCEDURE — 99214 OFFICE O/P EST MOD 30 MIN: CPT

## 2019-01-08 RX ADMIN — SODIUM CHLORIDE 2.5 MILLION UNITS: 9 INJECTION, SOLUTION INTRAVENOUS at 05:32

## 2019-01-08 RX ADMIN — SODIUM CHLORIDE 2.5 MILLION UNITS: 9 INJECTION, SOLUTION INTRAVENOUS at 01:32

## 2019-01-08 RX ADMIN — ACETAMINOPHEN 650 MG: 325 TABLET, FILM COATED ORAL at 09:33

## 2019-01-08 RX ADMIN — BETAMETHASONE SODIUM PHOSPHATE AND BETAMETHASONE ACETATE 12 MG: 3; 3 INJECTION, SUSPENSION INTRA-ARTICULAR; INTRALESIONAL; INTRAMUSCULAR at 08:31

## 2019-01-08 NOTE — PROGRESS NOTES
Progress Note - OB/GYN   Evangelina Garcia 26 y o  female MRN: [id number]  Unit/Bed#: L&D 328-01 Encounter: [id number]    Assessment:  A 72-year-old 32 week 3 day gestation with bilateral lower quadrant and lower back pain, likely musculoskeletal in origin  Plan:  Thirty-two week 3 week gestation  - Complete steroid course today  - PCN discontinued today  - No cervical change on exam, checks as clinically indicated - will check prior to discharge  - FHT/[place]    Abdominal-Pelvic Pain  - Likely musculoskeletal in origin  - Heating pad, tylenol PRN  - Minimal contractions on monitor    FEN  - Regular diet, IVF    Dispo: Anticipate discharge home today if cervical exam is unchanged    Subjective/Objective     Subjective:     Patient is currently comfortably resting at bedside  No current contractions, LOF, VB, or decreased fetal movement  Objective:     FHT: 125bpm baseline, moderate variability, reactive  No decels  [place]: Minimal contractions 1 q >10min    Vitals: Blood pressure 111/65, pulse 79, temperature 97 5 °F (36 4 °C), temperature source Oral, resp  rate 18, height 4' 11" (1 499 m), weight 52 2 kg (115 lb), last menstrual period 05/05/2018, SpO2 97 %, not currently breastfeeding  Physical Exam:     Physical Exam   Constitutional: She appears well-developed and well-nourished  No distress  Cardiovascular: Normal rate, regular rhythm, normal heart sounds and intact distal pulses  Pulmonary/Chest: Effort normal and breath sounds normal  No respiratory distress  She has no wheezes  Abdominal: Soft  Bowel sounds are normal  She exhibits no distension  There is tenderness (pain with deep palpation to RLQ LLQ and "rocking uterus")  There is no rebound and no guarding  Neurological: She is alert  Skin: Skin is warm and dry  She is not diaphoretic  Psychiatric: She has a normal mood and affect   Her behavior is normal  Judgment and thought content normal      Lab, Imaging and other studies: Coags, CBC WNL  US Kidney and Bladder 1/7/19  IMPRESSION:     Mild bilateral hydronephrosis which could be related to physiologic hydronephrosis of pregnancy  No obstructing etiology detected on this limited exam   Bilateral ureteral jets        Lab Results   Component Value Date    WBC 5 17 01/07/2019    HGB 10 1 (L) 01/07/2019    HCT 29 8 (L) 01/07/2019    MCV 99 (H) 01/07/2019     (L) 01/07/2019       Silas Montes MD  OB/GYN PGY-3  1/8/2019 6:57 AM

## 2019-01-09 LAB — GP B STREP DNA SPEC QL NAA+PROBE: NORMAL

## 2019-01-10 ENCOUNTER — ROUTINE PRENATAL (OUTPATIENT)
Dept: OBGYN CLINIC | Facility: CLINIC | Age: 24
End: 2019-01-10

## 2019-01-10 VITALS
DIASTOLIC BLOOD PRESSURE: 66 MMHG | BODY MASS INDEX: 23.27 KG/M2 | WEIGHT: 115.2 LBS | HEART RATE: 97 BPM | SYSTOLIC BLOOD PRESSURE: 97 MMHG

## 2019-01-10 DIAGNOSIS — Z23 IMMUNIZATION DUE: Primary | ICD-10-CM

## 2019-01-10 PROCEDURE — 90715 TDAP VACCINE 7 YRS/> IM: CPT | Performed by: OBSTETRICS & GYNECOLOGY

## 2019-01-10 PROCEDURE — 99202 OFFICE O/P NEW SF 15 MIN: CPT | Performed by: OBSTETRICS & GYNECOLOGY

## 2019-01-10 PROCEDURE — 90471 IMMUNIZATION ADMIN: CPT | Performed by: OBSTETRICS & GYNECOLOGY

## 2019-01-10 NOTE — PROGRESS NOTES
Assessment & Plan  26 y o  [id number] at 32w5d presenting for routine prenatal visit  Patient is well appearing without any current complaints  Return to clinic in 2 weeks  TDap given today  Patient reports that she will go for 3hr GTT  Reviewed  labor precautions, instructed to call if she starts to experience regular contractions, vaginal bleeding or leakage of fluid, or decreased fetal movement  Problem List Items Addressed This Visit     None      Visit Diagnoses     Immunization due    -  Primary    Relevant Orders    TDAP Vaccine greater than or equal to 8yo (Completed)        ____________________________________________________________  Subjective  She is without complaint  She denies contractions, loss of fluid, or vaginal bleeding  She feels regular fetal movements       Objective  BP 97/66   Pulse 97   Wt 52 3 kg (115 lb 3 2 oz)   LMP 2018 (Exact Date)   BMI 23 27 kg/m²   FHR: 135  FH: 31cm     Patient's Active Problem List  Patient Active Problem List   Diagnosis    Abdominal pain in pregnancy    32 weeks gestation of pregnancy    Positive urine drug screen    Tobacco smoking affecting pregnancy in third trimester    Elevated glucose    Prenatal care in third trimester    Hx of appendectomy       Melonie Prieto MD  OB/GYN PGY-3  1/10/2019 1:53 PM

## 2019-01-18 ENCOUNTER — HOSPITAL ENCOUNTER (OUTPATIENT)
Facility: HOSPITAL | Age: 24
Discharge: HOME | End: 2019-01-18
Attending: OBSTETRICS & GYNECOLOGY | Admitting: OBSTETRICS & GYNECOLOGY
Payer: COMMERCIAL

## 2019-01-18 VITALS
DIASTOLIC BLOOD PRESSURE: 85 MMHG | HEART RATE: 90 BPM | RESPIRATION RATE: 18 BRPM | WEIGHT: 115 LBS | HEIGHT: 59 IN | SYSTOLIC BLOOD PRESSURE: 110 MMHG | BODY MASS INDEX: 23.18 KG/M2 | OXYGEN SATURATION: 100 % | TEMPERATURE: 97.8 F

## 2019-01-18 PROBLEM — O47.00 PRETERM UTERINE CONTRACTIONS: Status: ACTIVE | Noted: 2019-01-18

## 2019-01-18 PROBLEM — Z3A.33 33 WEEKS GESTATION OF PREGNANCY: Status: ACTIVE | Noted: 2017-10-07

## 2019-01-18 LAB
AMPHETAMINES SERPL QL SCN: NEGATIVE
BACTERIA UR QL AUTO: NORMAL /HPF
BARBITURATES UR QL: NEGATIVE
BENZODIAZ UR QL: NEGATIVE
BILIRUB UR QL STRIP: NEGATIVE
CLARITY UR: CLEAR
COCAINE UR QL: NEGATIVE
COLOR UR: NORMAL
GLUCOSE UR STRIP-MCNC: NEGATIVE MG/DL
HGB UR QL STRIP.AUTO: NEGATIVE
KETONES UR STRIP-MCNC: NEGATIVE MG/DL
LEUKOCYTE ESTERASE UR QL STRIP: NEGATIVE
METHADONE UR QL: NEGATIVE
NITRITE UR QL STRIP: NEGATIVE
NON-SQ EPI CELLS URNS QL MICRO: NORMAL /HPF
OPIATES UR QL SCN: NEGATIVE
PCP UR QL: NEGATIVE
PH UR STRIP.AUTO: 7.5 [PH] (ref 4.5–8)
PROT UR STRIP-MCNC: NEGATIVE MG/DL
RBC #/AREA URNS AUTO: NORMAL /HPF
SP GR UR STRIP.AUTO: 1.01 (ref 1–1.03)
THC UR QL: NEGATIVE
UROBILINOGEN UR QL STRIP.AUTO: 0.2 E.U./DL
WBC #/AREA URNS AUTO: NORMAL /HPF

## 2019-01-18 PROCEDURE — 99203 OFFICE O/P NEW LOW 30 MIN: CPT

## 2019-01-18 PROCEDURE — 81001 URINALYSIS AUTO W/SCOPE: CPT | Performed by: STUDENT IN AN ORGANIZED HEALTH CARE EDUCATION/TRAINING PROGRAM

## 2019-01-18 PROCEDURE — 80307 DRUG TEST PRSMV CHEM ANLYZR: CPT | Performed by: STUDENT IN AN ORGANIZED HEALTH CARE EDUCATION/TRAINING PROGRAM

## 2019-01-18 NOTE — DISCHARGE INSTRUCTIONS
Early Labor Signs   WHAT YOU SHOULD KNOW:   Early labor signs are changes in your body that allow your baby to pass through your birth canal   INSTRUCTIONS:   Signs and symptoms of early labor:   · Lightening  occurs when your baby drops inside your pelvis  You may feel increased pressure in your pelvis  This may happen a few weeks to a few hours before your labor begins  · Contractions  are cramps and tightening that occur in your uterus to help move the baby through your birth canal  Contractions occur regularly and more often each time  Each one lasts about 30 to 70 seconds, and gets stronger and more painful until you deliver your baby  Contractions do not go away with movement  They start in your lower back and move to the front in your abdomen  · Effacement  occurs when your cervix softens and thins, so it can easily open for the baby  Your primary healthcare provider [facility] or obstetrician will examine your cervix for effacement  · Dilation  is widening of your cervix, also for the baby's passage  Your PHP or obstetrician will examine your cervix for dilation  Your cervix will be fully opened and ready for delivery when it is dilated to 10 centimeters  · Increased discharge  from your vagina may occur  It may be pink, clear, or slightly bloody  This discharge may also be called bloody show  Bloody show is a mucus plug that forms and blocks your cervix during pregnancy  · Rupture of membranes  is a sudden release of clear fluid from your vagina  It is also known as when your water breaks  Your PHP or obstetrician may need to break your water if it does not break on its own  False labor: You may have false labor signs, which are also called [place] contractions  False labor is common and may happen several weeks or days before your actual labor  The contractions are not regular, and do not get closer together  The pain is usually mild, does not worsen, and is felt only in front   [place] contractions may happen later in the day, and stop after you change position, walk, or rest   Contact your PHP or obstetrician if:   · You have pain in your lower back or abdomen  · You have bloody mucus or show  · You have questions or concerns about your condition or care  Return to the emergency department if:   · You have regular, painful contractions that are less than 5 minutes apart, and last 30 to 70 seconds each  · You have heavy vaginal bleeding  · You have a constant trickle or sudden gush of clear fluid from your vagina  · You notice a sudden decrease in your baby's movement  © 2014 [address] is for End User's use only and may not be sold, redistributed or otherwise used for commercial purposes  All illustrations and images included in CareNotes® are the copyrighted property of A D A M , Inc  or Sorin Winchester  The above information is an  only  It is not intended as medical advice for individual conditions or treatments  Talk to your doctor, nurse or pharmacist before following any medical regimen to see if it is safe and effective for you  [place] Contractions   WHAT YOU NEED TO KNOW:   [place] contractions are tightening and squeezing of the muscles of your uterus (womb) during pregnancy  The uterine muscles control the uterus  [place] contractions stop on their own  They are not true labor contractions and do not cause your cervix (opening to your uterus) to dilate (open)  DISCHARGE INSTRUCTIONS:   Seek care immediately if:   · You have bleeding from your vagina  · You have fluid leaking from your vagina that does not stop  · You feel a gush of fluid from your vagina  · Your contractions happen every 5 minutes or sooner, and last for more than 60 seconds  · Your contractions begin to feel stronger or more painful       · You feel a change in your baby's movement, or you feel fewer than 6 to 10 movements in an hour  Contact your healthcare provider if:   · You have a fever  · You have questions or concerns about your condition or care  Medicines:   · Medicines , such as sedatives, may help the muscles of your uterus  Your healthcare provider may give you pain medicine to relieve discomfort or pain  · Take your medicine as directed  Contact your healthcare provider if you think your medicine is not helping or if you have side effects  Tell him or her if you are allergic to any medicine  Keep a list of the medicines, vitamins, and herbs you take  Include the amounts, and when and why you take them  Bring the list or the pill bottles to follow-up visits  Carry your medicine list with you in case of an emergency  Self care:   · Change your activity or your position  when you feel contractions begin  Walk if you have been lying or sitting  Lie down if you have been standing or walking  True labor will not stop by changing your position or activity  · Take a warm bath  to relax your body  · Drink more fluids  to prevent dehydration  Ask how much liquid to drink each day and which liquids are best for you  · Practice your labor breathing  to decrease your discomfort  This may help you get ready for true labor  Take slow, deep breaths, or fast, short breaths  Ask your healthcare provider how to practice labor breathing  Follow up with your healthcare provider as directed:  Write down your questions so you remember to ask them during your visits  © 2017 [address]  Information is for End User's use only and may not be sold, redistributed or otherwise used for commercial purposes  All illustrations and images included in CareNotes® are the copyrighted property of A D A M , Inc  or Sorin Winchester  The above information is an  only  It is not intended as medical advice for individual conditions or treatments   Talk to your doctor, nurse or pharmacist before following any medical regimen to see if it is safe and effective for you

## 2019-01-18 NOTE — PROGRESS NOTES
L&D Triage Note - OB/GYN  Simba Mike 26 y o  female MRN: [id number]  Unit/Bed#: L&D 323-01 Encounter: [id number]    Patient is seen by SW OB      ASSESSMENT:    Simba Mike is a 26 y o  [id number] at 33w6d not in  labor    PLAN:    1) CL  2) Dry slide, wet mount, KOH, nitrazene, urine dip  3) SVE x2: unchanged  4) UA, UDS: negative  5) Continue routine prenatal care  6) Discharge from [facility] triage with  labor precautions   - Case discussed with Dr Rob Gaffney:    Simba Mike 26 y o  [id number] at [address] with an Estimated Date of Delivery: 3/2/19 with increased vaginal pressure and contractions since yesterday for which she took tylenol without relief      Her current obstetrical history is significant for tobacco use and marijuana use in pregnancy    Her past obstetrical history is significant for three full term vaginal deliveries    Contractions: yes  Leakage of fluid: denies  Vaginal Bleeding: denies  Fetal movement: present    OBJECTIVE:    Vitals:    19 0913   BP: 110/85   Pulse: 90   Resp: 18   Temp: 97 8 °F (36 6 °C)   SpO2:        ROS:  Constitutional: Negative  Respiratory: Negative  Cardiovascular: Negative    Gastrointestinal: Negative    General Physical Exam:  General: in no apparent distress, alert and oriented times 3  Cardiovascular: Cor RRR  Lungs: non-labored breathing  Abdomen: abdomen is soft without significant tenderness, masses, organomegaly or guarding  Lower extremeties: nontender    Cervical Exam  Speculum: Cervical os is multiparous in appearance, small amount of white discharge  SVE: 1 / 30% / -3    Fetal monitoring:  FHT:  140 bpm/ Moderate 6 - 25 bpm / 15x15 accelerations, no decelerations  [place]: every 5 minutes     KOH/WTMT:     Infection:   - no clue cells    - no hyphae   - no trichomonads present    Membrane status   - no ferning   - no nitrazene   - no pooling     Urine Dip    - negative    Imaging:       TVUS   - Cervical length    - 1 97cm    - 2 21cm    - 2 04cm   - Presentation: helen Shi MD  PGY-1 OB/GYN Resident   1/18/2019 9:57 AM

## 2019-01-22 ENCOUNTER — ULTRASOUND (OUTPATIENT)
Dept: PERINATAL CARE | Facility: CLINIC | Age: 24
End: 2019-01-22
Payer: COMMERCIAL

## 2019-01-22 VITALS
WEIGHT: 117 LBS | SYSTOLIC BLOOD PRESSURE: 122 MMHG | BODY MASS INDEX: 23.59 KG/M2 | DIASTOLIC BLOOD PRESSURE: 67 MMHG | HEART RATE: 101 BPM | HEIGHT: 59 IN

## 2019-01-22 DIAGNOSIS — Z36.4 ULTRASOUND FOR ANTENATAL SCREENING FOR FETAL GROWTH RESTRICTION: Primary | ICD-10-CM

## 2019-01-22 DIAGNOSIS — Z3A.34 34 WEEKS GESTATION OF PREGNANCY: ICD-10-CM

## 2019-01-22 DIAGNOSIS — O99.333 TOBACCO SMOKING AFFECTING PREGNANCY IN THIRD TRIMESTER: ICD-10-CM

## 2019-01-22 PROCEDURE — 76816 OB US FOLLOW-UP PER FETUS: CPT | Performed by: OBSTETRICS & GYNECOLOGY

## 2019-01-22 PROCEDURE — 99212 OFFICE O/P EST SF 10 MIN: CPT | Performed by: OBSTETRICS & GYNECOLOGY

## 2019-01-22 NOTE — LETTER
January 22, 2019     [employer] [address] PROVID[employer]    Patient: Augustine Recinos   YOB: 1995   Date of Visit: 1/22/2019       Dear   Provider: Thank you for referring Nikia Titus to me for evaluation  Below are my notes for this consultation  If you have questions, please do not hesitate to call me  I look forward to following your patient along with you  Sincerely,        Osvaldo Logan MD        CC: No Recipients  Osvaldo Logan MD  1/22/2019  5:39 PM  Sign at close encounter  Please refer to the [facility] ultrasound report in Ob Procedures for additional information regarding the visit to the [facility]  today

## 2019-01-22 NOTE — PROGRESS NOTES
Please refer to the [facility] ultrasound report in Ob Procedures for additional information regarding the visit to the [facility]  today

## 2019-01-24 ENCOUNTER — HOSPITAL ENCOUNTER (OUTPATIENT)
Facility: HOSPITAL | Age: 24
Discharge: HOME | End: 2019-01-24
Attending: OBSTETRICS & GYNECOLOGY | Admitting: OBSTETRICS & GYNECOLOGY
Payer: COMMERCIAL

## 2019-01-24 VITALS
OXYGEN SATURATION: 100 % | BODY MASS INDEX: 23.59 KG/M2 | WEIGHT: 117 LBS | DIASTOLIC BLOOD PRESSURE: 60 MMHG | HEART RATE: 93 BPM | SYSTOLIC BLOOD PRESSURE: 109 MMHG | TEMPERATURE: 98.3 F | RESPIRATION RATE: 18 BRPM | HEIGHT: 59 IN

## 2019-01-24 PROCEDURE — 99212 OFFICE O/P EST SF 10 MIN: CPT

## 2019-01-25 NOTE — PROGRESS NOTES
Progress Note - OB/GYN   Aly Dennis 26 y o  female MRN: [id number]  Unit/Bed#: L&D 329-02 Encounter: [id number]    Assessment:  26 y o   at 34w5d with subclinical contractions, not in labor  NST reactive, no cervical change from previous checks  Plan:  -Pt evaluated 6 days ago with negative UA, wet mount, and no change in urinary or vaginal symptoms  Defer UA at this time  -Reassured that discomfort in third trimester can be normal due to musculoskeletal strain  In absence of clinical s/s infection or PTL, advised expectant management at this time  Advised hydration, Benadryl as sleep aid at night, maternity belt, Tylenol prn   -discharge home    I saw and examined the patient myself  Garfield Rincon MD  SOD    Subjective/Objective   Chief Complaint: "I'm in pain"    Subjective: Aly Dennis 26 y o  [id number] at 34w5d  with an Estimated Date of Delivery: 3/2/19 with increased vaginal pressure and contractions since she was seen 19 and had  labor workup that was negative      Complications in pregnancy significant for tobacco use and marijuana use in pregnancy     Her past obstetrical history is significant for three full term vaginal deliveries     Contractions: yes  Leakage of fluid: denies  Vaginal Bleeding: denies  Fetal movement: present    Objective:     Vitals:   /60 (BP Location: Left arm)   Pulse 93   Temp 98 3 °F (36 8 °C) (Oral)   Resp 18   Ht 4' 11" (1 499 m)   Wt 53 1 kg (117 lb)   LMP 2018 (Exact Date)   SpO2 100%   BMI 23 63 kg/m²       Physical Exam:   Gen NAD  Abd: no tenderness to palpations, no ctx palpable  MSK: no CVA tenderness  Spec: deferred, no new discharge complaints  SVE: 30/2/-3    FHR: 140 moderate variability +accels no decels  [place]: no ctx       IG sono  : EFW 62%, cephalic

## 2019-02-03 ENCOUNTER — HOSPITAL ENCOUNTER (OUTPATIENT)
Facility: HOSPITAL | Age: 24
Discharge: HOME | End: 2019-02-03
Attending: OBSTETRICS & GYNECOLOGY | Admitting: OBSTETRICS & GYNECOLOGY
Payer: COMMERCIAL

## 2019-02-03 VITALS
SYSTOLIC BLOOD PRESSURE: 105 MMHG | TEMPERATURE: 99.1 F | BODY MASS INDEX: 23.23 KG/M2 | WEIGHT: 115 LBS | DIASTOLIC BLOOD PRESSURE: 69 MMHG | RESPIRATION RATE: 18 BRPM | HEART RATE: 99 BPM

## 2019-02-03 PROBLEM — Z3A.36 36 WEEKS GESTATION OF PREGNANCY: Status: ACTIVE | Noted: 2017-10-07

## 2019-02-03 PROCEDURE — 99212 OFFICE O/P EST SF 10 MIN: CPT

## 2019-02-03 RX ORDER — OXYCODONE HYDROCHLORIDE AND ACETAMINOPHEN 5; 325 MG/1; MG/1
2 TABLET ORAL ONCE
Status: COMPLETED | OUTPATIENT
Start: 2019-02-03 | End: 2019-02-03

## 2019-02-03 RX ORDER — DIPHENHYDRAMINE HCL 25 MG
25 TABLET ORAL ONCE
Status: COMPLETED | OUTPATIENT
Start: 2019-02-03 | End: 2019-02-03

## 2019-02-03 RX ADMIN — OXYCODONE HYDROCHLORIDE AND ACETAMINOPHEN 2 TABLET: 5; 325 TABLET ORAL at 23:14

## 2019-02-03 RX ADMIN — DIPHENHYDRAMINE HCL 25 MG: 25 TABLET, FILM COATED ORAL at 23:14

## 2019-02-04 NOTE — PROGRESS NOTES
Triage Note - OB  Geri Greenwood 26 y o  female MRN: [id number]  Unit/Bed#: L&D 329-02 Encounter: [id number]    Chief Complaint:   Chief Complaint   Patient presents with    Contractions     RACHEL: Estimated Date of Delivery: 3/2/19    HPI: 26 y o  female [id number] at 36w1d presents with c/o contractions  She was seen recently on 1/24/19 with similar complaints and was noted to be 2/30/-3 and sent home with precautions  Pt states that contractions started earlier today in her back and then started to wrap around to the front  They are every 5 minutes  She denies vaginal bleeding or loss of fluid  Reports positive fetal movement  She was given a dose of BTM on 1/7-1/8/19 for concern of PTL at 32weeks  Complications in this pregnancy include: Poor prenatal care, +UDS this pregnancy with negative re-screen on 1/18/19, Elevated 1hr glucola, hx appendectomy, hx cholecystectomy, hx diagnostic laparoscopy    Vitals: Blood pressure 105/69, pulse 99, temperature 99 1 °F (37 3 °C), temperature source Oral, resp  rate 18, weight 52 2 kg (115 lb), last menstrual period 05/05/2018, not currently breastfeeding  ,Body mass index is 23 23 kg/m²  Physical Exam  GEN: well developed and well nourished, alert, oriented times 3 and appears comfortable  Abd: soft, non tender and gravid  SVE: 3/70/-3, unchanged after 2 hrs    FHR: 130bpm  [place]: irregular initially with mostly irritability    Labs:   No visits with results within 1 Day(s) from this visit     Latest known visit with results is:   Admission on 01/18/2019, Discharged on 01/18/2019   Component Date Value    Clarity, UA 01/18/2019 Clear     Color, UA 01/18/2019 Light Yellow     Specific Gravity, UA 01/18/2019 1 015     pH, UA 01/18/2019 7 5     Glucose, UA 01/18/2019 Negative     Ketones, UA 01/18/2019 Negative     Blood, UA 01/18/2019 Negative     Protein, UA 01/18/2019 Negative     Nitrite, UA 01/18/2019 Negative     Bilirubin, UA 01/18/2019 Negative     Urobilinogen, UA 01/18/2019 0 2     Leukocytes, UA 01/18/2019 Negative     WBC, UA 01/18/2019 None Seen     RBC, UA 01/18/2019 None Seen     Bacteria, UA 01/18/2019 Occasional     Epithelial Cells 01/18/2019 Occasional     Amph/Meth UR 01/18/2019 Negative     Barbiturate Ur 01/18/2019 Negative     Benzodiazepine Urine 01/18/2019 Negative     Cocaine Urine 01/18/2019 Negative     Methadone Urine 01/18/2019 Negative     Opiate Urine 01/18/2019 Negative     PCP Ur 01/18/2019 Negative     THC Urine 01/18/2019 Negative        Lab, Imaging and other studies: I have personally reviewed pertinent reports  A/P: 26 y o  female [id number] at 36w1d with contactions, not in labor  1)SVE 3/70/-3, changed from 2/30/-3 on 1/24/19  S/P full course of BTM at 32 weeks (1/7-1/8/19), not a candidate for rescue steroids  2hr recheck was unchanged  No ctx on toco, mostly irritability  Pt requesting outpatient therapeutic rest as she is still uncomfortable  2 tabs of percocet (10mg total) and 25mg of benadryl given prior to discharge  Pt with ride home  Encouraged patient to continue conservative management otherwise with Tylenol PRN, increased hydration, warm showers, heating pad to back  Common discomforts of pregnancy reviewed and patient reassures she is not in labor  2)Discharge instructions given to patient and pre-term labor precautions reviewed  Pt to schedule prenatal appointment for this week       Dr Kemar Cheek was present and saw patient    Marino Gooden MD  OBGYN, PGY-2  2/3/2019 11:00 PM

## 2019-02-08 ENCOUNTER — ROUTINE PRENATAL (OUTPATIENT)
Dept: OBGYN CLINIC | Facility: CLINIC | Age: 24
End: 2019-02-08

## 2019-02-08 VITALS — DIASTOLIC BLOOD PRESSURE: 70 MMHG | BODY MASS INDEX: 23.87 KG/M2 | WEIGHT: 118.2 LBS | SYSTOLIC BLOOD PRESSURE: 114 MMHG

## 2019-02-08 DIAGNOSIS — Z34.93 PRENATAL CARE IN THIRD TRIMESTER: Primary | ICD-10-CM

## 2019-02-08 DIAGNOSIS — O99.333 TOBACCO SMOKING AFFECTING PREGNANCY IN THIRD TRIMESTER: ICD-10-CM

## 2019-02-08 DIAGNOSIS — Z3A.36 36 WEEKS GESTATION OF PREGNANCY: ICD-10-CM

## 2019-02-08 PROCEDURE — 99215 OFFICE O/P EST HI 40 MIN: CPT | Performed by: NURSE PRACTITIONER

## 2019-02-08 NOTE — PATIENT INSTRUCTIONS
Presents to labor and and delivery to rule out labor  If you don't have the baby return in 1 week  Call with vaginal bleeding, loss of fluid, regular contractions, decreased fetal movement, other needs or concerns

## 2019-02-08 NOTE — PROGRESS NOTES
Assessment & Plan  26 y o  [id number] at 36w6d presenting for routine prenatal visit  Pt call to be seen because she states she was seen in L&D and was told contractions did not continue or change her cervix  Pt states she has continued with intermittent CTX that now are closer and stronger, pt is visibly uncomfortable  Pt never completed 3 hour GTT  Problem List Items Addressed This Visit        Other    36 weeks gestation of pregnancy    Tobacco smoking affecting pregnancy in third trimester    Prenatal care in third trimester - Primary        ____________________________________________________________  Subjective     She denies contractions, loss of fluid, or vaginal bleeding  She feels regular fetal movements  Objective  /70 (BP Location: Left arm, Patient Position: Sitting, Cuff Size: Standard)   Wt 53 6 kg (118 lb 3 2 oz)   LMP 2018 (Exact Date)   BMI 23 87 kg/m²   FHR: 125     Patient's Active Problem List  Patient Active Problem List   Diagnosis    Abdominal pain in pregnancy    36 weeks gestation of pregnancy    Positive urine drug screen    Tobacco smoking affecting pregnancy in third trimester    Elevated glucose    Prenatal care in third trimester    Hx of appendectomy     uterine contractions     Plan  Presents to labor and and delivery to rule out labor  If you don't have the baby return in 1 week  Call with vaginal bleeding, loss of fluid, regular contractions, decreased fetal movement, other needs or concerns  Pt verbalized understanding of all discussed

## 2019-02-09 ENCOUNTER — HOSPITAL ENCOUNTER (OUTPATIENT)
Facility: HOSPITAL | Age: 24
Discharge: HOME | End: 2019-02-09
Attending: OBSTETRICS & GYNECOLOGY | Admitting: OBSTETRICS & GYNECOLOGY
Payer: COMMERCIAL

## 2019-02-09 VITALS
TEMPERATURE: 97.8 F | HEART RATE: 71 BPM | DIASTOLIC BLOOD PRESSURE: 62 MMHG | RESPIRATION RATE: 16 BRPM | SYSTOLIC BLOOD PRESSURE: 102 MMHG

## 2019-02-09 LAB
AMPHETAMINES SERPL QL SCN: NEGATIVE
BACTERIA UR QL AUTO: ABNORMAL /HPF
BARBITURATES UR QL: NEGATIVE
BENZODIAZ UR QL: NEGATIVE
BILIRUB UR QL STRIP: NEGATIVE
CLARITY UR: CLEAR
COCAINE UR QL: NEGATIVE
COLOR UR: COLORLESS
GLUCOSE UR STRIP-MCNC: NEGATIVE MG/DL
HGB UR QL STRIP.AUTO: NEGATIVE
KETONES UR STRIP-MCNC: NEGATIVE MG/DL
LEUKOCYTE ESTERASE UR QL STRIP: ABNORMAL
METHADONE UR QL: NEGATIVE
NITRITE UR QL STRIP: NEGATIVE
NON-SQ EPI CELLS URNS QL MICRO: ABNORMAL /HPF
OPIATES UR QL SCN: NEGATIVE
PCP UR QL: NEGATIVE
PH UR STRIP.AUTO: 6.5 [PH] (ref 4.5–8)
PROT UR STRIP-MCNC: NEGATIVE MG/DL
RBC #/AREA URNS AUTO: ABNORMAL /HPF
SP GR UR STRIP.AUTO: <=1.005 (ref 1–1.03)
THC UR QL: NEGATIVE
UROBILINOGEN UR QL STRIP.AUTO: 0.2 E.U./DL
WBC #/AREA URNS AUTO: ABNORMAL /HPF

## 2019-02-09 PROCEDURE — 80307 DRUG TEST PRSMV CHEM ANLYZR: CPT | Performed by: OBSTETRICS & GYNECOLOGY

## 2019-02-09 PROCEDURE — 99213 OFFICE O/P EST LOW 20 MIN: CPT

## 2019-02-09 PROCEDURE — 99213 OFFICE O/P EST LOW 20 MIN: CPT | Performed by: OBSTETRICS & GYNECOLOGY

## 2019-02-09 PROCEDURE — 81001 URINALYSIS AUTO W/SCOPE: CPT | Performed by: OBSTETRICS & GYNECOLOGY

## 2019-02-09 RX ORDER — SODIUM CHLORIDE, SODIUM LACTATE, POTASSIUM CHLORIDE, CALCIUM CHLORIDE 600; 310; 30; 20 MG/100ML; MG/100ML; MG/100ML; MG/100ML
250 INJECTION, SOLUTION INTRAVENOUS CONTINUOUS
Status: DISCONTINUED | OUTPATIENT
Start: 2019-02-09 | End: 2019-02-09 | Stop reason: HOSPADM

## 2019-02-09 RX ORDER — DIPHENHYDRAMINE HYDROCHLORIDE 50 MG/ML
50 INJECTION INTRAMUSCULAR; INTRAVENOUS ONCE
Status: DISCONTINUED | OUTPATIENT
Start: 2019-02-09 | End: 2019-02-09 | Stop reason: HOSPADM

## 2019-02-09 RX ORDER — OXYCODONE HYDROCHLORIDE AND ACETAMINOPHEN 5; 325 MG/1; MG/1
2 TABLET ORAL ONCE
Status: COMPLETED | OUTPATIENT
Start: 2019-02-09 | End: 2019-02-09

## 2019-02-09 RX ORDER — HYDROMORPHONE HCL 110MG/55ML
2 PATIENT CONTROLLED ANALGESIA SYRINGE INTRAVENOUS ONCE
Status: DISCONTINUED | OUTPATIENT
Start: 2019-02-09 | End: 2019-02-09 | Stop reason: HOSPADM

## 2019-02-09 RX ORDER — PROMETHAZINE HYDROCHLORIDE 25 MG/ML
12.5 INJECTION, SOLUTION INTRAMUSCULAR; INTRAVENOUS ONCE
Status: COMPLETED | OUTPATIENT
Start: 2019-02-09 | End: 2019-02-09

## 2019-02-09 RX ORDER — BUTORPHANOL TARTRATE 1 MG/ML
1 INJECTION, SOLUTION INTRAMUSCULAR; INTRAVENOUS ONCE
Status: COMPLETED | OUTPATIENT
Start: 2019-02-09 | End: 2019-02-09

## 2019-02-09 RX ADMIN — PROMETHAZINE HYDROCHLORIDE 12.5 MG: 25 INJECTION INTRAMUSCULAR; INTRAVENOUS at 02:42

## 2019-02-09 RX ADMIN — OXYCODONE HYDROCHLORIDE AND ACETAMINOPHEN 2 TABLET: 5; 325 TABLET ORAL at 09:12

## 2019-02-09 RX ADMIN — SODIUM CHLORIDE, SODIUM LACTATE, POTASSIUM CHLORIDE, AND CALCIUM CHLORIDE 250 ML/HR: .6; .31; .03; .02 INJECTION, SOLUTION INTRAVENOUS at 02:25

## 2019-02-09 RX ADMIN — BUTORPHANOL TARTRATE 1 MG: 1 INJECTION, SOLUTION INTRAMUSCULAR; INTRAVENOUS at 02:42

## 2019-02-09 NOTE — PROGRESS NOTES
L&D Triage Note - OB/GYN  Zhen Serrato 26 y o  female MRN: [id number]  Unit/Bed#: L&D 326-01 Encounter: [id number]      Assessment:  26 y o   at 37w0d r/o labor    Plan:  1  SVE  2  FHT monitoring  3  Recheck SVE      ______________________________________________________________________      Chief Compliant: contraction    TIME: 0100  Subjective:  26 y o   at 37w0d c/o contractions last 2 weeks now she is feeling more pain than before  She decline vaginal bleeding, fluid leakage and reporting good fetal movements  Decline urinary and GI problems        Objective:  Vitals:    19 0046   BP: 119/76   Pulse: 85   Resp: 18   Temp: 97 9 °F (36 6 °C)       SVE: 4 / 70% / -2 recheck 70/-2  FHT:  125 / Moderate 6 - 25 bpm / 15x15 accels and no decesl, reactive  [place]: q 10 min          Patricia Allison MD [id number] 27:25 AM

## 2019-02-09 NOTE — PROGRESS NOTES
OB Triage Note  Simba Corporal 26 y o  female MRN: [id number]  Unit/Bed#: L&D 326-01     RACHEL: Estimated Date of Delivery: 3/2/19    HPI: 26 y o   at 37w0d with c/o contractions  She  complains of uterine contractions that are irregular, no LOF, and no VB  She states she has felt good FM  Patient was kept for 8 hours for therapeutic rest and rechek to r/o active labor, she remained unchanged with Cat 1 tracing  OB Cx: none    FHT:  Baseline Rate: 135 bpm (diff trace at times, mat and fetal movement, adj)  Variability: Moderate 6-25 bpm  Accelerations: 15 x 15 or greater  Decelerations: None  FHR Category: Category I  TOCO:   Contraction Frequency (minutes): 1-10  Contraction Duration (seconds):   Contraction Quality: Mild    Vitals:   Temp:  [97 8 °F (36 6 °C)-98 1 °F (36 7 °C)] 97 8 °F (36 6 °C)  HR:  [71-85] 71  Resp:  [16-18] 16  BP: ()/(62-84) 102/62    Physical Exam:  General: AAOX3  No acute distress   Abdominal: Soft  NT/ND  Gravid      SVE:  Dilation: 4-5  Effacement (%): 70  Station: -2  Cervical Characteristics: Anterior     Recheck at 2 hour intervals x 2 unchanged    Ultrasound: see fetal testing note  ~_~_~_~_~_~_~_~_~_~_~_~_~_~_~_~_~_~_~_~_~_~_~_~_~_~_~_~_~_~_~_~_~_~_~    A/P: [id number] at 37w0d here for rule out labor   After evaluation, no signs of active labor  Fetal testing reassuring  · Discharge home with precautions  Instructions reviewed with patient  Dr Mahnaz kelly  Signature/Title:  Marisela Jimenez  Date: 2019  Time: 9:00 AM

## 2019-02-11 ENCOUNTER — HOSPITAL ENCOUNTER (INPATIENT)
Facility: HOSPITAL | Age: 24
LOS: 1 days | Discharge: HOME | DRG: 560 | End: 2019-02-12
Attending: OBSTETRICS & GYNECOLOGY | Admitting: OBSTETRICS & GYNECOLOGY
Payer: COMMERCIAL

## 2019-02-11 PROBLEM — O42.90 PROM (PREMATURE RUPTURE OF MEMBRANES): Status: ACTIVE | Noted: 2019-02-11

## 2019-02-11 LAB
ABO GROUP BLD: NORMAL
BASE EXCESS BLDCOA CALC-SCNC: -1 MMOL/L (ref 3–11)
BASE EXCESS BLDCOV CALC-SCNC: 0.9 MMOL/L (ref 1–9)
BLD GP AB SCN SERPL QL: NEGATIVE
GLUCOSE SERPL-MCNC: 98 MG/DL (ref 65–140)
HCO3 BLDCOA-SCNC: 27.6 MMOL/L (ref 17.3–27.3)
HCO3 BLDCOV-SCNC: 26.8 MMOL/L (ref 12.2–28.6)
O2 CT VFR BLDCOA CALC: 12.8 ML/DL
OXYHGB MFR BLDCOA: 52.3 %
OXYHGB MFR BLDCOV: 63 %
PCO2 BLDCOA: 60.4 MM[HG] (ref 30–60)
PCO2 BLDCOV: 47 MM HG (ref 27–43)
PH BLDCOA: 7.28 [PH] (ref 7.23–7.43)
PH BLDCOV: 7.37 [PH] (ref 7.19–7.49)
PO2 BLDCOA: 22.8 MM HG (ref 5–25)
PO2 BLDCOV: 22.8 MM HG (ref 15–45)
RH BLD: POSITIVE
SAO2 % BLDCOV: 14.1 ML/DL
SPECIMEN EXPIRATION DATE: NORMAL

## 2019-02-11 PROCEDURE — 86850 RBC ANTIBODY SCREEN: CPT | Performed by: OBSTETRICS & GYNECOLOGY

## 2019-02-11 PROCEDURE — 82948 REAGENT STRIP/BLOOD GLUCOSE: CPT

## 2019-02-11 PROCEDURE — 99213 OFFICE O/P EST LOW 20 MIN: CPT

## 2019-02-11 PROCEDURE — 82805 BLOOD GASES W/O2 SATURATION: CPT | Performed by: OBSTETRICS & GYNECOLOGY

## 2019-02-11 PROCEDURE — 86901 BLOOD TYPING SEROLOGIC RH(D): CPT | Performed by: OBSTETRICS & GYNECOLOGY

## 2019-02-11 PROCEDURE — 86900 BLOOD TYPING SEROLOGIC ABO: CPT | Performed by: OBSTETRICS & GYNECOLOGY

## 2019-02-11 PROCEDURE — 59410 OBSTETRICAL CARE: CPT | Performed by: OBSTETRICS & GYNECOLOGY

## 2019-02-11 PROCEDURE — 86592 SYPHILIS TEST NON-TREP QUAL: CPT | Performed by: OBSTETRICS & GYNECOLOGY

## 2019-02-11 RX ORDER — ONDANSETRON 2 MG/ML
4 INJECTION INTRAMUSCULAR; INTRAVENOUS EVERY 6 HOURS PRN
Status: DISCONTINUED | OUTPATIENT
Start: 2019-02-11 | End: 2019-02-11

## 2019-02-11 RX ORDER — CALCIUM CARBONATE 200(500)MG
1000 TABLET,CHEWABLE ORAL DAILY PRN
Status: DISCONTINUED | OUTPATIENT
Start: 2019-02-11 | End: 2019-02-13 | Stop reason: HOSPADM

## 2019-02-11 RX ORDER — ONDANSETRON 2 MG/ML
4 INJECTION INTRAMUSCULAR; INTRAVENOUS EVERY 8 HOURS PRN
Status: DISCONTINUED | OUTPATIENT
Start: 2019-02-11 | End: 2019-02-13 | Stop reason: HOSPADM

## 2019-02-11 RX ORDER — DOCUSATE SODIUM 100 MG/1
100 CAPSULE, LIQUID FILLED ORAL 2 TIMES DAILY
Status: DISCONTINUED | OUTPATIENT
Start: 2019-02-11 | End: 2019-02-13 | Stop reason: HOSPADM

## 2019-02-11 RX ORDER — SIMETHICONE 80 MG
80 TABLET,CHEWABLE ORAL 4 TIMES DAILY PRN
Status: DISCONTINUED | OUTPATIENT
Start: 2019-02-11 | End: 2019-02-13 | Stop reason: HOSPADM

## 2019-02-11 RX ORDER — SODIUM CHLORIDE, SODIUM LACTATE, POTASSIUM CHLORIDE, CALCIUM CHLORIDE 600; 310; 30; 20 MG/100ML; MG/100ML; MG/100ML; MG/100ML
125 INJECTION, SOLUTION INTRAVENOUS CONTINUOUS
Status: DISCONTINUED | OUTPATIENT
Start: 2019-02-11 | End: 2019-02-11

## 2019-02-11 RX ORDER — BUTORPHANOL TARTRATE 1 MG/ML
1 INJECTION, SOLUTION INTRAMUSCULAR; INTRAVENOUS ONCE
Status: COMPLETED | OUTPATIENT
Start: 2019-02-11 | End: 2019-02-11

## 2019-02-11 RX ORDER — OXYTOCIN/RINGER'S LACTATE 30/500 ML
1-30 PLASTIC BAG, INJECTION (ML) INTRAVENOUS
Status: DISCONTINUED | OUTPATIENT
Start: 2019-02-11 | End: 2019-02-11

## 2019-02-11 RX ORDER — OXYCODONE HYDROCHLORIDE AND ACETAMINOPHEN 5; 325 MG/1; MG/1
2 TABLET ORAL EVERY 4 HOURS PRN
Status: DISCONTINUED | OUTPATIENT
Start: 2019-02-11 | End: 2019-02-13 | Stop reason: HOSPADM

## 2019-02-11 RX ORDER — OXYCODONE HYDROCHLORIDE AND ACETAMINOPHEN 5; 325 MG/1; MG/1
1 TABLET ORAL EVERY 4 HOURS PRN
Status: DISCONTINUED | OUTPATIENT
Start: 2019-02-11 | End: 2019-02-13 | Stop reason: HOSPADM

## 2019-02-11 RX ORDER — PROMETHAZINE HYDROCHLORIDE 25 MG/ML
12.5 INJECTION, SOLUTION INTRAMUSCULAR; INTRAVENOUS ONCE
Status: COMPLETED | OUTPATIENT
Start: 2019-02-11 | End: 2019-02-11

## 2019-02-11 RX ORDER — ACETAMINOPHEN 325 MG/1
650 TABLET ORAL EVERY 4 HOURS PRN
Status: DISCONTINUED | OUTPATIENT
Start: 2019-02-11 | End: 2019-02-13 | Stop reason: HOSPADM

## 2019-02-11 RX ORDER — MAGNESIUM HYDROXIDE/ALUMINUM HYDROXICE/SIMETHICONE 120; 1200; 1200 MG/30ML; MG/30ML; MG/30ML
15 SUSPENSION ORAL EVERY 6 HOURS PRN
Status: DISCONTINUED | OUTPATIENT
Start: 2019-02-11 | End: 2019-02-13 | Stop reason: HOSPADM

## 2019-02-11 RX ORDER — DIAPER,BRIEF,INFANT-TODD,DISP
1 EACH MISCELLANEOUS AS NEEDED
Status: DISCONTINUED | OUTPATIENT
Start: 2019-02-11 | End: 2019-02-13 | Stop reason: HOSPADM

## 2019-02-11 RX ORDER — OXYTOCIN/RINGER'S LACTATE 30/500 ML
250 PLASTIC BAG, INJECTION (ML) INTRAVENOUS CONTINUOUS
Status: ACTIVE | OUTPATIENT
Start: 2019-02-11 | End: 2019-02-11

## 2019-02-11 RX ORDER — DIPHENHYDRAMINE HCL 25 MG
25 TABLET ORAL EVERY 6 HOURS PRN
Status: DISCONTINUED | OUTPATIENT
Start: 2019-02-11 | End: 2019-02-13 | Stop reason: HOSPADM

## 2019-02-11 RX ADMIN — OXYCODONE HYDROCHLORIDE AND ACETAMINOPHEN 2 TABLET: 5; 325 TABLET ORAL at 20:42

## 2019-02-11 RX ADMIN — PROMETHAZINE HYDROCHLORIDE 12.5 MG: 25 INJECTION INTRAMUSCULAR; INTRAVENOUS at 16:45

## 2019-02-11 RX ADMIN — ACETAMINOPHEN 650 MG: 325 TABLET, FILM COATED ORAL at 19:07

## 2019-02-11 RX ADMIN — BUTORPHANOL TARTRATE 1 MG: 1 INJECTION, SOLUTION INTRAMUSCULAR; INTRAVENOUS at 16:45

## 2019-02-11 RX ADMIN — SODIUM CHLORIDE, SODIUM LACTATE, POTASSIUM CHLORIDE, AND CALCIUM CHLORIDE 125 ML/HR: .6; .31; .03; .02 INJECTION, SOLUTION INTRAVENOUS at 13:25

## 2019-02-11 RX ADMIN — Medication 2 MILLI-UNITS/MIN: at 13:30

## 2019-02-11 NOTE — DISCHARGE SUMMARY
Discharge Summary - Christopher Wallace 26 y o  female MRN: [id number]    Unit/Bed#: L&D 321-01 Encounter: [id number]    Admission Date: 2019     Discharge Date: 2019    Delivering Attending: Verona Silver MD  Discharge Attending:    Admitting Diagnosis:   1  Pregnancy at 37w2d  2  Marijuana use in pregnancy  3  Tobacco use in pregnancy    Discharge Diagnosis:   Same, delivered    Procedures: Spontaneous Vaginal Delivery    Complications: none apparent    Consultations:    Hospital Course:     Christopher Wallace is a 26 y o   at 37w2d wks who was initially admitted for prelabor ruputre of membranes  She delivered a viable female  on 19 at 99 [id number]  Weight 5lbs 15oz via spontaneous vaginal delivery  Apgars were 8 (1 min) and 9 (5 min)   was transferred to  nursery  Patient tolerated the procedure well and was transferred to recovery in stable condition  Patient's postpartum course was uncomplicated  Condition at discharge: good     On day of discharge pain was well controlled, patient was tolerating PO with appropriate bowel function  She was discharged on postpartum day #1 with standard post partum instructions to follow up with her physician in 3-6 weeks for a postpartum appointment and to call with any signs of infection or bleeding  Discharge instructions: See after visit summary for complete information  Do not place anything (no partner, tampons or douche) in your vagina for 6 weeks    You may walk for exercise for the first 6 weeks then gradually return to your usual activities     Please do not drive for 1 week if you have no stitches and for 2 weeks if you have stitches or underwent a  delivery     You may take baths or shower per your preference     Please look at your breasts in the mirror daily and call for redness or tenderness or increased warmth     If you have had a  please look at your incision daily as well and call us for increasing redness or steady drainage from the incision     Please call us for temperature > 100 4*F or 38* C, worsening pain or a foul discharge  Provisions for Follow-Up Care:  See after visit summary for information related to follow-up care and any pertinent home health orders  Disposition: See After Visit Summary for discharge disposition information  Planned Readmission: No    Discharge Medications:   Prenatal vitamin daily for 6 months or the duration of nursing whichever is longer    Motrin 600 mg orally every 6 hours as needed for pain  Tylenol (over the counter) per bottle directions as needed for pain  Hydrocortisone cream 1% (over the counter) applied 1-2x daily to hemorrhoids as needed  Witch hazel pads for hemorrhoidal discomfort as needed  Lon Rojas MD  OBGYN PGY-1  2/12/2019 1:34 PM

## 2019-02-11 NOTE — OB LABOR/OXYTOCIN SAFETY PROGRESS
Oxytocin Safety Progress Check Note - Joesph Ariza 26 y o  female MRN: [id number]    Unit/Bed#: L&D 321-01 Encounter: [id number]    OB History    Para Term  AB Living   4 3 3 0 0 3   SAB TAB Ectopic Multiple Live Births   0 0 0 0 3   Obstetric Comments   Normal pregnancy     Gestational Age: 37w2d  Dose (gavin-units/min) Oxytocin: 10 gavin-units/min  Contraction Frequency (minutes): 3-4  Contraction Quality: Moderate  Tachysystole: No   Dilation: 4        Effacement (%): 80  Station: -2  Baseline Rate: 135 bpm     FHR Category: Category I          Notes/commet  Patient uncomfortable  Cervical exam as above  Pitocin at 10    FHT category 1    Charlie every 3 minutes    Reassess in 2 hours          Leticia Newton MD 2019 4:15 PM
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
## 2019-02-11 NOTE — L&D DELIVERY NOTE
Vaginal Delivery Summary - OB/GYN   Koki Hinds 26 y o  female MRN: [id number]  Unit/Bed#: L&D 321-01 Encounter: [id number]          Predelivery Diagnosis:  1  Pregnancy at 37w2d     Postdelivery Diagnosis:  1  Same as above  2  Delivery of term     Procedure: Spontaneous Vaginal Delivery    Attending: Jarrett Fraser MD    Assistant: Ann Orozco MD    Anesthesia: Epidural    EBL: [id number]    Complications: none apparent    Specimens: cord blood, arterial and venous cord blood gasses, placenta to storage    Findings:   1  Viable female at 1, with APGARS of 8 and 9 at 1 and 5 minutes respectively,  2  Spontaneous delivery of intact placenta at 1658  3  Blood gases:   Arterial pH: 7 277   Arterial base excess: -1 0   Venous pH: 7 374   Venous base excess: 0 9    Disposition:  Patient tolerated the procedure well and was recovering in labor and delivery room     Brief history and labor course:  Ms Koki Hinds is a 26 y o  [id number] at 37wk2d  She presented to labor and delivery for prelabor rupture of membranes  Her pregnancy was complicated by marijuana use in this pregnancy  On exam in triage she was noted to be 4/60/-3 and ruptured  She was admitted for induction of labor  Description of procedure    Patient precipitously delivered a viable female  in the bed, wt pending, apgars of 8 (1 min) and 9 (5 min)  The fetal vertex delivered spontaneously in the delivery bed  Baby was found to have a nuchal cord with two loose loops that were reduced  Upon delivery, the infant was placed on the mothers abdomen and the cord was clamped and cut  Delayed cord clamping was performed  The infant was noted to cry spontaneously and was moving all extremities appropriately  There was no evidence for injury  Awaiting nurse resuscitators evaluated the   Arterial and venous cord blood gases and cord blood was collected for analysis  These were promptly sent to the lab   In the immediate post-partum, 30 units of IV pitocin was administered, and the uterus was noted to contract down well with massage and pitocin  The placenta delivered spontaneously at [phone] and was noted to have a centrally inserted 3 vessel cord  The vagina, cervix, perineum, and rectum were inspected and there was noted to be no lacerations  At the conclusion of the procedure, all needle, sponge, and instrument counts were noted to be correct  Patient tolerated the procedure well and was allowed to recover in labor and delivery room with family and  before being transferred to the post-partum floor  The attending, Dr Beau Sanchez, was present and participated in all key portions of the case          Teri Pride MD  2019  5:33 PM

## 2019-02-11 NOTE — PLAN OF CARE
Problem: Knowledge Deficit  Goal: Verbalizes understanding of labor plan  Description  Assess patient/family/caregiver's baseline knowledge level and ability to understand information  Provide education via patient/family/caregiver's preferred learning method at appropriate level of understanding  1  Provide teaching at level of understanding  2  Provide teaching via preferred learning method(s)  Outcome: Progressing     Problem: Labor & Delivery  Goal: Manages discomfort  Description  Assess and monitor for signs and symptoms of discomfort  Assess patient's pain level regularly and per hospital policy  Administer medications as ordered  Support use of nonpharmacological methods to help control pain such as distraction, imagery, relaxation, and application of heat and cold  Collaborate with interdisciplinary team and patient to determine appropriate pain management plan  1  Include patient in decisions related to comfort  2  Offer non-pharmacological pain management interventions  3  Report ineffective pain management to physician  Outcome: Progressing  Goal: Patient vital signs are stable  Description  1  Assess vital signs - vaginal delivery    Outcome: Progressing

## 2019-02-11 NOTE — OB LABOR/OXYTOCIN SAFETY PROGRESS
Oxytocin Safety Progress Check Note - Elle Friendly 26 y o  female MRN: [id number]    Unit/Bed#: L&D 321-01 Encounter: [id number]    OB History    Para Term  AB Living   4 3 3 0 0 3   SAB TAB Ectopic Multiple Live Births   0 0 0 0 3   Obstetric Comments   Normal pregnancy     Gestational Age: 37w2d  Dose (gavin-units/min) Oxytocin: 4 gavin-units/min  Contraction Frequency (minutes): irregular  Contraction Quality: Mild      Dilation: 4        Effacement (%): 60  Station: -3  Baseline Rate: 135 bpm     FHR Category: Category I          Notes/comments:     SVE deferred  [place] q2-4  Category I tracing  Will reassess in 2hrs or as clinically indicated        Rashad Esposito MD 2019 3:46 PM

## 2019-02-11 NOTE — OB LABOR/OXYTOCIN SAFETY PROGRESS
Oxytocin Safety Progress Check Note - Viann Albino 26 y o  female MRN: [id number]    Unit/Bed#: L&D 321-01 Encounter: [id number]    OB History    Para Term  AB Living   4 3 3 0 0 3   SAB TAB Ectopic Multiple Live Births   0 0 0 0 3   Obstetric Comments   Normal pregnancy     Gestational Age: 37w2d  Dose (gavin-units/min) Oxytocin: 10 gavin-units/min  Contraction Frequency (minutes): 3-4  Contraction Quality: Moderate  Tachysystole: No   Dilation: 4-5        Effacement (%): 80  Station: -1  Baseline Rate: 135 bpm     FHR Category: Category I          Notes/comments:     Forebag ruptured for clear fluid  SVE as above  Cat I tracing, toco q2 mins  Requesting pain medication, does not want an epidural yet  Stadol/phenergan ordered        Tawanna Echevarria MD 2019 4:40 PM

## 2019-02-12 VITALS
HEART RATE: 93 BPM | RESPIRATION RATE: 18 BRPM | BODY MASS INDEX: 24.19 KG/M2 | HEIGHT: 59 IN | SYSTOLIC BLOOD PRESSURE: 127 MMHG | DIASTOLIC BLOOD PRESSURE: 73 MMHG | WEIGHT: 120 LBS | TEMPERATURE: 98.1 F

## 2019-02-12 LAB
GLUCOSE SERPL-MCNC: 147 MG/DL (ref 65–140)
RPR SER QL: NORMAL

## 2019-02-12 RX ORDER — DIAPER,BRIEF,INFANT-TODD,DISP
1 EACH MISCELLANEOUS AS NEEDED
Qty: 30 G | Refills: 0 | Status: SHIPPED | OUTPATIENT
Start: 2019-02-12 | End: 2019-06-23 | Stop reason: ALTCHOICE

## 2019-02-12 RX ORDER — ACETAMINOPHEN 325 MG/1
650 TABLET ORAL EVERY 4 HOURS PRN
Qty: 30 TABLET | Refills: 0 | Status: SHIPPED | OUTPATIENT
Start: 2019-02-12 | End: 2019-06-23 | Stop reason: ALTCHOICE

## 2019-02-12 RX ORDER — ACETAMINOPHEN AND CODEINE PHOSPHATE 120; 12 MG/5ML; MG/5ML
1 SOLUTION ORAL DAILY
Qty: 30 TABLET | Refills: 12 | Status: SHIPPED | OUTPATIENT
Start: 2019-02-12 | End: 2019-06-23 | Stop reason: ALTCHOICE

## 2019-02-12 RX ADMIN — OXYCODONE HYDROCHLORIDE AND ACETAMINOPHEN 2 TABLET: 5; 325 TABLET ORAL at 05:16

## 2019-02-12 RX ADMIN — DOCUSATE SODIUM 100 MG: 100 CAPSULE, LIQUID FILLED ORAL at 17:33

## 2019-02-12 RX ADMIN — OXYCODONE HYDROCHLORIDE AND ACETAMINOPHEN 2 TABLET: 5; 325 TABLET ORAL at 01:37

## 2019-02-12 RX ADMIN — OXYCODONE HYDROCHLORIDE AND ACETAMINOPHEN 1 TABLET: 5; 325 TABLET ORAL at 17:33

## 2019-02-12 RX ADMIN — OXYCODONE HYDROCHLORIDE AND ACETAMINOPHEN 2 TABLET: 5; 325 TABLET ORAL at 11:31

## 2019-02-12 RX ADMIN — DOCUSATE SODIUM 100 MG: 100 CAPSULE, LIQUID FILLED ORAL at 08:55

## 2019-02-12 RX ADMIN — BENZOCAINE AND LEVOMENTHOL: 200; 5 SPRAY TOPICAL at 17:56

## 2019-02-12 RX ADMIN — WITCH HAZEL 1 PAD: 500 SOLUTION RECTAL; TOPICAL at 17:56

## 2019-02-12 RX ADMIN — Medication 1 TABLET: at 08:54

## 2019-02-12 NOTE — LACTATION NOTE
This note was copied from a baby's chart  Met with mother  Provided mother with Ready, Set, Baby booklet  Discussed Skin to Skin contact an benefits to mom and baby  Talked about the delay of the first bath until baby has adjusted  Spoke about the benefits of rooming in  Feeding on cue and what that means for recognizing infant's hunger  Avoidance of pacifiers for the first month discussed  Talked about exclusive breastfeeding for the first 6 months  Positioning and latch reviewed as well as showing images of other feeding positions  Discussed the properties of a good latch in any position  Reviewed hand/manual expression  Discussed s/s that baby is getting enough milk and some s/s that breastfeeding dyad may need further help  Gave information on common concerns, what to expect the first few weeks after delivery, preparing for other caregivers, and how partners can help  Resources for support also provided  Mother verbalized breastfeeding is going well on left breast but she is having difficulty on right  Enc to call for assistance next feeding and as needed,phone # given

## 2019-02-12 NOTE — PROGRESS NOTES
Progress Note - OB/GYN   Adi Parr 26 y o  female MRN: [id number]  Unit/Bed#: L&D 304-01 Encounter: [id number]    Assessment:  Post partum Day #1 s/p , stable, baby in room    Plan:  1) THC and tobacco use in pregnancy   - CM consult placed  2) Contraception    - Would like Micronor  3) Continue routine post partum care   Encourage ambulation   Encourage breastfeeding   Anticipate discharge home tomorrow     Subjective/Objective   Chief Complaint:     Post delivery  Patient is doing well  Lochia WNL  Pain well controlled  Subjective:     Pain: yes, cramping, improved with meds  Tolerating PO: yes  Voiding: yes  Ambulating: yes  Breastfeeding:  Yes/bottle  Chest pain: no  Shortness of breath: no  Leg pain: no  Lochia: WNL    Objective:     Vitals: /71 (BP Location: Right arm)   Pulse 85   Temp 98 1 °F (36 7 °C) (Oral)   Resp 20   Ht 4' 11" (1 499 m)   Wt 54 4 kg (120 lb)   LMP 2018 (Exact Date)   Breastfeeding? Yes   BMI 24 24 kg/m²       Intake/Output Summary (Last 24 hours) at 2019 3777  Last data filed at 2019 2300  Gross per 24 hour   Intake --   Output 800 ml   Net -800 ml       Lab Results   Component Value Date    WBC 5 17 2019    HGB 10 1 (L) 2019    HCT 29 8 (L) 2019    MCV 99 (H) 2019     (L) 2019       Physical Exam:     Gen: AAOx3, NAD  CV: RRR  Lungs: CTA b/l  Abd: Soft, non-tender, non-distended, no rebound or guarding  Uterine fundus firm and non-tender, 2 cm below the umbilicus     Ext: Non tender      Dipesh Santiago MD  OB/GYN PGY-1  2019  6:21 AM

## 2019-02-12 NOTE — DISCHARGE INSTRUCTIONS
Breast Care for the Breast Feeding Mother   GENERAL INFORMATION:   Why is breast care important while I am breastfeeding? Your breasts will go through normal changes while you are breastfeeding  Sometimes breast and nipple problems can develop while you are breastfeeding  Learn about changes that are normal and those that may be a problem  Breast care can help you prevent and manage problems so you and your baby can enjoy the benefits of breastfeeding  What breast changes happen while I am breastfeeding? · For the first few days after your baby is born, your body makes a small amount of breast milk (colostrum)  Within about 2 to 5 days, your body will begin making mature milk  It may take up to 10 days or longer for mature milk to come in  When your mature milk comes in, your breasts will become full and firm  They may feel tender  · Breastfeeding your baby will decrease the full feeling in your breasts  You may feel a tingly sensation during feedings as milk is released from your breasts  This is called the milk let-down reflex  After 7 or more days, the fullness may feel like it has decreased  Your nipples should look the same as they did before you started breastfeeding  Breasts that feel full before and empty after breastfeeding are signs that breastfeeding is going well  What breast problems can occur while I am breastfeeding? · Nipple soreness  may occur when you begin to breastfeed your baby  You may have nipple soreness if your baby does not latch on to your breast correctly  Correct positioning and latch-on may decrease or stop the pain in your nipples  Work with your caregiver to help your baby latch on correctly  It may also be helpful to place warm, wet compresses on your nipples to help decrease pain  · Plugged milk ducts  may cause painful breast lumps  Plugged ducts may be caused by not emptying your breasts completely during feedings   When your baby pauses during breastfeeding, massage and gently squeeze your breast  Gentle massage may unplug a blocked milk duct  Pump out any milk left in your breasts after your baby is done breastfeeding  Avoid wearing tight tops, tight bras, or under-wire bras, because they may put pressure on your breasts  · Engorgement  may occur as your milk comes in soon after you begin breastfeeding  Engorgement may cause your breasts to become swollen and painful  Your breasts may also become engorged if you miss a feeding or you do not breastfeed on demand  The best way to decrease engorgement symptoms is to empty your breasts by feeding your baby often  Engorgement can make it hard for your baby to latch on to your breast  If this happens, express a small amount of milk and then have your baby latch on  Cold compresses, gel packs, or ice packs on your breasts can help decrease pain and swelling  Ask your caregiver how often and how long you should use cold, or ice packs  · A breast infection called mastitis  can develop if you have plugged milk ducts or engorgement  Mastitis causes your breasts to become red, swollen, and painful  You may also have flu-like symptoms, such as chills and a fever  Place heat on your breasts to help decrease the pain  You may want to place a moist, warm cloth on the painful breast or both of your breasts  Ask your caregiver how often to do this  Your caregiver may suggest that you take an NSAID, such as ibuprofen, to decrease pain and swelling  Your caregiver may also order antibiotics to treat mastitis  Ask your caregiver about feeding your baby when you have a breast infection  What can I do to help prevent or manage breast problems while I am breastfeeding? · Learn how to position your baby and latch him on correctly  To latch your baby correctly to your breast, make sure that his mouth covers most of your areola (dark area around your nipple)  He should not be attached only to the nipple   Your baby is latched on well if you feel comfortable and do not feel pain  A correct latch helps him get enough milk and can help to prevent sore nipples and other breast problems  There are several breastfeeding positions that you can try  Find the position that works best for you and your baby  Ask your caregiver for more information about how to hold and breastfeed your baby  · Prevent biting  Your baby may get teeth at about 1to 3months of age  To help prevent biting, break his suction once he is finished breastfeeding or if he has fallen asleep  To break his suction, slip a finger into the side of his mouth  If your baby bites you, respond with surprise or unhappiness  Offer praise when he does not bite you  · Breastfeed your baby regularly  Feed your baby 8 to 12 times a day  You may need to wake your baby at night to feed him  It is okay to feed from 1 or both breasts at each feeding  Your baby should breastfeed from both breasts equally over the course of a day  If your baby only feeds from 1 side during a feeding, offer your other breast to him first for the next feeding  · Schedule and keep follow-up visits  Talk to your baby's caregiver or your caregiver during follow-up visits if you have breast problems  Caregivers may suggest that you, or you and your partner, attend classes on breastfeeding  You also may want to join a breastfeeding support group  Caregivers may suggest that you see a lactation consultant  This is a caregiver who can help you with breastfeeding  When should I contact my caregiver? Contact your caregiver if:  · You have a fever and chills  · You have body aches and you feel like you do not have any energy  · One or both of your breasts is red, swollen or hard, painful, and feels warm or hot  · You have breast engorgement that does not get better within 24 hours  · You see or feel a lump in your breast that hurts when you touch it      · You have nipple pain during breastfeeding or between feedings  · Your nipples are red, dry, cracked, or bleeding, or they have scabs on them  · You have questions or concerns about your condition or care  CARE AGREEMENT:   You have the right to help plan your care  To help with this plan, you must learn as much as you can about breastfeeding  Ask your caregivers questions about breast care  Work with them to decide what care is best for you and your baby  The above information is an  only  It is not intended as medical advice for individual conditions or treatments  Talk to your doctor, nurse or pharmacist before following any medical regimen to see if it is safe and effective for you  © 2014 [address] is for End User's use only and may not be sold, redistributed or otherwise used for commercial purposes  All illustrations and images included in CareNotes® are the copyrighted property of A D A M , Inc  or [employer]  Vaginal Delivery   WHAT YOU SHOULD KNOW:   A vaginal delivery is the birth of your baby through your vagina (birth canal)  AFTER YOU LEAVE:   Medicines:  · NSAIDs  help decrease swelling and pain or fever  This medicine is available with or without a doctor's order  NSAIDs can cause stomach bleeding or kidney problems in certain people  If you take blood thinner medicine, always ask your healthcare provider if NSAIDs are safe for you  Always read the medicine label and follow directions  · Take your medicine as directed  Call your healthcare provider if you think your medicine is not helping or if you have side effects  Tell him if you are allergic to any medicine  Keep a list of the medicines, vitamins, and herbs you take  Include the amounts, and when and why you take them  Bring the list or the pill bottles to follow-up visits  Carry your medicine list with you in case of an emergency    Follow up with your primary healthcare provider:  Most women need to return 6 weeks after a vaginal delivery  Ask about how to care for your wounds or stitches  Write down your questions so you remember to ask them during your visits  Activity:  Rest as much as possible  Try to keep all activities short  You may be able to do some exercise soon after you have your baby  Talk with your primary healthcare provider before you start exercising  If you work outside the home, ask when you can return to your job  Kegel exercises:  Kegel exercises may help your vaginal and rectal muscles heal faster  You can do Kegel exercises by tightening and relaxing the muscles around your vagina  Kegel exercises help make the muscles stronger  Breast care:  When your milk comes in, your breasts may feel full and hard  Ask how to care for your breasts, even if you are not breastfeeding  Constipation:  Do not try to push the bowel movement out if it is too hard  High-fiber foods, extra liquids, and regular exercise can help you prevent constipation  Examples of high-fiber foods are fruit and bran  Prune juice and water are good liquids to drink  Regular exercise helps your digestive system work  You may also be told to take over-the-counter fiber and stool softener medicines  Take these items as directed  Hemorrhoids:  Pregnancy can cause severe hemorrhoids  You may have rectal pain because of the hemorrhoids  Ask how to prevent or treat hemorrhoids  Perineum care: Your perineum is the area between your vagina and anus  Keep the area clean and dry to help it heal and to prevent infection  Wash the area gently with soap and water when you bathe or shower  Rinse your perineum with warm water when you use the toilet  Your primary healthcare provider may suggest you use a warm sitz bath to help decrease pain  A sitz bath is a bathtub or basin filled to hip level  Stay in the sitz bath for 20 to 30 minutes, or as directed  Vaginal discharge: You will have vaginal discharge, called lochia, after your delivery  The lochia is bright red the first day or two after the birth  By the fourth day, the amount decreases, and it turns red-brown  Use a sanitary pad rather than a tampon to prevent a vaginal infection  It is normal to have lochia up to 8 weeks after your baby is born  Monthly periods: Your period may start again within 7 to 12 weeks after your baby is born  If you are breastfeeding, it may take longer for your period to start again  You can still get pregnant again even though you do not have your monthly period  Talk with your primary healthcare provider about a birth control method that will be good for you if you do not want to get pregnant  Mood changes: Many new mothers have some kind of mood changes after delivery  Some of these changes occur because of lack of sleep, hormone changes, and caring for a new baby  Some mood changes can be more serious, such as postpartum depression  Talk with your primary healthcare provider if you feel unable to care for yourself or your baby  Sexual activity:  You may need to avoid sex for 6 to 7 weeks after you have your baby  You may notice you have a decreased desire for sex, or sex may be painful  You may need to use a vaginal lubricant (gel) to help make sex more comfortable  Contact your primary healthcare provider if:   · You have heavy vaginal bleeding that fills 1 or more sanitary pads in 1 hour  · You have a fever  · Your pain does not go away, or gets worse  · The skin between your vagina and rectum is swollen, warm, or red  · You have swollen, hard, or painful breasts  · You feel very sad or depressed  · You feel more tired than usual      · You have questions or concerns about your condition or care  Seek care immediately or call 911 if:   · You have pus or yellow drainage coming from your vagina or wound  · You are urinating very little, or not at all  · Your arm or leg feels warm, tender, and painful   It may look swollen and red     · You feel lightheaded, have sudden and worsening chest pain, or trouble breathing  You may have more pain when you take deep breaths or cough, or you may cough up blood  © 2014 [address] is for End User's use only and may not be sold, redistributed or otherwise used for commercial purposes  All illustrations and images included in CareNotes® are the copyrighted property of A D A M , Inc  or Sorin Winchester  The above information is an  only  It is not intended as medical advice for individual conditions or treatments  Talk to your doctor, nurse or pharmacist before following any medical regimen to see if it is safe and effective for you

## 2019-02-12 NOTE — PLAN OF CARE
Problem: POSTPARTUM  Goal: Experiences normal postpartum course  Description  INTERVENTIONS:  - Monitor maternal vital signs  - Assess uterine involution and lochia  Outcome: Progressing  Goal: Appropriate maternal -  bonding  Description  INTERVENTIONS:  - Identify family support  - Assess for appropriate maternal/infant bonding   -Encourage maternal/infant bonding opportunities  - Referral to  or  as needed  Outcome: Progressing  Goal: Establishment of infant feeding pattern  Description  INTERVENTIONS:  - Assess breast/bottle feeding  - Refer to lactation as needed  Outcome: Progressing  Goal: Incision(s), wounds(s) or drain site(s) healing without S/S of infection  Description  INTERVENTIONS  - Assess and document risk factors for skin impairment   - Assess and document dressing, incision, wound bed, drain sites and surrounding tissue  - Initiate Nutrition services consult and/or wound management as needed  Outcome: Progressing     Problem: Knowledge Deficit  Goal: Verbalizes understanding of labor plan  Description  Assess patient/family/caregiver's baseline knowledge level and ability to understand information  Provide education via patient/family/caregiver's preferred learning method at appropriate level of understanding  1  Provide teaching at level of understanding  2  Provide teaching via preferred learning method(s)  Outcome: Completed     Problem: Labor & Delivery  Goal: Manages discomfort  Description  Assess and monitor for signs and symptoms of discomfort  Assess patient's pain level regularly and per hospital policy  Administer medications as ordered  Support use of nonpharmacological methods to help control pain such as distraction, imagery, relaxation, and application of heat and cold  Collaborate with interdisciplinary team and patient to determine appropriate pain management plan  1  Include patient in decisions related to comfort    2  Offer non-pharmacological pain management interventions  3  Report ineffective pain management to physician  Outcome: Completed  Goal: Patient vital signs are stable  Description  1  Assess vital signs - vaginal delivery    Outcome: Completed

## 2019-02-12 NOTE — SOCIAL WORK
Received consult for Hx of THC use - CM reviewed labs for mom and infant, negative on admission  Last + UDS for MOB is August, 2018  CM met with MOB to address consult:    MOB is Gregg Evans  FOB is [address]  Infant is [facility] Astrid    MOB and FOB do not live together  FOB involved in infant care  MOB has a 7yo, 4yo, 2yo child in the home  She lives with her mother  MOB has all necessary items for the infant at home, including a carseat for d c and a pack and play for safe sleep  She is breastfeeding, reports it is going well  She no longer has her pump from 2017 - not eligible for a new pump  She has [facility] services  Reports good transportation to get to appointments  Uses [id number] for ped needs    [address] history for BiPolar and Depression - no current MH treatment, no treatment for approx 3yrs, Reports no need to f/u with any providers  Denies any SI/HI/AH/VH  No hx of PPD or PPA  Discussed THC history - reports last use was at that + screen in August  [facility] was called, case was opened and closed  Notified her that no need for intervention today d/t negative UDS on her and infant and that CM will f/u with umbilical chord screen and if that is + will need to notify [facility]  MOB in understanding    No social concerns identified

## 2019-02-12 NOTE — LACTATION NOTE
This note was copied from a baby's chart  Assisted mom with breastfeeding  She has been having difficulty on right breast  Her nipple on the right, is sort of inverted, but it does become queta with her stimulation  I demo  football and cross cradle hold, how to hand express and how to get a deep latch  Baby tried to latch to right but only got a few sucks in the 10 minutes we attempted  I had mom hand express colostrum every time we attempted to latch him  We then switched to the left and he still had some difficulty getting latched  The nipple is everted and he eventually latched fairly well   I gave her the latch assist and demo how to use it, I enc her to call me next feeding and we will try to work some more on the right breast

## 2019-02-12 NOTE — PLAN OF CARE
Problem: DISCHARGE PLANNING - CARE MANAGEMENT  Goal: Discharge to post-acute care or home with appropriate resources  Description  INTERVENTIONS:  - Conduct assessment to determine patient/family and health care team treatment goals, and need for post-acute services based on payer coverage, community resources, and patient preferences, and barriers to discharge  - Address psychosocial, clinical, and financial barriers to discharge as identified in assessment in conjunction with the patient/family and health care team  - Arrange appropriate level of post-acute services according to patient?s   needs and preference and payer coverage in collaboration with the physician and health care team  - Communicate with and update the patient/family, physician, and health care team regarding progress on the discharge plan  - Arrange appropriate transportation to post-acute venues  Outcome: Completed     No social concerns identified

## 2019-02-13 NOTE — PROGRESS NOTES
Pt reporting desire for early discharge   has been cleared by pediatrician for discharge  Pt was evaluated today by case management and no social concerns were identified  Pt will be discharged per pt request jude Nguyen DO  OB/GYN, PGY3  2019, 7:36 PM

## 2019-02-13 NOTE — UTILIZATION REVIEW
Notification of Maternity Inpatient Admission/Maternity Inpatient Authorization Request  This is a Notification of Maternity Inpatient Admission/Maternity Inpatient Authorization Request to our facility [address] [place] [address]  Please be advised that this patient is currently in our facility under Inpatient Status  Below you will find the Birth/[place] Summary, Attending Physician and Facilitys information including NPI#  and contact information for the Utilization Review Department where the patient is receiving care services  Facility: [address] [place] [address]  Address: Yary Cheng, [address] [facility]  Phone: [phone] Tax ID: [id number]  NPI: [id number]  Medicare ID: [id number]    Place of Service Code: 24   Place of Service Name: Inpatient Hospital  Presentation Date & Time: 2019 11:05 AM  Inpatient Admission Date & Time: 19 1232  Discharge Date & Time: 2019  8:21 PM   Discharge Disposition (if discharged): Home/Self Care  Attending Physician & NPI: Zion Brice Md [[id number]]  LUDIVINA Blanchard  Specialty- Obstetrics and Gynecology  [facility] ID- [id number]  [address] [place] [address]  Phone 1: [phone]  Fax: [phone]  Mother of [place] Information: Evangelina Garcia   MRN: [id number] YOB: 1995   Mother's Admitting Diagnosis: Abdominal pain [R10 9]  Estimated Date of Delivery: 3/2/19  Type of Delivery: Vaginal, Spontaneous   Delivering clinician: Zion Brice   OB History        4    Para   4    Term   4       0    AB        Living   4       SAB        TAB        Ectopic        Multiple   0    Live Births   4           Obstetric Comments   Normal pregnancy            Name & MRN:   Information for the patient's :  Darren Bo Boy [[id number]]      Delivery Information:  Sex: male  Delivered 2019 4:53 PM by Vaginal, Spontaneous;  Gestational Age: 37w2d    [place] Measurements:  Weight: 5 lb 15 4 oz (2705 g); Height: 18"    APGAR 1 minute 5 minutes 10 minutes   Totals: 8 9      Thank you,  [facility]  Phone: Jose Rafael Uribe  - [phone]; Fax [phone]  ATTENTION: Please call with any questions or concerns to [phone]  and carefully follow the prompts so that you are directed to the right person  Send all requests for admission clinical reviews, approved or denied determinations and any other requests to fax [phone]   All voicemails are confidential

## 2019-02-19 LAB — PLACENTA IN STORAGE: NORMAL

## 2019-03-28 ENCOUNTER — POSTPARTUM VISIT (OUTPATIENT)
Dept: OBGYN CLINIC | Facility: CLINIC | Age: 24
End: 2019-03-28

## 2019-03-28 VITALS
BODY MASS INDEX: 21.65 KG/M2 | DIASTOLIC BLOOD PRESSURE: 83 MMHG | HEART RATE: 80 BPM | WEIGHT: 107.2 LBS | SYSTOLIC BLOOD PRESSURE: 114 MMHG

## 2019-03-28 PROBLEM — Z3A.37 37 WEEKS GESTATION OF PREGNANCY: Status: RESOLVED | Noted: 2017-10-07 | Resolved: 2019-03-28

## 2019-03-28 PROBLEM — O42.90 PROM (PREMATURE RUPTURE OF MEMBRANES): Status: RESOLVED | Noted: 2019-02-11 | Resolved: 2019-03-28

## 2019-03-28 PROCEDURE — 99214 OFFICE O/P EST MOD 30 MIN: CPT | Performed by: NURSE PRACTITIONER

## 2019-03-28 NOTE — PATIENT INSTRUCTIONS
Return after 4/3/2019 for pregnancy test and Mirena insertion if negative  Call with questions needs or concerns

## 2019-03-28 NOTE — PROGRESS NOTES
OB POSTPARTUM VISIT PROGRESS NOTE  Date of Encounter: 3/28/2019    Travis Clinton    : 1995  (22 y o )  MR: [id number]    Subjective   Classelton Serrano is in for her postpartum visit  She is now [id number]  She delivered by normal spontaneous vaginal delivery  She's generally doing well, denies current pain or bleeding issues, and has no significant depression issues  She is bottle feeding  We discussed all appropriate contraceptive options and she chooses Mirena safe and effective use provided verbally and inwriting  and is considering sterilization pt has 4 children  1 partner x 1 year  He is father of first child as well  Denies domestic violence  Denies problems with urinating or BM's  Pt had unprotected intercourse 3/20/2019          Objective   EXAM:  GENERAL: alert, well appearing, and in no distress  VITALS: Blood pressure 114/83, pulse 80, weight 48 6 kg (107 lb 3 2 oz), last menstrual period 2018, not currently breastfeeding  BMI: Body mass index is 21 65 kg/m²  Denies pain  BREASTS: Denies pain, redness or lumps, bottle feeding  EXTREMITIES: Denies pain, redness or edema     Assessment/Plan   Diagnoses and all orders for this visit:     (normal spontaneous vaginal delivery)    Postpartum follow-up        Plan  Patient Instructions   Return after 4/3/2019 for pregnancy test and Mirena insertion if negative  Call with questions needs or concerns   Pt verbalized understanding of all discussed  Return in about 1 week (around 2019) for UPT and Mirena insertion if negative      SANJIV Gutiérrez

## 2019-06-23 ENCOUNTER — HOSPITAL ENCOUNTER (EMERGENCY)
Facility: HOSPITAL | Age: 24
Discharge: LEFT BEFORE DISCHARGE | End: 2019-06-23
Attending: EMERGENCY MEDICINE
Payer: COMMERCIAL

## 2019-06-23 VITALS
RESPIRATION RATE: 18 BRPM | BODY MASS INDEX: 19.51 KG/M2 | SYSTOLIC BLOOD PRESSURE: 117 MMHG | WEIGHT: 96.6 LBS | DIASTOLIC BLOOD PRESSURE: 81 MMHG | TEMPERATURE: 97.6 F | HEART RATE: 93 BPM | OXYGEN SATURATION: 97 %

## 2019-06-23 DIAGNOSIS — R51.9 HEADACHE: Primary | ICD-10-CM

## 2019-06-23 DIAGNOSIS — R11.2 NAUSEA & VOMITING: ICD-10-CM

## 2019-06-23 LAB
BACTERIA UR QL AUTO: ABNORMAL /HPF
BILIRUB UR QL STRIP: NEGATIVE
CLARITY UR: ABNORMAL
COLOR UR: ABNORMAL
EXT PREG TEST URINE: NEGATIVE
EXT. CONTROL ED NAV: NORMAL
GLUCOSE UR STRIP-MCNC: NEGATIVE MG/DL
HGB UR QL STRIP.AUTO: NEGATIVE
KETONES UR STRIP-MCNC: ABNORMAL MG/DL
LEUKOCYTE ESTERASE UR QL STRIP: 500
MUCOUS THREADS UR QL AUTO: ABNORMAL
NITRITE UR QL STRIP: NEGATIVE
NON-SQ EPI CELLS URNS QL MICRO: ABNORMAL /HPF
PH UR STRIP.AUTO: 8 [PH]
PROT UR STRIP-MCNC: NEGATIVE MG/DL
RBC #/AREA URNS AUTO: ABNORMAL /HPF
SP GR UR STRIP.AUTO: 1.01 (ref 1–1.04)
UROBILINOGEN UA: 4 MG/DL
WBC #/AREA URNS AUTO: ABNORMAL /HPF

## 2019-06-23 PROCEDURE — 99284 EMERGENCY DEPT VISIT MOD MDM: CPT | Performed by: PHYSICIAN ASSISTANT

## 2019-06-23 PROCEDURE — 96375 TX/PRO/DX INJ NEW DRUG ADDON: CPT

## 2019-06-23 PROCEDURE — 81003 URINALYSIS AUTO W/O SCOPE: CPT | Performed by: PHYSICIAN ASSISTANT

## 2019-06-23 PROCEDURE — 81001 URINALYSIS AUTO W/SCOPE: CPT | Performed by: PHYSICIAN ASSISTANT

## 2019-06-23 PROCEDURE — 81025 URINE PREGNANCY TEST: CPT | Performed by: PHYSICIAN ASSISTANT

## 2019-06-23 PROCEDURE — 96374 THER/PROPH/DIAG INJ IV PUSH: CPT

## 2019-06-23 PROCEDURE — 99283 EMERGENCY DEPT VISIT LOW MDM: CPT

## 2019-06-23 RX ORDER — DEXAMETHASONE SODIUM PHOSPHATE 4 MG/ML
10 INJECTION, SOLUTION INTRA-ARTICULAR; INTRALESIONAL; INTRAMUSCULAR; INTRAVENOUS; SOFT TISSUE ONCE
Status: COMPLETED | OUTPATIENT
Start: 2019-06-23 | End: 2019-06-23

## 2019-06-23 RX ORDER — METOCLOPRAMIDE HYDROCHLORIDE 5 MG/ML
10 INJECTION INTRAMUSCULAR; INTRAVENOUS ONCE
Status: COMPLETED | OUTPATIENT
Start: 2019-06-23 | End: 2019-06-23

## 2019-06-23 RX ORDER — DIPHENHYDRAMINE HYDROCHLORIDE 50 MG/ML
25 INJECTION INTRAMUSCULAR; INTRAVENOUS ONCE
Status: COMPLETED | OUTPATIENT
Start: 2019-06-23 | End: 2019-06-23

## 2019-06-23 RX ADMIN — DEXAMETHASONE SODIUM PHOSPHATE 10 MG: 4 INJECTION, SOLUTION INTRAMUSCULAR; INTRAVENOUS at 21:31

## 2019-06-23 RX ADMIN — DIPHENHYDRAMINE HYDROCHLORIDE 25 MG: 50 INJECTION, SOLUTION INTRAMUSCULAR; INTRAVENOUS at 21:29

## 2019-06-23 RX ADMIN — METOCLOPRAMIDE 10 MG: 5 INJECTION, SOLUTION INTRAMUSCULAR; INTRAVENOUS at 21:29

## 2019-06-23 RX ADMIN — SODIUM CHLORIDE 1000 ML: 9 INJECTION, SOLUTION INTRAVENOUS at 21:26

## 2019-06-24 NOTE — ED NOTES
Patient notified tech that she must leave due to a family emergency  PA made aware of same  AMA form signed       Koki Magdaleno RN  06/23/19 [id number]
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
## 2019-08-06 ENCOUNTER — HOSPITAL ENCOUNTER (EMERGENCY)
Facility: HOSPITAL | Age: 24
Discharge: HOME | End: 2019-08-06
Attending: EMERGENCY MEDICINE | Admitting: EMERGENCY MEDICINE
Payer: COMMERCIAL

## 2019-08-06 VITALS
RESPIRATION RATE: 14 BRPM | HEART RATE: 73 BPM | BODY MASS INDEX: 20.13 KG/M2 | SYSTOLIC BLOOD PRESSURE: 129 MMHG | DIASTOLIC BLOOD PRESSURE: 88 MMHG | TEMPERATURE: 97.8 F | WEIGHT: 99.65 LBS | OXYGEN SATURATION: 100 %

## 2019-08-06 DIAGNOSIS — R10.84 GENERALIZED ABDOMINAL CRAMPING: ICD-10-CM

## 2019-08-06 DIAGNOSIS — G43.829 MENSTRUAL MIGRAINE WITHOUT STATUS MIGRAINOSUS, NOT INTRACTABLE: ICD-10-CM

## 2019-08-06 DIAGNOSIS — N92.0 MENORRHAGIA: Primary | ICD-10-CM

## 2019-08-06 DIAGNOSIS — R11.2 NAUSEA AND VOMITING: ICD-10-CM

## 2019-08-06 DIAGNOSIS — R07.89 CHEST TIGHTNESS: ICD-10-CM

## 2019-08-06 LAB
ALBUMIN SERPL BCP-MCNC: 4.2 G/DL (ref 3.5–5)
ALP SERPL-CCNC: 109 U/L (ref 46–116)
ALT SERPL W P-5'-P-CCNC: 15 U/L (ref 12–78)
ANION GAP SERPL CALCULATED.3IONS-SCNC: 7 MMOL/L (ref 4–13)
AST SERPL W P-5'-P-CCNC: 12 U/L (ref 5–45)
BACTERIA UR QL AUTO: ABNORMAL /HPF
BASOPHILS # BLD AUTO: 0.03 THOUSANDS/ΜL (ref 0–0.1)
BASOPHILS NFR BLD AUTO: 0 % (ref 0–1)
BILIRUB SERPL-MCNC: 0.27 MG/DL (ref 0.2–1)
BILIRUB UR QL STRIP: NEGATIVE
BUN SERPL-MCNC: 10 MG/DL (ref 5–25)
CALCIUM SERPL-MCNC: 9 MG/DL (ref 8.3–10.1)
CHLORIDE SERPL-SCNC: 105 MMOL/L (ref 100–108)
CLARITY UR: ABNORMAL
CO2 SERPL-SCNC: 29 MMOL/L (ref 21–32)
COLOR UR: YELLOW
CREAT SERPL-MCNC: 0.71 MG/DL (ref 0.6–1.3)
EOSINOPHIL # BLD AUTO: 0.13 THOUSAND/ΜL (ref 0–0.61)
EOSINOPHIL NFR BLD AUTO: 2 % (ref 0–6)
ERYTHROCYTE [DISTWIDTH] IN BLOOD BY AUTOMATED COUNT: 13 % (ref 11.6–15.1)
EXT PREG TEST URINE: NEGATIVE
EXT. CONTROL ED NAV: NORMAL
GFR SERPL CREATININE-BSD FRML MDRD: 120 ML/MIN/1.73SQ M
GLUCOSE SERPL-MCNC: 99 MG/DL (ref 65–140)
GLUCOSE UR STRIP-MCNC: NEGATIVE MG/DL
HCT VFR BLD AUTO: 38.2 % (ref 34.8–46.1)
HGB BLD-MCNC: 12.6 G/DL (ref 11.5–15.4)
HGB UR QL STRIP.AUTO: ABNORMAL
IMM GRANULOCYTES # BLD AUTO: 0.02 THOUSAND/UL (ref 0–0.2)
IMM GRANULOCYTES NFR BLD AUTO: 0 % (ref 0–2)
KETONES UR STRIP-MCNC: NEGATIVE MG/DL
LEUKOCYTE ESTERASE UR QL STRIP: NEGATIVE
LIPASE SERPL-CCNC: 75 U/L (ref 73–393)
LYMPHOCYTES # BLD AUTO: 1.48 THOUSANDS/ΜL (ref 0.6–4.47)
LYMPHOCYTES NFR BLD AUTO: 22 % (ref 14–44)
MCH RBC QN AUTO: 30.7 PG (ref 26.8–34.3)
MCHC RBC AUTO-ENTMCNC: 33 G/DL (ref 31.4–37.4)
MCV RBC AUTO: 93 FL (ref 82–98)
MONOCYTES # BLD AUTO: 0.34 THOUSAND/ΜL (ref 0.17–1.22)
MONOCYTES NFR BLD AUTO: 5 % (ref 4–12)
MUCOUS THREADS UR QL AUTO: ABNORMAL
NEUTROPHILS # BLD AUTO: 4.85 THOUSANDS/ΜL (ref 1.85–7.62)
NEUTS SEG NFR BLD AUTO: 71 % (ref 43–75)
NITRITE UR QL STRIP: NEGATIVE
NON-SQ EPI CELLS URNS QL MICRO: ABNORMAL /HPF
NRBC BLD AUTO-RTO: 0 /100 WBCS
PH UR STRIP.AUTO: 6 [PH]
PLATELET # BLD AUTO: 254 THOUSANDS/UL (ref 149–390)
PMV BLD AUTO: 10 FL (ref 8.9–12.7)
POTASSIUM SERPL-SCNC: 3.8 MMOL/L (ref 3.5–5.3)
PROT SERPL-MCNC: 7.8 G/DL (ref 6.4–8.2)
PROT UR STRIP-MCNC: ABNORMAL MG/DL
RBC # BLD AUTO: 4.1 MILLION/UL (ref 3.81–5.12)
RBC #/AREA URNS AUTO: ABNORMAL /HPF
SODIUM SERPL-SCNC: 141 MMOL/L (ref 136–145)
SP GR UR STRIP.AUTO: >=1.03 (ref 1–1.03)
UROBILINOGEN UR QL STRIP.AUTO: 1 E.U./DL
WBC # BLD AUTO: 6.85 THOUSAND/UL (ref 4.31–10.16)
WBC #/AREA URNS AUTO: ABNORMAL /HPF

## 2019-08-06 PROCEDURE — 99284 EMERGENCY DEPT VISIT MOD MDM: CPT

## 2019-08-06 PROCEDURE — 80053 COMPREHEN METABOLIC PANEL: CPT | Performed by: EMERGENCY MEDICINE

## 2019-08-06 PROCEDURE — 36415 COLL VENOUS BLD VENIPUNCTURE: CPT | Performed by: EMERGENCY MEDICINE

## 2019-08-06 PROCEDURE — 96374 THER/PROPH/DIAG INJ IV PUSH: CPT

## 2019-08-06 PROCEDURE — 99285 EMERGENCY DEPT VISIT HI MDM: CPT | Performed by: EMERGENCY MEDICINE

## 2019-08-06 PROCEDURE — 96361 HYDRATE IV INFUSION ADD-ON: CPT

## 2019-08-06 PROCEDURE — 83690 ASSAY OF LIPASE: CPT | Performed by: EMERGENCY MEDICINE

## 2019-08-06 PROCEDURE — 85025 COMPLETE CBC W/AUTO DIFF WBC: CPT | Performed by: EMERGENCY MEDICINE

## 2019-08-06 PROCEDURE — 96375 TX/PRO/DX INJ NEW DRUG ADDON: CPT

## 2019-08-06 PROCEDURE — 81001 URINALYSIS AUTO W/SCOPE: CPT | Performed by: EMERGENCY MEDICINE

## 2019-08-06 PROCEDURE — 81025 URINE PREGNANCY TEST: CPT | Performed by: EMERGENCY MEDICINE

## 2019-08-06 RX ORDER — METOCLOPRAMIDE HYDROCHLORIDE 5 MG/ML
10 INJECTION INTRAMUSCULAR; INTRAVENOUS ONCE
Status: COMPLETED | OUTPATIENT
Start: 2019-08-06 | End: 2019-08-06

## 2019-08-06 RX ORDER — ONDANSETRON 2 MG/ML
4 INJECTION INTRAMUSCULAR; INTRAVENOUS ONCE
Status: DISCONTINUED | OUTPATIENT
Start: 2019-08-06 | End: 2019-08-06

## 2019-08-06 RX ORDER — OXYCODONE HYDROCHLORIDE AND ACETAMINOPHEN 5; 325 MG/1; MG/1
1 TABLET ORAL EVERY 4 HOURS PRN
Qty: 12 TABLET | Refills: 0 | Status: SHIPPED | OUTPATIENT
Start: 2019-08-06 | End: 2019-08-16

## 2019-08-06 RX ORDER — DIPHENHYDRAMINE HYDROCHLORIDE 50 MG/ML
25 INJECTION INTRAMUSCULAR; INTRAVENOUS ONCE
Status: COMPLETED | OUTPATIENT
Start: 2019-08-06 | End: 2019-08-06

## 2019-08-06 RX ORDER — METOCLOPRAMIDE 10 MG/1
10 TABLET ORAL EVERY 6 HOURS
Qty: 30 TABLET | Refills: 0 | Status: SHIPPED | OUTPATIENT
Start: 2019-08-06 | End: 2020-04-15

## 2019-08-06 RX ORDER — MORPHINE SULFATE 4 MG/ML
4 INJECTION, SOLUTION INTRAMUSCULAR; INTRAVENOUS ONCE
Status: COMPLETED | OUTPATIENT
Start: 2019-08-06 | End: 2019-08-06

## 2019-08-06 RX ORDER — DEXAMETHASONE SODIUM PHOSPHATE 4 MG/ML
10 INJECTION, SOLUTION INTRA-ARTICULAR; INTRALESIONAL; INTRAMUSCULAR; INTRAVENOUS; SOFT TISSUE ONCE
Status: COMPLETED | OUTPATIENT
Start: 2019-08-06 | End: 2019-08-06

## 2019-08-06 RX ORDER — BUTALBITAL, ACETAMINOPHEN AND CAFFEINE 50; 325; 40 MG/1; MG/1; MG/1
1 TABLET ORAL EVERY 4 HOURS PRN
Qty: 30 TABLET | Refills: 0 | Status: SHIPPED | OUTPATIENT
Start: 2019-08-06 | End: 2020-04-15

## 2019-08-06 RX ADMIN — DIPHENHYDRAMINE HYDROCHLORIDE 25 MG: 50 INJECTION, SOLUTION INTRAMUSCULAR; INTRAVENOUS at 01:28

## 2019-08-06 RX ADMIN — SODIUM CHLORIDE 1000 ML: 0.9 INJECTION, SOLUTION INTRAVENOUS at 01:26

## 2019-08-06 RX ADMIN — DEXAMETHASONE SODIUM PHOSPHATE 10 MG: 4 INJECTION, SOLUTION INTRAMUSCULAR; INTRAVENOUS at 01:30

## 2019-08-06 RX ADMIN — MORPHINE SULFATE 4 MG: 4 INJECTION INTRAVENOUS at 01:32

## 2019-08-06 RX ADMIN — METOCLOPRAMIDE 10 MG: 5 INJECTION, SOLUTION INTRAMUSCULAR; INTRAVENOUS at 01:35
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
## 2019-09-21 ENCOUNTER — APPOINTMENT (EMERGENCY)
Dept: CT IMAGING | Facility: HOSPITAL | Age: 24
End: 2019-09-21
Payer: COMMERCIAL

## 2019-09-21 ENCOUNTER — HOSPITAL ENCOUNTER (EMERGENCY)
Facility: HOSPITAL | Age: 24
Discharge: HOME | End: 2019-09-21
Attending: EMERGENCY MEDICINE | Admitting: EMERGENCY MEDICINE
Payer: COMMERCIAL

## 2019-09-21 VITALS
HEART RATE: 66 BPM | RESPIRATION RATE: 16 BRPM | SYSTOLIC BLOOD PRESSURE: 103 MMHG | WEIGHT: 95.24 LBS | HEIGHT: 59 IN | OXYGEN SATURATION: 100 % | BODY MASS INDEX: 19.2 KG/M2 | TEMPERATURE: 97.2 F | DIASTOLIC BLOOD PRESSURE: 63 MMHG

## 2019-09-21 DIAGNOSIS — L02.31 ABSCESS OF LEFT BUTTOCK: Primary | ICD-10-CM

## 2019-09-21 LAB
ANION GAP SERPL CALCULATED.3IONS-SCNC: 8 MMOL/L (ref 4–13)
BACTERIA UR QL AUTO: ABNORMAL /HPF
BASOPHILS # BLD AUTO: 0.05 THOUSANDS/ΜL (ref 0–0.1)
BASOPHILS NFR BLD AUTO: 1 % (ref 0–1)
BILIRUB UR QL STRIP: NEGATIVE
BUN SERPL-MCNC: 5 MG/DL (ref 5–25)
CALCIUM SERPL-MCNC: 9.1 MG/DL (ref 8.3–10.1)
CHLORIDE SERPL-SCNC: 103 MMOL/L (ref 100–108)
CLARITY UR: ABNORMAL
CO2 SERPL-SCNC: 28 MMOL/L (ref 21–32)
COLOR UR: YELLOW
CREAT SERPL-MCNC: 0.58 MG/DL (ref 0.6–1.3)
EOSINOPHIL # BLD AUTO: 0.13 THOUSAND/ΜL (ref 0–0.61)
EOSINOPHIL NFR BLD AUTO: 2 % (ref 0–6)
ERYTHROCYTE [DISTWIDTH] IN BLOOD BY AUTOMATED COUNT: 12.2 % (ref 11.6–15.1)
EXT PREG TEST URINE: NEGATIVE
EXT. CONTROL ED NAV: NORMAL
GFR SERPL CREATININE-BSD FRML MDRD: 130 ML/MIN/1.73SQ M
GLUCOSE SERPL-MCNC: 91 MG/DL (ref 65–140)
GLUCOSE UR STRIP-MCNC: NEGATIVE MG/DL
HCT VFR BLD AUTO: 36.6 % (ref 34.8–46.1)
HGB BLD-MCNC: 12.3 G/DL (ref 11.5–15.4)
HGB UR QL STRIP.AUTO: NEGATIVE
IMM GRANULOCYTES # BLD AUTO: 0.02 THOUSAND/UL (ref 0–0.2)
IMM GRANULOCYTES NFR BLD AUTO: 0 % (ref 0–2)
KETONES UR STRIP-MCNC: NEGATIVE MG/DL
LEUKOCYTE ESTERASE UR QL STRIP: ABNORMAL
LYMPHOCYTES # BLD AUTO: 1.39 THOUSANDS/ΜL (ref 0.6–4.47)
LYMPHOCYTES NFR BLD AUTO: 16 % (ref 14–44)
MCH RBC QN AUTO: 31.2 PG (ref 26.8–34.3)
MCHC RBC AUTO-ENTMCNC: 33.6 G/DL (ref 31.4–37.4)
MCV RBC AUTO: 93 FL (ref 82–98)
MONOCYTES # BLD AUTO: 0.52 THOUSAND/ΜL (ref 0.17–1.22)
MONOCYTES NFR BLD AUTO: 6 % (ref 4–12)
NEUTROPHILS # BLD AUTO: 6.56 THOUSANDS/ΜL (ref 1.85–7.62)
NEUTS SEG NFR BLD AUTO: 75 % (ref 43–75)
NITRITE UR QL STRIP: NEGATIVE
NON-SQ EPI CELLS URNS QL MICRO: ABNORMAL /HPF
NRBC BLD AUTO-RTO: 0 /100 WBCS
PH UR STRIP.AUTO: 7 [PH] (ref 4.5–8)
PLATELET # BLD AUTO: 212 THOUSANDS/UL (ref 149–390)
PMV BLD AUTO: 9.7 FL (ref 8.9–12.7)
POTASSIUM SERPL-SCNC: 3.7 MMOL/L (ref 3.5–5.3)
PROT UR STRIP-MCNC: NEGATIVE MG/DL
RBC # BLD AUTO: 3.94 MILLION/UL (ref 3.81–5.12)
RBC #/AREA URNS AUTO: ABNORMAL /HPF
SODIUM SERPL-SCNC: 139 MMOL/L (ref 136–145)
SP GR UR STRIP.AUTO: 1.02 (ref 1–1.03)
UROBILINOGEN UR QL STRIP.AUTO: 0.2 E.U./DL
WBC # BLD AUTO: 8.67 THOUSAND/UL (ref 4.31–10.16)
WBC #/AREA URNS AUTO: ABNORMAL /HPF

## 2019-09-21 PROCEDURE — 81001 URINALYSIS AUTO W/SCOPE: CPT

## 2019-09-21 PROCEDURE — 96374 THER/PROPH/DIAG INJ IV PUSH: CPT

## 2019-09-21 PROCEDURE — 80048 BASIC METABOLIC PNL TOTAL CA: CPT | Performed by: PHYSICIAN ASSISTANT

## 2019-09-21 PROCEDURE — 99284 EMERGENCY DEPT VISIT MOD MDM: CPT | Performed by: PHYSICIAN ASSISTANT

## 2019-09-21 PROCEDURE — 81025 URINE PREGNANCY TEST: CPT | Performed by: PHYSICIAN ASSISTANT

## 2019-09-21 PROCEDURE — 85025 COMPLETE CBC W/AUTO DIFF WBC: CPT | Performed by: PHYSICIAN ASSISTANT

## 2019-09-21 PROCEDURE — 36415 COLL VENOUS BLD VENIPUNCTURE: CPT | Performed by: PHYSICIAN ASSISTANT

## 2019-09-21 PROCEDURE — 96361 HYDRATE IV INFUSION ADD-ON: CPT

## 2019-09-21 PROCEDURE — 99284 EMERGENCY DEPT VISIT MOD MDM: CPT

## 2019-09-21 PROCEDURE — 10061 I&D ABSCESS COMP/MULTIPLE: CPT | Performed by: PHYSICIAN ASSISTANT

## 2019-09-21 PROCEDURE — 74177 CT ABD & PELVIS W/CONTRAST: CPT

## 2019-09-21 RX ORDER — LIDOCAINE HYDROCHLORIDE AND EPINEPHRINE 10; 10 MG/ML; UG/ML
5 INJECTION, SOLUTION INFILTRATION; PERINEURAL ONCE
Status: COMPLETED | OUTPATIENT
Start: 2019-09-21 | End: 2019-09-21

## 2019-09-21 RX ORDER — SULFAMETHOXAZOLE AND TRIMETHOPRIM 800; 160 MG/1; MG/1
1 TABLET ORAL 2 TIMES DAILY
Qty: 14 TABLET | Refills: 0 | Status: SHIPPED | OUTPATIENT
Start: 2019-09-21 | End: 2019-09-28

## 2019-09-21 RX ORDER — CEPHALEXIN 250 MG/1
250 CAPSULE ORAL 4 TIMES DAILY
Qty: 28 CAPSULE | Refills: 0 | Status: SHIPPED | OUTPATIENT
Start: 2019-09-21 | End: 2019-09-28

## 2019-09-21 RX ADMIN — IOHEXOL 100 ML: 350 INJECTION, SOLUTION INTRAVENOUS at 13:14

## 2019-09-21 RX ADMIN — LIDOCAINE HYDROCHLORIDE,EPINEPHRINE BITARTRATE 5 ML: 10; .01 INJECTION, SOLUTION INFILTRATION; PERINEURAL at 15:25

## 2019-09-21 RX ADMIN — MORPHINE SULFATE 2 MG: 2 INJECTION, SOLUTION INTRAMUSCULAR; INTRAVENOUS at 12:13

## 2019-09-21 RX ADMIN — SODIUM CHLORIDE 1000 ML: 0.9 INJECTION, SOLUTION INTRAVENOUS at 12:04

## 2019-09-22 NOTE — ED PROVIDER NOTES
History  Chief Complaint   Patient presents with    Abscess     pt reports abscess in base of spine that she first noticed yesterday  Pt states she expressed some drainage from area yesterday, but since then, area has become larger and harder and is no longer draining     Cece Rodriguez is a 26-year-old female presenting with "abscess" to left perianal area x 2 days  Patient states pain has worsened over the past 1 day, and is provoked by sitting and by ambulation  Patient also describes spontaneous discharge from abscess yesterday which she describes as white  Denies fevers or chills at home  Denies recent trauma or injury to region  Patient does describe prior history of abscess to axilla, denies prior known history of MRSA  Denies abdominal pain, diarrhea, constipation  Denies rectal bleeding or blood in stool  History provided by:  Patient   used: No    Abscess   Abscess location: Perianal area  Size:  2 5 cm  Abscess quality: induration, painful and redness    Red streaking: no    Duration:  2 days  Progression:  Worsening  Pain details:     Severity:  Severe    Timing:  Constant    Progression:  Worsening  Chronicity:  New  Context: not diabetes, not injected drug use and not skin injury    Relieved by:  Nothing  Exacerbated by: Movement, palpation  Associated symptoms: no fever, no nausea and no vomiting    Risk factors: prior abscess    Risk factors: no family hx of MRSA and no hx of MRSA        Prior to Admission Medications   Prescriptions Last Dose Informant Patient Reported? Taking?    butalbital-acetaminophen-caffeine (FIORICET,ESGIC) -40 mg per tablet   No No   Sig: Take 1 tablet by mouth every 4 (four) hours as needed for headaches   metoclopramide (REGLAN) 10 mg tablet   No No   Sig: Take 1 tablet (10 mg total) by mouth every 6 (six) hours      Facility-Administered Medications: None       Past Medical History:   Diagnosis Date    Anemia     Anxiety     no treatment   Bipolar disorder ([facility] [address] )     took latuda and stopped in february when found out pregnant    Chlamydia infection 2013    GERD (gastroesophageal reflux disease)     Hearing loss     Heart murmur     Helicobacter pylori infection     last assessed 02/16/17    History of methicillin resistant staphylococcus aureus (MRSA)     Negative Nasal Culture 6/2014 - Isolation discontinued 1/7/2018 (axilla wounds 2010)    Perforated left tympanic membrane on examination     last assessed 01/10/17    Trauma 2013    First son's father    Varicella 2000       Past Surgical History:   Procedure Laterality Date    ABDOMINAL SURGERY      APPENDECTOMY      CHOLECYSTECTOMY  02/2015    lap    MYRINGOTOMY      OTHER SURGICAL HISTORY      Nexplanon removal    WA COLONOSCOPY FLX DX W/COLLJ SPEC WHEN PFRMD N/A 1/19/2017    Procedure: EGD AND COLONOSCOPY;  Surgeon: Camacho Orosco DO;  Location: [facility]; Service: Gastroenterology    WA LAP,APPENDECTOMY N/A 5/17/2016    Procedure: Denisse Silverio;  Surgeon: Omid Schreiber DO;  [facility];  Service: General    WA LAP,DIAGNOSTIC ABDOMEN N/A 3/13/2016    Procedure: LAPAROSCOPY DIAGNOSTIC;  Surgeon: Trevin Cottrell MD;  [facility];  Service: General    SMALL INTESTINE SURGERY      twisted bowels       Family History   Problem Relation Age of Onset    Diabetes Maternal Grandmother     Hypothyroidism Maternal Grandmother     No Known Problems Child     No Known Problems Other     Colon cancer Family     Asthma Mother     No Known Problems Father      I have reviewed and agree with the history as documented      Social History     Tobacco Use    Smoking status: Light Tobacco Smoker     Packs/day: 0 25     Years: 5 00     Pack years: 1 25     Types: Cigarettes    Smokeless tobacco: Never Used   Substance Use Topics    Alcohol use: Yes     Comment: occasionally    Drug use: Not Currently     Types: Marijuana        Review of Systems   Constitutional: Negative for chills and fever  HENT: Negative for congestion, rhinorrhea and sore throat  Eyes: Negative for pain and visual disturbance  Respiratory: Negative for cough, shortness of breath and wheezing  Cardiovascular: Negative for chest pain and palpitations  Gastrointestinal: Negative for abdominal pain, anal bleeding, blood in stool, constipation, nausea and vomiting  Genitourinary: Negative for dysuria, frequency and urgency  Musculoskeletal: Negative for back pain, neck pain and neck stiffness  Skin: Negative for rash and wound  Positive for abscess  Neurological: Negative for dizziness, weakness, light-headedness and numbness  Physical Exam  Physical Exam   Constitutional: She is oriented to person, place, and time  She appears well-developed and well-nourished  No distress  HENT:   Head: Normocephalic and atraumatic  Right Ear: External ear normal    Left Ear: External ear normal    Mouth/Throat: Oropharynx is clear and moist    Eyes: Pupils are equal, round, and reactive to light  Conjunctivae and EOM are normal    Neck: Normal range of motion  Neck supple  Cardiovascular: Normal rate, regular rhythm, normal heart sounds and intact distal pulses  Exam reveals no gallop and no friction rub  No murmur heard  Pulmonary/Chest: Effort normal and breath sounds normal  No respiratory distress  She has no wheezes  Abdominal: Soft  She exhibits no distension  There is no tenderness  There is no rebound and no guarding  Genitourinary:         Lymphadenopathy:     She has no cervical adenopathy  Neurological: She is alert and oriented to person, place, and time  She exhibits normal muscle tone  Coordination normal    Skin: Skin is warm and dry  Capillary refill takes less than 2 seconds  No rash noted  She is not diaphoretic  No erythema  Psychiatric: She has a normal mood and affect   Her behavior is normal  Judgment and thought content normal        Vital Signs  ED Triage Vitals [09/21/19 1139]   Temperature Pulse Respirations Blood Pressure SpO2   (!) 97 2 °F (36 2 °C) 80 16 117/68 97 %      Temp Source Heart Rate Source Patient Position - Orthostatic VS BP Location FiO2 (%)   Oral Monitor Sitting Right arm --      Pain Score       8           Vitals:    09/21/19 1139 09/21/19 1323 09/21/19 1522   BP: 117/68 118/68 103/63   Pulse: 80 87 66   Patient Position - Orthostatic VS: Sitting Lying Lying         Visual Acuity      ED Medications  Medications   sodium chloride 0 9 % bolus 1,000 mL (0 mL Intravenous Stopped 9/21/19 1304)   morphine injection 2 mg (2 mg Intravenous Given 9/21/19 1213)   iohexol (OMNIPAQUE) 350 MG/ML injection (MULTI-DOSE) 100 mL (100 mL Intravenous Given 9/21/19 1314)   lidocaine-epinephrine (XYLOCAINE/EPINEPHRINE) 1 %-1:100,000 injection 5 mL (5 mL Infiltration Given by Other 9/21/19 1525)       Diagnostic Studies  Results Reviewed     Procedure Component Value Units Date/Time    Urine Microscopic [[id number]]  (Abnormal) Collected:  09/21/19 1201    Lab Status:  Final result Specimen:  Urine, Clean Catch Updated:  09/21/19 1227     RBC, UA 1-2 /hpf      WBC, UA 4-10 /hpf      Epithelial Cells Occasional /hpf      Bacteria, UA Occasional /hpf     Basic metabolic panel [[id number]]  (Abnormal) Collected:  09/21/19 1204    Lab Status:  Final result Specimen:  Blood from Arm, Right Updated:  09/21/19 1218     Sodium 139 mmol/L      Potassium 3 7 mmol/L      Chloride 103 mmol/L      CO2 28 mmol/L      ANION GAP 8 mmol/L      BUN 5 mg/dL      Creatinine 0 58 mg/dL      Glucose 91 mg/dL      Calcium 9 1 mg/dL      eGFR 130 ml/min/1 73sq m     Narrative:       Sheron guidelines for Chronic Kidney Disease (CKD):     Stage 1 with normal or high GFR (GFR > 90 mL/min/1 73 square meters)    Stage 2 Mild CKD (GFR = 60-89 mL/min/1 73 square meters)    Stage 3A Moderate CKD (GFR = 45-59 mL/min/1 73 square meters)    Stage 3B Moderate CKD (GFR = 30-44 mL/min/1 73 square meters)    Stage 4 Severe CKD (GFR = 15-29 mL/min/1 73 square meters)    Stage 5 End Stage CKD (GFR <15 mL/min/1 73 square meters)  Note: GFR calculation is accurate only with a steady state creatinine    CBC and differential [[id number]] Collected:  09/21/19 1204    Lab Status:  Final result Specimen:  Blood from Arm, Right Updated:  09/21/19 1210     WBC 8 67 Thousand/uL      RBC 3 94 Million/uL      Hemoglobin 12 3 g/dL      Hematocrit 36 6 %      MCV 93 fL      MCH 31 2 pg      MCHC 33 6 g/dL      RDW 12 2 %      MPV 9 7 fL      Platelets 905 Thousands/uL      nRBC 0 /100 WBCs      Neutrophils Relative 75 %      Immat GRANS % 0 %      Lymphocytes Relative 16 %      Monocytes Relative 6 %      Eosinophils Relative 2 %      Basophils Relative 1 %      Neutrophils Absolute 6 56 Thousands/µL      Immature Grans Absolute 0 02 Thousand/uL      Lymphocytes Absolute 1 39 Thousands/µL      Monocytes Absolute 0 52 Thousand/µL      Eosinophils Absolute 0 13 Thousand/µL      Basophils Absolute 0 05 Thousands/µL     POCT urinalysis dipstick [[id number]]  (Abnormal) Resulted:  09/21/19 1205    Lab Status:  Final result Specimen:  Urine Updated:  09/21/19 1205    POCT pregnancy, urine [[id number]]  (Normal) Resulted:  09/21/19 1204    Lab Status:  Final result Updated:  09/21/19 1205     EXT PREG TEST UR (Ref: Negative) negative     Control valid    ED Urine Macroscopic [[id number]]  (Abnormal) Collected:  09/21/19 1201    Lab Status:  Final result Specimen:  Urine Updated:  09/21/19 1203     Color, UA Yellow     Clarity, UA Slightly Cloudy     pH, UA 7 0     Leukocytes, UA Trace     Nitrite, UA Negative     Protein, UA Negative mg/dl      Glucose, UA Negative mg/dl      Ketones, UA Negative mg/dl      Urobilinogen, UA 0 2 E U /dl      Bilirubin, UA Negative     Blood, UA Negative     Specific Gravity, UA 1 020    Narrative:       CLINITEK RESULT CT abdomen pelvis with contrast   Final Result by Leisa Crawford MD (09/21 3974)      Tiny subcutaneous collection of approximately 1 cm with surrounding cellulitis  No evidence for involvement of the deep or pelvic tissue planes are issue rectal fossa  No discrete fistulous connection identified  The study was marked in [facility] for immediate notification  Workstation performed: [id number]                    Procedures  Incision and drain  Date/Time: 9/22/2019 6:11 PM  Performed by: Alyce Greene PA-C  Authorized by: Alyce Greene PA-C     Patient location:  ED  Other Assisting Provider: No    Consent:     Consent obtained:  Verbal    Consent given by:  Patient    Risks discussed:  Bleeding, incomplete drainage, pain, infection and damage to other organs    Alternatives discussed:  No treatment, delayed treatment, alternative treatment, observation and referral  Universal protocol:     Procedure explained and questions answered to patient or proxy's satisfaction: yes      Relevant documents present and verified: yes      Patient identity confirmed:  Verbally with patient  Location:     Type:  Abscess    Size:  3 cm    Location:  Anogenital    Anogenital location:  Perianal  Pre-procedure details:     Skin preparation:  Betadine  Anesthesia (see MAR for exact dosages): Anesthesia method:  Local infiltration    Local anesthetic:  Lidocaine 1% WITH epi  Procedure details:     Complexity:  Intermediate    Incision types:  Stab incision    Scalpel blade:  11    Wound management:  Irrigated with saline and probed and deloculated    Drainage:  Purulent    Drainage amount: Moderate    Wound treatment:  Wound left open    Packing materials:  None (Wound dressed with gauze )  Post-procedure details:     Patient tolerance of procedure:   Tolerated well, no immediate complications           ED Course                               MDM  Number of Diagnoses or Management Options  Abscess of left buttock:   Diagnosis management comments: Left-sided indurated, fluctuant abscess of buttocks in left perianal region  Blood work and CT obtained due to proximity to anus/rectum  CT reveals subcutaneous abscess with surrounding cellulitis, no evidence of involvement of deep/pelvic tissue planes or fistula  Abscess drain for moderate purulent discharge in the ED  Will place patient on Keflex and Bactrim x7 days  Patient is not currently see PCP, advised follow up with primary care  Patient instructed to return to ED symptoms for any new or worsening symptoms including limited to worsening pain, spreading of redness, fevers or chills, rectal pain, or any new worsening symptoms of concern  Patient verbalizes understanding these instructions  Amount and/or Complexity of Data Reviewed  Clinical lab tests: ordered and reviewed  Tests in the radiology section of CPT®: ordered and reviewed    Patient Progress  Patient progress: improved      Disposition  Final diagnoses:   Abscess of left buttock     Time reflects when diagnosis was documented in both MDM as applicable and the Disposition within this note     Time User Action Codes Description Comment    9/21/2019  4:14 PM Primus Howard Add [L02 31] Abscess of left buttock       ED Disposition     ED Disposition Condition Date/Time Comment    Discharge Stable Sat Sep 21, 2019  4:13 PM Wilberto Quinonez discharge to home/self care              Follow-up Information     Follow up With Specialties Details Why Contact Info Additional [address] [facility]  For general follow up  [address] [place] [id number]  [address] [place] [address],[address], [phone]          Discharge Medication List as of 9/21/2019  4:15 PM      START taking these medications    Details   cephalexin (KEFLEX) 250 mg capsule Take 1 capsule (250 mg total) by mouth 4 (four) times a day for 7 days, Starting Sat 9/21/2019, Until Sat 9/28/2019, Print      sulfamethoxazole-trimethoprim (BACTRIM DS) 800-160 mg per tablet Take 1 tablet by mouth 2 (two) times a day for 7 days smx-tmp DS (BACTRIM) 800-160 mg tabs (1tab q12 D10), Starting Sat 9/21/2019, Until Sat 9/28/2019, Print         CONTINUE these medications which have NOT CHANGED    Details   butalbital-acetaminophen-caffeine (FIORICET,ESGIC) -40 mg per tablet Take 1 tablet by mouth every 4 (four) hours as needed for headaches, Starting Tue 8/6/2019, Print      metoclopramide (REGLAN) 10 mg tablet Take 1 tablet (10 mg total) by mouth every 6 (six) hours, Starting Tue 8/6/2019, Print           No discharge procedures on file      ED Provider  Electronically Signed by           Td Perez PA-C  09/22/19 7918

## 2020-02-18 ENCOUNTER — APPOINTMENT (EMERGENCY)
Dept: ULTRASOUND IMAGING | Facility: HOSPITAL | Age: 25
End: 2020-02-18
Payer: COMMERCIAL

## 2020-02-18 ENCOUNTER — HOSPITAL ENCOUNTER (EMERGENCY)
Facility: HOSPITAL | Age: 25
Discharge: HOME | End: 2020-02-18
Attending: EMERGENCY MEDICINE | Admitting: EMERGENCY MEDICINE
Payer: COMMERCIAL

## 2020-02-18 VITALS
OXYGEN SATURATION: 99 % | BODY MASS INDEX: 19.06 KG/M2 | WEIGHT: 94.36 LBS | DIASTOLIC BLOOD PRESSURE: 77 MMHG | SYSTOLIC BLOOD PRESSURE: 121 MMHG | RESPIRATION RATE: 18 BRPM | TEMPERATURE: 98.4 F | HEART RATE: 70 BPM

## 2020-02-18 DIAGNOSIS — R11.2 NAUSEA AND VOMITING: ICD-10-CM

## 2020-02-18 DIAGNOSIS — R51.9 HEADACHE: ICD-10-CM

## 2020-02-18 DIAGNOSIS — R19.7 DIARRHEA: ICD-10-CM

## 2020-02-18 DIAGNOSIS — M79.10 MYALGIA: ICD-10-CM

## 2020-02-18 DIAGNOSIS — Z34.90 INTRAUTERINE PREGNANCY: Primary | ICD-10-CM

## 2020-02-18 DIAGNOSIS — R10.9 RIGHT FLANK PAIN: ICD-10-CM

## 2020-02-18 LAB
ABO GROUP BLD: NORMAL
ALBUMIN SERPL BCP-MCNC: 3.5 G/DL (ref 3.5–5)
ALP SERPL-CCNC: 119 U/L (ref 46–116)
ALT SERPL W P-5'-P-CCNC: 19 U/L (ref 12–78)
AMORPH PHOS CRY URNS QL MICRO: ABNORMAL /HPF
ANION GAP SERPL CALCULATED.3IONS-SCNC: 11 MMOL/L (ref 4–13)
AST SERPL W P-5'-P-CCNC: 20 U/L (ref 5–45)
B-HCG SERPL-ACNC: ABNORMAL MIU/ML
BACTERIA UR QL AUTO: ABNORMAL /HPF
BASOPHILS # BLD AUTO: 0.02 THOUSANDS/ΜL (ref 0–0.1)
BASOPHILS NFR BLD AUTO: 0 % (ref 0–1)
BILIRUB SERPL-MCNC: 0.31 MG/DL (ref 0.2–1)
BILIRUB UR QL STRIP: NEGATIVE
BLD GP AB SCN SERPL QL: NEGATIVE
BUN SERPL-MCNC: 8 MG/DL (ref 5–25)
CALCIUM SERPL-MCNC: 8.7 MG/DL (ref 8.3–10.1)
CHLORIDE SERPL-SCNC: 100 MMOL/L (ref 100–108)
CLARITY UR: CLEAR
CO2 SERPL-SCNC: 24 MMOL/L (ref 21–32)
COLOR UR: YELLOW
CREAT SERPL-MCNC: 0.47 MG/DL (ref 0.6–1.3)
EOSINOPHIL # BLD AUTO: 0.06 THOUSAND/ΜL (ref 0–0.61)
EOSINOPHIL NFR BLD AUTO: 1 % (ref 0–6)
ERYTHROCYTE [DISTWIDTH] IN BLOOD BY AUTOMATED COUNT: 13.4 % (ref 11.6–15.1)
EXT PREG TEST URINE: POSITIVE
EXT. CONTROL ED NAV: ABNORMAL
GFR SERPL CREATININE-BSD FRML MDRD: 140 ML/MIN/1.73SQ M
GLUCOSE SERPL-MCNC: 87 MG/DL (ref 65–140)
GLUCOSE UR STRIP-MCNC: NEGATIVE MG/DL
HCT VFR BLD AUTO: 33.1 % (ref 34.8–46.1)
HGB BLD-MCNC: 11.5 G/DL (ref 11.5–15.4)
HGB UR QL STRIP.AUTO: NEGATIVE
IMM GRANULOCYTES # BLD AUTO: 0.03 THOUSAND/UL (ref 0–0.2)
IMM GRANULOCYTES NFR BLD AUTO: 0 % (ref 0–2)
KETONES UR STRIP-MCNC: ABNORMAL MG/DL
LEUKOCYTE ESTERASE UR QL STRIP: ABNORMAL
LIPASE SERPL-CCNC: 66 U/L (ref 73–393)
LYMPHOCYTES # BLD AUTO: 1.33 THOUSANDS/ΜL (ref 0.6–4.47)
LYMPHOCYTES NFR BLD AUTO: 19 % (ref 14–44)
MCH RBC QN AUTO: 33 PG (ref 26.8–34.3)
MCHC RBC AUTO-ENTMCNC: 34.7 G/DL (ref 31.4–37.4)
MCV RBC AUTO: 95 FL (ref 82–98)
MONOCYTES # BLD AUTO: 0.35 THOUSAND/ΜL (ref 0.17–1.22)
MONOCYTES NFR BLD AUTO: 5 % (ref 4–12)
NEUTROPHILS # BLD AUTO: 5.15 THOUSANDS/ΜL (ref 1.85–7.62)
NEUTS SEG NFR BLD AUTO: 75 % (ref 43–75)
NITRITE UR QL STRIP: NEGATIVE
NON-SQ EPI CELLS URNS QL MICRO: ABNORMAL /HPF
NRBC BLD AUTO-RTO: 0 /100 WBCS
PH UR STRIP.AUTO: 7.5 [PH] (ref 4.5–8)
PLATELET # BLD AUTO: 225 THOUSANDS/UL (ref 149–390)
PMV BLD AUTO: 9.4 FL (ref 8.9–12.7)
POTASSIUM SERPL-SCNC: 3.7 MMOL/L (ref 3.5–5.3)
PROT SERPL-MCNC: 8 G/DL (ref 6.4–8.2)
PROT UR STRIP-MCNC: NEGATIVE MG/DL
RBC # BLD AUTO: 3.48 MILLION/UL (ref 3.81–5.12)
RBC #/AREA URNS AUTO: ABNORMAL /HPF
RH BLD: POSITIVE
SODIUM SERPL-SCNC: 135 MMOL/L (ref 136–145)
SP GR UR STRIP.AUTO: 1.02 (ref 1–1.03)
SPECIMEN EXPIRATION DATE: NORMAL
UROBILINOGEN UR QL STRIP.AUTO: >=8 E.U./DL
WBC # BLD AUTO: 6.94 THOUSAND/UL (ref 4.31–10.16)
WBC #/AREA URNS AUTO: ABNORMAL /HPF

## 2020-02-18 PROCEDURE — 80053 COMPREHEN METABOLIC PANEL: CPT | Performed by: EMERGENCY MEDICINE

## 2020-02-18 PROCEDURE — 81025 URINE PREGNANCY TEST: CPT | Performed by: EMERGENCY MEDICINE

## 2020-02-18 PROCEDURE — 86901 BLOOD TYPING SEROLOGIC RH(D): CPT | Performed by: EMERGENCY MEDICINE

## 2020-02-18 PROCEDURE — 96361 HYDRATE IV INFUSION ADD-ON: CPT

## 2020-02-18 PROCEDURE — 83690 ASSAY OF LIPASE: CPT | Performed by: EMERGENCY MEDICINE

## 2020-02-18 PROCEDURE — 99284 EMERGENCY DEPT VISIT MOD MDM: CPT

## 2020-02-18 PROCEDURE — 86850 RBC ANTIBODY SCREEN: CPT | Performed by: EMERGENCY MEDICINE

## 2020-02-18 PROCEDURE — 99284 EMERGENCY DEPT VISIT MOD MDM: CPT | Performed by: EMERGENCY MEDICINE

## 2020-02-18 PROCEDURE — 96374 THER/PROPH/DIAG INJ IV PUSH: CPT

## 2020-02-18 PROCEDURE — 84702 CHORIONIC GONADOTROPIN TEST: CPT | Performed by: EMERGENCY MEDICINE

## 2020-02-18 PROCEDURE — 81001 URINALYSIS AUTO W/SCOPE: CPT

## 2020-02-18 PROCEDURE — 85025 COMPLETE CBC W/AUTO DIFF WBC: CPT | Performed by: EMERGENCY MEDICINE

## 2020-02-18 PROCEDURE — 96375 TX/PRO/DX INJ NEW DRUG ADDON: CPT

## 2020-02-18 PROCEDURE — 36415 COLL VENOUS BLD VENIPUNCTURE: CPT | Performed by: EMERGENCY MEDICINE

## 2020-02-18 PROCEDURE — 86900 BLOOD TYPING SEROLOGIC ABO: CPT | Performed by: EMERGENCY MEDICINE

## 2020-02-18 RX ORDER — METOCLOPRAMIDE 10 MG/1
10 TABLET ORAL EVERY 6 HOURS
Qty: 10 TABLET | Refills: 0 | Status: SHIPPED | OUTPATIENT
Start: 2020-02-18 | End: 2020-04-15

## 2020-02-18 RX ORDER — DICYCLOMINE HCL 20 MG
20 TABLET ORAL 2 TIMES DAILY
Qty: 10 TABLET | Refills: 0 | Status: SHIPPED | OUTPATIENT
Start: 2020-02-18 | End: 2020-04-15

## 2020-02-18 RX ORDER — METOCLOPRAMIDE HYDROCHLORIDE 5 MG/ML
10 INJECTION INTRAMUSCULAR; INTRAVENOUS ONCE
Status: COMPLETED | OUTPATIENT
Start: 2020-02-18 | End: 2020-02-18

## 2020-02-18 RX ORDER — SUCRALFATE 1 G/1
1 TABLET ORAL 4 TIMES DAILY
Qty: 10 TABLET | Refills: 0 | Status: SHIPPED | OUTPATIENT
Start: 2020-02-18 | End: 2020-04-15

## 2020-02-18 RX ORDER — MORPHINE SULFATE 4 MG/ML
4 INJECTION, SOLUTION INTRAMUSCULAR; INTRAVENOUS ONCE
Status: COMPLETED | OUTPATIENT
Start: 2020-02-18 | End: 2020-02-18

## 2020-02-18 RX ORDER — LIDOCAINE HYDROCHLORIDE 20 MG/ML
15 SOLUTION OROPHARYNGEAL ONCE
Status: COMPLETED | OUTPATIENT
Start: 2020-02-18 | End: 2020-02-18

## 2020-02-18 RX ORDER — MAGNESIUM HYDROXIDE/ALUMINUM HYDROXICE/SIMETHICONE 120; 1200; 1200 MG/30ML; MG/30ML; MG/30ML
30 SUSPENSION ORAL ONCE
Status: COMPLETED | OUTPATIENT
Start: 2020-02-18 | End: 2020-02-18

## 2020-02-18 RX ADMIN — MORPHINE SULFATE 4 MG: 4 INJECTION INTRAVENOUS at 18:32

## 2020-02-18 RX ADMIN — FAMOTIDINE 20 MG: 10 INJECTION, SOLUTION INTRAVENOUS at 19:37

## 2020-02-18 RX ADMIN — SODIUM CHLORIDE 1000 ML: 0.9 INJECTION, SOLUTION INTRAVENOUS at 18:26

## 2020-02-18 RX ADMIN — METOCLOPRAMIDE 10 MG: 5 INJECTION, SOLUTION INTRAMUSCULAR; INTRAVENOUS at 18:35

## 2020-02-18 RX ADMIN — LIDOCAINE HYDROCHLORIDE 15 ML: 20 SOLUTION ORAL; TOPICAL at 19:38

## 2020-02-18 RX ADMIN — ALUMINUM HYDROXIDE, MAGNESIUM HYDROXIDE, AND SIMETHICONE 30 ML: 200; 200; 20 SUSPENSION ORAL at 19:37

## 2020-02-18 NOTE — ED PROVIDER NOTES
History  Chief Complaint   Patient presents with    Abdominal Pain     pt reports RL abdominal pain for 1 week  reports vomiting twice testerdau     Dizziness     pt reports feeling dizzy for 3 days     22 y o  F s/p appendectomy, s/p cholecystectomy p/w right flank pain x 3-4 days  Pt reports pain is constant but waxes and wanes  Having subjective fever, lightheadedness, myalgias, N/V/D, frontal headache  History provided by:  Patient   used: No    Abdominal Pain   Pain location:  R flank  Pain quality: sharp    Duration:  4 days  Timing:  Constant  Progression:  Waxing and waning  Chronicity:  New  Context: previous surgery    Relieved by:  None tried  Worsened by:  Nothing  Ineffective treatments:  None tried  Associated symptoms: diarrhea, fever (Subjective), nausea and vomiting    Associated symptoms: no chills, no constipation, no cough, no dysuria, no hematuria, no sore throat, no vaginal bleeding and no vaginal discharge    Dizziness   Associated symptoms: diarrhea, headaches (Frontal), nausea and vomiting    Associated symptoms: no blood in stool        Prior to Admission Medications   Prescriptions Last Dose Informant Patient Reported? Taking? butalbital-acetaminophen-caffeine (FIORICET,ESGIC) -40 mg per tablet   No No   Sig: Take 1 tablet by mouth every 4 (four) hours as needed for headaches   metoclopramide (REGLAN) 10 mg tablet Not Taking at Unknown time  No No   Sig: Take 1 tablet (10 mg total) by mouth every 6 (six) hours   Patient not taking: Reported on 2/18/2020      Facility-Administered Medications: None       Past Medical History:   Diagnosis Date    Anemia     Anxiety     no treatment      Bipolar disorder ([facility] [address] )     took latuda and stopped in february when found out pregnant    Chlamydia infection 2013    GERD (gastroesophageal reflux disease)     Hearing loss     Heart murmur     Helicobacter pylori infection     last assessed 02/16/17    History of methicillin resistant staphylococcus aureus (MRSA)     Negative Nasal Culture 6/2014 - Isolation discontinued 1/7/2018 (axilla wounds 2010)    Perforated left tympanic membrane on examination     last assessed 01/10/17    Trauma 2013    First son's father    Varicella 2000       Past Surgical History:   Procedure Laterality Date    ABDOMINAL SURGERY      APPENDECTOMY      CHOLECYSTECTOMY  02/2015    lap    MYRINGOTOMY      OTHER SURGICAL HISTORY      Nexplanon removal    IN COLONOSCOPY FLX DX W/COLLJ SPEC WHEN PFRMD N/A 1/19/2017    Procedure: EGD AND COLONOSCOPY;  Surgeon: Grant Grajeda DO;  Location: [facility]; Service: Gastroenterology    IN LAP,APPENDECTOMY N/A 5/17/2016    Procedure: Juan Prophet;  Surgeon: Cornelio Garcia DO;  Location: [facility];  Service: General    IN LAP,DIAGNOSTIC ABDOMEN N/A 3/13/2016    Procedure: LAPAROSCOPY DIAGNOSTIC;  Surgeon: Jeannie Lockett MD;  Location: [facility];  Service: General    SMALL INTESTINE SURGERY      twisted bowels       Family History   Problem Relation Age of Onset    Diabetes Maternal Grandmother     Hypothyroidism Maternal Grandmother     No Known Problems Child     No Known Problems Other     Colon cancer Family     Asthma Mother     No Known Problems Father      I have reviewed and agree with the history as documented  Social History     Tobacco Use    Smoking status: Light Tobacco Smoker     Packs/day: 0 25     Years: 5 00     Pack years: 1 25     Types: Cigarettes    Smokeless tobacco: Never Used   Substance Use Topics    Alcohol use: Yes     Comment: occasionally    Drug use: Not Currently     Types: Marijuana       Review of Systems   Constitutional: Positive for fever (Subjective)  Negative for appetite change and chills  HENT: Negative for congestion, rhinorrhea and sore throat  Respiratory: Negative for cough  Gastrointestinal: Positive for abdominal pain, diarrhea, nausea and vomiting   Negative for abdominal distention, blood in stool and constipation  Genitourinary: Positive for frequency  Negative for dysuria, flank pain, hematuria, urgency, vaginal bleeding and vaginal discharge  Musculoskeletal: Positive for myalgias  Negative for back pain  Neurological: Positive for syncope, light-headedness and headaches (Frontal)  Negative for dizziness  All other systems reviewed and are negative  Physical Exam  Physical Exam   Constitutional: She is oriented to person, place, and time  She appears well-developed and well-nourished  No distress  HENT:   Head: Normocephalic  Mouth/Throat: Oropharynx is clear and moist and mucous membranes are normal    Neck: Normal range of motion  Neck supple  Cardiovascular: Normal rate, regular rhythm, normal heart sounds and intact distal pulses  Exam reveals no gallop and no friction rub  No murmur heard  Pulmonary/Chest: Effort normal and breath sounds normal  No respiratory distress  She has no wheezes  She has no rales  Abdominal: Soft  Normal appearance and bowel sounds are normal  She exhibits no distension and no mass  There is no hepatosplenomegaly  There is no tenderness  There is no rigidity, no rebound, no guarding, no CVA tenderness, no tenderness at McBurney's point and negative Soto's sign  Lymphadenopathy:     She has no cervical adenopathy  Neurological: She is alert and oriented to person, place, and time  Skin: Skin is warm, dry and intact  No rash noted  No pallor  Nursing note and vitals reviewed        Vital Signs  ED Triage Vitals   Temperature Pulse Respirations Blood Pressure SpO2   02/18/20 1710 02/18/20 1710 02/18/20 1710 02/18/20 1710 02/18/20 1710   98 4 °F (36 9 °C) 79 18 103/64 97 %      Temp Source Heart Rate Source Patient Position - Orthostatic VS BP Location FiO2 (%)   02/18/20 1710 02/18/20 1710 02/18/20 1710 02/18/20 1710 --   Oral Monitor Sitting Right arm       Pain Score       02/18/20 1825       8 Vitals:    02/18/20 1710 02/18/20 1825   BP: 103/64 121/77   Pulse: 79 70   Patient Position - Orthostatic VS: Sitting Sitting         Visual Acuity      ED Medications  Medications   sodium chloride 0 9 % bolus 1,000 mL (1,000 mL Intravenous New Bag 2/18/20 1826)   morphine (PF) 4 mg/mL injection 4 mg (4 mg Intravenous Given 2/18/20 1832)   metoclopramide (REGLAN) injection 10 mg (10 mg Intravenous Given 2/18/20 1835)   famotidine (PEPCID) injection 20 mg (20 mg Intravenous Given 2/18/20 1937)   aluminum-magnesium hydroxide-simethicone (MYLANTA) 200-200-20 mg/5 mL oral suspension 30 mL (30 mL Oral Given 2/18/20 1937)   Lidocaine Viscous HCl (XYLOCAINE) 2 % mucosal solution 15 mL (15 mL Swish & Spit Given 2/18/20 1938)       Diagnostic Studies  Results Reviewed     Procedure Component Value Units Date/Time    Quantitative hCG [[id number]]  (Abnormal) Collected:  02/18/20 1820    Lab Status:  Final result Specimen:  Blood from Arm, Left Updated:  02/18/20 1947     HCG, Quant 46,241 0 mIU/mL     Narrative:        Expected Ranges:     Approximate               Approximate HCG  Gestation age          Concentration ( mIU/mL)  _____________          ______________________   [facility]                      HCG values  0 2-1                       5-50  1-2                           2-3                         100-5000  3-4                         500-97450  4-5                         1000-03279  5-6                         [id number]  6-8                         27132-257401  8-12                        [id number]      Urine Microscopic [[id number]]  (Abnormal) Collected:  02/18/20 1808    Lab Status:  Final result Specimen:  Urine, Clean Catch Updated:  02/18/20 1937     RBC, UA None Seen /hpf      WBC, UA 2-4 /hpf      Epithelial Cells Occasional /hpf      Bacteria, UA Occasional /hpf      AMORPH PHOSPATES Moderate /hpf     Comprehensive metabolic panel [[id number]]  (Abnormal) Collected:  02/18/20 1820    Lab Status:  Final result Specimen:  Blood from Arm, Left Updated:  02/18/20 1907     Sodium 135 mmol/L      Potassium 3 7 mmol/L      Chloride 100 mmol/L      CO2 24 mmol/L      ANION GAP 11 mmol/L      BUN 8 mg/dL      Creatinine 0 47 mg/dL      Glucose 87 mg/dL      Calcium 8 7 mg/dL      AST 20 U/L      ALT 19 U/L      Alkaline Phosphatase 119 U/L      Total Protein 8 0 g/dL      Albumin 3 5 g/dL      Total Bilirubin 0 31 mg/dL      eGFR 140 ml/min/1 73sq m     Narrative:       National Kidney Disease Foundation guidelines for Chronic Kidney Disease (CKD):     Stage 1 with normal or high GFR (GFR > 90 mL/min/1 73 square meters)    Stage 2 Mild CKD (GFR = 60-89 mL/min/1 73 square meters)    Stage 3A Moderate CKD (GFR = 45-59 mL/min/1 73 square meters)    Stage 3B Moderate CKD (GFR = 30-44 mL/min/1 73 square meters)    Stage 4 Severe CKD (GFR = 15-29 mL/min/1 73 square meters)    Stage 5 End Stage CKD (GFR <15 mL/min/1 73 square meters)  Note: GFR calculation is accurate only with a steady state creatinine    Lipase [[id number]]  (Abnormal) Collected:  02/18/20 1820    Lab Status:  Final result Specimen:  Blood from Arm, Left Updated:  02/18/20 1907     Lipase 66 u/L     CBC and differential [[id number]]  (Abnormal) Collected:  02/18/20 1820    Lab Status:  Final result Specimen:  Blood from Arm, Left Updated:  02/18/20 1836     WBC 6 94 Thousand/uL      RBC 3 48 Million/uL      Hemoglobin 11 5 g/dL      Hematocrit 33 1 %      MCV 95 fL      MCH 33 0 pg      MCHC 34 7 g/dL      RDW 13 4 %      MPV 9 4 fL      Platelets 784 Thousands/uL      nRBC 0 /100 WBCs      Neutrophils Relative 75 %      Immat GRANS % 0 %      Lymphocytes Relative 19 %      Monocytes Relative 5 %      Eosinophils Relative 1 %      Basophils Relative 0 %      Neutrophils Absolute 5 15 Thousands/µL      Immature Grans Absolute 0 03 Thousand/uL      Lymphocytes Absolute 1 33 Thousands/µL      Monocytes Absolute 0 35 Thousand/µL      Eosinophils Absolute 0 06 Thousand/µL      Basophils Absolute 0 02 Thousands/µL     POCT pregnancy, urine [[id number]]  (Abnormal) Resulted:  20    Lab Status:  Final result Updated:  20     EXT PREG TEST UR (Ref: Negative) positive     Control valid    Urine Macroscopic, POC [[id number]]  (Abnormal) Collected:  20    Lab Status:  Final result Specimen:  Urine Updated:  20     Color, UA Yellow     Clarity, UA Clear     pH, UA 7 5     Leukocytes, UA Small     Nitrite, UA Negative     Protein, UA Negative mg/dl      Glucose, UA Negative mg/dl      Ketones, UA Trace mg/dl      Urobilinogen, UA >=8 0 E U /dl      Bilirubin, UA Negative     Blood, UA Negative     Specific Gravity, UA 1 020    Narrative:       CLINITEK RESULT                 No orders to display                  Procedures  Pelvic Ultrasound  Performed by: Lina Subramanian DO  Authorized by: Lina Subramanian DO     Procedure date/time:  2020 6:52 PM  Patient location:  Bedside  Procedure details:     Indications: evaluate for IUP    Uterine findings:     Intrauterine pregnancy: identified      Single gestation: identified      Fetal heart rate: identified      Fetal heart rate (bpm):  146             ED Course  ED Course as of e 2020   1822 Pt informed of positive pregnancy test   Pt notes she had a period at the end of January but it was a little shorter than usual   Pt now   PREGNANCY TEST URINE: positive    Received call from [facility]  They states they are unable to do the Aquaporinme because she is 14 weeks pregnant   Pt reports that she feels better after meds  Eating ice chips currently   Pt now reporting burning epigastric pain  Will give Pepcid/GI cocktail   Pt given GI cocktail       Pt reports feeling better  Pt instructed to f/u with OBGYN for her pregnancy                                    MDM  Number of Diagnoses or Management Options  Diarrhea: Intrauterine pregnancy:   Myalgia:   Nausea and vomiting:   Right flank pain:      Amount and/or Complexity of Data Reviewed  Clinical lab tests: reviewed and ordered  Tests in the radiology section of CPT®: ordered and reviewed  Tests in the medicine section of CPT®: ordered and reviewed          Disposition  Final diagnoses:   Intrauterine pregnancy   Right flank pain   Nausea and vomiting   Diarrhea   Myalgia   Headache     Time reflects when diagnosis was documented in both MDM as applicable and the Disposition within this note     Time User Action Codes Description Comment    2/18/2020  7:29 PM Bishop De Jesus 48 [N24 91] Intrauterine pregnancy     2/18/2020  7:29 PM Sammie De Jesusurgnader 48 [R10 9] Right flank pain     2/18/2020  7:29 PM Sammie De Jesusurga 48 [R11 2] Nausea and vomiting     2/18/2020  7:29 PM Sammie De Jesusurga 48 [R19 7] Diarrhea     2/18/2020  7:29 PM Sammie De Jesusurga 48 [M79 10] Myalgia     2/18/2020  7:34 PM Neal [address] [place] [address] Headache       ED Disposition     ED Disposition Condition Date/Time Comment    Discharge Stable Tue Feb 18, 2020  8:01 PM Emir Blood discharge to home/self care              Follow-up Information     Follow up With Specialties Details Why Contact Info Additional [address] [facility] Schedule an appointment as soon as possible for a visit  For your pregnancy [address] [place] [address], [address] [place], [place] [id number] [address], [address] [place] [address], [phone]          Patient's Medications   Discharge Prescriptions    DICYCLOMINE (BENTYL) 20 MG TABLET    Take 1 tablet (20 mg total) by mouth 2 (two) times a day       Start Date: 2/18/2020 End Date: --       Order Dose: 20 mg       Quantity: 10 tablet    Refills: 0    METOCLOPRAMIDE (REGLAN) 10 MG TABLET    Take 1 tablet (10 mg total) by mouth every 6 (six) hours Start Date: 2/18/2020 End Date: --       Order Dose: 10 mg       Quantity: 10 tablet    Refills: 0    SUCRALFATE (CARAFATE) 1 G TABLET    Take 1 tablet (1 g total) by mouth 4 (four) times a day       Start Date: 2/18/2020 End Date: --       Order Dose: 1 g       Quantity: 10 tablet    Refills: 0     No discharge procedures on file      PDMP Review     None          ED Provider  Electronically Signed by           Les Lobo 24, DO  02/18/20 2003

## 2020-03-02 ENCOUNTER — APPOINTMENT (EMERGENCY)
Dept: CT IMAGING | Facility: HOSPITAL | Age: 25
End: 2020-03-02
Payer: COMMERCIAL

## 2020-03-02 ENCOUNTER — HOSPITAL ENCOUNTER (EMERGENCY)
Facility: HOSPITAL | Age: 25
Discharge: HOME | End: 2020-03-02
Attending: EMERGENCY MEDICINE | Admitting: EMERGENCY MEDICINE
Payer: COMMERCIAL

## 2020-03-02 VITALS
HEART RATE: 62 BPM | DIASTOLIC BLOOD PRESSURE: 58 MMHG | OXYGEN SATURATION: 99 % | RESPIRATION RATE: 17 BRPM | WEIGHT: 97 LBS | TEMPERATURE: 98.4 F | SYSTOLIC BLOOD PRESSURE: 100 MMHG | BODY MASS INDEX: 19.59 KG/M2

## 2020-03-02 DIAGNOSIS — K04.7 DENTAL ABSCESS: Primary | ICD-10-CM

## 2020-03-02 DIAGNOSIS — R22.0 LEFT FACIAL SWELLING: ICD-10-CM

## 2020-03-02 LAB
ANION GAP SERPL CALCULATED.3IONS-SCNC: 11 MMOL/L (ref 4–13)
BUN SERPL-MCNC: 5 MG/DL (ref 5–25)
CALCIUM SERPL-MCNC: 8.8 MG/DL (ref 8.3–10.1)
CHLORIDE SERPL-SCNC: 100 MMOL/L (ref 100–108)
CO2 SERPL-SCNC: 23 MMOL/L (ref 21–32)
CREAT SERPL-MCNC: 0.36 MG/DL (ref 0.6–1.3)
GFR SERPL CREATININE-BSD FRML MDRD: 151 ML/MIN/1.73SQ M
GLUCOSE SERPL-MCNC: 77 MG/DL (ref 65–140)
POTASSIUM SERPL-SCNC: 3.6 MMOL/L (ref 3.5–5.3)
SODIUM SERPL-SCNC: 134 MMOL/L (ref 136–145)

## 2020-03-02 PROCEDURE — 99284 EMERGENCY DEPT VISIT MOD MDM: CPT

## 2020-03-02 PROCEDURE — 96374 THER/PROPH/DIAG INJ IV PUSH: CPT

## 2020-03-02 PROCEDURE — 36415 COLL VENOUS BLD VENIPUNCTURE: CPT | Performed by: EMERGENCY MEDICINE

## 2020-03-02 PROCEDURE — 70491 CT SOFT TISSUE NECK W/DYE: CPT

## 2020-03-02 PROCEDURE — 80048 BASIC METABOLIC PNL TOTAL CA: CPT | Performed by: EMERGENCY MEDICINE

## 2020-03-02 PROCEDURE — 99285 EMERGENCY DEPT VISIT HI MDM: CPT | Performed by: EMERGENCY MEDICINE

## 2020-03-02 PROCEDURE — 96361 HYDRATE IV INFUSION ADD-ON: CPT

## 2020-03-02 PROCEDURE — 96375 TX/PRO/DX INJ NEW DRUG ADDON: CPT

## 2020-03-02 RX ORDER — PENICILLIN V POTASSIUM 250 MG/1
500 TABLET ORAL ONCE
Status: COMPLETED | OUTPATIENT
Start: 2020-03-02 | End: 2020-03-02

## 2020-03-02 RX ORDER — ACETAMINOPHEN 325 MG/1
650 TABLET ORAL ONCE
Status: COMPLETED | OUTPATIENT
Start: 2020-03-02 | End: 2020-03-02

## 2020-03-02 RX ORDER — PENICILLIN V POTASSIUM 500 MG/1
500 TABLET ORAL 4 TIMES DAILY
Qty: 40 TABLET | Refills: 0 | Status: SHIPPED | OUTPATIENT
Start: 2020-03-02 | End: 2020-03-12

## 2020-03-02 RX ORDER — ONDANSETRON 2 MG/ML
4 INJECTION INTRAMUSCULAR; INTRAVENOUS ONCE
Status: COMPLETED | OUTPATIENT
Start: 2020-03-02 | End: 2020-03-02

## 2020-03-02 RX ADMIN — SODIUM CHLORIDE 1000 ML: 0.9 INJECTION, SOLUTION INTRAVENOUS at 19:16

## 2020-03-02 RX ADMIN — ACETAMINOPHEN 650 MG: 325 TABLET ORAL at 21:10

## 2020-03-02 RX ADMIN — IOHEXOL 85 ML: 350 INJECTION, SOLUTION INTRAVENOUS at 20:14

## 2020-03-02 RX ADMIN — MORPHINE SULFATE 2 MG: 2 INJECTION, SOLUTION INTRAMUSCULAR; INTRAVENOUS at 19:18

## 2020-03-02 RX ADMIN — PENICILLIN V POTASSIUM 500 MG: 250 TABLET, FILM COATED ORAL at 21:15

## 2020-03-02 RX ADMIN — ONDANSETRON 4 MG: 2 INJECTION INTRAMUSCULAR; INTRAVENOUS at 19:16

## 2020-03-03 NOTE — ED PROVIDER NOTES
History  Chief Complaint   Patient presents with    Facial Swelling     Patient c/o L sided facial swelling since last night  Denies dental pain or known dental problems, denies injury, denies throat discomfort  52-year-old female presents for evaluation of left-sided facial swelling which started gradually yesterday  She states that she does sharp pain over lateral aspect of her left lower jaw  The pain is constant, nonradiating, worse with opening closing her jaw, eating  The pain is persistent in addition to the swelling and both have gotten worse  She states she feels difficulty opening and closing her jaw but is still able to  She denies dental pain, gum pain, neck pain or neck stiffness, fevers, chills, cough, URI symptoms  History provided by:  Patient      Prior to Admission Medications   Prescriptions Last Dose Informant Patient Reported? Taking?    butalbital-acetaminophen-caffeine (FIORICET,ESGIC) -40 mg per tablet Not Taking at Unknown time  No No   Sig: Take 1 tablet by mouth every 4 (four) hours as needed for headaches   Patient not taking: Reported on 3/2/2020   dicyclomine (BENTYL) 20 mg tablet Not Taking at Unknown time  No No   Sig: Take 1 tablet (20 mg total) by mouth 2 (two) times a day   Patient not taking: Reported on 3/2/2020   metoclopramide (REGLAN) 10 mg tablet Not Taking at Unknown time  No No   Sig: Take 1 tablet (10 mg total) by mouth every 6 (six) hours   Patient not taking: Reported on 2/18/2020   metoclopramide (REGLAN) 10 mg tablet Not Taking at Unknown time  No No   Sig: Take 1 tablet (10 mg total) by mouth every 6 (six) hours   Patient not taking: Reported on 3/2/2020   sucralfate (CARAFATE) 1 g tablet Not Taking at Unknown time  No No   Sig: Take 1 tablet (1 g total) by mouth 4 (four) times a day   Patient not taking: Reported on 3/2/2020      Facility-Administered Medications: None       Past Medical History:   Diagnosis Date    Anemia     Anxiety     no treatment   Bipolar disorder ([facility] [address] )     took latuda and stopped in february when found out pregnant    Chlamydia infection 2013    GERD (gastroesophageal reflux disease)     Hearing loss     Heart murmur     Helicobacter pylori infection     last assessed 02/16/17    History of methicillin resistant staphylococcus aureus (MRSA)     Negative Nasal Culture 6/2014 - Isolation discontinued 1/7/2018 (axilla wounds 2010)    Perforated left tympanic membrane on examination     last assessed 01/10/17    Trauma 2013    First son's father    Varicella 2000       Past Surgical History:   Procedure Laterality Date    ABDOMINAL SURGERY      APPENDECTOMY      CHOLECYSTECTOMY  02/2015    lap    MYRINGOTOMY      OTHER SURGICAL HISTORY      Nexplanon removal    [place] COLONOSCOPY FLX DX W/COLLJ SPEC WHEN PFRMD N/A 1/19/2017    Procedure: EGD AND COLONOSCOPY;  Surgeon: Tanya Fox DO;  Location: [facility]; Service: Gastroenterology    [place] LAP,APPENDECTOMY N/A 5/17/2016    Procedure: Marylene Sizer;  Surgeon: Christian Rod DO;  [facility];  Service: General    [place] LAP,DIAGNOSTIC ABDOMEN N/A 3/13/2016    Procedure: LAPAROSCOPY DIAGNOSTIC;  Surgeon: Dimitrios Hartman MD;  [facility];  Service: General    SMALL INTESTINE SURGERY      twisted bowels       Family History   Problem Relation Age of Onset    Diabetes Maternal Grandmother     Hypothyroidism Maternal Grandmother     No Known Problems Child     No Known Problems Other     Colon cancer Family     Asthma Mother     No Known Problems Father      I have reviewed and agree with the history as documented      E-Cigarette/Vaping     E-Cigarette/Vaping Substances     Social History     Tobacco Use    Smoking status: Light Tobacco Smoker     Packs/day: 0 25     Years: 5 00     Pack years: 1 25     Types: Cigarettes    Smokeless tobacco: Never Used   Substance Use Topics    Alcohol use: Yes     Comment: occasionally    Drug use: Not Currently     Types: Marijuana       Review of Systems   Constitutional: Negative for activity change, appetite change, chills, fatigue and fever  HENT: Positive for facial swelling  Negative for congestion, dental problem, ear pain, mouth sores, rhinorrhea, sinus pressure, sore throat, trouble swallowing and voice change  Eyes: Negative for pain and redness  Respiratory: Negative for chest tightness, shortness of breath and wheezing  Cardiovascular: Negative for chest pain and palpitations  Gastrointestinal: Negative for abdominal pain, blood in stool, constipation, diarrhea, nausea and vomiting  Endocrine: Negative for cold intolerance and heat intolerance  Genitourinary: Negative for dysuria, frequency and hematuria  Musculoskeletal: Negative for arthralgias, myalgias, neck pain and neck stiffness  Skin: Negative for color change, pallor and rash  Neurological: Negative for weakness and numbness  Hematological: Does not bruise/bleed easily  Psychiatric/Behavioral: Negative for agitation, hallucinations and suicidal ideas  Physical Exam  Physical Exam   Constitutional: She appears well-developed and well-nourished  HENT:   Normocephalic/atraumatic  There is no facial bone tenderness palpation  No facial bone tenderness  No weston sign, no raccoon eyes, no hemotympanum  Nose is atraumatic  No evidence of epistaxis  Normal appearing gums  Multiple dental caries, impacted L posterior molar  Left-sided facial swelling, minimal tenderness palpation, no erythema, no warmth  No evidence of Russ's angina  Eyes:   Patient has painless full range of motion in both her eyes  Normal visual fields  No hyphema noted  Neck: No tracheal deviation present  Patient is nontender palpation over her cervical, thoracic, lumbar spines  There is no step-offs, no deformities  Cardiovascular:   No JVD noted  Heart sounds are normal  Regular rate rate and rhythm   Symmetric strong distal pulses  Pulmonary/Chest:   Chest wall is stable and nontender palpation  There is no crepitus noted  Patient has symmetric chest wall expansion  No flail segment noted clear and equal breath sounds bilateral    Abdominal:   No external signs of trauma noted on the abdomen/pelvis  Patient is nontender palpation of the abdomen  There is no rebound, guarding, CVA tenderness  Abdomen is nondistended  Pelvis stable, nttp  Musculoskeletal:   Right upper extremity has full range of motion without pain  There is no tenderness palpation noted  Patient has no external signs of trauma  Patient is neurovascularly intact in this extremity  Left upper extremity has full range of motion without pain  There is no tenderness palpation noted  Patient has no external signs of trauma  Patient is neurovascularly intact in this extremity  Right lower extremity has full range of motion without pain  There is no tenderness palpation noted  Patient has no external signs of trauma  Patient is neurovascularly intact in this extremity  Left Lower  extremity has full range of motion without pain  There is no tenderness palpation noted  Patient has no external signs of trauma  Patient is neurovascularly intact in this extremity  Neurological:   Alert and oriented ×3  Normal mental status exam  Normal cranial nerves II through XII  Normal sensation and strength throughout  Normal cerebellar exam  GCS 15  Skin:   No external signs of trauma  Psychiatric: She has a normal mood and affect  Her behavior is normal  Judgment normal    Nursing note and vitals reviewed        Vital Signs  ED Triage Vitals [03/02/20 1728]   Temperature Pulse Respirations Blood Pressure SpO2   98 4 °F (36 9 °C) 88 18 103/62 97 %      Temp Source Heart Rate Source Patient Position - Orthostatic VS BP Location FiO2 (%)   Oral Monitor Sitting Right arm --      Pain Score       8           Vitals:    03/02/20 1728 03/02/20 1907 03/02/20 2111   BP: 103/62 105/63 100/58   Pulse: 88 94 62   Patient Position - Orthostatic VS: Sitting Sitting Sitting         Visual Acuity      ED Medications  Medications   penicillin V potassium (VEETID) tablet 500 mg (has no administration in time range)   sodium chloride 0 9 % bolus 1,000 mL (1,000 mL Intravenous New Bag 3/2/20 1916)   morphine injection 2 mg (2 mg Intravenous Given 3/2/20 1918)   ondansetron (ZOFRAN) injection 4 mg (4 mg Intravenous Given 3/2/20 1916)   iohexol (OMNIPAQUE) 350 MG/ML injection (SINGLE-DOSE) 85 mL (85 mL Intravenous Given 3/2/20 2014)   acetaminophen (TYLENOL) tablet 650 mg (650 mg Oral Given 3/2/20 2110)       Diagnostic Studies  Results Reviewed     Procedure Component Value Units Date/Time    Basic metabolic panel [[id number]]  (Abnormal) Collected:  03/02/20 1916    Lab Status:  Final result Specimen:  Blood from Arm, Left Updated:  03/02/20 1951     Sodium 134 mmol/L      Potassium 3 6 mmol/L      Chloride 100 mmol/L      CO2 23 mmol/L      ANION GAP 11 mmol/L      BUN 5 mg/dL      Creatinine 0 36 mg/dL      Glucose 77 mg/dL      Calcium 8 8 mg/dL      eGFR 151 ml/min/1 73sq m     Narrative:       [place] guidelines for Chronic Kidney Disease (CKD):     Stage 1 with normal or high GFR (GFR > 90 mL/min/1 73 square meters)    Stage 2 Mild CKD (GFR = 60-89 mL/min/1 73 square meters)    Stage 3A Moderate CKD (GFR = 45-59 mL/min/1 73 square meters)    Stage 3B Moderate CKD (GFR = 30-44 mL/min/1 73 square meters)    Stage 4 Severe CKD (GFR = 15-29 mL/min/1 73 square meters)    Stage 5 End Stage CKD (GFR <15 mL/min/1 73 square meters)  Note: GFR calculation is accurate only with a steady state creatinine    POCT pregnancy, urine [[id number]]     Lab Status:  No result                  CT soft tissue neck   Final Result by Deborah Nicole MD (03/02 2030)      1    Soft tissue swelling overlying the left mandibular region, nonspecific, however may be related to underlying trace periapical lucency of the left 3rd mandibular molar  No significant fluid collection to suggest drainable abscess  2   The right 3rd mandibular molar is horizontally impacted  Workstation performed: [id number]                    Procedures  Procedures         ED Course  ED Course as of Mar 02 2112   Mon Mar 02, 2020   2103 CT reviewed  Patient with impacted molar and dental caries  No abscess amenable to drainage  Likely reactive swelling secondary to periapical abscess  Will start on antibiotics, dental follow-up  [facility]  Number of Diagnoses or Management Options  Diagnosis management comments: Left-sided facial swelling-will CT to rule out abscess, p r n  Pain medications  Disposition  Final diagnoses:   Dental abscess   Left facial swelling     Time reflects when diagnosis was documented in both MDM as applicable and the Disposition within this note     Time User Action Codes Description Comment    3/2/2020  9:09 PM Gabrielle Mondragon [K04 7] Dental abscess     3/2/2020  9:09 PM Gabrielle Mondragon [R22 0] Left facial swelling       ED Disposition     ED Disposition Condition Date/Time Comment    Discharge Stable Mon Mar 2, 2020  9:09 PM Vickie Edwards discharge to home/self care  Follow-up Information     Follow up With Specialties Details Why Contact Info    [facility]  Schedule an appointment as soon as possible for a visit in 2 days  [facility] [address]  Schedule an appointment as soon as possible for a visit in 2 days  [facility]          Patient's Medications   Discharge Prescriptions    PENICILLIN V POTASSIUM (VEETID) 500 MG TABLET    Take 1 tablet (500 mg total) by mouth 4 (four) times a day for 10 days       Start Date: 3/2/2020  End Date: 3/12/2020       Order Dose: 500 mg       Quantity: 40 tablet    Refills: 0     No discharge procedures on file      PDMP Review None          ED Provider  Electronically Signed by           Dianne Solis MD  03/02/20 0824

## 2020-03-03 NOTE — ED NOTES
Jostin Rubio is a 19 year old female presenting for   Chief Complaint   Patient presents with   • Well Adolescent     19 year     Denies Latex allergy or sensitivity.    Currently is not taking any medications  Refills needed today: No    Health Maintenance Summary     Depression Screening (Yearly)  Due since 8/28/2019    HPV (Female) Vaccine (1 - Female 3-dose series)  Postponed until 8/29/2020    Influenza Vaccine (1)  Next due on 9/1/2019    DTaP/Tdap/Td Vaccine (7 - Td)  Next due on 8/16/2022    Hepatitis B Vaccine   Completed    Pneumococcal Vaccine 0-64   Aged Out    MMR Vaccine   Completed    Hepatitis A Vaccine   Completed    Varicella Vaccine   Completed    Meningococcal Vaccine   Completed           Patient is due for topics as listed above but is not proceeding with Immunization(s) HPV at this time.    Pt returned from CT at this time     Darlin Good, EL  03/02/20 2018

## 2020-03-13 DIAGNOSIS — Z34.92 PRENATAL CARE IN SECOND TRIMESTER: Primary | ICD-10-CM

## 2020-03-26 ENCOUNTER — TELEPHONE (OUTPATIENT)
Dept: PERINATAL CARE | Facility: CLINIC | Age: 25
End: 2020-03-26

## 2020-03-26 NOTE — TELEPHONE ENCOUNTER
HAROON Telephone Note:      Attempted to reach patient by phone and voicemail to confirm appointment for her ultrasound  At this time we are seeing regularly scheduled appointments  Patient was asked that if they were not feeling well, have cough, fever or Shortness of Breath to call and reschedule their appointment once all symptoms have resolved  If you have any of these symptoms please contact your primary care physician or [phone]  Pt was notified that there are NO visitors or support people to be present for their appointment and notified of the phone check in process

## 2020-03-27 ENCOUNTER — ROUTINE PRENATAL (OUTPATIENT)
Dept: PERINATAL CARE | Facility: CLINIC | Age: 25
End: 2020-03-27
Payer: COMMERCIAL

## 2020-03-27 VITALS
WEIGHT: 101.6 LBS | HEART RATE: 89 BPM | SYSTOLIC BLOOD PRESSURE: 120 MMHG | DIASTOLIC BLOOD PRESSURE: 64 MMHG | BODY MASS INDEX: 20.48 KG/M2 | HEIGHT: 59 IN

## 2020-03-27 DIAGNOSIS — Z3A.19 19 WEEKS GESTATION OF PREGNANCY: ICD-10-CM

## 2020-03-27 DIAGNOSIS — Z36.3 ENCOUNTER FOR ANTENATAL SCREENING FOR MALFORMATION USING ULTRASOUND: Primary | ICD-10-CM

## 2020-03-27 PROCEDURE — 76805 OB US >/= 14 WKS SNGL FETUS: CPT | Performed by: OBSTETRICS & GYNECOLOGY

## 2020-03-27 NOTE — PROGRESS NOTES
The patient was seen today for an ultrasound  Please see ultrasound report (located under Ob Procedures) for additional details  Thank you very much for allowing us to participate in the care of this very nice patient  Should you have any questions, please do not hesitate to contact me  Damon Cooper MD [address] [place]  Attending Physician, Santhosh

## 2020-04-15 ENCOUNTER — NURSE TRIAGE (OUTPATIENT)
Dept: OTHER | Facility: OTHER | Age: 25
End: 2020-04-15

## 2020-04-15 ENCOUNTER — TELEMEDICINE (OUTPATIENT)
Dept: OBGYN CLINIC | Facility: CLINIC | Age: 25
End: 2020-04-15

## 2020-04-15 DIAGNOSIS — Z86.32 HISTORY OF GESTATIONAL DIABETES: ICD-10-CM

## 2020-04-15 DIAGNOSIS — Z3A.22 22 WEEKS GESTATION OF PREGNANCY: Primary | ICD-10-CM

## 2020-04-15 DIAGNOSIS — Z20.828 SARS-ASSOCIATED CORONAVIRUS EXPOSURE: Primary | ICD-10-CM

## 2020-04-15 DIAGNOSIS — O99.332 MATERNAL TOBACCO USE IN SECOND TRIMESTER: ICD-10-CM

## 2020-04-15 PROBLEM — Z13.79 GENETIC SCREENING: Status: ACTIVE | Noted: 2020-04-15

## 2020-04-15 PROBLEM — Z90.49 HX OF APPENDECTOMY: Status: RESOLVED | Noted: 2019-01-07 | Resolved: 2020-04-15

## 2020-04-15 PROBLEM — O99.330 TOBACCO USE IN PREGNANCY: Status: ACTIVE | Noted: 2020-04-15

## 2020-04-15 PROBLEM — Z30.9 CONTRACEPTION MANAGEMENT: Status: ACTIVE | Noted: 2020-04-15

## 2020-04-15 PROBLEM — Z3A.19 19 WEEKS GESTATION OF PREGNANCY: Status: RESOLVED | Noted: 2020-03-27 | Resolved: 2020-04-15

## 2020-04-15 PROBLEM — R82.5 POSITIVE URINE DRUG SCREEN: Status: RESOLVED | Noted: 2018-08-21 | Resolved: 2020-04-15

## 2020-04-15 PROBLEM — R73.09 ELEVATED GLUCOSE: Status: RESOLVED | Noted: 2018-12-11 | Resolved: 2020-04-15

## 2020-04-15 PROBLEM — O99.333 TOBACCO SMOKING AFFECTING PREGNANCY IN THIRD TRIMESTER: Status: RESOLVED | Noted: 2018-10-17 | Resolved: 2020-04-15

## 2020-04-15 PROBLEM — Z36.9 ENCOUNTER FOR FETAL ULTRASOUND: Status: ACTIVE | Noted: 2020-04-15

## 2020-04-15 PROCEDURE — 99214 OFFICE O/P EST MOD 30 MIN: CPT | Performed by: NURSE PRACTITIONER

## 2020-04-20 ENCOUNTER — ROUTINE PRENATAL (OUTPATIENT)
Dept: OBGYN CLINIC | Facility: CLINIC | Age: 25
End: 2020-04-20

## 2020-04-20 VITALS
HEART RATE: 91 BPM | HEIGHT: 59 IN | SYSTOLIC BLOOD PRESSURE: 103 MMHG | DIASTOLIC BLOOD PRESSURE: 71 MMHG | TEMPERATURE: 95.5 F | BODY MASS INDEX: 21.81 KG/M2 | WEIGHT: 108.2 LBS

## 2020-04-20 DIAGNOSIS — Z3A.23 23 WEEKS GESTATION OF PREGNANCY: ICD-10-CM

## 2020-04-20 DIAGNOSIS — Z34.93 PRENATAL CARE IN THIRD TRIMESTER: Primary | ICD-10-CM

## 2020-04-20 PROCEDURE — 99214 OFFICE O/P EST MOD 30 MIN: CPT | Performed by: NURSE PRACTITIONER

## 2020-04-23 ENCOUNTER — PATIENT OUTREACH (OUTPATIENT)
Dept: OBGYN CLINIC | Facility: CLINIC | Age: 25
End: 2020-04-23

## 2020-05-05 ENCOUNTER — TELEPHONE (OUTPATIENT)
Dept: PERINATAL CARE | Facility: CLINIC | Age: 25
End: 2020-05-05

## 2020-05-06 ENCOUNTER — ROUTINE PRENATAL (OUTPATIENT)
Dept: OBGYN CLINIC | Facility: CLINIC | Age: 25
End: 2020-05-06

## 2020-05-06 VITALS
TEMPERATURE: 97 F | WEIGHT: 108.2 LBS | DIASTOLIC BLOOD PRESSURE: 67 MMHG | SYSTOLIC BLOOD PRESSURE: 109 MMHG | HEART RATE: 97 BPM | BODY MASS INDEX: 21.85 KG/M2

## 2020-05-06 DIAGNOSIS — Z86.32 HISTORY OF GESTATIONAL DIABETES: Primary | ICD-10-CM

## 2020-05-06 DIAGNOSIS — Z72.51 HIGH RISK HETEROSEXUAL BEHAVIOR: ICD-10-CM

## 2020-05-06 DIAGNOSIS — Z3A.25 25 WEEKS GESTATION OF PREGNANCY: ICD-10-CM

## 2020-05-06 DIAGNOSIS — O99.332 MATERNAL TOBACCO USE IN SECOND TRIMESTER: ICD-10-CM

## 2020-05-06 PROCEDURE — 99214 OFFICE O/P EST MOD 30 MIN: CPT | Performed by: OBSTETRICS & GYNECOLOGY

## 2020-05-06 PROCEDURE — 87491 CHLMYD TRACH DNA AMP PROBE: CPT | Performed by: OBSTETRICS & GYNECOLOGY

## 2020-05-06 PROCEDURE — 87591 N.GONORRHOEAE DNA AMP PROB: CPT | Performed by: OBSTETRICS & GYNECOLOGY

## 2020-05-06 PROCEDURE — G0145 SCR C/V CYTO,THINLAYER,RESCR: HCPCS | Performed by: OBSTETRICS & GYNECOLOGY

## 2020-05-08 ENCOUNTER — TELEPHONE (OUTPATIENT)
Dept: LABOR AND DELIVERY | Facility: HOSPITAL | Age: 25
End: 2020-05-08

## 2020-05-08 ENCOUNTER — TELEPHONE (OUTPATIENT)
Dept: PERINATAL CARE | Facility: CLINIC | Age: 25
End: 2020-05-08

## 2020-05-08 LAB
LAB AP GYN PRIMARY INTERPRETATION: NORMAL
Lab: NORMAL

## 2020-05-11 ENCOUNTER — ULTRASOUND (OUTPATIENT)
Dept: PERINATAL CARE | Facility: OTHER | Age: 25
End: 2020-05-11
Payer: COMMERCIAL

## 2020-05-11 VITALS
HEIGHT: 59 IN | HEART RATE: 111 BPM | TEMPERATURE: 96.2 F | BODY MASS INDEX: 21.77 KG/M2 | WEIGHT: 108 LBS | SYSTOLIC BLOOD PRESSURE: 115 MMHG | DIASTOLIC BLOOD PRESSURE: 70 MMHG

## 2020-05-11 DIAGNOSIS — Z3A.26 26 WEEKS GESTATION OF PREGNANCY: ICD-10-CM

## 2020-05-11 DIAGNOSIS — IMO0002 EVALUATE ANATOMY NOT SEEN ON PRIOR SONOGRAM: ICD-10-CM

## 2020-05-11 DIAGNOSIS — O99.332 MATERNAL TOBACCO USE IN SECOND TRIMESTER: Primary | ICD-10-CM

## 2020-05-11 PROCEDURE — 99212 OFFICE O/P EST SF 10 MIN: CPT | Performed by: OBSTETRICS & GYNECOLOGY

## 2020-05-11 PROCEDURE — 76816 OB US FOLLOW-UP PER FETUS: CPT | Performed by: OBSTETRICS & GYNECOLOGY

## 2020-05-14 LAB
C TRACH DNA SPEC QL NAA+PROBE: NEGATIVE
N GONORRHOEA DNA SPEC QL NAA+PROBE: NEGATIVE

## 2020-05-26 ENCOUNTER — TELEPHONE (OUTPATIENT)
Dept: OBGYN CLINIC | Facility: CLINIC | Age: 25
End: 2020-05-26

## 2020-05-26 ENCOUNTER — PATIENT OUTREACH (OUTPATIENT)
Dept: OBGYN CLINIC | Facility: CLINIC | Age: 25
End: 2020-05-26

## 2020-06-09 ENCOUNTER — TELEPHONE (OUTPATIENT)
Dept: OBGYN CLINIC | Facility: CLINIC | Age: 25
End: 2020-06-09

## 2020-06-10 ENCOUNTER — ROUTINE PRENATAL (OUTPATIENT)
Dept: OBGYN CLINIC | Facility: CLINIC | Age: 25
End: 2020-06-10

## 2020-06-10 VITALS
SYSTOLIC BLOOD PRESSURE: 105 MMHG | BODY MASS INDEX: 23.03 KG/M2 | TEMPERATURE: 98.2 F | WEIGHT: 114 LBS | DIASTOLIC BLOOD PRESSURE: 69 MMHG | HEART RATE: 75 BPM

## 2020-06-10 DIAGNOSIS — Z3A.30 30 WEEKS GESTATION OF PREGNANCY: Primary | ICD-10-CM

## 2020-06-10 DIAGNOSIS — Z34.92 PRENATAL CARE IN SECOND TRIMESTER: ICD-10-CM

## 2020-06-10 PROCEDURE — 96372 THER/PROPH/DIAG INJ SC/IM: CPT | Performed by: NURSE PRACTITIONER

## 2020-06-10 PROCEDURE — 90715 TDAP VACCINE 7 YRS/> IM: CPT

## 2020-06-10 PROCEDURE — 90471 IMMUNIZATION ADMIN: CPT

## 2020-06-10 PROCEDURE — 99215 OFFICE O/P EST HI 40 MIN: CPT | Performed by: NURSE PRACTITIONER

## 2020-06-23 ENCOUNTER — TELEPHONE (OUTPATIENT)
Dept: OBGYN CLINIC | Facility: CLINIC | Age: 25
End: 2020-06-23

## 2020-06-26 ENCOUNTER — NURSE TRIAGE (OUTPATIENT)
Dept: OTHER | Facility: OTHER | Age: 25
End: 2020-06-26

## 2020-06-29 ENCOUNTER — TELEPHONE (OUTPATIENT)
Dept: OBGYN CLINIC | Facility: CLINIC | Age: 25
End: 2020-06-29

## 2020-07-07 ENCOUNTER — TELEPHONE (OUTPATIENT)
Dept: PERINATAL CARE | Facility: CLINIC | Age: 25
End: 2020-07-07

## 2020-07-07 NOTE — TELEPHONE ENCOUNTER
Spoke with patient and confirmed appointment with [facility]  1 support person ( must be over age of 15) may accompany you for your appointment  Are you and your support person able to wear a mask without a valve during entire appointment? YES  [facility] does not allow cell phone use, recording device or streaming during the ultrasound  Check in and rooming questions will be done via phone, when you arrive in the parking lot please call the following inside line # prior to entering office:    [place] [phone]  [facility] [place] line: [facility] line:  [address] line: [phone]  [place] line:  [phone]  [place] line: [phone]    Do you or your support person currently have:  Fever or flu- like symptoms? NO  Symptoms of upper respiratory infection like runny nose, sore throat or cough? NO  Do you have new headache that you have not had in the past?NO  Have you experienced any new shortness of breath recently? NO  Do you have any new loss of taste or smell? NO  Do you have any new diarrhea, nausea or vomiting? NO  Have you recently been in contact with anyone who has been sick or diagnosed with COVID-19 infection? NO  Have you been recommended to quarantine because of an exposure to a confirmed positive COVID19 person? NO  You and your support person will be screened upon arrival     Patient verbalized understanding of all instructions  [facility] does not allow cell phone use, recording device or streaming during ultrasound     Any questions with these instructions please call Maternal Fetal Medicine nurse line today @ # [phone]

## 2020-07-16 ENCOUNTER — TELEPHONE (OUTPATIENT)
Dept: OBGYN CLINIC | Facility: CLINIC | Age: 25
End: 2020-07-16

## 2020-07-16 ENCOUNTER — HOSPITAL ENCOUNTER (OUTPATIENT)
Facility: HOSPITAL | Age: 25
Discharge: HOME | End: 2020-07-16
Attending: OBSTETRICS & GYNECOLOGY | Admitting: OBSTETRICS & GYNECOLOGY
Payer: COMMERCIAL

## 2020-07-16 VITALS
SYSTOLIC BLOOD PRESSURE: 116 MMHG | HEART RATE: 79 BPM | RESPIRATION RATE: 16 BRPM | DIASTOLIC BLOOD PRESSURE: 72 MMHG | TEMPERATURE: 98.2 F

## 2020-07-16 PROBLEM — O99.323 DRUG USE AFFECTING PREGNANCY IN THIRD TRIMESTER: Status: ACTIVE | Noted: 2020-07-16

## 2020-07-16 PROBLEM — Z3A.35 35 WEEKS GESTATION OF PREGNANCY: Status: ACTIVE | Noted: 2020-05-06

## 2020-07-16 LAB
ABO GROUP BLD: NORMAL
AMPHETAMINES SERPL QL SCN: NEGATIVE
BACTERIA UR QL AUTO: ABNORMAL /HPF
BARBITURATES UR QL: NEGATIVE
BASOPHILS # BLD AUTO: 0.03 THOUSANDS/ΜL (ref 0–0.1)
BASOPHILS NFR BLD AUTO: 0 % (ref 0–1)
BENZODIAZ UR QL: NEGATIVE
BILIRUB UR QL STRIP: NEGATIVE
BILIRUB UR QL STRIP: NEGATIVE
BLD GP AB SCN SERPL QL: NEGATIVE
CLARITY UR: CLEAR
CLARITY UR: CLEAR
COCAINE UR QL: NEGATIVE
COLOR UR: YELLOW
COLOR UR: YELLOW
EOSINOPHIL # BLD AUTO: 0.06 THOUSAND/ΜL (ref 0–0.61)
EOSINOPHIL NFR BLD AUTO: 1 % (ref 0–6)
ERYTHROCYTE [DISTWIDTH] IN BLOOD BY AUTOMATED COUNT: 13.1 % (ref 11.6–15.1)
GLUCOSE UR STRIP-MCNC: NEGATIVE MG/DL
GLUCOSE UR STRIP-MCNC: NEGATIVE MG/DL
HBV SURFACE AG SER QL: NORMAL
HCT VFR BLD AUTO: 33.3 % (ref 34.8–46.1)
HCV AB SER QL: NORMAL
HGB BLD-MCNC: 10.8 G/DL (ref 11.5–15.4)
HGB UR QL STRIP.AUTO: NEGATIVE
HGB UR QL STRIP.AUTO: NEGATIVE
IMM GRANULOCYTES # BLD AUTO: 0.11 THOUSAND/UL (ref 0–0.2)
IMM GRANULOCYTES NFR BLD AUTO: 1 % (ref 0–2)
KETONES UR STRIP-MCNC: NEGATIVE MG/DL
KETONES UR STRIP-MCNC: NEGATIVE MG/DL
LEUKOCYTE ESTERASE UR QL STRIP: ABNORMAL
LEUKOCYTE ESTERASE UR QL STRIP: ABNORMAL
LYMPHOCYTES # BLD AUTO: 1.42 THOUSANDS/ΜL (ref 0.6–4.47)
LYMPHOCYTES NFR BLD AUTO: 15 % (ref 14–44)
MCH RBC QN AUTO: 31.9 PG (ref 26.8–34.3)
MCHC RBC AUTO-ENTMCNC: 32.4 G/DL (ref 31.4–37.4)
MCV RBC AUTO: 98 FL (ref 82–98)
METHADONE UR QL: NEGATIVE
MONOCYTES # BLD AUTO: 0.57 THOUSAND/ΜL (ref 0.17–1.22)
MONOCYTES NFR BLD AUTO: 6 % (ref 4–12)
NEUTROPHILS # BLD AUTO: 7.28 THOUSANDS/ΜL (ref 1.85–7.62)
NEUTS SEG NFR BLD AUTO: 77 % (ref 43–75)
NITRITE UR QL STRIP: NEGATIVE
NITRITE UR QL STRIP: NEGATIVE
NON-SQ EPI CELLS URNS QL MICRO: ABNORMAL /HPF
NRBC BLD AUTO-RTO: 0 /100 WBCS
OPIATES UR QL SCN: NEGATIVE
OXYCODONE+OXYMORPHONE UR QL SCN: NEGATIVE
PCP UR QL: NEGATIVE
PH UR STRIP.AUTO: 7 [PH]
PH UR STRIP.AUTO: 7 [PH]
PLATELET # BLD AUTO: 173 THOUSANDS/UL (ref 149–390)
PMV BLD AUTO: 11.1 FL (ref 8.9–12.7)
PROT UR STRIP-MCNC: NEGATIVE MG/DL
PROT UR STRIP-MCNC: NEGATIVE MG/DL
RBC # BLD AUTO: 3.39 MILLION/UL (ref 3.81–5.12)
RBC #/AREA URNS AUTO: ABNORMAL /HPF
RH BLD: POSITIVE
RUBV IGG SERPL IA-ACNC: 12.7 IU/ML
SP GR UR STRIP.AUTO: 1.01 (ref 1–1.03)
SP GR UR STRIP.AUTO: 1.01 (ref 1–1.03)
SPECIMEN EXPIRATION DATE: NORMAL
THC UR QL: POSITIVE
UROBILINOGEN UR QL STRIP.AUTO: 0.2 E.U./DL
UROBILINOGEN UR QL STRIP.AUTO: 0.2 E.U./DL
WBC # BLD AUTO: 9.47 THOUSAND/UL (ref 4.31–10.16)
WBC #/AREA URNS AUTO: ABNORMAL /HPF

## 2020-07-16 PROCEDURE — 83020 HEMOGLOBIN ELECTROPHORESIS: CPT | Performed by: OBSTETRICS & GYNECOLOGY

## 2020-07-16 PROCEDURE — 87653 STREP B DNA AMP PROBE: CPT | Performed by: STUDENT IN AN ORGANIZED HEALTH CARE EDUCATION/TRAINING PROGRAM

## 2020-07-16 PROCEDURE — 87086 URINE CULTURE/COLONY COUNT: CPT | Performed by: OBSTETRICS & GYNECOLOGY

## 2020-07-16 PROCEDURE — 80307 DRUG TEST PRSMV CHEM ANLYZR: CPT | Performed by: OBSTETRICS & GYNECOLOGY

## 2020-07-16 PROCEDURE — 80081 OBSTETRIC PANEL INC HIV TSTG: CPT | Performed by: OBSTETRICS & GYNECOLOGY

## 2020-07-16 PROCEDURE — 99213 OFFICE O/P EST LOW 20 MIN: CPT | Performed by: OBSTETRICS & GYNECOLOGY

## 2020-07-16 PROCEDURE — 86803 HEPATITIS C AB TEST: CPT | Performed by: OBSTETRICS & GYNECOLOGY

## 2020-07-16 PROCEDURE — 99203 OFFICE O/P NEW LOW 30 MIN: CPT

## 2020-07-16 PROCEDURE — 81001 URINALYSIS AUTO W/SCOPE: CPT | Performed by: OBSTETRICS & GYNECOLOGY

## 2020-07-16 RX ORDER — ACETAMINOPHEN 325 MG/1
650 TABLET ORAL EVERY 6 HOURS PRN
Status: DISCONTINUED | OUTPATIENT
Start: 2020-07-16 | End: 2020-07-16 | Stop reason: HOSPADM

## 2020-07-16 RX ADMIN — ACETAMINOPHEN 650 MG: 325 TABLET ORAL at 13:41

## 2020-07-16 NOTE — TELEPHONE ENCOUNTER
Patient called and reports having pelvic pain for 3 days with no relief  Patient instructed to go to Labor and Delivery for evaluation  Patient verbalized understanding

## 2020-07-16 NOTE — PROGRESS NOTES
L&D Triage Note - OB/GYN  Teresa Huang 26 y o  female MRN: [id number]  Unit/Bed#: L&D 445-58 Encounter: [id number]    SUBJECTIVE    RACHEL: Estimated Date of Delivery: 8/17/20    HPI:  26 y o  [id number] 35w3d presents with complaint of pelvic/back pain for the last 3 days  She describes the pain as sharp pressure that occurs in her lower abdomen/pelvis and back  The pain subsequently radiates down the back of her right leg  It is not exacerbated by anything and comes on randomly  She has tried to take Tylenol for pain relief, but states that it has not helped  When the pain is at its worst, she rates it as 8-9/10  She denies any recent trauma to the abdomen/pelvis  She describes minimal LoF yesterday  She states it does not feel ROM, and has not continued to leak since  She denies VB  She is having intermittent contractions and has positive fetal movement  Her current obstetrical history is significant for tobacco use in pregnancy  Her past obstetrical history is significant for 4 prior SVDs  G1 and G2 were c/b GDM      ROS:  Constitutional: Negative  Respiratory: Negative  Cardiovascular: Negative    Gastrointestinal: Negative    OBJECTIVE:  /72 (BP Location: Left arm)   Pulse 79   Temp 98 2 °F (36 8 °C) (Oral)   Resp 16   LMP 01/28/2020 (Approximate)   There is no height or weight on file to calculate BMI  Physical Exam   Constitutional: She appears well-developed  Cardiovascular: Normal rate and regular rhythm  Pulmonary/Chest: Breath sounds normal    Abdominal: Soft  There is tenderness  Lower abdominal tenderness to palpation    Neurological: She is alert  Skin: Skin is warm and dry  Psychiatric: She has a normal mood and affect         SVE:  Dilation: 3  Effacement (%): 60  Station: -2  Method: Manual  OB Examiner: Omar    FHT:  Baseline Rate: 125 bpm  Variability: Moderate 6-25 bpm  Accelerations: 15 x 15 or greater, At variable times  Decelerations: None  FHR Category: Category I    TOCO:   Contraction Frequency (minutes): occasional(irritability present)  Contraction Duration (seconds): 50  Contraction Quality: Mild    Labs:   Recent Results (from the past 24 hour(s))   CBC and differential    Collection Time: 07/16/20  1:27 PM   Result Value Ref Range    WBC 9 47 4 31 - 10 16 Thousand/uL    RBC 3 39 (L) 3 81 - 5 12 Million/uL    Hemoglobin 10 8 (L) 11 5 - 15 4 g/dL    Hematocrit 33 3 (L) 34 8 - 46 1 %    MCV 98 82 - 98 fL    MCH 31 9 26 8 - 34 3 pg    MCHC 32 4 31 4 - 37 4 g/dL    RDW 13 1 11 6 - 15 1 %    MPV 11 1 8 9 - 12 7 fL    Platelets 940 695 - 862 Thousands/uL    nRBC 0 /100 WBCs    Neutrophils Relative 77 (H) 43 - 75 %    Immat GRANS % 1 0 - 2 %    Lymphocytes Relative 15 14 - 44 %    Monocytes Relative 6 4 - 12 %    Eosinophils Relative 1 0 - 6 %    Basophils Relative 0 0 - 1 %    Neutrophils Absolute 7 28 1 85 - 7 62 Thousands/µL    Immature Grans Absolute 0 11 0 00 - 0 20 Thousand/uL    Lymphocytes Absolute 1 42 0 60 - 4 47 Thousands/µL    Monocytes Absolute 0 57 0 17 - 1 22 Thousand/µL    Eosinophils Absolute 0 06 0 00 - 0 61 Thousand/µL    Basophils Absolute 0 03 0 00 - 0 10 Thousands/µL   Type and screen    Collection Time: 07/16/20  1:28 PM   Result Value Ref Range    ABO Grouping O     Rh Factor Positive     Antibody Screen Negative     Specimen Expiration Date 20200719    Rapid drug screen, urine    Collection Time: 07/16/20  1:37 PM   Result Value Ref Range    Amph/Meth UR Negative Negative    Barbiturate Ur Negative Negative    Benzodiazepine Urine Negative Negative    Cocaine Urine Negative Negative    Methadone Urine Negative Negative    Opiate Urine Negative Negative    PCP Ur Negative Negative    THC Urine Positive (A) Negative    Oxycodone Urine Negative Negative       Patient is seen by Dr Shelli Amaral     ASSESSMENT/PLAN  uAgustine Recinos is a 26 y o  [id number] at 35w3d presents with pelvic/back pain that radiates down her right leg   Will r/o PTL, UTI and nephrolithiasis   labor ruled out   - SVE as noted above   - Patient's strip is reactive   - No change on 2nd SVE and patient was not galilea regularly  Nephrolithiasis/UTI   - UA to assess for UTI  - Showed trace leukocytes, no blood  - Instructed patient to stay hydrated, and if symptoms persist to call the office  Patient's pain is likely ligamentous pain related to pregnancy  Instructed patient that if she starts galilea regularly, has LoF, decreased fetal movement or VB to call the office and come back for evaluation  Of note, patient has not been compliant with PNC  During this visit, a prenatal panel, Hep C and GBS were done  Patient was also scanned and found to be vertex on ultrasound  Pt had previous h/o THC use  With patient's consent, a UDS was drawn and shown to be positive for THC  This was discussed with her and she was counseled appropriately  Patient is considering a tubal ligation for contraception  Due to her noncompliance with [facility], I signed MA 31 papers with patient  At the time of consent, patient expressed feeling pressure from family to have a tubal ligation  Discussed with her that this consent is not a binding contract it is to ensure that if she does want a tubal during her for her admission for labor she will be able to have it done  Discussed all of her options with her including long-acting reversible contraception and vasectomy  Please reassess patient's thoughts on contraception choice on admission for labor  Discharge instructions  · Patient instructed to call if experiencing worsening contractions, vaginal bleeding, loss of fluid or decreased fetal movement  · Will follow up with OBGYN on       D/w Dr Josie Basurto MD  OB/GYN PGY-1  2020  3:01 PM

## 2020-07-17 LAB
BACTERIA UR CULT: NORMAL
HIV 1+2 AB+HIV1 P24 AG SERPL QL IA: NORMAL
RPR SER QL: NORMAL

## 2020-07-18 LAB — GP B STREP DNA SPEC QL NAA+PROBE: NORMAL

## 2020-07-20 ENCOUNTER — TELEPHONE (OUTPATIENT)
Dept: OBGYN CLINIC | Facility: CLINIC | Age: 25
End: 2020-07-20

## 2020-07-20 NOTE — TELEPHONE ENCOUNTER
COVID Pre-Visit Screening     1  Is this a family member screening? No  2  Have you traveled outside of your state in the past 2 weeks? No  3  Do you presently have a fever or flu-like symptoms? No  4  Do you have symptoms of an upper respiratory infection like runny nose, sore throat, or cough? No  5  Are you suffering from new headache that you have not had in the past?  No  6  Do you have/have you experienced any new shortness of breath recently? No  7  Do you have any new diarrhea, nausea or vomiting? No  8  Have you been in contact with anyone who has been sick or diagnosed with COVID-19? No  9  10  Do you have any new loss of taste or smell? No  11  Are you able to wear a mask without a valve for the entire visit?  Yes

## 2020-07-21 ENCOUNTER — ROUTINE PRENATAL (OUTPATIENT)
Dept: OBGYN CLINIC | Facility: CLINIC | Age: 25
End: 2020-07-21

## 2020-07-21 VITALS
TEMPERATURE: 98 F | SYSTOLIC BLOOD PRESSURE: 123 MMHG | BODY MASS INDEX: 25.09 KG/M2 | DIASTOLIC BLOOD PRESSURE: 79 MMHG | WEIGHT: 124.2 LBS | HEART RATE: 84 BPM

## 2020-07-21 DIAGNOSIS — Z3A.36 36 WEEKS GESTATION OF PREGNANCY: Primary | ICD-10-CM

## 2020-07-21 LAB
DEPRECATED HGB OTHER BLD-IMP: 0 %
HGB A MFR BLD: 1.5 % (ref 1.8–3.2)
HGB A MFR BLD: 98.5 % (ref 96.4–98.8)
HGB C MFR BLD: 0 %
HGB F MFR BLD: 0 % (ref 0–2)
HGB FRACT BLD-IMP: ABNORMAL
HGB S BLD QL SOLY: NEGATIVE
HGB S MFR BLD: 0 %

## 2020-07-21 PROCEDURE — 99213 OFFICE O/P EST LOW 20 MIN: CPT | Performed by: OBSTETRICS & GYNECOLOGY

## 2020-07-21 NOTE — PROGRESS NOTES
Izabella Hagan presents today for routine OB visit at 36w1d  Blood Pressure: 123/79  Wt=56 3 kg (124 lb 3 2 oz); Body mass index is 25 09 kg/m² ; TWG=13 7 kg (30 lb 3 2 oz)   ; Fundal height: 33 cm, Cervical dilation: 5 cm, checked during office visit  Abdomen: gravid, soft, non-tender  She reports mild contractions every 30 minutes  No abdominal pain  Denies vaginal bleeding or leaking of fluid  GBS: collected and negative on 7/16/2020  Reports adequate fetal movement of at least 10 movements in 2 hours once daily  Third trimester bloodwork: Completed  Vaccinations: up to date  Scheduled for next ultrasound 7/27/2020  Reviewed premature labor precautions and fetal kick counts  Advised to continue medications and return in 1 weeks  Current Outpatient Medications:     Prenatal Vit-Fe Fumarate-FA (PRENATAL VITAMIN PO), Take by mouth, Disp: , Rfl:       G5 Problems (from 03/27/20 to present)     Problem Noted Resolved    Drug use affecting pregnancy in third trimester 7/16/2020 by Qi Fortune MD No    Fetal ultrasound 4/15/2020 by SANJIV Dial No    Overview Signed 4/15/2020  3:16 PM by SANJIV Dial     3/27/20 (19w4d) EDC confirmed         Genetic screening 4/15/2020 by SANJIV Dial No    Overview Signed 4/15/2020  3:16 PM by Joey Armendariz     Presented too late for genetic screening         Contraception management 4/15/2020 by SANJIV Dial No    Overview Addendum 7/16/2020  5:40 PM by Qi Fortune MD     Desires surgical sterilization   Tubal consent was signed 7/16/2020         History of GDM 4/15/2020 by SANJIV Dial No    Overview Addendum 5/6/2020  4:11 PM by SANJIV Dial     Needs early GDM screen - did not have drawn         Tobacco use in pregnancy 4/15/2020 by SANJIV Dial No    Overview Signed 4/15/2020  3:19 PM by SANJIV Dial     Cessation counseling - done  Pre-gestation smoked 1/2 ppd  Down to 2-3 cigs/day

## 2020-07-22 ENCOUNTER — TELEPHONE (OUTPATIENT)
Dept: OBGYN CLINIC | Facility: CLINIC | Age: 25
End: 2020-07-22

## 2020-07-22 NOTE — TELEPHONE ENCOUNTER
Called patient to offer prenatal appointment, left message via voicemail asking patient to call office

## 2020-07-23 ENCOUNTER — HOSPITAL ENCOUNTER (OUTPATIENT)
Facility: HOSPITAL | Age: 25
Setting detail: OBSERVATION
Discharge: HOME | End: 2020-07-23
Attending: OBSTETRICS & GYNECOLOGY | Admitting: OBSTETRICS & GYNECOLOGY
Payer: COMMERCIAL

## 2020-07-23 VITALS
OXYGEN SATURATION: 98 % | HEART RATE: 76 BPM | HEIGHT: 59 IN | RESPIRATION RATE: 18 BRPM | TEMPERATURE: 97 F | BODY MASS INDEX: 25.09 KG/M2 | SYSTOLIC BLOOD PRESSURE: 120 MMHG | DIASTOLIC BLOOD PRESSURE: 87 MMHG

## 2020-07-23 PROBLEM — Z3A.36 36 WEEKS GESTATION OF PREGNANCY: Status: ACTIVE | Noted: 2020-05-06

## 2020-07-23 LAB
ABO GROUP BLD: NORMAL
BLD GP AB SCN SERPL QL: NEGATIVE
ERYTHROCYTE [DISTWIDTH] IN BLOOD BY AUTOMATED COUNT: 13.5 % (ref 11.6–15.1)
GLUCOSE SERPL-MCNC: 83 MG/DL (ref 65–140)
HCT VFR BLD AUTO: 33.6 % (ref 34.8–46.1)
HGB BLD-MCNC: 11 G/DL (ref 11.5–15.4)
MCH RBC QN AUTO: 32.4 PG (ref 26.8–34.3)
MCHC RBC AUTO-ENTMCNC: 32.7 G/DL (ref 31.4–37.4)
MCV RBC AUTO: 99 FL (ref 82–98)
PLATELET # BLD AUTO: 169 THOUSANDS/UL (ref 149–390)
PMV BLD AUTO: 11.7 FL (ref 8.9–12.7)
RBC # BLD AUTO: 3.4 MILLION/UL (ref 3.81–5.12)
RH BLD: POSITIVE
SPECIMEN EXPIRATION DATE: NORMAL
WBC # BLD AUTO: 8.92 THOUSAND/UL (ref 4.31–10.16)

## 2020-07-23 PROCEDURE — 85027 COMPLETE CBC AUTOMATED: CPT | Performed by: OBSTETRICS & GYNECOLOGY

## 2020-07-23 PROCEDURE — 86901 BLOOD TYPING SEROLOGIC RH(D): CPT | Performed by: OBSTETRICS & GYNECOLOGY

## 2020-07-23 PROCEDURE — 82948 REAGENT STRIP/BLOOD GLUCOSE: CPT

## 2020-07-23 PROCEDURE — 86850 RBC ANTIBODY SCREEN: CPT | Performed by: OBSTETRICS & GYNECOLOGY

## 2020-07-23 PROCEDURE — 99205 OFFICE O/P NEW HI 60 MIN: CPT

## 2020-07-23 PROCEDURE — 86592 SYPHILIS TEST NON-TREP QUAL: CPT | Performed by: OBSTETRICS & GYNECOLOGY

## 2020-07-23 PROCEDURE — 86900 BLOOD TYPING SEROLOGIC ABO: CPT | Performed by: OBSTETRICS & GYNECOLOGY

## 2020-07-23 PROCEDURE — 99234 HOSP IP/OBS SM DT SF/LOW 45: CPT | Performed by: OBSTETRICS & GYNECOLOGY

## 2020-07-23 RX ORDER — PROMETHAZINE HYDROCHLORIDE 25 MG/ML
12.5 INJECTION, SOLUTION INTRAMUSCULAR; INTRAVENOUS ONCE
Status: DISCONTINUED | OUTPATIENT
Start: 2020-07-23 | End: 2020-07-23

## 2020-07-23 RX ORDER — SODIUM CHLORIDE, SODIUM LACTATE, POTASSIUM CHLORIDE, CALCIUM CHLORIDE 600; 310; 30; 20 MG/100ML; MG/100ML; MG/100ML; MG/100ML
125 INJECTION, SOLUTION INTRAVENOUS CONTINUOUS
Status: DISCONTINUED | OUTPATIENT
Start: 2020-07-23 | End: 2020-07-23 | Stop reason: HOSPADM

## 2020-07-23 RX ORDER — ACETAMINOPHEN 325 MG/1
650 TABLET ORAL EVERY 4 HOURS PRN
Status: DISCONTINUED | OUTPATIENT
Start: 2020-07-23 | End: 2020-07-23 | Stop reason: HOSPADM

## 2020-07-23 RX ORDER — PROMETHAZINE HYDROCHLORIDE 25 MG/ML
12.5 INJECTION, SOLUTION INTRAMUSCULAR; INTRAVENOUS ONCE
Status: COMPLETED | OUTPATIENT
Start: 2020-07-23 | End: 2020-07-23

## 2020-07-23 RX ORDER — BUTORPHANOL TARTRATE 1 MG/ML
1 INJECTION, SOLUTION INTRAMUSCULAR; INTRAVENOUS ONCE
Status: COMPLETED | OUTPATIENT
Start: 2020-07-23 | End: 2020-07-23

## 2020-07-23 RX ORDER — BETAMETHASONE SODIUM PHOSPHATE AND BETAMETHASONE ACETATE 3; 3 MG/ML; MG/ML
12 INJECTION, SUSPENSION INTRA-ARTICULAR; INTRALESIONAL; INTRAMUSCULAR; SOFT TISSUE EVERY 24 HOURS
Status: DISCONTINUED | OUTPATIENT
Start: 2020-07-23 | End: 2020-07-23

## 2020-07-23 RX ADMIN — PROMETHAZINE HYDROCHLORIDE 12.5 MG: 25 INJECTION INTRAMUSCULAR; INTRAVENOUS at 14:42

## 2020-07-23 RX ADMIN — PROMETHAZINE HYDROCHLORIDE 12.5 MG: 25 INJECTION INTRAMUSCULAR; INTRAVENOUS at 09:54

## 2020-07-23 RX ADMIN — BETAMETHASONE SODIUM PHOSPHATE AND BETAMETHASONE ACETATE 12 MG: 3; 3 INJECTION, SUSPENSION INTRA-ARTICULAR; INTRALESIONAL; INTRAMUSCULAR at 10:33

## 2020-07-23 RX ADMIN — BUTORPHANOL TARTRATE 1 MG: 1 INJECTION, SOLUTION INTRAMUSCULAR; INTRAVENOUS at 09:53

## 2020-07-23 RX ADMIN — SODIUM CHLORIDE, SODIUM LACTATE, POTASSIUM CHLORIDE, AND CALCIUM CHLORIDE 125 ML/HR: .6; .31; .03; .02 INJECTION, SOLUTION INTRAVENOUS at 15:14

## 2020-07-23 RX ADMIN — BUTORPHANOL TARTRATE 1 MG: 1 INJECTION, SOLUTION INTRAMUSCULAR; INTRAVENOUS at 14:39

## 2020-07-23 RX ADMIN — SODIUM CHLORIDE, SODIUM LACTATE, POTASSIUM CHLORIDE, AND CALCIUM CHLORIDE 1000 ML: .6; .31; .03; .02 INJECTION, SOLUTION INTRAVENOUS at 09:50

## 2020-07-23 NOTE — PROGRESS NOTES
L&D Triage Note - OB/GYN  Garett Lockhart 26 y o  female MRN: [id number]  Unit/Bed#: L&D 323-01 Encounter: [id number]    SUBJECTIVE    RACHEL: Estimated Date of Delivery: 20    HPI:  26 y o  [id number] 36w3d presents with complaint of contractions every 5 minutes  Her contractions started yesterday morning and persisted throughout the day  She tried to take Tylenol, but it did not help relieve her cramping  She denies VB, LoF and reports good FM  Patient was seen in the office yesterday and was 5cm dilated  Patient was seen in triage on  for r/o  labor  At that time her SVE was 3/60/-2 and she was not galilea  Her workup was negative and steroids were not indicated at that time  Due to patient non-compliance with [facility], at that visit her prenatal panel and GBS were collected  She has a h/o GDM in prior pregnancies and missed her 1h GTT  A fingerstick on arrival was 81  Patient's last meal was yesterday evening and has not had anything but water  Contractions: yes  Leakage of fluid: no  Vaginal Bleeding: no  Fetal movement: present    Her current obstetrical history is significant for tobacco use, THC use, limited PNC    Her past obstetrical history is significant for h/o GDM and 4 prior       ROS:  Constitutional: Negative  Respiratory: Negative  Cardiovascular: Negative    Gastrointestinal: Negative    OBJECTIVE:  /79   Pulse 88   Temp (!) 45 7 °F (7 6 °C) (Oral)   Resp 18   Ht 4' 11" (1 499 m)   LMP 2020 (Approximate)   SpO2 98%   BMI 25 09 kg/m²   Body mass index is 25 09 kg/m²  Physical Exam   Constitutional: She appears well-developed  Cardiovascular: Normal rate and regular rhythm  Pulmonary/Chest: Breath sounds normal    Abdominal: Soft         SVE:  Dilation: 5  Effacement (%): 80  Station: -2  Method: Manual  OB Examiner: Philippescmikayla Ekonomidis    FHT:  Baseline Rate: 135 bpm  Variability: Moderate 6-25 bpm  Accelerations: 15 x 15 or greater  Decelerations: None  FHR Category: Category I    TOCO:   Contraction Frequency (minutes): 5  Contraction Duration (seconds): 60  Contraction Quality: Mild    Labs:   Recent Results (from the past 24 hour(s))   Fingerstick Glucose (POCT)    Collection Time: 07/23/20  9:47 AM   Result Value Ref Range    POC Glucose 83 65 - 140 mg/dl       Patient is seen by Adi Florentino and Omar     ASSESSMENT/PLAN   Augustine Recinos is a 26 y o  [id number] at 36w3d who presents with contractions since yesterday morning  Patient's SVE in office yesterday was 5cm  She tried taking Tylenol to relieve the cramping/pressure, but it did not help  She was seen on 7/16 in triage for PTL which was subsequently ruled out  Steroids were not indicated at that time   Patient denies VB/LoF and reports good FM     - SVE   - external fetal monitoring    - [place]   - 1L bolus IVF    - Stadol/Phenergan for pain    - POCT glucose- 83 since pt h/o GDM and missed 1h GTT    - Will reassess in 2 hours to see if patient has made change     D/w Dr Kelsey Sow MD  OB/GYN PGY-1  7/23/2020  10:12 AM

## 2020-07-23 NOTE — PROGRESS NOTES
Patient was having more pain  Dr Arline Melgoza and I rechecked the patient and she is unchanged from her last exam  Her strip is still reactive and the toco displays irritability  Will reassess patient if having more pain

## 2020-07-23 NOTE — PROGRESS NOTES
At 2h recheck, patient is now 5-6/80/-2  Has made some change since last check  She is still cramping even after stadol injection  Contractions are minimal on toco  Patient received one dose of BMZ  Plan to recheck in another 2 hours       SVE: 5-6/80/-2  FHR: 130, moderate variability, 15x15 accels   TOCO: irritability

## 2020-07-23 NOTE — H&P
[address] 26 y o  female MRN: [id number]  Unit/Bed#: L&D 323-01 Encounter: [id number]    SUBJECTIVE:    Chief Complaint: contractions    HPI: Deedee Flores is a 26 y o  [id number] with an RACHEL of 2020, by Ultrasound at 36w3d who is being admitted for observation  She initially presented with complaint of contractions every 5 minutes  Her contractions started yesterday morning and persisted throughout the day  She tried to take Tylenol, but it did not help relieve her cramping  She denies VB, LoF and reports good FM  Patient was seen in the office yesterday and was 5cm dilated  Patient was seen in triage on  for r/o  labor  At that time her SVE was 3/60/-2 and she was not galilea  Her workup was negative and steroids were not indicated at that time  Due to patient non-compliance with [facility], at that visit her prenatal panel and GBS were collected  She has a h/o GDM in prior pregnancies and missed her 1h GTT  A fingerstick on arrival was 81  Patient's last meal was yesterday evening and has not had anything but water  At 2h recheck, patient is now 5-6/80/-2  Has made some change since last check  She is still cramping even after stadol injection  Contractions are minimal on toco  Patient received one dose of BMZ        Pregnancy complications: Tobacco and THC use, limited PNC    Patient Active Problem List   Diagnosis    Fetal ultrasound    Genetic screening    Contraception management    History of GDM    Tobacco use in pregnancy    36 weeks gestation of pregnancy    Evaluate anatomy not seen on prior sonogram    Drug use affecting pregnancy in third trimester       Baby complications/comments: none    Review of Systems   Constitutional: Negative for chills and fever  HENT: Negative for sore throat  Respiratory: Negative for cough and shortness of breath  Cardiovascular: Negative for chest pain  Gastrointestinal: Negative for abdominal pain  Genitourinary: Negative for dysuria and hematuria  OB History    Para Term  AB Living   5 4 4 0 0 4   SAB TAB Ectopic Multiple Live Births   0 0 0 0 4      # Outcome Date GA Lbr Nagi/2nd Weight Sex Delivery Anes PTL Lv   5 Current            4 Term 19 37w2d  2705 g (5 lb 15 4 oz) M Vag-Spont None N DELFIN      Birth Comments: FOB1   3 Term 10/08/17 37w4d / 00:52 2608 g (5 lb 12 oz) M Vag-Spont EPI N DELFIN      Birth Comments: FOB2      Complications: Gestational diabetes mellitus   2 Term 14 38w6d  2466 g (5 lb 7 oz) F Vag-Spont EPI N DELFIN      Birth Comments: FOB2   1 Term 13 37w0d  2438 g (5 lb 6 oz) M Vag-Spont EPI Y DELFIN      Birth Comments: FOB1      Complications: Gestational diabetes mellitus       Past Medical History:   Diagnosis Date    Chlamydia     GERD (gastroesophageal reflux disease)     has not req'd tx since        Past Surgical History:   Procedure Laterality Date    CHOLECYSTECTOMY      MO COLONOSCOPY FLX DX W/COLLJ SPEC WHEN PFRMD N/A 2017    Procedure: EGD AND COLONOSCOPY;  Surgeon: Rick Oleary DO;  Location: [facility] GI LAB;   Service: Gastroenterology    MO LAP,APPENDECTOMY N/A 2016    Procedure: APPENDECTOMY LAPAROSCOPIC;  Surgeon: Francesco Pantoja DO;  [facility];  Service: General    MO LAP,DIAGNOSTIC ABDOMEN N/A 3/13/2016    Procedure: LAPAROSCOPY DIAGNOSTIC;  Surgeon: Travis Ruffin MD;  [facility];  Service: General    RESECTION SMALL BOWEL LAPAROSCOPIC         Social History     Tobacco Use    Smoking status: Heavy Tobacco Smoker     Packs/day: 0 50     Types: Cigarettes    Smokeless tobacco: Never Used   Substance Use Topics    Alcohol use: Not Currently     Frequency: Monthly or less     Drinks per session: 1 or 2       Allergies   Allergen Reactions    Ibuprofen Anaphylaxis       Medications Prior to Admission   Medication    Prenatal Vit-Fe Fumarate-FA (PRENATAL VITAMIN PO) OBJECTIVE:  Vitals:  Temp:  [45 7 °F (7 6 °C)-97 4 °F (36 3 °C)] 45 7 °F (7 6 °C)  HR:  [81-88] 88  Resp:  [18-20] 18  BP: (115-119)/(66-79) 115/79  Body mass index is 25 09 kg/m²  Physical Exam:  Physical Exam   Constitutional: She appears well-developed  Cardiovascular: Normal rate and regular rhythm  Pulmonary/Chest: Breath sounds normal    Abdominal: Soft  Neurological: She is alert  Psychiatric: She has a normal mood and affect  SVE:  Dilation: 5-6  Effacement (%): 70  Station: -2    FHT:  Baseline Rate: 130 bpm  Variability: Moderate 6-25 bpm  Accelerations: 15 x 15 or greater  Decelerations: None  FHR Category: Category I    TOCO:   Contraction Frequency (minutes): irreg  Contraction Duration (seconds):   Contraction Quality: Moderate    Lab Results   Component Value Date    WBC 9 47 07/16/2020    HGB 10 8 (L) 07/16/2020    HCT 33 3 (L) 07/16/2020     07/16/2020     Lab Results   Component Value Date     11/21/2015    K 3 6 03/02/2020     03/02/2020    CO2 23 03/02/2020    BUN 5 03/02/2020    CREATININE 0 36 (L) 03/02/2020    GLUCOSE 80 05/05/2017    AST 20 02/18/2020    ALT 19 02/18/2020       Prenatal Labs: Reviewed      Blood type: O+  Antibody: negative  Group B strep: negative  HIV: negative  Hepatitis B: negative  RPR: non-reactive  Rubella: Immune  1 hour Glucose: not done  Platelets: 020  Hgb: 10 8    Assessment: 26 y o   [id number] at 36w3d admitted for observation   SVE: 5-6/70/-2  FHT: 130s, reactive 15x15 accels   GBS status: negative    Postpartum contraception plan: possible tubal, MA 31 signed       Plan:   · Admit  · CBC, RPR, Type & Screen  · Observation        Dr Neisha Salazar aware    <2 Maira Hogan MD  OB/GYN PGY-1  7/23/2020  2:25 PM

## 2020-07-23 NOTE — DISCHARGE INSTRUCTIONS
Early Labor Signs   WHAT YOU NEED TO KNOW:   Early labor signs can happen weeks, days, or hours before your baby is ready to be delivered  You may have false labor signs, which are also called [place] contractions  False labor is common and may happen several weeks or days before your actual labor  The contractions are not regular, and do not get closer together  The pain is usually mild, does not worsen, and is felt only in front  [place] contractions may happen later in the day, and stop after you change position, walk, or rest   DISCHARGE INSTRUCTIONS:   Call 911 for any of the following:   · You have heavy vaginal bleeding  · You cannot get to the hospital before the baby starts to come out  Seek care immediately if:   · You have regular, painful contractions that are less than 5 minutes apart and last 30 to 70 seconds each  · You have a constant trickle or sudden gush of clear fluid from your vagina  · You notice a sudden decrease in your baby's movement  Contact your obstetrician or healthcare provider if:   · You have pain in your lower back or abdomen that does not get better when you change positions  · You have bloody mucus or show  · You have questions or concerns about your condition or care  Early labor signs and symptoms:   · Lightening  occurs when your baby drops inside your pelvis  You may feel increased pressure in your pelvis  This may happen a few weeks to a few hours before your labor begins  · Contractions  are cramps and tightening that occur in your uterus to help move the baby through your birth canal  Contractions occur regularly and more often each time  Each one lasts about 30 to 70 seconds, and gets stronger until you deliver your baby  Contractions do not go away with movement  The pain usually starts in your lower back and moves to your abdomen  · Effacement  occurs when your cervix softens and thins, so it can easily open for the baby   You will not be able to feel effacement  Your healthcare provider will examine your cervix for effacement  · Dilation  is widening of your cervix  Your healthcare provider will examine your cervix for dilation  Your cervix may start to dilate weeks before your baby is delivered  Your cervix will be fully opened and ready for delivery when it is dilated to 10 centimeters  · Increased discharge  from your vagina may occur  It may be brown, pink, clear, or slightly bloody  This discharge may also be called bloody show  Bloody show is a mucus plug that forms and blocks your cervix during pregnancy  The discharge may mean that your cervix is opening up and getting ready for delivery  · Rupture of membranes  is a sudden release of clear fluid from your vagina  Ruptured membranes means your water broke  Your healthcare provider may need to break your water if it does not happen on its own  Follow up with your obstetrician or healthcare provider as directed:  Write down your questions so you remember to ask them during your visit  © 2017 [employer] [facility] Information is for End User's use only and may not be sold, redistributed or otherwise used for commercial purposes  All illustrations and images included in CareNotes® are the copyrighted property of [employer]  or Sorin Winchester  The above information is an  only  It is not intended as medical advice for individual conditions or treatments  Talk to your doctor, nurse or pharmacist before following any medical regimen to see if it is safe and effective for you

## 2020-07-24 ENCOUNTER — TELEPHONE (OUTPATIENT)
Dept: PERINATAL CARE | Facility: OTHER | Age: 25
End: 2020-07-24

## 2020-07-24 LAB — RPR SER QL: NORMAL

## 2020-07-24 NOTE — CASE MANAGEMENT
The patient has since been discharged, although she was in the observation patient class while in the hospital  This case management assistant mailed a copy of the observation letter to the patient's home address listed on file

## 2020-07-24 NOTE — TELEPHONE ENCOUNTER
Spoke with patient and confirmed appointment with MFM  1 support person ( must be over age of 15) may accompany patient  Will you and your support person be able to wear a mask ,without a valve , during entire appointment? yes   To minimize your exposure in our waiting area,check in and rooming questions will be done via phone  When you arrive in the parking lot please call the following inside line # prior to entering office:    Vivian Caputo [phone] line: [facility] line:  [address] line: [phone]  [place]add[place] line:  [phone]  [place] line: [phone]    Have you or your support person traveled outside the state in the last 2 weeks?no   If yes, what state did you travel to? [place]       Do you or your support person have:  Fever or flu- like symptoms?no  Symptoms of upper respiratory infection like runny nose, sore throat or cough? no  Do you have new headache that you have not had in the past?no  Have you experienced any new shortness of breath recently?no  Do you have any new loss of taste or smell?no  Do you have any new diarrhea, nausea or vomiting?no  Have you recently been in contact with anyone who has been sick or diagnosed with COVID-19 infection?no  Have you been recommended to quarantine because of an exposure to a confirmed positive COVID19 person?no  You and your support person will have temperature screening upon arrival   Patient verbalized understanding of all instructions   -------------------------------------------------------------    Attempted to reach patient by phone and left voicemail to confirm appointment for MFM ultrasound  1 support person ( must be over the age of 15) may accompany you for your appointment  If you or your support person have traveled outside the state in the past 2 weeks, please call and notify our office today #[phone]    You and your support person must wear a mask ,covering nose and mouth,during your entire visit  You and your support person will have temperature screened upon arrival     To minimize your exposure in our waiting room, please call our office prior to entering the building  Check in and rooming questions will be done via phone  We will give you directions when to enter for your appointment  Inside office # provided:  [place] line: [phone]  [facility] line:  [phone]  [facility] line:  [address] line:  [phone]  Wiliam Sweeney line:  [phone]  [place] line:  [phone]    IF you are not feeling well- cough, fever, shortness of breath or any flu like symptoms, contact your primary care physician or [phone] [place]    Any questions with these instructions please call Maternal Fetal Medicine nurse line today @ # [phone]

## 2020-07-27 ENCOUNTER — HOSPITAL ENCOUNTER (INPATIENT)
Facility: HOSPITAL | Age: 25
LOS: 1 days | Discharge: HOME | DRG: 560 | End: 2020-07-28
Attending: OBSTETRICS & GYNECOLOGY | Admitting: OBSTETRICS & GYNECOLOGY
Payer: COMMERCIAL

## 2020-07-27 ENCOUNTER — TELEPHONE (OUTPATIENT)
Dept: OBGYN CLINIC | Facility: CLINIC | Age: 25
End: 2020-07-27

## 2020-07-27 ENCOUNTER — ULTRASOUND (OUTPATIENT)
Dept: PERINATAL CARE | Facility: OTHER | Age: 25
End: 2020-07-27
Payer: COMMERCIAL

## 2020-07-27 VITALS
DIASTOLIC BLOOD PRESSURE: 72 MMHG | WEIGHT: 125.2 LBS | BODY MASS INDEX: 25.24 KG/M2 | TEMPERATURE: 98 F | SYSTOLIC BLOOD PRESSURE: 111 MMHG | HEART RATE: 98 BPM | HEIGHT: 59 IN

## 2020-07-27 DIAGNOSIS — Z3A.37 37 WEEKS GESTATION OF PREGNANCY: ICD-10-CM

## 2020-07-27 DIAGNOSIS — O99.332 MATERNAL TOBACCO USE IN SECOND TRIMESTER: ICD-10-CM

## 2020-07-27 DIAGNOSIS — Z36.89 ENCOUNTER FOR ULTRASOUND TO ASSESS FETAL GROWTH: Primary | ICD-10-CM

## 2020-07-27 PROBLEM — IMO0002 EVALUATE ANATOMY NOT SEEN ON PRIOR SONOGRAM: Status: RESOLVED | Noted: 2020-05-11 | Resolved: 2020-07-27

## 2020-07-27 LAB
ABO GROUP BLD: NORMAL
ALBUMIN SERPL BCP-MCNC: 2.3 G/DL (ref 3.5–5)
ALP SERPL-CCNC: 181 U/L (ref 46–116)
ALT SERPL W P-5'-P-CCNC: 16 U/L (ref 12–78)
AMPHETAMINES SERPL QL SCN: NEGATIVE
ANION GAP SERPL CALCULATED.3IONS-SCNC: 11 MMOL/L (ref 4–13)
AST SERPL W P-5'-P-CCNC: 21 U/L (ref 5–45)
BARBITURATES UR QL: NEGATIVE
BASE EXCESS BLDCOA CALC-SCNC: -0.9 MMOL/L (ref 3–11)
BASE EXCESS BLDCOV CALC-SCNC: 1 MMOL/L (ref 1–9)
BASOPHILS # BLD AUTO: 0.04 THOUSANDS/ΜL (ref 0–0.1)
BASOPHILS NFR BLD AUTO: 0 % (ref 0–1)
BENZODIAZ UR QL: NEGATIVE
BILIRUB SERPL-MCNC: 0.36 MG/DL (ref 0.2–1)
BLD GP AB SCN SERPL QL: NEGATIVE
BUN SERPL-MCNC: 8 MG/DL (ref 5–25)
CALCIUM SERPL-MCNC: 8.5 MG/DL (ref 8.3–10.1)
CHLORIDE SERPL-SCNC: 98 MMOL/L (ref 100–108)
CO2 SERPL-SCNC: 23 MMOL/L (ref 21–32)
COCAINE UR QL: NEGATIVE
CREAT SERPL-MCNC: 0.53 MG/DL (ref 0.6–1.3)
CREAT UR-MCNC: 159.8 MG/DL
EOSINOPHIL # BLD AUTO: 0.07 THOUSAND/ΜL (ref 0–0.61)
EOSINOPHIL NFR BLD AUTO: 1 % (ref 0–6)
ERYTHROCYTE [DISTWIDTH] IN BLOOD BY AUTOMATED COUNT: 13.2 % (ref 11.6–15.1)
GFR SERPL CREATININE-BSD FRML MDRD: 133 ML/MIN/1.73SQ M
GLUCOSE SERPL-MCNC: 135 MG/DL (ref 65–140)
HCO3 BLDCOA-SCNC: 26.4 MMOL/L (ref 17.3–27.3)
HCO3 BLDCOV-SCNC: 26.7 MMOL/L (ref 12.2–28.6)
HCT VFR BLD AUTO: 34.1 % (ref 34.8–46.1)
HGB BLD-MCNC: 11.3 G/DL (ref 11.5–15.4)
IMM GRANULOCYTES # BLD AUTO: 0.12 THOUSAND/UL (ref 0–0.2)
IMM GRANULOCYTES NFR BLD AUTO: 1 % (ref 0–2)
LYMPHOCYTES # BLD AUTO: 2.03 THOUSANDS/ΜL (ref 0.6–4.47)
LYMPHOCYTES NFR BLD AUTO: 16 % (ref 14–44)
MCH RBC QN AUTO: 31.8 PG (ref 26.8–34.3)
MCHC RBC AUTO-ENTMCNC: 33.1 G/DL (ref 31.4–37.4)
MCV RBC AUTO: 96 FL (ref 82–98)
METHADONE UR QL: NEGATIVE
MONOCYTES # BLD AUTO: 0.73 THOUSAND/ΜL (ref 0.17–1.22)
MONOCYTES NFR BLD AUTO: 6 % (ref 4–12)
NEUTROPHILS # BLD AUTO: 10.03 THOUSANDS/ΜL (ref 1.85–7.62)
NEUTS SEG NFR BLD AUTO: 76 % (ref 43–75)
NRBC BLD AUTO-RTO: 0 /100 WBCS
O2 CT VFR BLDCOA CALC: 13.1 ML/DL
OPIATES UR QL SCN: NEGATIVE
OXYCODONE+OXYMORPHONE UR QL SCN: NEGATIVE
OXYHGB MFR BLDCOA: 53.1 %
OXYHGB MFR BLDCOV: 67.8 %
PCO2 BLDCOA: 52.9 MM[HG] (ref 30–60)
PCO2 BLDCOV: 45.8 MM HG (ref 27–43)
PCP UR QL: NEGATIVE
PH BLDCOA: 7.32 [PH] (ref 7.23–7.43)
PH BLDCOV: 7.38 [PH] (ref 7.19–7.49)
PLATELET # BLD AUTO: 221 THOUSANDS/UL (ref 149–390)
PMV BLD AUTO: 10.9 FL (ref 8.9–12.7)
PO2 BLDCOA: 21.8 MM HG (ref 5–25)
PO2 BLDCOV: 25.5 MM HG (ref 15–45)
POTASSIUM SERPL-SCNC: 3.9 MMOL/L (ref 3.5–5.3)
PROT SERPL-MCNC: 6.3 G/DL (ref 6.4–8.2)
PROT UR-MCNC: 45 MG/DL
PROT/CREAT UR: 0.28 MG/G{CREAT} (ref 0–0.1)
RBC # BLD AUTO: 3.55 MILLION/UL (ref 3.81–5.12)
RH BLD: POSITIVE
SAO2 % BLDCOV: 15.8 ML/DL
SODIUM SERPL-SCNC: 132 MMOL/L (ref 136–145)
SPECIMEN EXPIRATION DATE: NORMAL
THC UR QL: POSITIVE
WBC # BLD AUTO: 13.02 THOUSAND/UL (ref 4.31–10.16)

## 2020-07-27 PROCEDURE — 76816 OB US FOLLOW-UP PER FETUS: CPT | Performed by: OBSTETRICS & GYNECOLOGY

## 2020-07-27 PROCEDURE — 82805 BLOOD GASES W/O2 SATURATION: CPT | Performed by: OBSTETRICS & GYNECOLOGY

## 2020-07-27 PROCEDURE — 99211 OFF/OP EST MAY X REQ PHY/QHP: CPT

## 2020-07-27 PROCEDURE — 99024 POSTOP FOLLOW-UP VISIT: CPT | Performed by: OBSTETRICS & GYNECOLOGY

## 2020-07-27 PROCEDURE — 86901 BLOOD TYPING SEROLOGIC RH(D): CPT | Performed by: OBSTETRICS & GYNECOLOGY

## 2020-07-27 PROCEDURE — 59409 OBSTETRICAL CARE: CPT | Performed by: OBSTETRICS & GYNECOLOGY

## 2020-07-27 PROCEDURE — 86900 BLOOD TYPING SEROLOGIC ABO: CPT | Performed by: OBSTETRICS & GYNECOLOGY

## 2020-07-27 PROCEDURE — 82570 ASSAY OF URINE CREATININE: CPT | Performed by: OBSTETRICS & GYNECOLOGY

## 2020-07-27 PROCEDURE — 86592 SYPHILIS TEST NON-TREP QUAL: CPT | Performed by: OBSTETRICS & GYNECOLOGY

## 2020-07-27 PROCEDURE — 85025 COMPLETE CBC W/AUTO DIFF WBC: CPT | Performed by: OBSTETRICS & GYNECOLOGY

## 2020-07-27 PROCEDURE — 86850 RBC ANTIBODY SCREEN: CPT | Performed by: OBSTETRICS & GYNECOLOGY

## 2020-07-27 PROCEDURE — 10907ZC DRAINAGE OF AMNIOTIC FLUID, THERAPEUTIC FROM PRODUCTS OF CONCEPTION, VIA NATURAL OR ARTIFICIAL OPENING: ICD-10-PCS | Performed by: OBSTETRICS & GYNECOLOGY

## 2020-07-27 PROCEDURE — 99212 OFFICE O/P EST SF 10 MIN: CPT | Performed by: OBSTETRICS & GYNECOLOGY

## 2020-07-27 PROCEDURE — 80053 COMPREHEN METABOLIC PANEL: CPT | Performed by: OBSTETRICS & GYNECOLOGY

## 2020-07-27 PROCEDURE — 84156 ASSAY OF PROTEIN URINE: CPT | Performed by: OBSTETRICS & GYNECOLOGY

## 2020-07-27 PROCEDURE — 80307 DRUG TEST PRSMV CHEM ANLYZR: CPT | Performed by: OBSTETRICS & GYNECOLOGY

## 2020-07-27 RX ORDER — CALCIUM CARBONATE 200(500)MG
1000 TABLET,CHEWABLE ORAL DAILY PRN
Status: DISCONTINUED | OUTPATIENT
Start: 2020-07-27 | End: 2020-07-27

## 2020-07-27 RX ORDER — SODIUM CHLORIDE, SODIUM LACTATE, POTASSIUM CHLORIDE, CALCIUM CHLORIDE 600; 310; 30; 20 MG/100ML; MG/100ML; MG/100ML; MG/100ML
125 INJECTION, SOLUTION INTRAVENOUS CONTINUOUS
Status: DISCONTINUED | OUTPATIENT
Start: 2020-07-27 | End: 2020-07-27

## 2020-07-27 RX ORDER — ONDANSETRON 2 MG/ML
4 INJECTION INTRAMUSCULAR; INTRAVENOUS EVERY 6 HOURS PRN
Status: DISCONTINUED | OUTPATIENT
Start: 2020-07-27 | End: 2020-07-27

## 2020-07-27 RX ORDER — OXYTOCIN 10 [USP'U]/ML
10 INJECTION, SOLUTION INTRAMUSCULAR; INTRAVENOUS ONCE
Status: COMPLETED | OUTPATIENT
Start: 2020-07-27 | End: 2020-07-27

## 2020-07-27 RX ORDER — CALCIUM CARBONATE 200(500)MG
1000 TABLET,CHEWABLE ORAL 3 TIMES DAILY PRN
Status: DISCONTINUED | OUTPATIENT
Start: 2020-07-27 | End: 2020-07-28 | Stop reason: HOSPADM

## 2020-07-27 RX ORDER — SIMETHICONE 80 MG
80 TABLET,CHEWABLE ORAL 4 TIMES DAILY PRN
Status: DISCONTINUED | OUTPATIENT
Start: 2020-07-27 | End: 2020-07-28 | Stop reason: HOSPADM

## 2020-07-27 RX ORDER — MAGNESIUM HYDROXIDE/ALUMINUM HYDROXICE/SIMETHICONE 120; 1200; 1200 MG/30ML; MG/30ML; MG/30ML
15 SUSPENSION ORAL EVERY 6 HOURS PRN
Status: DISCONTINUED | OUTPATIENT
Start: 2020-07-27 | End: 2020-07-28 | Stop reason: HOSPADM

## 2020-07-27 RX ORDER — DOCUSATE SODIUM 100 MG/1
100 CAPSULE, LIQUID FILLED ORAL 2 TIMES DAILY
Status: DISCONTINUED | OUTPATIENT
Start: 2020-07-27 | End: 2020-07-28 | Stop reason: HOSPADM

## 2020-07-27 RX ORDER — OXYTOCIN/RINGER'S LACTATE 30/500 ML
PLASTIC BAG, INJECTION (ML) INTRAVENOUS
Status: COMPLETED
Start: 2020-07-27 | End: 2020-07-27

## 2020-07-27 RX ORDER — OXYCODONE HYDROCHLORIDE AND ACETAMINOPHEN 5; 325 MG/1; MG/1
1 TABLET ORAL EVERY 4 HOURS PRN
Status: DISCONTINUED | OUTPATIENT
Start: 2020-07-27 | End: 2020-07-27

## 2020-07-27 RX ORDER — OXYTOCIN/RINGER'S LACTATE 30/500 ML
62.5 PLASTIC BAG, INJECTION (ML) INTRAVENOUS ONCE
Status: COMPLETED | OUTPATIENT
Start: 2020-07-27 | End: 2020-07-27

## 2020-07-27 RX ORDER — ACETAMINOPHEN 325 MG/1
650 TABLET ORAL EVERY 4 HOURS PRN
Status: DISCONTINUED | OUTPATIENT
Start: 2020-07-27 | End: 2020-07-27

## 2020-07-27 RX ORDER — SENNOSIDES 8.6 MG
1 TABLET ORAL DAILY
Status: DISCONTINUED | OUTPATIENT
Start: 2020-07-27 | End: 2020-07-28 | Stop reason: HOSPADM

## 2020-07-27 RX ORDER — DIAPER,BRIEF,INFANT-TODD,DISP
1 EACH MISCELLANEOUS 4 TIMES DAILY PRN
Status: DISCONTINUED | OUTPATIENT
Start: 2020-07-27 | End: 2020-07-28 | Stop reason: HOSPADM

## 2020-07-27 RX ORDER — ACETAMINOPHEN 325 MG/1
650 TABLET ORAL EVERY 6 HOURS SCHEDULED
Status: DISCONTINUED | OUTPATIENT
Start: 2020-07-27 | End: 2020-07-28 | Stop reason: HOSPADM

## 2020-07-27 RX ORDER — OXYCODONE HYDROCHLORIDE 5 MG/1
5 TABLET ORAL EVERY 4 HOURS PRN
Status: DISCONTINUED | OUTPATIENT
Start: 2020-07-27 | End: 2020-07-28 | Stop reason: HOSPADM

## 2020-07-27 RX ORDER — OXYTOCIN 10 [USP'U]/ML
INJECTION, SOLUTION INTRAMUSCULAR; INTRAVENOUS
Status: COMPLETED
Start: 2020-07-27 | End: 2020-07-27

## 2020-07-27 RX ADMIN — OXYCODONE HYDROCHLORIDE 5 MG: 5 TABLET ORAL at 22:37

## 2020-07-27 RX ADMIN — OXYCODONE HYDROCHLORIDE AND ACETAMINOPHEN 1 TABLET: 5; 325 TABLET ORAL at 16:42

## 2020-07-27 RX ADMIN — OXYTOCIN 10 UNITS: 10 INJECTION INTRAVENOUS at 14:12

## 2020-07-27 RX ADMIN — Medication 30 UNITS: at 14:10

## 2020-07-27 RX ADMIN — ACETAMINOPHEN 650 MG: 325 TABLET ORAL at 21:11

## 2020-07-27 RX ADMIN — OXYTOCIN 10 UNITS: 10 INJECTION, SOLUTION INTRAMUSCULAR; INTRAVENOUS at 14:12

## 2020-07-27 RX ADMIN — ACETAMINOPHEN 650 MG: 325 TABLET ORAL at 15:10

## 2020-07-27 NOTE — H&P
H&P Exam - Obstetrics   Evangelina Garcia 26 y o  female MRN: [id number]  Unit/Bed#: L&D 322-01 Encounter: [id number]      History of Present Illness     Chief Complaint: Contractions    HPI:  Evangelina Garcia is a 26 y o  [id number] female with an RACHEL of 2020, by Ultrasound at 37w0d weeks gestation who is being admitted for active labor  Contractions: yes  Loss of fluid: no  Vaginal bleeding: yes  Fetal movement: yes    She is [facility] patient  PREGNANCY COMPLICATIONS:   1) Tobacco use  2) Grand multiparity  3) Limited PNC  4) THC use in pregnancy    OB History    Para Term  AB Living   5 4 4 0 0 4   SAB TAB Ectopic Multiple Live Births   0 0 0 0 4      # Outcome Date GA Lbr Nagi/2nd Weight Sex Delivery Anes PTL Lv   5 Current            4 Term 19 37w2d  2705 g (5 lb 15 4 oz) M Vag-Spont None N DELFIN      Birth Comments: FOB1   3 Term 10/08/17 37w4d / 00:52 2608 g (5 lb 12 oz) M Vag-Spont EPI N DELFIN      Birth Comments: FOB2      Complications: Gestational diabetes mellitus   2 Term 14 38w6d  2466 g (5 lb 7 oz) F Vag-Spont EPI N DELFIN      Birth Comments: FOB2   1 Term 13 37w0d  2438 g (5 lb 6 oz) M Vag-Spont EPI Y DELFIN      Birth Comments: FOB1      Complications: Gestational diabetes mellitus       Baby complications/comments: None    Review of Systems   Constitutional: Negative for diaphoresis and fever  HENT: Negative for hearing loss, nosebleeds, tinnitus and trouble swallowing  Eyes: Negative for photophobia and visual disturbance  Respiratory: Negative for apnea, shortness of breath and wheezing  Cardiovascular: Negative for chest pain and palpitations  Gastrointestinal: Negative for blood in stool and vomiting  Endocrine: Negative for polydipsia and polyphagia  Genitourinary: Negative for dysuria and hematuria  Musculoskeletal: Negative for arthralgias, gait problem and myalgias  Skin: Negative for color change and pallor     Neurological: Negative for dizziness, seizures, facial asymmetry, speech difficulty, numbness and headaches  Psychiatric/Behavioral: Negative for agitation, behavioral problems and confusion  Historical Information   Past Medical History:   Diagnosis Date    Chlamydia 2013    GERD (gastroesophageal reflux disease)     has not req'd tx since 2019     Past Surgical History:   Procedure Laterality Date    CHOLECYSTECTOMY  2015    [place] COLONOSCOPY FLX DX W/COLLJ SPEC WHEN PFRMD N/A 1/19/2017    Procedure: EGD AND COLONOSCOPY;  Surgeon: Ryan Carter DO;  Location: [facility] GI LAB; Service: Gastroenterology    WA LAP,APPENDECTOMY N/A 5/17/2016    Procedure: APPENDECTOMY LAPAROSCOPIC;  Surgeon: Poncho Grullon DO;  [facility];  Service: General    WA LAP,DIAGNOSTIC ABDOMEN N/A 3/13/2016    Procedure: LAPAROSCOPY DIAGNOSTIC;  Surgeon: Juarez Barrett MD;  [facility];  Service: General    RESECTION SMALL BOWEL LAPAROSCOPIC  2016     Social History   Social History     Substance and Sexual Activity   Alcohol Use Not Currently    Frequency: Monthly or less    Drinks per session: 1 or 2     Social History     Substance and Sexual Activity   Drug Use Not Currently    Types: Marijuana    Comment: last used marijuana 3/2020     Social History     Tobacco Use   Smoking Status Heavy Tobacco Smoker    Packs/day: 0 50    Types: Cigarettes   Smokeless Tobacco Never Used     Family History: non-contributory    Meds/Allergies      Medications Prior to Admission   Medication    Prenatal Vit-Fe Fumarate-FA (PRENATAL VITAMIN PO)        Allergies   Allergen Reactions    Ibuprofen Anaphylaxis       OBJECTIVE:    Vitals: Blood pressure 133/75, pulse 69, temperature (!) 97 3 °F (36 3 °C), temperature source Oral, resp  rate 18, height 4' 11" (1 499 m), weight 56 7 kg (125 lb), last menstrual period 01/28/2020, not currently breastfeeding  Body mass index is 25 25 kg/m²      Physical Exam   Constitutional: She appears well-nourished  In distress secondary to labor   HENT:   Head: Normocephalic and atraumatic  Cardiovascular: Normal rate, regular rhythm and normal heart sounds  Pulmonary/Chest: Breath sounds normal  No respiratory distress  She has no wheezes  Abdominal: Bowel sounds are normal  There is no tenderness        [place]: Deferred  Nitrazine: Deferred    Cervix: 9 5/100/+2       Fetal heart rate:   Baseline Rate: 130 bpm  Variability: Moderate 6-25 bpm    [place]:   Contraction Frequency (minutes): 1-2 PER PATIENT(DIFFICULTY TRACING PATIENT MOVEMENT)  Contraction Duration (seconds): 30-50  Contraction Quality: Strong    GBS: Negative    Prenatal Labs:   Blood Type:   Lab Results   Component Value Date/Time    ABO Grouping O 07/23/2020 04:17 PM    ABO Grouping O 11/13/2014 07:30 PM     , D (Rh type):   Lab Results   Component Value Date/Time    Rh Factor Positive 07/23/2020 04:17 PM    Rh Factor Positive 11/13/2014 07:30 PM     , Antibody Screen:   Lab Results   Component Value Date/Time    Antibody Screen Negative 11/13/2014 07:30 PM    , HCT/HGB:   Lab Results   Component Value Date/Time    Hematocrit 34 1 (L) 07/27/2020 02:11 PM    Hematocrit 43 1 10/14/2015 05:26 AM    Hemoglobin 11 3 (L) 07/27/2020 02:11 PM    Hemoglobin 16 0 (H) 10/14/2015 05:26 AM      , MCV:   Lab Results   Component Value Date/Time    MCV 96 07/27/2020 02:11 PM    MCV 88 10/14/2015 05:26 AM      , Platelets:   Lab Results   Component Value Date/Time    Platelets 610 31/40/3754 02:11 PM    Platelets 943 21/03/3924 05:26 AM      , 1 hour Glucola:   Lab Results   Component Value Date/Time    GLUCOSE 1 HR 50 GM GLUC CHALLENGE-PREG  06/16/2014 12:25 PM    Glucose 155 (H) 12/09/2018 04:36 PM    Rubella:   Lab Results   Component Value Date/Time    RUBELLA IGG QUANTITATION 6 3 06/16/2014 12:25 PM    Rubella IgG Quant 12 7 07/16/2020 01:28 PM        , VDRL/RPR:   Lab Results   Component Value Date/Time    RPR SCREEN Non-Reactive (q 2014 07:30 PM    RPR Non-Reactive 2020 04:17 PM      , Urine Culture/Screen:   Lab Results   Component Value Date/Time    Urine Culture No Growth <1000 cfu/mL 2020 01:37 PM    Urine Drug Screen:   Lab Results   Component Value Date/Time    AMPHETAMINE URINE Negative 06/15/2014 08:20 PM    BARBITURATE URINE Negative 10/13/2014 04:35 AM    Barbiturate Ur Negative 2020 01:37 PM    BENZODIAZEPINE URINE Negative 10/13/2014 04:35 AM    Benzodiazepine Urine Negative 2020 01:37 PM    THC URINE Positive (A) 10/13/2014 04:35 AM    THC Urine Positive (A) 2020 01:37 PM    COCAINE URINE Negative 10/13/2014 04:35 AM    Cocaine Urine Negative 2020 01:37 PM    METHADONE URINE Negative 10/13/2014 04:35 AM    Methadone Urine Negative 2020 01:37 PM    OPIATE URINE Negative 10/13/2014 04:35 AM    Opiate Urine Negative 2020 01:37 PM    PHENCYCLIDINE URINE Negative 10/13/2014 04:35 AM    PCP Ur Negative 2020 01:37 PM    METHAMPHETAMINE URINE Negative 06/15/2014 37:48 PM    TRICYCLICS URINE Negative  08:20 PM   Hep B:   Lab Results   Component Value Date/Time    HEPATITIS B SURFACE ANTIGEN Nonreactive (NR 2015 05:54 AM    Hepatitis B Surface Ag Non-reactive 2020 01:28 PM   HIV:   Lab Results   Component Value Date/Time    HIV-1/2 AB-AG Non-Reactive (q 2014 12:25 PM    HIV-1/HIV-2 Ab Non-Reactive 2020 01:28 PM   Gonorrhea:   Lab Results   Component Value Date/Time    N GONORRHOEAE, AMPLIFIED DNA Negative 2014 02:25 PM    N gonorrhoeae, DNA Probe Negative 2020 01:17 PM    N gonorrhoeae, DNA Probe N  gonorrhoeae Amplified DNA Negative 10/02/2018 10:19 AM   Group B Strep:    Lab Results   Component Value Date/Time    Strep Grp B PCR Negative for Beta Hemolytic Strep Grp B by PCR 2020 02:57 PM          Assessment/Plan     ASSESSMENT:  18yo  at 37w0d weeks gestation who is being admitted for active labor      PLAN:   - Admit   - CBC, RPR, Blood Type   - Start with expectant management   - GBS negative status: no PCN for prophylaxis    - Analgesia and/or epidural at patient request   - Discussed with Dr Juliane Rangel      This patient will be an INPATIENT  and I certify the anticipated length of stay is >2 Midnights      Rahul Luther MD  7/27/2020  2:49 PM

## 2020-07-27 NOTE — LETTER
July 27, 2020     Jeanabdirahman Lockhart, [address] [id number] [employer]    Patient: Arturo Wong   YOB: 1995   Date of Visit: 7/27/2020       Dear Dr Lovett Check:    Thank you for referring Becky Almazan to me for evaluation  Below are my notes for this consultation  If you have questions, please do not hesitate to call me  I look forward to following your patient along with you  Sincerely,        Sandra Martinez MD        CC: No Recipients  Sandra Martinez MD  7/27/2020  9:16 AM  Sign at close encounter  [facility]: Ms Thania Anderson was seen today at 37w0d for fetal growth assessment ultrasound  See ultrasound report under "OB Procedures" tab  MENDEL at lower normal limit (6 5), will re-evaluate in 1 week       Please don't hesitate to contact our office with any concerns or questions   -Sandra Martinez MD

## 2020-07-27 NOTE — PLAN OF CARE
Problem: PAIN - ADULT  Goal: Verbalizes/displays adequate comfort level or baseline comfort level  Description  Interventions:  - Encourage patient to monitor pain and request assistance  - Assess pain using appropriate pain scale  - Administer analgesics based on type and severity of pain and evaluate response  - Implement non-pharmacological measures as appropriate and evaluate response  - Consider cultural and social influences on pain and pain management  - Notify physician/advanced practitioner if interventions unsuccessful or patient reports new pain  Outcome: Progressing     Problem: INFECTION - ADULT  Goal: Absence or prevention of progression during hospitalization  Description  INTERVENTIONS:  - Assess and monitor for signs and symptoms of infection  - Monitor lab/diagnostic results  - Monitor all insertion sites, i e  indwelling lines, tubes, and drains  - Monitor endotracheal if appropriate and nasal secretions for changes in amount and color  - [place] appropriate cooling/warming therapies per order  - Administer medications as ordered  - Instruct and encourage patient and family to use good hand hygiene technique  - Identify and instruct in appropriate isolation precautions for identified infection/condition  Outcome: Progressing  Goal: Absence of fever/infection during neutropenic period  Description  INTERVENTIONS:  - Monitor WBC    Outcome: Progressing     Problem: SAFETY ADULT  Goal: Patient will remain free of falls  Description  INTERVENTIONS:  - Assess patient frequently for physical needs  -  Identify cognitive and physical deficits and behaviors that affect risk of falls    -  [place] fall precautions as indicated by assessment   - Educate patient/family on patient safety including physical limitations  - Instruct patient to call for assistance with activity based on assessment  - Modify environment to reduce risk of injury  - Consider OT/PT consult to assist with strengthening/mobility  Outcome: Progressing  Goal: Maintain or return to baseline ADL function  Description  INTERVENTIONS:  -  Assess patient's ability to carry out ADLs; assess patient's baseline for ADL function and identify physical deficits which impact ability to perform ADLs (bathing, care of mouth/teeth, toileting, grooming, dressing, etc )  - Assess/evaluate cause of self-care deficits   - Assess range of motion  - Assess patient's mobility; develop plan if impaired  - Assess patient's need for assistive devices and provide as appropriate  - Encourage maximum independence but intervene and supervise when necessary  - Involve family in performance of ADLs  - Assess for home care needs following discharge   - Consider OT consult to assist with ADL evaluation and planning for discharge  - Provide patient education as appropriate  Outcome: Progressing  Goal: Maintain or return mobility status to optimal level  Description  INTERVENTIONS:  - Assess patient's baseline mobility status (ambulation, transfers, stairs, etc )    - Identify cognitive and physical deficits and behaviors that affect mobility  - Identify mobility aids required to assist with transfers and/or ambulation (gait belt, sit-to-stand, lift, walker, cane, etc )  - [place] fall precautions as indicated by assessment  - Record patient progress and toleration of activity level on Mobility SBAR; progress patient to next Phase/Stage  - Instruct patient to call for assistance with activity based on assessment  - Consider rehabilitation consult to assist with strengthening/weightbearing, etc   Outcome: Progressing     Problem: Knowledge Deficit  Goal: Patient/family/caregiver demonstrates understanding of disease process, treatment plan, medications, and discharge instructions  Description  Complete learning assessment and assess knowledge base    Interventions:  - Provide teaching at level of understanding  - Provide teaching via preferred learning methods  Outcome: Progressing     Problem: DISCHARGE PLANNING  Goal: Discharge to home or other facility with appropriate resources  Description  INTERVENTIONS:  - Identify barriers to discharge w/patient and caregiver  - Arrange for needed discharge resources and transportation as appropriate  - Identify discharge learning needs (meds, wound care, etc )  - Arrange for interpretive services to assist at discharge as needed  - Refer to Case Management Department for coordinating discharge planning if the patient needs post-hospital services based on physician/advanced practitioner order or complex needs related to functional status, cognitive ability, or social support system  Outcome: Progressing

## 2020-07-27 NOTE — L&D DELIVERY NOTE
Vaginal Delivery Note - OB/GYN   Aura Rodriguez 26 y o  female MRN: [id number]  Unit/Bed#: L&D 322-01 Encounter: [id number]          Predelivery Diagnosis:  1  Pregnancy at 37w0d  2  Limited PNC  3  H/o THC use  4  [address] multiparity   5  Tobacco use  6  Labor    Postdelivery Diagnosis:  1  Same as above  2  Delivery of term     Procedure: Spontaneous Vaginal Delivery    Attending: Alonzo Haddad    Assistant: Les Levine    Anesthesia: Epidural    QBL: 74cc  Admission H 3  Admission platelets: 304B    Complications: none apparent    Specimens: cord blood, arterial and venous cord blood gasses, placenta to storage    Findings:   1  Viable male at 200, with APGARS of 9 and 9 at 1 and 5 minutes respectively,  2  Spontaneous delivery of intact placenta at 1415  3  No perineal lacerations  4  Blood gases:   Arterial pH: 7 316   Arterial base excess: -0 9   Venous pH: 7 383   Venous base excess: 1 0    Disposition:  Patient tolerated the procedure well and was recovering in labor and delivery room     Brief history and labor course:  Ms Aura Rodriguez is a 26 y o  [id number] at 37wk0d  She presented to labor and delivery for labor  Her pregnancy was complicated by the diagnoses listed above  On exam in triage she was noted to be 9 5/100/+2  She was admitted for labor  Description of procedure    After pushing for less than a minute minutes, at 1411 patient delivered a viable male , wt pending, apgars of 9 (1 min) and 9 (5 min)  The fetal vertex delivered spontaneously  Baby was checked for nuchal  No nuchal cord was palpated  The anterior shoulder delivered atraumatically with maternal expulsive forces and the assistance of gentle downward traction  The posterior shoulder delivered with maternal expulsive forces and the assistance of upward traction  The remainder of the fetus delivered spontaneously  Upon delivery, the infant was placed on the mothers abdomen and the cord was clamped and cut  Delayed cord clamping was performed  The infant was noted to cry spontaneously and was moving all extremities appropriately  There was no evidence for injury  Awaiting nurse resuscitators evaluated the   Arterial and venous cord blood gases and cord blood was collected for analysis  These were promptly sent to the lab  In the immediate post-partum, 30 units of IV pitocin was administered, and the uterus was noted to contract down well with massage and pitocin  The placenta delivered spontaneously at 1415 and was noted to have a centrally inserted 3 vessel cord  The vagina, cervix, perineum, and rectum were inspected and there was noted to be no lacerations  At the conclusion of the procedure, all needle, sponge, and instrument counts were noted to be correct  Patient tolerated the procedure well and was allowed to recover in labor and delivery room with family and  before being transferred to the post-partum floor  The attending was present and participated in all key portions of the case          Jarek Westbrook MD  2020  2:21 PM

## 2020-07-27 NOTE — PROGRESS NOTES
[facility]: Ms Fabian Aponte was seen today at 37w0d for fetal growth assessment ultrasound  See ultrasound report under "OB Procedures" tab  MENDEL at lower normal limit (6 5), will re-evaluate in 1 week       Please don't hesitate to contact our office with any concerns or questions   -Ute Monaco MD

## 2020-07-27 NOTE — QUICK NOTE
Notified by RN that patient had several elevated diastolic pressures in the 90s  Patient asymptomatic other than generalized discomfort following delivery  Will collect CMP and urine P/C ratio from a straight cath; CBC collected on admission and within normal limits       David Jerez MD  OB/GYN PGY-2  7/27/2020  4:41 PM

## 2020-07-27 NOTE — DISCHARGE SUMMARY
Discharge Summary - OB/GYN   Carolina Turner 26 y o  female MRN: [id number]  Unit/Bed#: L&D 322-01 Encounter: [id number]      Admission Date: 2020     Discharge Date: 2020    Admitting Diagnosis:   1  Pregnancy at 37w0d  2  Limited PNC  3  H/o THC use  4  [address] multiparity   5  Tobacco use  6  Labor    Discharge Diagnosis:   Same, delivered    Procedures: spontaneous vaginal delivery    Delivery Attending: Bette Estrada MD  Discharge Attending: Dr Pura Lopez Course:     Ms Carolina Turner is a 26 y o  [id number] at 37wk0d  She presented to labor and delivery for labor  Her pregnancy was complicated by the diagnoses listed above  On exam in triage she was noted to be 9 5/100/+2  She was admitted for labor  She delivered a viable male  on 2020 at 1411  Weight 5lbs 14 4oz via spontaneous vaginal delivery  Apgars were 9 (1 min) and 9 (5 min)   was transferred to  nursery  Patient tolerated the procedure well and was transferred to recovery in stable condition  Her postpartum course was uncomplicated    Her postpartum pain was well controlled with oral analgesics  On day of discharge, she was ambulating and able to reasonably perform all ADLs  She was voiding and had appropriate bowel function  Pain was well controlled  She was discharged home on post-partum day #1 without complications  Patient was instructed to follow up with her OB as an outpatient and was given appropriate warnings to call provider if she develops signs of infection or uncontrolled pain  Complications: none apparent    Condition at discharge: good     Discharge instructions/Information to patient and family:   - Do not place anything (no partner, tampons or douche) in your vagina for 6 weeks    -You may walk for exercise for the first 6 weeks then gradually return to your usual activities    -Please do not drive for 1 week if you have no stitches and for 2 weeks if you have stitches or underwent a  delivery     -You may take baths or shower per your preference    -Please look at your bust (breasts) in the mirror daily and call for redness or tenderness or increased warmth    -Please call us for temperature > 100 4*F or 38* C, worsening pain or a foul discharge  Discharge Medications:   Prenatal vitamin daily for 6 months or the duration of nursing whichever is longer    Motrin 600 mg orally every 6 hours as needed for pain  Tylenol (over the counter) per bottle directions as needed for pain: do NOT use with percocet  Hydrocortisone cream 1% (over the counter) applied 1-2x daily to hemorrhoids as needed    Planned Readmission: Melissa Hannah MD  PGY-1 OB/GYN   2020 5:47 PM

## 2020-07-28 VITALS
DIASTOLIC BLOOD PRESSURE: 78 MMHG | WEIGHT: 125 LBS | OXYGEN SATURATION: 98 % | SYSTOLIC BLOOD PRESSURE: 115 MMHG | HEIGHT: 59 IN | TEMPERATURE: 98.2 F | RESPIRATION RATE: 16 BRPM | HEART RATE: 78 BPM | BODY MASS INDEX: 25.2 KG/M2

## 2020-07-28 LAB — RPR SER QL: NORMAL

## 2020-07-28 PROCEDURE — 99024 POSTOP FOLLOW-UP VISIT: CPT | Performed by: OBSTETRICS & GYNECOLOGY

## 2020-07-28 RX ORDER — CALCIUM CARBONATE 200(500)MG
1000 TABLET,CHEWABLE ORAL 3 TIMES DAILY PRN
Refills: 0
Start: 2020-07-28 | End: 2021-01-04 | Stop reason: ALTCHOICE

## 2020-07-28 RX ORDER — MEDROXYPROGESTERONE ACETATE 150 MG/ML
150 INJECTION, SUSPENSION INTRAMUSCULAR ONCE
Status: COMPLETED | OUTPATIENT
Start: 2020-07-28 | End: 2020-07-28

## 2020-07-28 RX ORDER — SIMETHICONE 80 MG
80 TABLET,CHEWABLE ORAL 4 TIMES DAILY PRN
Qty: 30 TABLET | Refills: 0
Start: 2020-07-28 | End: 2021-01-04 | Stop reason: ALTCHOICE

## 2020-07-28 RX ORDER — MAGNESIUM HYDROXIDE/ALUMINUM HYDROXICE/SIMETHICONE 120; 1200; 1200 MG/30ML; MG/30ML; MG/30ML
15 SUSPENSION ORAL EVERY 6 HOURS PRN
Qty: 355 ML | Refills: 0
Start: 2020-07-28 | End: 2021-01-04 | Stop reason: ALTCHOICE

## 2020-07-28 RX ORDER — DOCUSATE SODIUM 100 MG/1
100 CAPSULE, LIQUID FILLED ORAL 2 TIMES DAILY
Qty: 10 CAPSULE | Refills: 0
Start: 2020-07-28 | End: 2021-01-04 | Stop reason: ALTCHOICE

## 2020-07-28 RX ORDER — ACETAMINOPHEN 325 MG/1
650 TABLET ORAL EVERY 6 HOURS SCHEDULED
Qty: 30 TABLET | Refills: 0
Start: 2020-07-28 | End: 2021-01-04 | Stop reason: SDUPTHER

## 2020-07-28 RX ADMIN — MEDROXYPROGESTERONE ACETATE 150 MG: 150 INJECTION, SUSPENSION, EXTENDED RELEASE INTRAMUSCULAR at 12:37

## 2020-07-28 RX ADMIN — ACETAMINOPHEN 650 MG: 325 TABLET ORAL at 08:21

## 2020-07-28 RX ADMIN — ACETAMINOPHEN 650 MG: 325 TABLET ORAL at 03:41

## 2020-07-28 RX ADMIN — OXYCODONE HYDROCHLORIDE 5 MG: 5 TABLET ORAL at 12:37

## 2020-07-28 RX ADMIN — DOCUSATE SODIUM 100 MG: 100 CAPSULE, LIQUID FILLED ORAL at 08:21

## 2020-07-28 RX ADMIN — OXYCODONE HYDROCHLORIDE 5 MG: 5 TABLET ORAL at 02:41

## 2020-07-28 RX ADMIN — ACETAMINOPHEN 650 MG: 325 TABLET ORAL at 15:55

## 2020-07-28 NOTE — PLAN OF CARE
Problem: PAIN - ADULT  Goal: Verbalizes/displays adequate comfort level or baseline comfort level  Description  Interventions:  - Encourage patient to monitor pain and request assistance  - Assess pain using appropriate pain scale  - Administer analgesics based on type and severity of pain and evaluate response  - Implement non-pharmacological measures as appropriate and evaluate response  - Consider cultural and social influences on pain and pain management  - Notify physician/advanced practitioner if interventions unsuccessful or patient reports new pain  Outcome: Progressing     Problem: INFECTION - ADULT  Goal: Absence or prevention of progression during hospitalization  Description  INTERVENTIONS:  - Assess and monitor for signs and symptoms of infection  - Monitor lab/diagnostic results  - Monitor all insertion sites, i e  indwelling lines, tubes, and drains  - Monitor endotracheal if appropriate and nasal secretions for changes in amount and color  - [place] appropriate cooling/warming therapies per order  - Administer medications as ordered  - Instruct and encourage patient and family to use good hand hygiene technique  - Identify and instruct in appropriate isolation precautions for identified infection/condition  Outcome: Progressing  Goal: Absence of fever/infection during neutropenic period  Description  INTERVENTIONS:  - Monitor WBC    Outcome: Progressing     Problem: SAFETY ADULT  Goal: Patient will remain free of falls  Description  INTERVENTIONS:  - Assess patient frequently for physical needs  -  Identify cognitive and physical deficits and behaviors that affect risk of falls    -  [place] fall precautions as indicated by assessment   - Educate patient/family on patient safety including physical limitations  - Instruct patient to call for assistance with activity based on assessment  - Modify environment to reduce risk of injury  - Consider OT/PT consult to assist with strengthening/mobility  Outcome: Progressing  Goal: Maintain or return to baseline ADL function  Description  INTERVENTIONS:  -  Assess patient's ability to carry out ADLs; assess patient's baseline for ADL function and identify physical deficits which impact ability to perform ADLs (bathing, care of mouth/teeth, toileting, grooming, dressing, etc )  - Assess/evaluate cause of self-care deficits   - Assess range of motion  - Assess patient's mobility; develop plan if impaired  - Assess patient's need for assistive devices and provide as appropriate  - Encourage maximum independence but intervene and supervise when necessary  - Involve family in performance of ADLs  - Assess for home care needs following discharge   - Consider OT consult to assist with ADL evaluation and planning for discharge  - Provide patient education as appropriate  Outcome: Progressing  Goal: Maintain or return mobility status to optimal level  Description  INTERVENTIONS:  - Assess patient's baseline mobility status (ambulation, transfers, stairs, etc )    - Identify cognitive and physical deficits and behaviors that affect mobility  - Identify mobility aids required to assist with transfers and/or ambulation (gait belt, sit-to-stand, lift, walker, cane, etc )  - [place] fall precautions as indicated by assessment  - Record patient progress and toleration of activity level on Mobility SBAR; progress patient to next Phase/Stage  - Instruct patient to call for assistance with activity based on assessment  - Consider rehabilitation consult to assist with strengthening/weightbearing, etc   Outcome: Progressing     Problem: Knowledge Deficit  Goal: Patient/family/caregiver demonstrates understanding of disease process, treatment plan, medications, and discharge instructions  Description  Complete learning assessment and assess knowledge base    Interventions:  - Provide teaching at level of understanding  - Provide teaching via preferred learning methods  Outcome: Progressing     Problem: DISCHARGE PLANNING  Goal: Discharge to home or other facility with appropriate resources  Description  INTERVENTIONS:  - Identify barriers to discharge w/patient and caregiver  - Arrange for needed discharge resources and transportation as appropriate  - Identify discharge learning needs (meds, wound care, etc )  - Arrange for interpretive services to assist at discharge as needed  - Refer to Case Management Department for coordinating discharge planning if the patient needs post-hospital services based on physician/advanced practitioner order or complex needs related to functional status, cognitive ability, or social support system  Outcome: Progressing     Problem: POSTPARTUM  Goal: Experiences normal postpartum course  Description  INTERVENTIONS:  - Monitor maternal vital signs  - Assess uterine involution and lochia  Outcome: Progressing  Goal: Appropriate maternal -  bonding  Description  INTERVENTIONS:  - Identify family support  - Assess for appropriate maternal/infant bonding   -Encourage maternal/infant bonding opportunities  - Referral to  or  as needed  Outcome: Progressing  Goal: Establishment of infant feeding pattern  Description  INTERVENTIONS:  - Assess breast/bottle feeding  - Refer to lactation as needed  Outcome: Progressing

## 2020-07-28 NOTE — SOCIAL WORK
Eben Case with [facility] was out to the home today and it looks appropriate  Infant cleared to discharge with mom when medically stable

## 2020-07-28 NOTE — PROGRESS NOTES
Progress Note - OB/GYN   Elle Friendly 26 y o  female MRN: [id number]  Unit/Bed#: L&D 312-01 Encounter: [id number]      Assessment:  Post partum day #1 s/p , stable, and doing well  Reports she is uncomfortable and has some pain, but has not been using the pain medication around the clock  Will start asking for more  Plan:    Continue routine post partum care   - Patient is ambulating, encouraged to continue    - Encourage breastfeeding    Continue current meds    - See list below   - Pain is tolerable, patient will start asking for more of her prn medications    Future contraception   - Pt desires tubal, M31 signed on 2020   - Desires bridge with depo-provera     Disposition   - Anticipate discharge home tomorrow      Subjective/Objective       Subjective:   Pain: yes  Tolerating Oral Intake: yes  Voiding: yes  Flatus: yes  Bowel Movement: no  Ambulating: yes  Breastfeeding: Bottle feeding  Chest Pain: no  Shortness of Breath: no  Leg Pain/Discomfort: no    Objective:   Vitals:   /71 (BP Location: Left arm)   Pulse 70   Temp 97 8 °F (36 6 °C) (Oral)   Resp 18   Ht 4' 11" (1 499 m)   Wt 56 7 kg (125 lb)   LMP 2020 (Approximate)   Breastfeeding No   BMI 25 25 kg/m²   Body mass index is 25 25 kg/m²    I/O        07 -  0700  07 -  07    Urine (mL/kg/hr)  800    Blood  74    Total Output  874    Net  -874              Lab Results   Component Value Date    WBC 13 02 (H) 2020    HGB 11 3 (L) 2020    HCT 34 1 (L) 2020    MCV 96 2020     2020       Meds/Allergies   Current Facility-Administered Medications   Medication Dose Route Frequency    acetaminophen (TYLENOL) tablet 650 mg  650 mg Oral Q6H [address]    aluminum-magnesium hydroxide-simethicone (MYLANTA) 200-200-20 mg/5 mL oral suspension 15 mL  15 mL Oral Q6H PRN    benzocaine-menthol-lanolin-aloe (DERMOPLAST) 20-0 5 % topical spray   Topical 4x Daily PRN    calcium carbonate (TUMS) chewable tablet 1,000 mg  1,000 mg Oral TID PRN    docusate sodium (COLACE) capsule 100 mg  100 mg Oral BID    hydrocortisone 1 % cream 1 application  1 application Topical 4x Daily PRN    nicotine polacrilex (NICORETTE) gum 2 mg  2 mg Oral Q2H PRN    oxyCODONE (ROXICODONE) IR tablet 5 mg  5 mg Oral Q4H PRN    senna (SENOKOT) tablet 8 6 mg  1 tablet Oral Daily    simethicone (MYLICON) chewable tablet 80 mg  80 mg Oral 4x Daily PRN    witch hazel-glycerin (TUCKS) topical pad 1 pad  1 pad Topical Q2H PRN       Physical Exam:  General: in no apparent distress  Cardiovascular: Cor RRR  Lungs: clear to auscultation bilaterally  Fundus: Firm, at the level of the umbilicus    Tami Hannah MD  PGY-1 OB/GYN   7/28/2020 6:33 AM

## 2020-07-28 NOTE — DISCHARGE INSTRUCTIONS
Self Care After Delivery   AMBULATORY CARE:   The postpartum period  is the period of time from delivery to about 6 weeks  During this time you may experience many physical and emotional changes  It is important to understand what is normal and when you need to call your healthcare provider  It is also important to know how to care for yourself during this time  Call 911 for any of the following:   · You soak through 1 pad in 15 minutes, have blurry vision, clammy or pale skin, and feel faint  · You faint or lose consciousness  · Your heart is beating faster than normal      · You have trouble breathing  · You cough up blood  Seek care immediately if:   · You soak through 1 or more pads in an hour, or pass blood clots larger than a quarter from your vagina  · Your leg is painful, red, and larger than normal      · You have severe abdominal pain  · You have a bad headache or changes in your vision  · Your episiotomy or C section incision is red, swollen, bleeding, or draining pus  · Your incision comes apart  · You see or hear things that are not there, or have thoughts of harming yourself or your baby  Contact your obstetrician or midwife if:   · You have a fever  · You have new or worsening pain in your abdomen or vagina  · You continue to have the baby blues 10 days after you deliver  · You have trouble sleeping  · You have foul-smelling discharge from your vagina  · You have pain or burning when you urinate  · You do not have a bowel movement for 3 days or more  · You have nausea or are vomiting  · You have hard lumps or red streaks over your breasts  · You have cracked nipples or bleed from your nipples  · You have questions or concerns about your condition or care  Physical changes:   The following are normal changes after you give birth:  · Pain in the area between your anus and vagina    · Breast pain    · Constipation or hemorrhoids    · Hot or cold flashes    · Vaginal bleeding or discharge    · Mild to moderate abdominal cramping    · Difficulty controlling bowel movements or urine  Emotional changes: The following are symptoms of the baby blues, or normal emotions after you give birth  The changes in your emotions may be caused by a drop in hormone levels after you deliver  If these symptoms last longer than 1 to 2 weeks after you give birth, you may have postpartum depression  · Feeling irritable    · Feeling sad    · Crying for no reason    · Feeling anxious  Breast care for nursing mothers: You may have sore breasts for 3 to 6 days after you give birth  This happens as your milk begins to fill your breasts  You may also have sore breasts if you do not breastfeed frequently  Do the following to care for your breasts:  · Apply a moist, warm, compress to your breast as directed  This may help soothe your breasts  Make sure the washcloth is not too hot before you apply it to your breast      · Nurse your baby or pump your milk frequently  This may prevent clogged milk ducts  Ask your healthcare provider how often to nurse or pump  · Massage your breasts as directed  This may help increase your milk flow  Gently rub your breasts in a circular motion before you breastfeed  You may need to gently squeeze your breast or nipple to help release milk  You can also use a breast pump to help release milk from your breast      · Wash your breasts with warm water only  Do not put soap on your nipples  Soap may cause your nipples to become dry  · Apply lanolin cream to your nipples as directed  Lanolin cream may add moisture to your skin and prevent nipple dryness  Always  wash off lanolin cream with warm water before you breastfeed  · Place pads in your bra  Your nipples may leak milk when you are not breastfeeding  You can place pads inside of your bra to help prevent leaking onto your clothing   Ask your healthcare provider where to purchase bra pads  · Get breastfeeding support if needed  There are healthcare providers who can answer questions about breastfeeding and provide you with support  Ask your healthcare provider who you can contact if you need breastfeeding support  Breast care for non-nursing mothers:  Milk will fill your breasts even if you bottle feed your baby  Do the following to help stop your milk from filling your breasts and causing pain:  · Wear a bra with support at all times  A sports bra or a tight-fitting bra will help stop your milk from coming in  · Apply ice on each breast for 15 to 20 minutes every hour or as directed  Use an ice pack, or put crushed ice in a plastic bag  Cover it with a towel  Ice helps your milk ducts shrink  · Keep your breasts away from warm water  Warm water will make it easier for milk to fill your breasts  Stand with your breasts away from warm water in the shower  · Limit how much you touch your breasts  This will prevent them from filling with milk  Perineum care: Your perineum is the area between your rectum and vagina  It is normal to have swelling and pain in this area after you give birth  If you had an episiotomy, your healthcare provider may give you special instructions  · Clean your perineum after you use the bathroom  This may prevent infection and help with healing  Use a spray bottle with warm water to clean your perineum  You may also gently spray warm water against your perineum when you urinate  Always wipe front to back  · Take a sitz bath as directed  A sitz bath may help relieve swelling and pain  Fill your bath tub or bucket with water up to your hips and sit in the water  Use cold water for 2 days after you deliver  Then use warm water  Ask your healthcare provider for more information about a sitz bath  · Apply ice packs for the first 24 hours or as directed  Use a plastic glove filled with ice or buy an ice pack   Wrap the ice pack or plastic glove in a small towel or wash cloth  Place the ice pack on your perineum for 20 minutes at a time  · Sit on a donut-shaped pillow  This may relieve pressure on your perineum when you sit  · Use wipes with medicine or take pills as directed  Your healthcare provider may tell you to use witch hazel pads  You can place witch hazel pads in the refrigerator before you apply them to your perineum  He may also tell you to take NSAIDs  Ask your healthcare provider how often to take pills or use wipes with medicine  · Do not go swimming or take tub baths for 4 to 6 weeks or as directed  This will help prevent an infection in your vagina or uterus  Bowel and bladder care: It may take 3 to 5 days to have a bowel movement after you deliver your baby  You can do the following to prevent or manage constipation, and get control of your bowel or bladder:  · Take stool softeners as directed  A stool softener is medicine that will make your bowel movements softer  This may prevent or relieve constipation  A stool softener may also make bowel movements less painful  · Drink plenty of liquids  Ask how much liquid to drink each day and which liquids are best for you  Liquids may help prevent constipation  · Eat foods high in fiber  Examples include fruits, vegetables, grains, beans, and lentils  Ask your healthcare provider how much fiber you need each day  Fiber may prevent constipation  · Do Kegel exercises as directed  Kegel exercises will help strengthen the muscles that control bowel movements and urination  Ask your healthcare provider for more information on Kegel exercises  · Apply cold compresses or medicine to hemorrhoids as directed  This may relieve swelling and pain  Your healthcare provider may tell you to apply ice or wipes with medicine to your hemorrhoids  He may also tell you to use a sitz bath   Ask your healthcare for more information on how to manage hemorrhoids  Nutrition:  Good nutrition is important in the postpartum period  It will help you return to a healthy weight, increase your energy levels, and prevent constipation  It will also help you get enough nutrients and calories if you are going to breastfeed your baby  · Eat a variety of healthy foods  Healthy foods include fruits, vegetables, whole-grain breads, low-fat dairy products, beans, lean meats, and fish  You may need 500 to 700 additional calories each day if you breastfeed your baby  You may also need extra protein  · Limit foods with added sugar and high amounts of fat  These foods are high in calories and low in healthy nutrients  Read food labels so you know how much sugar and fat is in the food you want to eat  · Drink 8 to 10 glasses of water per day  Water will help you make plenty of milk for your baby  It will also help prevent constipation  Drink a glass of water every time you breastfeed your baby  · Take vitamins as directed  Ask your healthcare provider what vitamins you need  · Limit caffeine and alcohol if you are breastfeeding  Caffeine and alcohol can get into your breast milk  Caffeine and alcohol can make your baby fussy  They can also interfere with your baby's sleep  Ask your healthcare provider if you can drink alcohol or caffeine  Rest and sleep: You may feel very tired in the postpartum period  Enough sleep will help you heal and give you energy to care for your baby  The following may help you get sleep and rest:  · Nap when your baby naps  Your baby may nap several times during the day  Get rest during this time  · Limit visitors  Many people may want to see you and your baby  Ask friends or family to visit on different days  This will give you time to rest      · Do not plan too much for one day  Put off household chores so that you have time to rest  Gradually do more each day  · Ask for help from family, friends, or neighbors    Ask them to help you with laundry, cleaning, or errands  Also ask someone to watch the baby while you take a nap or relax  Ask your partner to help with the care of your baby  Pump some of your breast milk so your partner can feed your baby during the night  Exercise after delivery:  Wait until your healthcare provider says it is okay to exercise  Exercise can help you lose weight, increase your energy levels, and manage your mood  It can also prevent constipation and blood clots  Start with gentle exercises such as walking  Do more as you have more energy  You may need to avoid abdominal exercises for 1 to 2 weeks after you deliver  Talk to your healthcare provider about an exercise plan that is right for you  Sexual activity after delivery:   · Do not have sex until your healthcare provider says it is okay  You may need to wait 4 to 6 weeks before you have sex  This may prevent infection and allow time to heal      · Your menstrual cycle may begin as soon as 3 weeks after you deliver  Your period may be delayed if you breastfeed your baby  You can become pregnant before you get your first postpartum period  Talk to your healthcare provider about birth control that is right for you  Some types of birth control are not safe during breastfeeding  For support and more information:  Join a support group for new mothers  Ask for help from family and friends with chores, errands, and care of your baby  · Office of Women's Health, [place] Department of Health and Human Services  [address], [address] [place] [address] [address]  [address], [address] [address] [place]  [place] , [address]  Phone: [phone]  Web Address: www womenshealth gov  · [facility] Postpartum [address] [facility] , [address] [place] [address]  [address]  Web Address: [employer]  [email or web address]/[email or web address]/[email or web address]-care  aspx  Follow up with your obstetrician or midwife as directed:   You will need to follow up with your healthcare provider in 2 to 6 weeks after delivery  Write down your questions so you remember to ask them at your visits  © 2017 [address] Information is for End User's use only and may not be sold, redistributed or otherwise used for commercial purposes  All illustrations and images included in CareNotes® are the copyrighted property of A D A M , Inc  or Sorin Winchester  The above information is an  only  It is not intended as medical advice for individual conditions or treatments  Talk to your doctor, nurse or pharmacist before following any medical regimen to see if it is safe and effective for you  Novel Coronavirus   WHAT YOU NEED TO KNOW:   What is the novel coronavirus (nCoV)? The nCoV is a new strain (type) of coronavirus  Coronaviruses often cause mild respiratory (lung) infections, such as the common cold  They can also cause more severe infections  Examples include acute respiratory syndrome (SARS), Middle East respiratory syndrome (MERS), pneumonia, and bronchitis  The nCoV can cause more severe symptoms than earlier coronaviruses  The nCoV was first found in individuals in part [facility] at the end of 2019  The virus is spreading as people who are infected travel to other parts of the world  What are the signs and symptoms of nCoV? Signs and symptoms can take 2 to 14 days to develop and range from mild to severe:  · Fever    · Cough    · Shortness of breath or trouble breathing    · Pneumonia (in severe cases)    How does nCoV spread? It is not known for sure how the virus spreads  The virus may spread in droplets when an infected person coughs or sneezes  Others become infected by breathing in the virus or getting the virus in their eyes  The virus can also possibly spread if a person touches certain animals, such as in a live market  How is nCoV diagnosed?   If you develop symptoms of nCoV, you may need to be checked  Call your doctor if you traveled within the past 14 days to an area where nCoV is active  Call your doctor if you have close contact with someone else who did  Your doctor will tell you what to do  He or she will need to take precautions in the office to protect staff members and other patients  Your doctor will take samples from your airway  The samples have to be sent to the Centers for Disease Control and Prevention (CDC) to be tested  What should I do if I have nCoV? No treatment is available for nCoV  Medicine may be given to help relieve your cough or fever, or to help you breathe more easily  Severe nCoV may need to be treated in the hospital  In the hospital, you may need breathing treatment or support, such as extra oxygen  The following can help you prevent spreading nCoV to others  Have anyone you are caring for who has nCoV also use the guidelines  · Limit close contact with others  Stay in a different room, or sleep in a separate bed  Remind others to stay at least 6 feet (2 meters) away from you while you are sick  Only go out of your house if you are going to a medical appointment  While you are recovering, always call the office of any healthcare provider you seeing  The provider will need to prepare for your arrival to keep others safe  · Wear a medical mask when you are around others  A medical mask will help prevent you from spreading the virus  You can ask others around you to wear a medical mask if you are not able to wear one  · Cover your mouth and nose to sneeze or cough  Sneeze and cough into a tissue that covers your mouth and nose  Use the bend of our arm if you do not have a tissue  Throw the tissue away in a trash can right away  Then wash your hands well with soap and water  Use hand  that contains alcohol if you cannot wash your hands  · Wash your hands often  You and everyone in your home must wash your hands throughout the day  Use soap and water  Use germ-killing gel if soap and water are not available  Do not touch your eyes or mouth if you have not washed your hands  · Do not share items with other people  Do not share dishes, towels, or other items with anyone  Always wash items after you use them  · Clean frequently touched surfaces often  Use household  or bleach diluted with water to clean counters, doorknobs, toilet seats, and other surfaces  · Do not handle live animals  You may be able to spread nCoV to animals, including pets  Until more is known, it is best not to touch, play with, or handle live animals while you are sick  What can I do to relieve my symptoms? The following may help if you have mild symptoms:  · Drink more liquids as directed  Liquids will help thin and loosen mucus so you can cough it up  Liquids will also help prevent dehydration  Liquids that help prevent dehydration include water, fruit juice, and broth  Do not drink liquids that contain caffeine  Caffeine can increase your risk for dehydration  Ask your healthcare provider how much liquid to drink each day  · Soothe a sore throat  Gargle with warm salt water  This helps your sore throat feel better  Make salt water by dissolving ¼ teaspoon salt in 1 cup warm water  You may also suck on hard candy or throat lozenges  You may use a sore throat spray  · Use a humidifier or vaporizer  Use a cool mist humidifier or a vaporizer to increase air moisture in your home  This may make it easier for you to breathe and help decrease your cough  · Use saline nasal drops as directed  These help relieve congestion  · Apply petroleum-based jelly around the outside of your nostrils  This can decrease irritation from blowing your nose  · Do not smoke  Nicotine and other chemicals in cigarettes and cigars can make your symptoms worse  They can also cause infections such as bronchitis or pneumonia   Ask your healthcare provider for information if you currently smoke and need help to quit  E-cigarettes or smokeless tobacco still contain nicotine  Talk to your healthcare provider before you use these products  Call your local emergency number [phone] in the [address]) for any of the following:  Tell the  you may have nCoV  This will help anyone in the ambulance stay safe, and to call ahead to the hospital   · You have sudden shortness of breath  · You have a fast heartbeat or chest pain  When should I seek immediate care? · You feel so dizzy that you have trouble standing up  · Your lips and fingernails turn blue  When should I call my doctor? · You have a fever over 102ºF (39ºC)  · Your sore throat gets worse or you see white or yellow spots in your throat  · Your symptoms get worse after 3 to 5 days or your cold is not better in 14 days  · You have a rash anywhere on your skin  · You have large, tender lumps in your neck  · You have thick, green, or yellow drainage from your nose  · You cough up thick yellow, green, or bloody mucus  · You are vomiting for more than 24 hours and cannot keep fluids down  · You have a bad earache  · You have questions or concerns about your condition or care  CARE AGREEMENT:   You have the right to help plan your care  Learn about your health condition and how it may be treated  Discuss treatment options with your healthcare providers to decide what care you want to receive  You always have the right to refuse treatment  The above information is an  only  It is not intended as medical advice for individual conditions or treatments  Talk to your doctor, nurse or pharmacist before following any medical regimen to see if it is safe and effective for you  © Copyright [address] Information is for End User's use only and may not be sold, redistributed or otherwise used for commercial purposes   All illustrations and images included in CareNotes® are the copyrighted property of A D A M , Inc  or [address] [place], [place] [address]

## 2020-07-28 NOTE — SOCIAL WORK
CM met with MOB to do a general SW assessment and complete for +THC in pregnancy:     MOB is Shena Miller  FOB is Haven Sleeper  Infant is Cinthia Resendiz    MOB and FOB do not live together, MOB lives with her four other children at confirmed address:   [address] [facility]    MOB reports she has good support through FOB and maternal grandma  MOB reports having all necessary items for the infant at discharge, including a carseat and crib/bassinet for safe sleep  MOB is formula feeding  Has WIC, Ma and SNAP assistance  MOB is unemployed  MOB does not drive - FOB and grandma assist with transport needs  Uses [address] for ped needs  [facility] provided through [address] [place] [address] [address] clinic  NO mental health history  No legal history    Hx THC use in pregnancy - she reports for pain and symptom management  She last smoked THC last week  This Cm has seen her for three deliveries, she was + for [facility] in 2017 and is aware of the process for childline referrals  Hx of CYS involvment in 2017 for [facility] use, case was closed  Called childline, spoke with Cayla Gleason #[phone], case to be assigned to Nicko Dow Cm following

## 2020-07-29 NOTE — UTILIZATION REVIEW
Notification of Maternity/Delivery & [place] Birth Information for Admission   Notification of Maternity/Delivery for Admission to our facility [address]  Be advised that this patient was admitted to our facility under Inpatient Status  Contact Pham Altamirano at [phone] for additional admission information  [facility] PARENT/CHILD HEALTH [facility] DEPT  DEDICATED -[phone]  Mother &  Information   Patient Name: Elle Blackmon YOB: 1995   Delivering clinician: [facility]   OB History        5    Para   5    Term   5       0    AB   0    Living   5       SAB   0    TAB   0    Ectopic   0    Multiple   0    Live Births   5                Name & MRN:   Information for the patient's :  Sheila Joaquin) [[id number]]     [place] Delivery Information:  Sex: male  Delivered 2020 2:11 PM by Vaginal, Spontaneous; Gestational Age: 41w0d     Measurements:  Weight: 5 lb 14 4 oz (2675 g); Height: 19"    APGAR 1 minute 5 minutes 10 minutes   Totals: 9 9      [place] Birth Information: 26 y o  female MRN: [id number] Unit/Bed#: L&D 312-01 Estimated Date of Delivery: 20  Birthweight: No birth weight on file   Gestational Age: <None> Delivery Type: Vaginal, Spontaneous          APGARS  One minute Five minutes Ten minutes   Totals:                 [address]:   [place]  Tax ID: [id number]  NPI: [id number] Attending Provider/NPI:  Phone:  Address: Emily Voss [[id number]]  [phone]  Same as HUMZA/Vikas Garza 1106 of Service Code: 24 Place of Service Name: [address] [place] [address]   Start Date: 20 1406   Discharge Date & Time: 2020  6:39 PM    Type of Admission: Inpatient Status Discharge Disposition (if discharged): Home/Self Care   Patient Diagnoses:   Generalized abdominal pain during pregnancy [O26 899, R10 84]  The encounter diagnosis was  (spontaneous vaginal delivery)  1   (spontaneous vaginal delivery)       Orders: Admission Orders (From admission, onward)     Ordered        20 1409  Inpatient Admission  Once                    Assigned Utilization Review Contact: Ez Colindres  Utilization   Network Utilization Review Department  Phone: [phone]; Fax [phone]  Email: [email or web address]

## 2020-08-04 LAB — PLACENTA IN STORAGE: NORMAL

## 2020-08-14 ENCOUNTER — TELEPHONE (OUTPATIENT)
Dept: OBGYN CLINIC | Facility: CLINIC | Age: 25
End: 2020-08-14

## 2020-08-17 ENCOUNTER — POSTPARTUM VISIT (OUTPATIENT)
Dept: OBGYN CLINIC | Facility: CLINIC | Age: 25
End: 2020-08-17

## 2020-08-17 VITALS
DIASTOLIC BLOOD PRESSURE: 81 MMHG | SYSTOLIC BLOOD PRESSURE: 123 MMHG | WEIGHT: 113.6 LBS | TEMPERATURE: 98.2 F | BODY MASS INDEX: 22.94 KG/M2 | HEART RATE: 79 BPM

## 2020-08-17 PROBLEM — O99.330 TOBACCO USE IN PREGNANCY: Status: RESOLVED | Noted: 2020-04-15 | Resolved: 2020-08-17

## 2020-08-17 PROBLEM — Z3A.37 37 WEEKS GESTATION OF PREGNANCY: Status: RESOLVED | Noted: 2020-05-06 | Resolved: 2020-08-17

## 2020-08-17 PROBLEM — Z86.32 HISTORY OF GESTATIONAL DIABETES: Status: RESOLVED | Noted: 2020-04-15 | Resolved: 2020-08-17

## 2020-08-17 PROCEDURE — 99214 OFFICE O/P EST MOD 30 MIN: CPT | Performed by: NURSE PRACTITIONER

## 2020-08-17 NOTE — PROGRESS NOTES
OB POSTPARTUM VISIT PROGRESS NOTE  Date of Encounter: 2020    Jose Alfredo Nam    : 1995  (26 y o )  MR: [id number]    [facility] is in for her postpartum visit  She is now   She delivered by normal spontaneous vaginal delivery  She's generally doing well, denies current pain or bleeding issues, and has no significant depression issues  Assisted by FOB  She is bottle feeding  We discussed all appropriate contraceptive options and she chooses   Depoprovera in the hospital, states she also signed sterilization consents, states she has 5 children and does not want more  States she become pregnant using other forms of birth control  Denies problems with urinating or BM's  Aware intercourse and exercise should not begin until after 6 weeks postpartum  LABS:  WNL PAP 2020    Objective   EXAM:  GENERAL: alert, well appearing, and in no distress  VITALS: Blood pressure 123/81, pulse 79, temperature 98 2 °F (36 8 °C), weight 51 5 kg (113 lb 9 6 oz), last menstrual period 2020, not currently breastfeeding  BMI: Body mass index is 22 94 kg/m²  Denies pain  WNL respiratory effort  Negative fever, cough or SOB  BREASTS: Denies pain, redness or lumps  EXTREMITIES: Denies pain redness or edema    Assessment/Plan   Diagnoses and all orders for this visit:     (spontaneous vaginal delivery)    Postpartum follow-up        Plan  Patient Instructions   Surgical scheduler will call you after 6 weeks postpartum to schedule history and physical for sterilization procedure and give you a date for the procedure  Call you with needs or concers  Annual GYN exam is due after 2020    Return for follow up visit  H&P for sterilization procedure  Pt verbalized understanding of all discussed      SANJIV Dubose

## 2020-08-17 NOTE — PATIENT INSTRUCTIONS
Surgical scheduler will call you after 6 weeks postpartum to schedule history and physical for sterilization procedure and give you a date for the procedure  Call you with needs or concers  Annual GYN exam is due after 5/6/2020    COVID-19 Instructions    If you are having any of the following:  Cough   Shortness of breath   Fever  If traveled within past 2 weeks internationally or to high risk [place] states  Or been in contact with someone that has     Please call either:   Your PCP office  [phone], option [phone]    They will screen you over the phone and direct you to the nearest appropriate testing location    DO NOT go to your PCP or OB office without calling first

## 2020-08-26 ENCOUNTER — TELEPHONE (OUTPATIENT)
Dept: OBGYN CLINIC | Facility: CLINIC | Age: 25
End: 2020-08-26

## 2020-11-02 ENCOUNTER — HOSPITAL ENCOUNTER (EMERGENCY)
Facility: HOSPITAL | Age: 25
Discharge: HOME | End: 2020-11-02
Attending: EMERGENCY MEDICINE | Admitting: EMERGENCY MEDICINE
Payer: COMMERCIAL

## 2020-11-02 VITALS
BODY MASS INDEX: 22.92 KG/M2 | DIASTOLIC BLOOD PRESSURE: 71 MMHG | SYSTOLIC BLOOD PRESSURE: 122 MMHG | TEMPERATURE: 96 F | HEART RATE: 82 BPM | WEIGHT: 113.5 LBS | OXYGEN SATURATION: 97 % | RESPIRATION RATE: 20 BRPM

## 2020-11-02 DIAGNOSIS — K08.89 TOOTHACHE: Primary | ICD-10-CM

## 2020-11-02 PROCEDURE — 99282 EMERGENCY DEPT VISIT SF MDM: CPT

## 2020-11-02 PROCEDURE — 99284 EMERGENCY DEPT VISIT MOD MDM: CPT | Performed by: PHYSICIAN ASSISTANT

## 2020-11-02 RX ORDER — CLINDAMYCIN HYDROCHLORIDE 300 MG/1
300 CAPSULE ORAL EVERY 6 HOURS SCHEDULED
Qty: 40 CAPSULE | Refills: 0 | Status: SHIPPED | OUTPATIENT
Start: 2020-11-02 | End: 2020-11-12

## 2020-11-02 RX ORDER — ACETAMINOPHEN 325 MG/1
650 TABLET ORAL ONCE
Status: COMPLETED | OUTPATIENT
Start: 2020-11-02 | End: 2020-11-02

## 2020-11-02 RX ORDER — CLINDAMYCIN HYDROCHLORIDE 150 MG/1
300 CAPSULE ORAL ONCE
Status: COMPLETED | OUTPATIENT
Start: 2020-11-02 | End: 2020-11-02

## 2020-11-02 RX ORDER — ACETAMINOPHEN 325 MG/1
650 TABLET ORAL EVERY 6 HOURS PRN
Qty: 30 TABLET | Refills: 0 | Status: SHIPPED | OUTPATIENT
Start: 2020-11-02 | End: 2021-11-01

## 2020-11-02 RX ADMIN — CLINDAMYCIN HYDROCHLORIDE 300 MG: 150 CAPSULE ORAL at 10:30

## 2020-11-02 RX ADMIN — ACETAMINOPHEN 650 MG: 325 TABLET ORAL at 10:30

## 2021-01-03 ENCOUNTER — HOSPITAL ENCOUNTER (EMERGENCY)
Facility: HOSPITAL | Age: 26
Discharge: HOME | End: 2021-01-04
Attending: EMERGENCY MEDICINE | Admitting: EMERGENCY MEDICINE
Payer: COMMERCIAL

## 2021-01-03 DIAGNOSIS — R10.13 EPIGASTRIC PAIN: ICD-10-CM

## 2021-01-03 DIAGNOSIS — R11.2 NAUSEA AND VOMITING: Primary | ICD-10-CM

## 2021-01-03 PROCEDURE — 36415 COLL VENOUS BLD VENIPUNCTURE: CPT | Performed by: EMERGENCY MEDICINE

## 2021-01-03 PROCEDURE — 99285 EMERGENCY DEPT VISIT HI MDM: CPT

## 2021-01-03 PROCEDURE — 84703 CHORIONIC GONADOTROPIN ASSAY: CPT | Performed by: EMERGENCY MEDICINE

## 2021-01-03 PROCEDURE — 99285 EMERGENCY DEPT VISIT HI MDM: CPT | Performed by: EMERGENCY MEDICINE

## 2021-01-03 PROCEDURE — 82150 ASSAY OF AMYLASE: CPT | Performed by: EMERGENCY MEDICINE

## 2021-01-03 PROCEDURE — 83690 ASSAY OF LIPASE: CPT | Performed by: EMERGENCY MEDICINE

## 2021-01-03 PROCEDURE — 85025 COMPLETE CBC W/AUTO DIFF WBC: CPT | Performed by: EMERGENCY MEDICINE

## 2021-01-03 PROCEDURE — 80053 COMPREHEN METABOLIC PANEL: CPT | Performed by: EMERGENCY MEDICINE

## 2021-01-03 RX ORDER — ONDANSETRON 2 MG/ML
4 INJECTION INTRAMUSCULAR; INTRAVENOUS ONCE
Status: COMPLETED | OUTPATIENT
Start: 2021-01-04 | End: 2021-01-04

## 2021-01-04 ENCOUNTER — APPOINTMENT (EMERGENCY)
Dept: CT IMAGING | Facility: HOSPITAL | Age: 26
End: 2021-01-04
Payer: COMMERCIAL

## 2021-01-04 VITALS
WEIGHT: 108 LBS | SYSTOLIC BLOOD PRESSURE: 100 MMHG | HEART RATE: 55 BPM | DIASTOLIC BLOOD PRESSURE: 57 MMHG | RESPIRATION RATE: 18 BRPM | TEMPERATURE: 97.2 F | BODY MASS INDEX: 21.81 KG/M2 | OXYGEN SATURATION: 100 %

## 2021-01-04 LAB
ALBUMIN SERPL BCP-MCNC: 5 G/DL (ref 3–5.2)
ALP SERPL-CCNC: 113 U/L (ref 43–122)
ALT SERPL W P-5'-P-CCNC: 7 U/L (ref 9–52)
AMYLASE SERPL-CCNC: 67 IU/L (ref 30–110)
ANION GAP SERPL CALCULATED.3IONS-SCNC: 9 MMOL/L (ref 5–14)
AST SERPL W P-5'-P-CCNC: 44 U/L (ref 14–36)
ATRIAL RATE: 79 BPM
BASOPHILS # BLD AUTO: 0 THOUSANDS/ΜL (ref 0–0.1)
BASOPHILS NFR BLD AUTO: 1 % (ref 0–1)
BILIRUB SERPL-MCNC: 1 MG/DL
BUN SERPL-MCNC: 11 MG/DL (ref 5–25)
CALCIUM SERPL-MCNC: 9.8 MG/DL (ref 8.4–10.2)
CHLORIDE SERPL-SCNC: 105 MMOL/L (ref 97–108)
CO2 SERPL-SCNC: 27 MMOL/L (ref 22–30)
CREAT SERPL-MCNC: 0.41 MG/DL (ref 0.6–1.2)
EOSINOPHIL # BLD AUTO: 0.1 THOUSAND/ΜL (ref 0–0.4)
EOSINOPHIL NFR BLD AUTO: 1 % (ref 0–6)
ERYTHROCYTE [DISTWIDTH] IN BLOOD BY AUTOMATED COUNT: 12.9 %
GFR SERPL CREATININE-BSD FRML MDRD: 145 ML/MIN/1.73SQ M
GLUCOSE SERPL-MCNC: 110 MG/DL (ref 70–99)
HCG SERPL QL: NEGATIVE
HCT VFR BLD AUTO: 37.3 % (ref 36–46)
HGB BLD-MCNC: 12.9 G/DL (ref 12–16)
LIPASE SERPL-CCNC: 26 U/L (ref 23–300)
LYMPHOCYTES # BLD AUTO: 2.3 THOUSANDS/ΜL (ref 0.5–4)
LYMPHOCYTES NFR BLD AUTO: 31 % (ref 25–45)
MCH RBC QN AUTO: 31.7 PG (ref 26–34)
MCHC RBC AUTO-ENTMCNC: 34.5 G/DL (ref 31–36)
MCV RBC AUTO: 92 FL (ref 80–100)
MONOCYTES # BLD AUTO: 0.4 THOUSAND/ΜL (ref 0.2–0.9)
MONOCYTES NFR BLD AUTO: 6 % (ref 1–10)
NEUTROPHILS # BLD AUTO: 4.4 THOUSANDS/ΜL (ref 1.8–7.8)
NEUTS SEG NFR BLD AUTO: 61 % (ref 45–65)
P AXIS: 61 DEGREES
PLATELET # BLD AUTO: 354 THOUSANDS/UL (ref 150–450)
PMV BLD AUTO: 7.4 FL (ref 8.9–12.7)
POTASSIUM SERPL-SCNC: 4.8 MMOL/L (ref 3.6–5)
PR INTERVAL: 138 MS
PROT SERPL-MCNC: 9.5 G/DL (ref 5.9–8.4)
QRS AXIS: 16 DEGREES
QRSD INTERVAL: 86 MS
QT INTERVAL: 364 MS
QTC INTERVAL: 417 MS
RBC # BLD AUTO: 4.06 MILLION/UL (ref 4–5.2)
SODIUM SERPL-SCNC: 141 MMOL/L (ref 137–147)
T WAVE AXIS: -44 DEGREES
TROPONIN I SERPL-MCNC: <0.01 NG/ML (ref 0–0.03)
VENTRICULAR RATE: 79 BPM
WBC # BLD AUTO: 7.2 THOUSAND/UL (ref 4.5–11)

## 2021-01-04 PROCEDURE — 96366 THER/PROPH/DIAG IV INF ADDON: CPT

## 2021-01-04 PROCEDURE — 93005 ELECTROCARDIOGRAM TRACING: CPT

## 2021-01-04 PROCEDURE — 71250 CT THORAX DX C-: CPT

## 2021-01-04 PROCEDURE — 96365 THER/PROPH/DIAG IV INF INIT: CPT

## 2021-01-04 PROCEDURE — 96361 HYDRATE IV INFUSION ADD-ON: CPT

## 2021-01-04 PROCEDURE — 84484 ASSAY OF TROPONIN QUANT: CPT | Performed by: EMERGENCY MEDICINE

## 2021-01-04 PROCEDURE — G1004 CDSM NDSC: HCPCS

## 2021-01-04 PROCEDURE — 93010 ELECTROCARDIOGRAM REPORT: CPT | Performed by: INTERNAL MEDICINE

## 2021-01-04 PROCEDURE — 96375 TX/PRO/DX INJ NEW DRUG ADDON: CPT

## 2021-01-04 RX ORDER — METOCLOPRAMIDE HYDROCHLORIDE 5 MG/ML
10 INJECTION INTRAMUSCULAR; INTRAVENOUS ONCE
Status: COMPLETED | OUTPATIENT
Start: 2021-01-04 | End: 2021-01-04

## 2021-01-04 RX ORDER — ONDANSETRON 8 MG/1
8 TABLET, ORALLY DISINTEGRATING ORAL EVERY 8 HOURS PRN
Qty: 12 TABLET | Refills: 0 | Status: SHIPPED | OUTPATIENT
Start: 2021-01-04 | End: 2021-11-01

## 2021-01-04 RX ADMIN — MORPHINE SULFATE 2 MG: 2 INJECTION, SOLUTION INTRAMUSCULAR; INTRAVENOUS at 00:58

## 2021-01-04 RX ADMIN — SODIUM CHLORIDE, SODIUM LACTATE, POTASSIUM CHLORIDE, AND CALCIUM CHLORIDE 1000 ML: .6; .31; .03; .02 INJECTION, SOLUTION INTRAVENOUS at 00:12

## 2021-01-04 RX ADMIN — ONDANSETRON 4 MG: 2 INJECTION INTRAMUSCULAR; INTRAVENOUS at 00:05

## 2021-01-04 RX ADMIN — FAMOTIDINE 20 MG: 10 INJECTION INTRAVENOUS at 00:05

## 2021-01-04 RX ADMIN — SODIUM CHLORIDE 1000 ML: 0.9 INJECTION, SOLUTION INTRAVENOUS at 02:34

## 2021-01-04 RX ADMIN — METOCLOPRAMIDE 10 MG: 5 INJECTION, SOLUTION INTRAMUSCULAR; INTRAVENOUS at 00:29

## 2021-01-04 NOTE — ED PROVIDER NOTES
History  Chief Complaint   Patient presents with    Chest Pain     Pt reports having chest pain over the last week or so that has progressively gotten worse  pt has nausea and vomiting as well as diaphoresis  pt is scheduled for a stress test on tuesday 26 y/o W female c/o sharp/stabbing epigastric pain along with nausea/vomiting which began earlier today  Patient is s/p tooth extraction and has been unable to eat; took her usual pain medications today and began to vomit uncontrollably  Denies dyspnea; seen by Cardiology in office on 12/4 - has ECHO and stress scheduled for upcoming date  Patient actively vomiting upon arrival   History of T wave inversions on EKG  Prior to Admission Medications   Prescriptions Last Dose Informant Patient Reported? Taking?   acetaminophen (TYLENOL) 325 mg tablet   No No   Sig: Take 2 tablets (650 mg total) by mouth every 6 (six) hours as needed for mild pain or moderate pain      Facility-Administered Medications: None       Past Medical History:   Diagnosis Date    Chlamydia 2013    GERD (gastroesophageal reflux disease)     has not req'd tx since 2019       Past Surgical History:   Procedure Laterality Date    APPENDECTOMY      CHOLECYSTECTOMY  2015    VA COLONOSCOPY FLX DX W/COLLJ SPEC WHEN PFRMD N/A 1/19/2017    Procedure: EGD AND COLONOSCOPY;  Surgeon: Pepito Santoyo DO;  Location: [facility];   Service: Gastroenterology    VA LAP,APPENDECTOMY N/A 5/17/2016    Procedure: APPENDECTOMY LAPAROSCOPIC;  Surgeon: Donovan Reynoso DO;  Location: [facility];  Service: General    VA LAP,DIAGNOSTIC ABDOMEN N/A 3/13/2016    Procedure: LAPAROSCOPY DIAGNOSTIC;  Surgeon: Sarah Elder MD;  Location: [facility];  Service: General    RESECTION SMALL BOWEL LAPAROSCOPIC  2016       Family History   Problem Relation Age of Onset    Diabetes Maternal Grandmother     Hypothyroidism Maternal Grandmother     Asthma Mother     No Known Problems Father Joseph Burgess Breast cancer Neg Hx     Cancer Neg Hx      I have reviewed and agree with the history as documented  E-Cigarette/Vaping    E-Cigarette Use Never User      E-Cigarette/Vaping Substances     Social History     Tobacco Use    Smoking status: Heavy Tobacco Smoker     Packs/day: 0 50     Types: Cigarettes    Smokeless tobacco: Never Used   Substance Use Topics    Alcohol use: Not Currently     Frequency: Monthly or less     Drinks per session: 1 or 2    Drug use: Not Currently     Types: Marijuana     Comment: last used marijuana 3/2020       Review of Systems   Cardiovascular: Positive for chest pain  Gastrointestinal: Positive for abdominal pain, nausea and vomiting  All other systems reviewed and are negative  Physical Exam  Physical Exam  Vitals signs and nursing note reviewed  Constitutional:       Appearance: She is well-developed  HENT:      Head: Normocephalic and atraumatic  Eyes:      Extraocular Movements: Extraocular movements intact  Pupils: Pupils are equal, round, and reactive to light  Neck:      Musculoskeletal: Normal range of motion and neck supple  Cardiovascular:      Rate and Rhythm: Normal rate and regular rhythm  Heart sounds: Normal heart sounds  Pulmonary:      Effort: Pulmonary effort is normal       Breath sounds: Normal breath sounds  Abdominal:      Palpations: Abdomen is soft  Tenderness: There is abdominal tenderness in the epigastric area  There is no guarding or rebound  Musculoskeletal: Normal range of motion  Right lower leg: No edema  Left lower leg: She exhibits no tenderness  No edema  Skin:     General: Skin is dry  Capillary Refill: Capillary refill takes less than 2 seconds  Neurological:      General: No focal deficit present  Mental Status: She is alert and oriented to person, place, and time  Psychiatric:         Mood and Affect: Mood is anxious           Vital Signs  ED Triage Vitals [01/03/21 2356]   Temperature Pulse Respirations Blood Pressure SpO2   (!) 97 2 °F (36 2 °C) 78 22 (!) 205/116 100 %      Temp Source Heart Rate Source Patient Position - Orthostatic VS BP Location FiO2 (%)   Tympanic Monitor Sitting Left arm --      Pain Score       Worst Possible Pain           Vitals:    01/04/21 0045 01/04/21 0100 01/04/21 0200 01/04/21 0333   BP: 138/92 138/84 (!) 93/45 100/57   Pulse: 70 63 62 55   Patient Position - Orthostatic VS: Lying Lying Lying Lying         Visual Acuity      ED Medications  Medications   lactated ringers bolus 1,000 mL (0 mL Intravenous Stopped 1/4/21 0223)   ondansetron (ZOFRAN) injection 4 mg (4 mg Intravenous Given 1/4/21 0005)   famotidine (PEPCID) injection 20 mg (20 mg Intravenous Given 1/4/21 0005)   metoclopramide (REGLAN) injection 10 mg (10 mg Intravenous Given 1/4/21 0029)   morphine injection 2 mg (2 mg Intravenous Given 1/4/21 0058)   sodium chloride 0 9 % bolus 1,000 mL (0 mL Intravenous Stopped 1/4/21 0333)       Diagnostic Studies  Results Reviewed     Procedure Component Value Units Date/Time    hCG, qualitative pregnancy [[id number]]  (Normal) Collected: 01/03/21 2359    Lab Status: Final result Specimen: Blood from Arm, Left Updated: 01/04/21 0048     Preg, Serum Negative    Troponin I [[id number]]  (Normal) Collected: 01/04/21 0000    Lab Status: Final result Specimen: Blood from Arm, Left Updated: 01/04/21 0030     Troponin I <0 01 ng/mL     Lipase [[id number]]  (Normal) Collected: 01/03/21 2359    Lab Status: Final result Specimen: Blood from Arm, Left Updated: 01/04/21 0021     Lipase 26 u/L     Narrative:      Hemolysis    Amylase [[id number]]  (Normal) Collected: 01/03/21 2359    Lab Status: Final result Specimen: Blood from Arm, Left Updated: 01/04/21 0021     Amylase 67 IU/L     Narrative:      Hemolysis    Comprehensive metabolic panel [[id number]]  (Abnormal) Collected: 01/03/21 2359    Lab Status: Final result Specimen: Blood from Arm, Left Updated: 01/04/21 0021     Sodium 141 mmol/L      Potassium 4 8 mmol/L      Chloride 105 mmol/L      CO2 27 mmol/L      ANION GAP 9 mmol/L      BUN 11 mg/dL      Creatinine 0 41 mg/dL      Glucose 110 mg/dL      Calcium 9 8 mg/dL      AST 44 U/L      ALT 7 U/L      Alkaline Phosphatase 113 U/L      Total Protein 9 5 g/dL      Albumin 5 0 g/dL      Total Bilirubin 1 00 mg/dL      eGFR 145 ml/min/1 73sq m     Narrative:      Hemolysis  National Kidney Disease Foundation guidelines for Chronic Kidney Disease (CKD):     Stage 1 with normal or high GFR (GFR > 90 mL/min/1 73 square meters)    Stage 2 Mild CKD (GFR = 60-89 mL/min/1 73 square meters)    Stage 3A Moderate CKD (GFR = 45-59 mL/min/1 73 square meters)    Stage 3B Moderate CKD (GFR = 30-44 mL/min/1 73 square meters)    Stage 4 Severe CKD (GFR = 15-29 mL/min/1 73 square meters)    Stage 5 End Stage CKD (GFR <15 mL/min/1 73 square meters)  Note: GFR calculation is accurate only with a steady state creatinine    CBC and differential [[id number]]  (Abnormal) Collected: 01/03/21 4985    Lab Status: Final result Specimen: Blood from Arm, Left Updated: 01/04/21 0009     WBC 7 20 Thousand/uL      RBC 4 06 Million/uL      Hemoglobin 12 9 g/dL      Hematocrit 37 3 %      MCV 92 fL      MCH 31 7 pg      MCHC 34 5 g/dL      RDW 12 9 %      MPV 7 4 fL      Platelets 537 Thousands/uL      Neutrophils Relative 61 %      Lymphocytes Relative 31 %      Monocytes Relative 6 %      Eosinophils Relative 1 %      Basophils Relative 1 %      Neutrophils Absolute 4 40 Thousands/µL      Lymphocytes Absolute 2 30 Thousands/µL      Monocytes Absolute 0 40 Thousand/µL      Eosinophils Absolute 0 10 Thousand/µL      Basophils Absolute 0 00 Thousands/µL                  CT chest without contrast   Final Result by Rashid Palmer MD (01/04 0144)      No pneumomediastinum  The CT appearance of the esophagus is unremarkable        Workstation performed: [id number] Procedures  Procedures         ED Course  ED Course as of Jan 04 0354   Mon Jan 04, 2021   0006 EKG: nsr @ 79 bpm, T wave inverisons V3, normal intervals  JK      0221 Patient resting comfortably in room upon reevaluation; nausea/vomiting resolved  JK                                SBIRT 22yo+      Most Recent Value   SBIRT (24 yo +)   In order to provide better care to our patients, we are screening all of our patients for alcohol and drug use  Would it be okay to ask you these screening questions? Yes Filed at: 01/04/2021 0012   Initial Alcohol Screen: US AUDIT-C    1  How often do you have a drink containing alcohol?  0 Filed at: 01/04/2021 0012   2  How many drinks containing alcohol do you have on a typical day you are drinking? 0 Filed at: 01/04/2021 0012   3b  FEMALE Any Age, or MALE 65+: How often do you have 4 or more drinks on one occassion? 0 Filed at: 01/04/2021 0012   Audit-C Score  0 Filed at: 01/04/2021 5629   DARRELL: How many times in the past year have you    Used an illegal drug or used a prescription medication for non-medical reasons? Once or Twice Filed at: 01/04/2021 0012   DAST-10: In the past 12 months      1  Have you used drugs other than those required for medical reasons? 0 Filed at: 01/04/2021 0012   2  Do you use more than one drug at a time? 0 Filed at: 01/04/2021 0012   3  Have you had medical problems as a result of your drug use (e g , memory loss, hepatitis, convulsions, bleeding, etc )? 0 Filed at: 01/04/2021 0012   4  Have you had "blackouts" or "flashbacks" as a result of drug use? YesNo  0 Filed at: 01/04/2021 0012   5  Do you ever feel bad or guilty about your drug use? 0 Filed at: 01/04/2021 0012   6  Does your spouse (or parent) ever complain about your involvement with drugs? 0 Filed at: 01/04/2021 0012   7  Have you neglected your family because of your use of drugs? 0 Filed at: 01/04/2021 0012   8   Have you engaged in illegal activities in order to obtain drugs? 0 Filed at: 01/04/2021 0012   9  Have you ever experienced withdrawal symptoms (felt sick) when you stopped taking drugs? 0 Filed at: 01/04/2021 0012   10  Are you always able to stop using drugs when you want to?  0 Filed at: 01/04/2021 0012   DAST-10 Score  0 Filed at: 01/04/2021 0012                    MDM    Disposition  Final diagnoses:   Nausea and vomiting   Epigastric pain     Time reflects when diagnosis was documented in both MDM as applicable and the Disposition within this note     Time User Action Codes Description Comment    1/4/2021  3:51 AM Ari Guidry Add [R11 2] Nausea and vomiting     1/4/2021  3:52 AM Ari Guidry Add [R10 13] Epigastric pain       ED Disposition     ED Disposition Condition Date/Time Comment    Discharge Stable Mon Jan 4, 2021  3:51 AM Lacy Vega discharge to home/self care  Follow-up Information     Follow up With Specialties Details Why Contact Info    Kiley Padron MD Internal Medicine Schedule an appointment as soon as possible for a visit in 1 week As needed [address] [facility]  [phone]            Patient's Medications   Discharge Prescriptions    ONDANSETRON (ZOFRAN-ODT) 8 MG DISINTEGRATING TABLET    Take 1 tablet (8 mg total) by mouth every 8 (eight) hours as needed for nausea or vomiting       Start Date: 1/4/2021  End Date: --       Order Dose: 8 mg       Quantity: 12 tablet    Refills: 0     No discharge procedures on file      PDMP Review     None          ED Provider  Electronically Signed by           Herman Benoit DO  01/04/21 6607

## 2021-01-05 ENCOUNTER — TELEPHONE (OUTPATIENT)
Dept: INTERNAL MEDICINE CLINIC | Facility: CLINIC | Age: 26
End: 2021-01-05

## 2021-01-05 NOTE — TELEPHONE ENCOUNTER
Left message on machine for patient to call me at [place] office  Per Dr Margarito Dempsey, needs ED follow up with her  Please schedule

## 2021-01-22 NOTE — TELEPHONE ENCOUNTER
Spoke to patient  She instructed me to remove dr Lon Yu as PCP  Pt lives in [facility] [facility], does not know where our office is, and will not be coming here

## 2021-05-10 ENCOUNTER — ANNUAL EXAM (OUTPATIENT)
Dept: OBGYN CLINIC | Facility: CLINIC | Age: 26
End: 2021-05-10

## 2021-05-10 VITALS
BODY MASS INDEX: 20.2 KG/M2 | WEIGHT: 100 LBS | HEART RATE: 92 BPM | DIASTOLIC BLOOD PRESSURE: 62 MMHG | SYSTOLIC BLOOD PRESSURE: 86 MMHG

## 2021-05-10 DIAGNOSIS — Z32.01 POSITIVE PREGNANCY TEST: Primary | ICD-10-CM

## 2021-05-10 PROBLEM — O99.323 DRUG USE AFFECTING PREGNANCY IN THIRD TRIMESTER: Status: RESOLVED | Noted: 2020-07-16 | Resolved: 2021-05-10

## 2021-05-10 PROBLEM — Z13.79 GENETIC SCREENING: Status: RESOLVED | Noted: 2020-04-15 | Resolved: 2021-05-10

## 2021-05-10 PROBLEM — Z36.9 ENCOUNTER FOR FETAL ULTRASOUND: Status: RESOLVED | Noted: 2020-04-15 | Resolved: 2021-05-10

## 2021-05-10 LAB — SL AMB POCT URINE HCG: POSITIVE

## 2021-05-10 PROCEDURE — 99213 OFFICE O/P EST LOW 20 MIN: CPT | Performed by: NURSE PRACTITIONER

## 2021-05-10 PROCEDURE — 81025 URINE PREGNANCY TEST: CPT | Performed by: NURSE PRACTITIONER

## 2021-05-10 NOTE — PROGRESS NOTES
Assessment/Plan:         Diagnoses and all orders for this visit:    Positive pregnancy test      Plan  Reschedule annual GYN exam  Call with needs or concerns  Pt verbalized understanding of all discussed  Subjective:      Patient ID: Mariam Aragon is a 23 y o  female  HPI  Pt presents to confirm she is pregnant  Pt states she was not using any form of birth control or condoms  Pt states she knew there was the possibility of becoming pregnant  Pt states she does not think she can continue the pregnancy and plans to discuss with her partner  Pt currently has 5 children, is considering a Mirena  Last WNL PAP 5/6/2020       The following portions of the patient's history were reviewed and updated as appropriate: allergies, current medications, past family history, past medical history, past social history, past surgical history and problem list     Review of Systems      Pertinent items are note in the HPI    Objective:      BP (!) 86/62   Pulse 92   Wt 45 4 kg (100 lb)   LMP 03/14/2021   BMI 20 20 kg/m²          Physical Exam  Vitals signs reviewed  Constitutional:       Appearance: Normal appearance  Eyes:      General:         Right eye: No discharge  Left eye: No discharge  Neck:      Musculoskeletal: Normal range of motion  Pulmonary:      Effort: Pulmonary effort is normal  No respiratory distress  Musculoskeletal: Normal range of motion  Neurological:      Mental Status: She is alert and oriented to person, place, and time  Psychiatric:         Mood and Affect: Mood normal          Behavior: Behavior normal          Thought Content:  Thought content normal        Negative cough or SOB

## 2021-05-10 NOTE — PATIENT INSTRUCTIONS
Reschedule annual GYN exam  Call with needs or concerns    COVID-19 Instructions    If you are having any of the following:  Cough   Shortness of breath   Fever  If traveled within past 2 weeks internationally or to high risk [place] states  Or been in contact with someone that has     Please call either:   Your PCP office  [phone], option [phone]    They will screen you over the phone and direct you to the nearest appropriate testing location    DO NOT go to your PCP or OB office without calling first

## 2021-10-29 ENCOUNTER — APPOINTMENT (EMERGENCY)
Dept: ULTRASOUND IMAGING | Facility: HOSPITAL | Age: 26
End: 2021-10-29
Payer: COMMERCIAL

## 2021-10-29 ENCOUNTER — HOSPITAL ENCOUNTER (EMERGENCY)
Facility: HOSPITAL | Age: 26
Discharge: HOME | End: 2021-10-29
Attending: EMERGENCY MEDICINE
Payer: COMMERCIAL

## 2021-10-29 VITALS
DIASTOLIC BLOOD PRESSURE: 84 MMHG | OXYGEN SATURATION: 100 % | BODY MASS INDEX: 18.99 KG/M2 | TEMPERATURE: 96.6 F | WEIGHT: 94 LBS | RESPIRATION RATE: 15 BRPM | HEART RATE: 78 BPM | SYSTOLIC BLOOD PRESSURE: 116 MMHG

## 2021-10-29 DIAGNOSIS — O26.899 ABDOMINAL PAIN AFFECTING PREGNANCY: ICD-10-CM

## 2021-10-29 DIAGNOSIS — R10.9 ABDOMINAL PAIN AFFECTING PREGNANCY: ICD-10-CM

## 2021-10-29 DIAGNOSIS — J06.9 VIRAL URI WITH COUGH: Primary | ICD-10-CM

## 2021-10-29 LAB
ABO GROUP BLD: NORMAL
ALBUMIN SERPL BCP-MCNC: 4.3 G/DL (ref 3–5.2)
ALP SERPL-CCNC: 109 U/L (ref 43–122)
ALT SERPL W P-5'-P-CCNC: 18 U/L
ANION GAP SERPL CALCULATED.3IONS-SCNC: 3 MMOL/L (ref 5–14)
AST SERPL W P-5'-P-CCNC: 20 U/L (ref 14–36)
B-HCG SERPL-ACNC: ABNORMAL MIU/ML
BASOPHILS # BLD AUTO: 0 THOUSANDS/ΜL (ref 0–0.1)
BASOPHILS NFR BLD AUTO: 1 % (ref 0–1)
BILIRUB SERPL-MCNC: 0.34 MG/DL
BILIRUB UR QL STRIP: NEGATIVE
BLD GP AB SCN SERPL QL: NEGATIVE
BUN SERPL-MCNC: 5 MG/DL (ref 5–25)
CALCIUM SERPL-MCNC: 9.3 MG/DL (ref 8.4–10.2)
CHLORIDE SERPL-SCNC: 104 MMOL/L (ref 97–108)
CLARITY UR: ABNORMAL
CO2 SERPL-SCNC: 27 MMOL/L (ref 22–30)
COLOR UR: ABNORMAL
CREAT SERPL-MCNC: 0.41 MG/DL (ref 0.6–1.2)
EOSINOPHIL # BLD AUTO: 0.1 THOUSAND/ΜL (ref 0–0.4)
EOSINOPHIL NFR BLD AUTO: 2 % (ref 0–6)
ERYTHROCYTE [DISTWIDTH] IN BLOOD BY AUTOMATED COUNT: 13.7 %
EXT PREG TEST URINE: POSITIVE
EXT. CONTROL ED NAV: NORMAL
FLUAV RNA RESP QL NAA+PROBE: NEGATIVE
FLUBV RNA RESP QL NAA+PROBE: NEGATIVE
GFR SERPL CREATININE-BSD FRML MDRD: 144 ML/MIN/1.73SQ M
GLUCOSE SERPL-MCNC: 95 MG/DL (ref 70–99)
GLUCOSE UR STRIP-MCNC: NEGATIVE MG/DL
HCT VFR BLD AUTO: 33.4 % (ref 36–46)
HGB BLD-MCNC: 11.5 G/DL (ref 12–16)
HGB UR QL STRIP.AUTO: NEGATIVE
KETONES UR STRIP-MCNC: NEGATIVE MG/DL
LEUKOCYTE ESTERASE UR QL STRIP: NEGATIVE
LIPASE SERPL-CCNC: 14 U/L (ref 23–300)
LYMPHOCYTES # BLD AUTO: 1.3 THOUSANDS/ΜL (ref 0.5–4)
LYMPHOCYTES NFR BLD AUTO: 16 % (ref 25–45)
MCH RBC QN AUTO: 31.5 PG (ref 26–34)
MCHC RBC AUTO-ENTMCNC: 34.5 G/DL (ref 31–36)
MCV RBC AUTO: 91 FL (ref 80–100)
MONOCYTES # BLD AUTO: 0.4 THOUSAND/ΜL (ref 0.2–0.9)
MONOCYTES NFR BLD AUTO: 5 % (ref 1–10)
NEUTROPHILS # BLD AUTO: 6.2 THOUSANDS/ΜL (ref 1.8–7.8)
NEUTS SEG NFR BLD AUTO: 77 % (ref 45–65)
NITRITE UR QL STRIP: NEGATIVE
PH UR STRIP.AUTO: 8 [PH]
PLATELET # BLD AUTO: 239 THOUSANDS/UL (ref 150–450)
PMV BLD AUTO: 8.2 FL (ref 8.9–12.7)
POTASSIUM SERPL-SCNC: 3.7 MMOL/L (ref 3.6–5)
PROT SERPL-MCNC: 7.6 G/DL (ref 5.9–8.4)
PROT UR STRIP-MCNC: NEGATIVE MG/DL
RBC # BLD AUTO: 3.66 MILLION/UL (ref 4–5.2)
RH BLD: POSITIVE
RSV RNA RESP QL NAA+PROBE: NEGATIVE
SARS-COV-2 RNA RESP QL NAA+PROBE: NEGATIVE
SODIUM SERPL-SCNC: 134 MMOL/L (ref 137–147)
SP GR UR STRIP.AUTO: 1.02 (ref 1–1.04)
SPECIMEN EXPIRATION DATE: NORMAL
UROBILINOGEN UA: 1 MG/DL
WBC # BLD AUTO: 8.1 THOUSAND/UL (ref 4.5–11)

## 2021-10-29 PROCEDURE — 96374 THER/PROPH/DIAG INJ IV PUSH: CPT

## 2021-10-29 PROCEDURE — 99284 EMERGENCY DEPT VISIT MOD MDM: CPT | Performed by: PHYSICIAN ASSISTANT

## 2021-10-29 PROCEDURE — 86901 BLOOD TYPING SEROLOGIC RH(D): CPT | Performed by: EMERGENCY MEDICINE

## 2021-10-29 PROCEDURE — 85025 COMPLETE CBC W/AUTO DIFF WBC: CPT | Performed by: PHYSICIAN ASSISTANT

## 2021-10-29 PROCEDURE — 76801 OB US < 14 WKS SINGLE FETUS: CPT

## 2021-10-29 PROCEDURE — 86850 RBC ANTIBODY SCREEN: CPT | Performed by: EMERGENCY MEDICINE

## 2021-10-29 PROCEDURE — 81003 URINALYSIS AUTO W/O SCOPE: CPT

## 2021-10-29 PROCEDURE — 83690 ASSAY OF LIPASE: CPT | Performed by: PHYSICIAN ASSISTANT

## 2021-10-29 PROCEDURE — 80053 COMPREHEN METABOLIC PANEL: CPT | Performed by: PHYSICIAN ASSISTANT

## 2021-10-29 PROCEDURE — 0241U HB NFCT DS VIR RESP RNA 4 TRGT: CPT | Performed by: PHYSICIAN ASSISTANT

## 2021-10-29 PROCEDURE — 99284 EMERGENCY DEPT VISIT MOD MDM: CPT

## 2021-10-29 PROCEDURE — 84702 CHORIONIC GONADOTROPIN TEST: CPT | Performed by: PHYSICIAN ASSISTANT

## 2021-10-29 PROCEDURE — 81025 URINE PREGNANCY TEST: CPT

## 2021-10-29 PROCEDURE — 86900 BLOOD TYPING SEROLOGIC ABO: CPT | Performed by: EMERGENCY MEDICINE

## 2021-10-29 PROCEDURE — 96361 HYDRATE IV INFUSION ADD-ON: CPT

## 2021-10-29 PROCEDURE — 36415 COLL VENOUS BLD VENIPUNCTURE: CPT | Performed by: PHYSICIAN ASSISTANT

## 2021-10-29 RX ORDER — ACETAMINOPHEN 500 MG
500 TABLET ORAL EVERY 6 HOURS PRN
Qty: 30 TABLET | Refills: 0 | Status: ON HOLD | OUTPATIENT
Start: 2021-10-29 | End: 2022-04-07 | Stop reason: SDUPTHER

## 2021-10-29 RX ORDER — ONDANSETRON 2 MG/ML
4 INJECTION INTRAMUSCULAR; INTRAVENOUS ONCE
Status: COMPLETED | OUTPATIENT
Start: 2021-10-29 | End: 2021-10-29

## 2021-10-29 RX ORDER — ACETAMINOPHEN 325 MG/1
650 TABLET ORAL ONCE
Status: COMPLETED | OUTPATIENT
Start: 2021-10-29 | End: 2021-10-29

## 2021-10-29 RX ADMIN — SODIUM CHLORIDE 1000 ML: 0.9 INJECTION, SOLUTION INTRAVENOUS at 10:07

## 2021-10-29 RX ADMIN — ONDANSETRON 4 MG: 2 INJECTION INTRAMUSCULAR; INTRAVENOUS at 10:10

## 2021-10-29 RX ADMIN — ACETAMINOPHEN 650 MG: 325 TABLET ORAL at 12:17

## 2021-11-01 ENCOUNTER — TELEPHONE (OUTPATIENT)
Dept: OBGYN CLINIC | Facility: CLINIC | Age: 26
End: 2021-11-01

## 2021-11-01 DIAGNOSIS — R11.2 NAUSEA AND VOMITING: ICD-10-CM

## 2021-11-01 DIAGNOSIS — O21.9 NAUSEA AND VOMITING DURING PREGNANCY: Primary | ICD-10-CM

## 2021-11-01 PROBLEM — Z30.9 CONTRACEPTION MANAGEMENT: Status: RESOLVED | Noted: 2020-04-15 | Resolved: 2021-11-01

## 2021-11-01 RX ORDER — ONDANSETRON 4 MG/1
4 TABLET, FILM COATED ORAL EVERY 8 HOURS PRN
Qty: 60 TABLET | Refills: 3 | Status: SHIPPED | OUTPATIENT
Start: 2021-11-01 | End: 2022-05-11

## 2021-11-02 ENCOUNTER — INITIAL PRENATAL (OUTPATIENT)
Dept: OBGYN CLINIC | Facility: CLINIC | Age: 26
End: 2021-11-02

## 2021-11-02 VITALS
HEART RATE: 67 BPM | DIASTOLIC BLOOD PRESSURE: 78 MMHG | WEIGHT: 94 LBS | SYSTOLIC BLOOD PRESSURE: 115 MMHG | HEIGHT: 59 IN | BODY MASS INDEX: 18.95 KG/M2

## 2021-11-02 DIAGNOSIS — Z34.91 PRENATAL CARE IN FIRST TRIMESTER: Primary | ICD-10-CM

## 2021-11-02 PROCEDURE — 99211 OFF/OP EST MAY X REQ PHY/QHP: CPT

## 2021-11-02 PROCEDURE — T1001 NURSING ASSESSMENT/EVALUATN: HCPCS

## 2021-11-04 ENCOUNTER — PATIENT OUTREACH (OUTPATIENT)
Dept: OBGYN CLINIC | Facility: CLINIC | Age: 26
End: 2021-11-04

## 2021-11-09 ENCOUNTER — TELEPHONE (OUTPATIENT)
Dept: OBGYN CLINIC | Facility: CLINIC | Age: 26
End: 2021-11-09

## 2021-11-22 ENCOUNTER — TELEPHONE (OUTPATIENT)
Dept: PERINATAL CARE | Facility: OTHER | Age: 26
End: 2021-11-22

## 2021-11-24 ENCOUNTER — APPOINTMENT (EMERGENCY)
Dept: ULTRASOUND IMAGING | Facility: HOSPITAL | Age: 26
End: 2021-11-24
Payer: COMMERCIAL

## 2021-11-24 ENCOUNTER — HOSPITAL ENCOUNTER (EMERGENCY)
Facility: HOSPITAL | Age: 26
Discharge: HOME | End: 2021-11-24
Attending: EMERGENCY MEDICINE
Payer: COMMERCIAL

## 2021-11-24 VITALS
RESPIRATION RATE: 16 BRPM | SYSTOLIC BLOOD PRESSURE: 95 MMHG | HEART RATE: 83 BPM | WEIGHT: 96.4 LBS | TEMPERATURE: 97.5 F | BODY MASS INDEX: 19.47 KG/M2 | DIASTOLIC BLOOD PRESSURE: 56 MMHG | OXYGEN SATURATION: 96 %

## 2021-11-24 DIAGNOSIS — R10.32 LEFT LOWER QUADRANT ABDOMINAL PAIN: ICD-10-CM

## 2021-11-24 DIAGNOSIS — Z3A.12 12 WEEKS GESTATION OF PREGNANCY: Primary | ICD-10-CM

## 2021-11-24 LAB
ALBUMIN SERPL BCP-MCNC: 3.7 G/DL (ref 3–5.2)
ALP SERPL-CCNC: 113 U/L (ref 43–122)
ALT SERPL W P-5'-P-CCNC: 12 U/L
ANION GAP SERPL CALCULATED.3IONS-SCNC: 2 MMOL/L (ref 5–14)
AST SERPL W P-5'-P-CCNC: 20 U/L (ref 14–36)
B-HCG SERPL-ACNC: ABNORMAL MIU/ML
BASOPHILS # BLD AUTO: 0 THOUSANDS/ΜL (ref 0–0.1)
BASOPHILS NFR BLD AUTO: 0 % (ref 0–1)
BILIRUB SERPL-MCNC: 0.16 MG/DL
BILIRUB UR QL STRIP: NEGATIVE
BUN SERPL-MCNC: 2 MG/DL (ref 5–25)
CALCIUM SERPL-MCNC: 8.9 MG/DL (ref 8.4–10.2)
CHLORIDE SERPL-SCNC: 105 MMOL/L (ref 97–108)
CLARITY UR: CLEAR
CO2 SERPL-SCNC: 28 MMOL/L (ref 22–30)
COLOR UR: NORMAL
CREAT SERPL-MCNC: 0.41 MG/DL (ref 0.6–1.2)
EOSINOPHIL # BLD AUTO: 0 THOUSAND/ΜL (ref 0–0.4)
EOSINOPHIL NFR BLD AUTO: 0 % (ref 0–6)
ERYTHROCYTE [DISTWIDTH] IN BLOOD BY AUTOMATED COUNT: 14.5 %
GFR SERPL CREATININE-BSD FRML MDRD: 144 ML/MIN/1.73SQ M
GLUCOSE SERPL-MCNC: 81 MG/DL (ref 70–99)
GLUCOSE UR STRIP-MCNC: NEGATIVE MG/DL
HCT VFR BLD AUTO: 32.8 % (ref 36–46)
HGB BLD-MCNC: 11.5 G/DL (ref 12–16)
HGB UR QL STRIP.AUTO: NEGATIVE
KETONES UR STRIP-MCNC: NEGATIVE MG/DL
LEUKOCYTE ESTERASE UR QL STRIP: NEGATIVE
LIPASE SERPL-CCNC: 17 U/L (ref 23–300)
LYMPHOCYTES # BLD AUTO: 1.2 THOUSANDS/ΜL (ref 0.5–4)
LYMPHOCYTES NFR BLD AUTO: 22 % (ref 25–45)
MCH RBC QN AUTO: 32.3 PG (ref 26–34)
MCHC RBC AUTO-ENTMCNC: 35.1 G/DL (ref 31–36)
MCV RBC AUTO: 92 FL (ref 80–100)
MONOCYTES # BLD AUTO: 0.2 THOUSAND/ΜL (ref 0.2–0.9)
MONOCYTES NFR BLD AUTO: 5 % (ref 1–10)
NEUTROPHILS # BLD AUTO: 3.9 THOUSANDS/ΜL (ref 1.8–7.8)
NEUTS SEG NFR BLD AUTO: 73 % (ref 45–65)
NITRITE UR QL STRIP: NEGATIVE
PH UR STRIP.AUTO: 8 [PH]
PLATELET # BLD AUTO: 180 THOUSANDS/UL (ref 150–450)
PMV BLD AUTO: 7.8 FL (ref 8.9–12.7)
POTASSIUM SERPL-SCNC: 3.9 MMOL/L (ref 3.6–5)
PROT SERPL-MCNC: 7.2 G/DL (ref 5.9–8.4)
PROT UR STRIP-MCNC: NEGATIVE MG/DL
RBC # BLD AUTO: 3.56 MILLION/UL (ref 4–5.2)
SODIUM SERPL-SCNC: 135 MMOL/L (ref 137–147)
SP GR UR STRIP.AUTO: 1.01 (ref 1–1.04)
UROBILINOGEN UA: NEGATIVE MG/DL
WBC # BLD AUTO: 5.3 THOUSAND/UL (ref 4.5–11)

## 2021-11-24 PROCEDURE — 96361 HYDRATE IV INFUSION ADD-ON: CPT

## 2021-11-24 PROCEDURE — 99284 EMERGENCY DEPT VISIT MOD MDM: CPT

## 2021-11-24 PROCEDURE — 83690 ASSAY OF LIPASE: CPT | Performed by: EMERGENCY MEDICINE

## 2021-11-24 PROCEDURE — 80053 COMPREHEN METABOLIC PANEL: CPT | Performed by: EMERGENCY MEDICINE

## 2021-11-24 PROCEDURE — 85025 COMPLETE CBC W/AUTO DIFF WBC: CPT | Performed by: EMERGENCY MEDICINE

## 2021-11-24 PROCEDURE — 99284 EMERGENCY DEPT VISIT MOD MDM: CPT | Performed by: EMERGENCY MEDICINE

## 2021-11-24 PROCEDURE — 36415 COLL VENOUS BLD VENIPUNCTURE: CPT | Performed by: EMERGENCY MEDICINE

## 2021-11-24 PROCEDURE — 76801 OB US < 14 WKS SINGLE FETUS: CPT

## 2021-11-24 PROCEDURE — 84702 CHORIONIC GONADOTROPIN TEST: CPT | Performed by: EMERGENCY MEDICINE

## 2021-11-24 PROCEDURE — 96374 THER/PROPH/DIAG INJ IV PUSH: CPT

## 2021-11-24 RX ORDER — ACETAMINOPHEN 325 MG/1
975 TABLET ORAL ONCE
Status: COMPLETED | OUTPATIENT
Start: 2021-11-24 | End: 2021-11-24

## 2021-11-24 RX ORDER — ONDANSETRON 2 MG/ML
4 INJECTION INTRAMUSCULAR; INTRAVENOUS ONCE
Status: COMPLETED | OUTPATIENT
Start: 2021-11-24 | End: 2021-11-24

## 2021-11-24 RX ADMIN — ACETAMINOPHEN 975 MG: 325 TABLET ORAL at 15:34

## 2021-11-24 RX ADMIN — ONDANSETRON 4 MG: 2 INJECTION INTRAMUSCULAR; INTRAVENOUS at 15:32

## 2021-11-24 RX ADMIN — SODIUM CHLORIDE 1000 ML: 0.9 INJECTION, SOLUTION INTRAVENOUS at 15:22

## 2021-12-01 ENCOUNTER — APPOINTMENT (OUTPATIENT)
Dept: LAB | Facility: HOSPITAL | Age: 26
End: 2021-12-01
Attending: OBSTETRICS & GYNECOLOGY
Payer: COMMERCIAL

## 2021-12-01 ENCOUNTER — ROUTINE PRENATAL (OUTPATIENT)
Dept: PERINATAL CARE | Facility: CLINIC | Age: 26
End: 2021-12-01
Payer: COMMERCIAL

## 2021-12-01 ENCOUNTER — PATIENT OUTREACH (OUTPATIENT)
Dept: OBGYN CLINIC | Facility: CLINIC | Age: 26
End: 2021-12-01

## 2021-12-01 VITALS
SYSTOLIC BLOOD PRESSURE: 118 MMHG | HEIGHT: 59 IN | WEIGHT: 97.6 LBS | HEART RATE: 82 BPM | DIASTOLIC BLOOD PRESSURE: 70 MMHG | BODY MASS INDEX: 19.68 KG/M2

## 2021-12-01 DIAGNOSIS — Z3A.13 13 WEEKS GESTATION OF PREGNANCY: ICD-10-CM

## 2021-12-01 DIAGNOSIS — Z34.91 PRENATAL CARE IN FIRST TRIMESTER: ICD-10-CM

## 2021-12-01 DIAGNOSIS — Z36.82 ENCOUNTER FOR NUCHAL TRANSLUCENCY TESTING: ICD-10-CM

## 2021-12-01 DIAGNOSIS — Z36.9 UNSPECIFIED ANTENATAL SCREENING: ICD-10-CM

## 2021-12-01 DIAGNOSIS — Z33.1 PREGNANT STATE, INCIDENTAL: ICD-10-CM

## 2021-12-01 LAB
ABO GROUP BLD: NORMAL
BASOPHILS # BLD AUTO: 0.02 THOUSANDS/ΜL (ref 0–0.1)
BASOPHILS NFR BLD AUTO: 0 % (ref 0–1)
BLD GP AB SCN SERPL QL: NEGATIVE
EOSINOPHIL # BLD AUTO: 0.03 THOUSAND/ΜL (ref 0–0.61)
EOSINOPHIL NFR BLD AUTO: 0 % (ref 0–6)
ERYTHROCYTE [DISTWIDTH] IN BLOOD BY AUTOMATED COUNT: 13.9 % (ref 11.6–15.1)
HBV SURFACE AG SER QL: NORMAL
HCT VFR BLD AUTO: 33.1 % (ref 34.8–46.1)
HCV AB SER QL: NORMAL
HGB BLD-MCNC: 11.4 G/DL (ref 11.5–15.4)
IMM GRANULOCYTES # BLD AUTO: 0.03 THOUSAND/UL (ref 0–0.2)
IMM GRANULOCYTES NFR BLD AUTO: 0 % (ref 0–2)
LYMPHOCYTES # BLD AUTO: 1.05 THOUSANDS/ΜL (ref 0.6–4.47)
LYMPHOCYTES NFR BLD AUTO: 14 % (ref 14–44)
MCH RBC QN AUTO: 31.9 PG (ref 26.8–34.3)
MCHC RBC AUTO-ENTMCNC: 34.4 G/DL (ref 31.4–37.4)
MCV RBC AUTO: 93 FL (ref 82–98)
MONOCYTES # BLD AUTO: 0.32 THOUSAND/ΜL (ref 0.17–1.22)
MONOCYTES NFR BLD AUTO: 4 % (ref 4–12)
NEUTROPHILS # BLD AUTO: 6.08 THOUSANDS/ΜL (ref 1.85–7.62)
NEUTS SEG NFR BLD AUTO: 82 % (ref 43–75)
NRBC BLD AUTO-RTO: 0 /100 WBCS
PLATELET # BLD AUTO: 248 THOUSANDS/UL (ref 149–390)
PMV BLD AUTO: 9.9 FL (ref 8.9–12.7)
RBC # BLD AUTO: 3.57 MILLION/UL (ref 3.81–5.12)
RH BLD: POSITIVE
RUBV IGG SERPL IA-ACNC: 14.5 IU/ML
SPECIMEN EXPIRATION DATE: NORMAL
WBC # BLD AUTO: 7.53 THOUSAND/UL (ref 4.31–10.16)

## 2021-12-01 PROCEDURE — 83020 HEMOGLOBIN ELECTROPHORESIS: CPT

## 2021-12-01 PROCEDURE — 80081 OBSTETRIC PANEL INC HIV TSTG: CPT

## 2021-12-01 PROCEDURE — 99241 PR OFFICE CONSULTATION NEW/ESTAB PATIENT 15 MIN: CPT | Performed by: OBSTETRICS & GYNECOLOGY

## 2021-12-01 PROCEDURE — 76813 OB US NUCHAL MEAS 1 GEST: CPT | Performed by: OBSTETRICS & GYNECOLOGY

## 2021-12-01 PROCEDURE — 36415 COLL VENOUS BLD VENIPUNCTURE: CPT

## 2021-12-01 PROCEDURE — 86803 HEPATITIS C AB TEST: CPT

## 2021-12-02 LAB
HIV 1+2 AB+HIV1 P24 AG SERPL QL IA: NORMAL
RPR SER QL: NORMAL

## 2021-12-05 LAB — MISCELLANEOUS LAB TEST RESULT: NORMAL

## 2021-12-06 LAB
HGB A MFR BLD: 1.6 % (ref 1.8–3.2)
HGB A MFR BLD: 98.4 % (ref 96.4–98.8)
HGB F MFR BLD: 0 % (ref 0–2)
HGB FRACT BLD-IMP: ABNORMAL
HGB S MFR BLD: 0 %

## 2021-12-14 ENCOUNTER — TELEPHONE (OUTPATIENT)
Dept: PULMONOLOGY | Facility: CLINIC | Age: 26
End: 2021-12-14

## 2021-12-30 ENCOUNTER — PATIENT OUTREACH (OUTPATIENT)
Dept: OBGYN CLINIC | Facility: CLINIC | Age: 26
End: 2021-12-30

## 2022-01-10 ENCOUNTER — PATIENT OUTREACH (OUTPATIENT)
Dept: OBGYN CLINIC | Facility: CLINIC | Age: 27
End: 2022-01-10

## 2022-01-10 NOTE — PROGRESS NOTES
SWCM called the patient via phone  SWCM had spoke with the patient about how she was doing  Patient stated she was doing okay  SWCM asked the patient why she has been missing her PN care  Patient stated she was going through a family matter but would set up an appt as soon as she can  SWCM asked the patient how she is doing with her MH  Patient stated she is doing alright  Patient stated she had to go as she was busy  SWCM encouraged patient to reach out for further needs  SWCM will continue to be available

## 2022-01-19 ENCOUNTER — ROUTINE PRENATAL (OUTPATIENT)
Dept: PERINATAL CARE | Facility: OTHER | Age: 27
End: 2022-01-19
Payer: MEDICARE

## 2022-01-19 VITALS
HEART RATE: 110 BPM | DIASTOLIC BLOOD PRESSURE: 71 MMHG | BODY MASS INDEX: 21.37 KG/M2 | WEIGHT: 106 LBS | HEIGHT: 59 IN | SYSTOLIC BLOOD PRESSURE: 117 MMHG

## 2022-01-19 DIAGNOSIS — Z36.86 ENCOUNTER FOR ANTENATAL SCREENING FOR CERVICAL LENGTH: ICD-10-CM

## 2022-01-19 DIAGNOSIS — Z3A.20 20 WEEKS GESTATION OF PREGNANCY: ICD-10-CM

## 2022-01-19 DIAGNOSIS — Z36.3 ENCOUNTER FOR ANTENATAL SCREENING FOR MALFORMATIONS: ICD-10-CM

## 2022-01-19 DIAGNOSIS — O09.292 PREVIOUS PREGNANCY COMPLICATED BY INTRAUTERINE GROWTH RESTRICTION, ANTEPARTUM, SECOND TRIMESTER: ICD-10-CM

## 2022-01-19 DIAGNOSIS — Z86.32 HISTORY OF GESTATIONAL DIABETES IN PRIOR PREGNANCY, CURRENTLY PREGNANT IN SECOND TRIMESTER: Primary | ICD-10-CM

## 2022-01-19 DIAGNOSIS — O09.292 HISTORY OF GESTATIONAL DIABETES IN PRIOR PREGNANCY, CURRENTLY PREGNANT IN SECOND TRIMESTER: Primary | ICD-10-CM

## 2022-01-19 PROCEDURE — 76817 TRANSVAGINAL US OBSTETRIC: CPT | Performed by: OBSTETRICS & GYNECOLOGY

## 2022-01-19 PROCEDURE — 99213 OFFICE O/P EST LOW 20 MIN: CPT | Performed by: OBSTETRICS & GYNECOLOGY

## 2022-01-19 PROCEDURE — 76805 OB US >/= 14 WKS SNGL FETUS: CPT | Performed by: OBSTETRICS & GYNECOLOGY

## 2022-01-19 NOTE — PROGRESS NOTES
Ultrasound Probe Disinfection    A transvaginal ultrasound was performed  Prior to use, disinfection was performed with Trophon  Probe serial number  was used        Daniel Graham  01/19/22  1:09 PM

## 2022-01-19 NOTE — LETTER
January 19, 2022     [facility]  [address]  [place] [address]    Patient: Joesph Ariza   YOB: 1995   Date of Visit: 1/19/2022       Dear Dr Eddie Capone:    Thank you for referring Fiona Naranjo to me for evaluation  Below are my notes for this consultation  If you have questions, please do not hesitate to call me  I look forward to following your patient along with you  Sincerely,        Racheal Starkey MD        CC: No Recipients  Racheal Starkey MD  1/19/2022  8:00 AM  Sign when Signing Visit  Please refer to the [facility] ultrasound report in Ob Procedures for additional information regarding today's visit

## 2022-01-21 ENCOUNTER — PATIENT OUTREACH (OUTPATIENT)
Dept: OBGYN CLINIC | Facility: CLINIC | Age: 27
End: 2022-01-21

## 2022-01-21 NOTE — PROGRESS NOTES
KOKI had called the patient via phone  Patient stated she is doing good  Patient stated she is doing good mentally  Patient continues to denied [address] services at this time  KOKI explained in the VM that [address] [address] will be following up with her moving forward throughout her pregnancy  Patient verbalized understanding and agreeing to this  LUDA transferred this case to [facility] will be available as needed

## 2022-01-27 ENCOUNTER — HOSPITAL ENCOUNTER (EMERGENCY)
Facility: HOSPITAL | Age: 27
Discharge: HOME | End: 2022-01-27
Attending: EMERGENCY MEDICINE | Admitting: EMERGENCY MEDICINE
Payer: MEDICARE

## 2022-01-27 VITALS
SYSTOLIC BLOOD PRESSURE: 111 MMHG | DIASTOLIC BLOOD PRESSURE: 69 MMHG | TEMPERATURE: 97.4 F | HEART RATE: 97 BPM | RESPIRATION RATE: 18 BRPM | WEIGHT: 106.7 LBS | BODY MASS INDEX: 21.55 KG/M2 | OXYGEN SATURATION: 99 %

## 2022-01-27 DIAGNOSIS — O26.899 ABDOMINAL PAIN AFFECTING PREGNANCY: Primary | ICD-10-CM

## 2022-01-27 DIAGNOSIS — E83.51 HYPOCALCEMIA: ICD-10-CM

## 2022-01-27 DIAGNOSIS — R10.9 ABDOMINAL PAIN AFFECTING PREGNANCY: Primary | ICD-10-CM

## 2022-01-27 DIAGNOSIS — E87.1 HYPONATREMIA: ICD-10-CM

## 2022-01-27 DIAGNOSIS — R11.2 NAUSEA AND VOMITING: ICD-10-CM

## 2022-01-27 LAB
ALBUMIN SERPL BCP-MCNC: 3.7 G/DL (ref 3–5.2)
ALP SERPL-CCNC: 104 U/L (ref 43–122)
ALT SERPL W P-5'-P-CCNC: 11 U/L
ANION GAP SERPL CALCULATED.3IONS-SCNC: 4 MMOL/L (ref 5–14)
AST SERPL W P-5'-P-CCNC: 18 U/L (ref 14–36)
BACTERIA UR QL AUTO: ABNORMAL /HPF
BILIRUB SERPL-MCNC: 0.36 MG/DL
BILIRUB UR QL STRIP: NEGATIVE
BUN SERPL-MCNC: 6 MG/DL (ref 5–25)
CALCIUM SERPL-MCNC: 8.3 MG/DL (ref 8.4–10.2)
CHLORIDE SERPL-SCNC: 106 MMOL/L (ref 97–108)
CLARITY UR: CLEAR
CO2 SERPL-SCNC: 24 MMOL/L (ref 22–30)
COLOR UR: YELLOW
CREAT SERPL-MCNC: 0.3 MG/DL (ref 0.6–1.2)
ERYTHROCYTE [DISTWIDTH] IN BLOOD BY AUTOMATED COUNT: 13.9 %
GFR SERPL CREATININE-BSD FRML MDRD: 159 ML/MIN/1.73SQ M
GLUCOSE SERPL-MCNC: 89 MG/DL (ref 70–99)
GLUCOSE UR STRIP-MCNC: NEGATIVE MG/DL
HCT VFR BLD AUTO: 33.1 % (ref 36–46)
HGB BLD-MCNC: 11.4 G/DL (ref 12–16)
HGB UR QL STRIP.AUTO: NEGATIVE
KETONES UR STRIP-MCNC: NEGATIVE MG/DL
LEUKOCYTE ESTERASE UR QL STRIP: 25
LIPASE SERPL-CCNC: 16 U/L (ref 23–300)
LYMPHOCYTES # BLD AUTO: 0.25 THOUSAND/UL (ref 0.5–4)
LYMPHOCYTES # BLD AUTO: 2 % (ref 25–45)
MCH RBC QN AUTO: 33.3 PG (ref 26–34)
MCHC RBC AUTO-ENTMCNC: 34.4 G/DL (ref 31–36)
MCV RBC AUTO: 97 FL (ref 80–100)
MONOCYTES # BLD AUTO: 0.25 THOUSAND/UL (ref 0.2–0.9)
MONOCYTES NFR BLD AUTO: 2 % (ref 1–10)
MUCOUS THREADS UR QL AUTO: ABNORMAL
NEUTS BAND NFR BLD MANUAL: 13 % (ref 0–8)
NEUTS SEG # BLD: 11.81 THOUSAND/UL (ref 1.8–7.8)
NEUTS SEG NFR BLD AUTO: 83 %
NITRITE UR QL STRIP: NEGATIVE
NON-SQ EPI CELLS URNS QL MICRO: ABNORMAL /HPF
PH UR STRIP.AUTO: 6 [PH]
PLATELET # BLD AUTO: 209 THOUSANDS/UL (ref 150–450)
PLATELET BLD QL SMEAR: ADEQUATE
PMV BLD AUTO: 8.1 FL (ref 8.9–12.7)
POTASSIUM SERPL-SCNC: 3.6 MMOL/L (ref 3.6–5)
PROT SERPL-MCNC: 7.1 G/DL (ref 5.9–8.4)
PROT UR STRIP-MCNC: NEGATIVE MG/DL
RBC # BLD AUTO: 3.42 MILLION/UL (ref 4–5.2)
RBC #/AREA URNS AUTO: ABNORMAL /HPF
RBC MORPH BLD: NORMAL
SODIUM SERPL-SCNC: 134 MMOL/L (ref 137–147)
SP GR UR STRIP.AUTO: 1.02 (ref 1–1.04)
TOTAL CELLS COUNTED SPEC: 100
UROBILINOGEN UA: NEGATIVE MG/DL
WBC # BLD AUTO: 12.3 THOUSAND/UL (ref 4.5–11)
WBC #/AREA URNS AUTO: ABNORMAL /HPF

## 2022-01-27 PROCEDURE — 99284 EMERGENCY DEPT VISIT MOD MDM: CPT

## 2022-01-27 PROCEDURE — 96361 HYDRATE IV INFUSION ADD-ON: CPT

## 2022-01-27 PROCEDURE — 85007 BL SMEAR W/DIFF WBC COUNT: CPT

## 2022-01-27 PROCEDURE — 81001 URINALYSIS AUTO W/SCOPE: CPT

## 2022-01-27 PROCEDURE — 85027 COMPLETE CBC AUTOMATED: CPT

## 2022-01-27 PROCEDURE — 81003 URINALYSIS AUTO W/O SCOPE: CPT

## 2022-01-27 PROCEDURE — 96374 THER/PROPH/DIAG INJ IV PUSH: CPT

## 2022-01-27 PROCEDURE — 80053 COMPREHEN METABOLIC PANEL: CPT

## 2022-01-27 PROCEDURE — 83690 ASSAY OF LIPASE: CPT

## 2022-01-27 PROCEDURE — 36415 COLL VENOUS BLD VENIPUNCTURE: CPT

## 2022-01-27 PROCEDURE — 87086 URINE CULTURE/COLONY COUNT: CPT

## 2022-01-27 RX ORDER — ACETAMINOPHEN 500 MG
500 TABLET ORAL EVERY 6 HOURS PRN
Qty: 30 TABLET | Refills: 0 | Status: ON HOLD | OUTPATIENT
Start: 2022-01-27 | End: 2022-07-08 | Stop reason: SDUPTHER

## 2022-01-27 RX ORDER — ONDANSETRON 2 MG/ML
4 INJECTION INTRAMUSCULAR; INTRAVENOUS ONCE
Status: COMPLETED | OUTPATIENT
Start: 2022-01-27 | End: 2022-01-27

## 2022-01-27 RX ADMIN — ONDANSETRON 4 MG: 2 INJECTION INTRAMUSCULAR; INTRAVENOUS at 16:50

## 2022-01-27 RX ADMIN — SODIUM CHLORIDE 1000 ML: 0.9 INJECTION, SOLUTION INTRAVENOUS at 16:50

## 2022-01-27 NOTE — DISCHARGE INSTRUCTIONS
Follow up with OBGYN regarding your symptoms, they should call you to make an appointment, if not call them tomorrow morning  Follow up with Primary Care Provider regarding your electrolytes  Take tylenol as needed for pain  Take ondansetron as needed, as prescribed, for nausea and vomiting  Return to ED for new or worsening symptoms as discussed

## 2022-01-27 NOTE — ED PROVIDER NOTES
History  Chief Complaint   Patient presents with    Abdominal Pain     states 21 weeks pregnant and started with sharp abdomen pains and then vomiting today  Denies bleeding or water discahrge- Called OBGYN, no reply  23 y o  F  currently 21 weeks pregnant with PMH of GERD, gestational diabetes, STD presents to ED c/o  abdominal pain x 1 day  She also reports N/V since this morning  Threw up the tylenol and the zofran she tried to take this morning  Unable to tolerate liquids or solids  Still urinating normally  Hx of cholecystectomy, appendectomy, small bowel resection  History provided by:  Patient   used: No    Abdominal Pain  Pain location:  Periumbilical, LLQ, RLQ and suprapubic  Pain quality: sharp    Pain radiates to:  Does not radiate  Pain severity:  Moderate  Onset quality:  Sudden  Duration:  1 day  Timing:  Intermittent  Progression:  Unchanged  Chronicity:  New  Context: previous surgery (cholecystectomy, appendectomy, small bowel resection )    Context: not awakening from sleep, not eating, not recent illness, not sick contacts and not trauma    Relieved by:  Nothing  Worsened by:  Nothing  Ineffective treatments:  Vomiting  Associated symptoms: nausea and vomiting    Associated symptoms: no anorexia, no belching, no chest pain, no chills, no constipation, no cough, no diarrhea, no dysuria, no fatigue, no fever, no flatus, no hematemesis, no hematochezia, no hematuria, no melena, no shortness of breath, no sore throat, no vaginal bleeding and no vaginal discharge    Associated symptoms comment:  Denies flank pain, urinary frequency, urinary urgency, rash     Vomiting:     Quality:  Bilious material and stomach contents    Number of occurrences:  Unable to specify, multiple     Severity:  Moderate    Timing:  Constant    Progression:  Unchanged  Risk factors: multiple surgeries and pregnancy    Risk factors: not elderly, no NSAID use, not obese and no recent hospitalization        Prior to Admission Medications   Prescriptions Last Dose Informant Patient Reported? Taking? Prenatal Multivit-Min-Fe-FA (Pre- Formula) TABS  Self No No   Sig: Take 1 tablet by mouth daily   acetaminophen (TYLENOL) 500 mg tablet Not Taking at Unknown time Self No No   Sig: Take 1 tablet (500 mg total) by mouth every 6 (six) hours as needed for mild pain   Patient not taking: Reported on 2022    ondansetron (ZOFRAN) 4 mg tablet Past Week at Unknown time Self No Yes   Sig: Take 1 tablet (4 mg total) by mouth every 8 (eight) hours as needed for nausea or vomiting      Facility-Administered Medications: None       Past Medical History:   Diagnosis Date    Chlamydia     GERD (gastroesophageal reflux disease)     has not req'd tx since 2019    Gestational diabetes        Past Surgical History:   Procedure Laterality Date    APPENDECTOMY      CHOLECYSTECTOMY      SD COLONOSCOPY FLX DX W/COLLJ SPEC WHEN PFRMD N/A 2017    Procedure: EGD AND COLONOSCOPY;  Surgeon: Lieutenant Tiara DO;  Location: [facility]; Service: Gastroenterology    SD LAP,APPENDECTOMY N/A 2016    Procedure: APPENDECTOMY LAPAROSCOPIC;  Surgeon: Irineo Chino DO;  [facility];  Service: General    SD LAP,DIAGNOSTIC ABDOMEN N/A 3/13/2016    Procedure: LAPAROSCOPY DIAGNOSTIC;  Surgeon: Jasper Maldonado MD;  [facility];  Service: General    RESECTION SMALL BOWEL LAPAROSCOPIC         Family History   Problem Relation Age of Onset    Diabetes Maternal Grandmother     Hypothyroidism Maternal Grandmother     Asthma Mother     No Known Problems Father     No Known Problems Sister     No Known Problems Brother     No Known Problems Daughter     No Known Problems Son     Breast cancer Neg Hx     Cancer Neg Hx      I have reviewed and agree with the history as documented      E-Cigarette/Vaping    E-Cigarette Use Never User      E-Cigarette/Vaping Substances     Social History     Tobacco Use    Smoking status: Current Every Day Smoker     Packs/day: 0 00     Types: Cigarettes    Smokeless tobacco: Never Used    Tobacco comment: 1-2 cigs/day   Vaping Use    Vaping Use: Never used   Substance Use Topics    Alcohol use: Not Currently     Comment: socially    Drug use: Not Currently     Types: Marijuana     Comment: LAST USED 10/2021       Review of Systems   Constitutional: Negative for chills, fatigue and fever  HENT: Negative for sore throat  Respiratory: Negative for cough and shortness of breath  Cardiovascular: Negative for chest pain  Gastrointestinal: Positive for abdominal pain, nausea and vomiting  Negative for anorexia, blood in stool, constipation, diarrhea, flatus, hematemesis, hematochezia and melena  Genitourinary: Negative for decreased urine volume, dysuria, flank pain, frequency, genital sores, hematuria, urgency, vaginal bleeding, vaginal discharge and vaginal pain  Physical Exam  Physical Exam  Vitals and nursing note reviewed  Constitutional:       General: She is awake  She is not in acute distress  Appearance: Normal appearance  She is not toxic-appearing or diaphoretic  HENT:      Head: Normocephalic and atraumatic  Jaw: No swelling  Right Ear: External ear normal       Left Ear: External ear normal       Mouth/Throat:      Lips: Pink  Mouth: Mucous membranes are moist    Eyes:      General: Lids are normal  Vision grossly intact  Right eye: No discharge  Left eye: No discharge  Conjunctiva/sclera: Conjunctivae normal    Cardiovascular:      Rate and Rhythm: Normal rate and regular rhythm  Heart sounds: Normal heart sounds  Pulmonary:      Effort: Pulmonary effort is normal  No respiratory distress  Breath sounds: Normal breath sounds  Abdominal:      General: A surgical scar is present  Tenderness:  There is abdominal tenderness in the right lower quadrant, periumbilical area, suprapubic area and left lower quadrant  There is no right CVA tenderness, left CVA tenderness, guarding or rebound  Comments: Pregnant abdomen, fundus about level of umbilicus    Musculoskeletal:      Cervical back: Neck supple  Skin:     General: Skin is warm and dry  Coloration: Skin is not jaundiced or pale  Neurological:      Mental Status: She is alert  Gait: Gait normal    Psychiatric:         Mood and Affect: Mood normal          Behavior: Behavior normal          Thought Content:  Thought content normal          Vital Signs  ED Triage Vitals [01/27/22 1600]   Temperature Pulse Respirations Blood Pressure SpO2   (!) 97 4 °F (36 3 °C) 85 20 105/64 99 %      Temp Source Heart Rate Source Patient Position - Orthostatic VS BP Location FiO2 (%)   Oral Monitor Sitting Left arm --      Pain Score       8           Vitals:    01/27/22 1600 01/27/22 1801   BP: 105/64 111/69   Pulse: 85 97   Patient Position - Orthostatic VS: Sitting Sitting         Visual Acuity      ED Medications  Medications   ondansetron (ZOFRAN) injection 4 mg (4 mg Intravenous Given 1/27/22 1650)   sodium chloride 0 9 % bolus 1,000 mL (0 mL Intravenous Stopped 1/27/22 1800)       Diagnostic Studies  Results Reviewed     Procedure Component Value Units Date/Time    Manual Differential (Non Wam) [[id number]]  (Abnormal) Collected: 01/27/22 1648    Lab Status: Final result Specimen: Blood from Arm, Right Updated: 01/27/22 1810     Segmented % 83 %      Bands % 13 %      Lymphocytes % 2 %      Monocytes % 2 %      Neutrophils Absolute 11 81 Thousand/uL      Lymphocytes Absolute 0 25 Thousand/uL      Monocytes Absolute 0 25 Thousand/uL      Total Counted 100     RBC Morphology Normal     Platelet Estimate Adequate    Urine Microscopic [[id number]]  (Abnormal) Collected: 01/27/22 1611    Lab Status: Final result Specimen: Urine, Clean Catch Updated: 01/27/22 1718     RBC, UA 2-4 /hpf      WBC, UA 4-10 /hpf      Epithelial Cells Innumerable /hpf      Bacteria, UA Occasional /hpf      MUCUS THREADS Innumerable     URINE COMMENT --    Comprehensive metabolic panel [[id number]]  (Abnormal) Collected: 01/27/22 1648    Lab Status: Final result Specimen: Blood from Arm, Right Updated: 01/27/22 1717     Sodium 134 mmol/L      Potassium 3 6 mmol/L      Chloride 106 mmol/L      CO2 24 mmol/L      ANION GAP 4 mmol/L      BUN 6 mg/dL      Creatinine 0 30 mg/dL      Glucose 89 mg/dL      Calcium 8 3 mg/dL      AST 18 U/L      ALT 11 U/L      Alkaline Phosphatase 104 U/L      Total Protein 7 1 g/dL      Albumin 3 7 g/dL      Total Bilirubin 0 36 mg/dL      eGFR 159 ml/min/1 73sq m     Narrative:      [place] guidelines for Chronic Kidney Disease (CKD):     Stage 1 with normal or high GFR (GFR > 90 mL/min/1 73 square meters)    Stage 2 Mild CKD (GFR = 60-89 mL/min/1 73 square meters)    Stage 3A Moderate CKD (GFR = 45-59 mL/min/1 73 square meters)    Stage 3B Moderate CKD (GFR = 30-44 mL/min/1 73 square meters)    Stage 4 Severe CKD (GFR = 15-29 mL/min/1 73 square meters)    Stage 5 End Stage CKD (GFR <15 mL/min/1 73 square meters)  Note: GFR calculation is accurate only with a steady state creatinine    Lipase [[id number]]  (Abnormal) Collected: 01/27/22 1648    Lab Status: Final result Specimen: Blood from Arm, Right Updated: 01/27/22 1717     Lipase 16 u/L     CBC and differential [[id number]]  (Abnormal) Collected: 01/27/22 1648    Lab Status: Final result Specimen: Blood from Arm, Right Updated: 01/27/22 1703     WBC 12 30 Thousand/uL      RBC 3 42 Million/uL      Hemoglobin 11 4 g/dL      Hematocrit 33 1 %      MCV 97 fL      MCH 33 3 pg      MCHC 34 4 g/dL      RDW 13 9 %      MPV 8 1 fL      Platelets 222 Thousands/uL     UA w Reflex to Microscopic w Reflex to Culture [[id number]]  (Abnormal) Collected: 01/27/22 1611    Lab Status: Final result Specimen: Urine, Clean Catch Updated: 01/27/22 1637     Color, UA Yellow Clarity, UA Clear     Specific Branch, UA 1 020     pH, UA 6 0     Leukocytes, UA 25 0     Nitrite, UA Negative     Protein, UA Negative mg/dl      Glucose, UA Negative mg/dl      Ketones, UA Negative mg/dl      Bilirubin, UA Negative     Blood, UA Negative     URINE COMMENT --     UROBILINOGEN UA Negative mg/dL     Urine culture [[id number]] Collected: 01/27/22 1611    Lab Status: In process Specimen: Urine, Clean Catch Updated: 01/27/22 1636                 No orders to display              Procedures  Procedures         ED Course  ED Course as of 01/27/22 2046   Thu Jan 27, 2022   1659 Patient discussed with attending, will wait on UA and bloodwork, then TT OBGYN     1710 TT OBGYN   [id number] Discussed pt with Dr Almita Fleming of OBGYN who reviewed associated findings and images  Plan is to get urine culture, not to treat with antibiotics in the meantime as she has no urinary symptoms  Plan is also to give Edilma Whalen as needed for N/V and have patient follow up with OBGYN outpatient  1748 PO challenge started    1827 PO challenge well tolerated    1832 Reports needs refill on tylenol, not ondansetron  SBIRT 22yo+      Most Recent Value   SBIRT (22 yo +)    In order to provide better care to our patients, we are screening all of our patients for alcohol and drug use  Would it be okay to ask you these screening questions? No Filed at: 01/27/2022 1621                    MDM  Number of Diagnoses or Management Options  Abdominal pain affecting pregnancy  Hypocalcemia  Hyponatremia  Nausea and vomiting  Diagnosis management comments:  23 y  o F [id number], currently 21 weeks c/o generalized lower abdominal pain since yesterday, described as intermittent and stabbing  Also reporting multiple episodes of vomiting since this morning, is unable to keep down liquids and vomited up the zofran and tylenol she took  No F, chills, diarrhea, constipation, urinary symptoms, or vaginal bleeding  +LLQ, suprapubic, RLQ, and periumbilical abdominal pain without rebound or guarding, no CVA tenderness  Past surgical hx of appendectomy, cholecystectomy, small bowel resection    Baby moving all 4 extremities on US, denies decrease in movement  Urine microscopic contaminated with epithelial cells, 2-4 RBC, 4-10 WBC, occasional bacteria  WBC 12 30, hgb 11 4 but stable, calcium 8 3, Na 134 otherwise electrolytes, liver enzymes, kidney function WNL  Currently getting IVF, IV ondansetron  Patient findings and symptoms discussed with OBGYN on call resident  No further testing or imaging recommended at this time  Urine culture ordered  Pt not having urinary symptoms  Discussed with OB, will not treat with antibiotics, will await culture results  PO challenge well-tolerated, patient stable for discharge  Ambulatory referral to OBGYN placed  Patient to follow up with PCP regarding electrolytes  All imaging and/or lab testing discussed with patient, strict return to ED precautions discussed  Patient recommended to follow up promptly with appropriate outpatient provider  Patient and/or family members verbalizes understanding and agrees with plan  Patient and/or family members were given opportunity to ask questions, all questions were answered at this time  Patient is stable for discharge      Portions of the record may have been created with voice recognition software  Occasional wrong word or "sound a like" substitutions may have occurred due to the inherent limitations of voice recognition software  Read the chart carefully and recognize, using context, where substitutions have occurred             Amount and/or Complexity of Data Reviewed  Clinical lab tests: ordered and reviewed  Discussion of test results with the performing providers: yes (Dr Aaron Lopez )  Discuss the patient with other providers: yes (Dr Aaron Lopez; Dr Shelli Amaral )        Disposition  Final diagnoses:   Abdominal pain affecting pregnancy   Nausea and vomiting   Hypocalcemia   Hyponatremia     Time reflects when diagnosis was documented in both MDM as applicable and the Disposition within this note     Time User Action Codes Description Comment    2022  5:48 PM Millicent Chela Add [O26 899,  R10 9] Abdominal pain affecting pregnancy     2022  5:48 PM Millicent Chela Add [R11 2] Nausea and vomiting     2022  6:27 PM [place] [E83 51] Hypocalcemia     2022  6:28 PM Millicent Chela Add [E87 1] Hyponatremia       ED Disposition     ED Disposition Condition Date/Time Comment    Discharge Stable Thu 2022  6:28 PM Elle Friendly discharge to home/self care  Follow-up Information     Follow up With Specialties Details Why Contact Info Additional [facility] Schedule an appointment as soon as possible for a visit  For follow up regarding your symptoms and electrolytes [address], [address] [place], [place] [id number] [place] [address], [address], [address] [place], [place], [address]          Discharge Medication List as of 2022  6:33 PM      START taking these medications    Details   !! acetaminophen (TYLENOL) 500 mg tablet Take 1 tablet (500 mg total) by mouth every 6 (six) hours as needed (pain), Starting Thu 2022, Normal       !! - Potential duplicate medications found  Please discuss with provider        CONTINUE these medications which have NOT CHANGED    Details   !! acetaminophen (TYLENOL) 500 mg tablet Take 1 tablet (500 mg total) by mouth every 6 (six) hours as needed for mild pain, Starting Fri 10/29/2021, Normal      ondansetron (ZOFRAN) 4 mg tablet Take 1 tablet (4 mg total) by mouth every 8 (eight) hours as needed for nausea or vomiting, Starting Mon 2021, Normal      Prenatal Multivit-Min-Fe-FA (Pre-Ana Formula) TABS Take 1 tablet by mouth daily, Starting Fri 10/29/2021, Until Wed 12/1/2021, Normal       !! - Potential duplicate medications found  Please discuss with provider                PDMP Review     None          ED Provider  Electronically Signed by           Megan Sutton PA-C  01/27/22 2046

## 2022-01-29 LAB — BACTERIA UR CULT: NORMAL

## 2022-02-10 ENCOUNTER — ROUTINE PRENATAL (OUTPATIENT)
Dept: OBGYN CLINIC | Facility: CLINIC | Age: 27
End: 2022-02-10

## 2022-02-10 VITALS
SYSTOLIC BLOOD PRESSURE: 108 MMHG | BODY MASS INDEX: 21.41 KG/M2 | DIASTOLIC BLOOD PRESSURE: 73 MMHG | HEART RATE: 94 BPM | WEIGHT: 106 LBS

## 2022-02-10 DIAGNOSIS — R11.2 NAUSEA AND VOMITING: ICD-10-CM

## 2022-02-10 DIAGNOSIS — R10.9 ABDOMINAL PAIN AFFECTING PREGNANCY: ICD-10-CM

## 2022-02-10 DIAGNOSIS — O09.32 INITIAL OBSTETRIC VISIT, SECOND TRIMESTER: Primary | ICD-10-CM

## 2022-02-10 DIAGNOSIS — O26.899 ABDOMINAL PAIN AFFECTING PREGNANCY: ICD-10-CM

## 2022-02-10 DIAGNOSIS — F12.90 MARIJUANA USE: ICD-10-CM

## 2022-02-10 DIAGNOSIS — O99.331 TOBACCO SMOKING COMPLICATING PREGNANCY IN FIRST TRIMESTER: ICD-10-CM

## 2022-02-10 PROCEDURE — 87491 CHLMYD TRACH DNA AMP PROBE: CPT

## 2022-02-10 PROCEDURE — 87591 N.GONORRHOEAE DNA AMP PROB: CPT

## 2022-02-10 PROCEDURE — 99213 OFFICE O/P EST LOW 20 MIN: CPT | Performed by: OBSTETRICS & GYNECOLOGY

## 2022-02-10 NOTE — PROGRESS NOTES
OB/GYN  PRENATAL H&P VISIT  Garett Lockhart  2/10/2022  2:48 PM  Dr Magdy Burnette MD      SUBJECTIVE  Nikky Males Fabian Aponte is a 23 y o  [id number] at 23w4d here for initial prenatal H&P  This is an unintended, but desired pregnancy  She is currently doing well, with the expected discomfort of pregnancy  She works as a  from home  Has support from father of other children, mother, and brother for childcare  She has a hx of Chlamydia in  and , and was treated both times  She is currently not with the same partner  She has no family history of inheritable conditions such as physical or intellectual disabilities, birth defects, blood disorders, heart or neural tube defects  She has no recent travel or travel planned in the near future  She smokes 2 cigarettes/day and plans to try and stop during pregnancy  She smokes marijuana daily and is trying to stop that as well  She does not use of ETOH  She denies vaginal bleeding, cramping, leakage, abnormal discharge       OB History    Para Term  AB Living   6 5 5 0 0 5   SAB IAB Ectopic Multiple Live Births   0 0 0 0 5      # Outcome Date GA Lbr Nagi/2nd Weight Sex Delivery Anes PTL Lv   6 Current            5 Term 20 37w0d  2675 g (5 lb 14 4 oz) M Vag-Spont None N DELFIN      Name: Chantal  (Shannan Najera)      Apgar1: 9  Apgar5: 9   4 Term 19 37w2d  2705 g (5 lb 15 4 oz) M Vag-Spont None N DELFIN      Birth Comments: [address] 8      Name: Mary [place]: 8  Apgar5: 9   3 Term 10/08/17 37w4d / 00:52 2608 g (5 lb 12 oz) M Vag-Spont EPI N DELFIN      Birth Comments: FOB2      Complications: Gestational diabetes mellitus      Name: Chantal   (JOAQUIN)      Apgar1: 9  Apgar5: 9   2 Term 14 38w6d  2466 g (5 lb 7 oz) F Vag-Spont EPI N DELFIN      Birth Comments: [address] [place] [address]      Name: Sharita Walker   1 Term 13 37w0d  2438 g (5 lb 6 oz) M Vag-Spont EPI Y DELFIN      Birth Comments: FOB1      Complications: Gestational diabetes mellitus      Name: [facility]       Review of Systems   Constitutional: Negative for fever  Eyes: Negative for visual disturbance  Respiratory: Negative for chest tightness and shortness of breath  Cardiovascular: Negative for chest pain  Gastrointestinal: Positive for nausea  Genitourinary: Negative for vaginal bleeding, vaginal discharge and vaginal pain  Past Medical History:   Diagnosis Date    Chlamydia 2013    GERD (gastroesophageal reflux disease)     has not req'd tx since 2019    Gestational diabetes        Past Surgical History:   Procedure Laterality Date    APPENDECTOMY      CHOLECYSTECTOMY  2015    CT COLONOSCOPY FLX DX W/COLLJ SPEC WHEN PFRMD N/A 1/19/2017    Procedure: EGD AND COLONOSCOPY;  Surgeon: Zehra Meredith DO;  Location: [facility];   Service: Gastroenterology    CT LAP,APPENDECTOMY N/A 5/17/2016    Procedure: APPENDECTOMY LAPAROSCOPIC;  Surgeon: Yvonne Flores DO;  [facility];  Service: General    CT LAP,DIAGNOSTIC ABDOMEN N/A 3/13/2016    Procedure: LAPAROSCOPY DIAGNOSTIC;  Surgeon: Milena Ellison MD;  [facility];  Service: General    RESECTION SMALL BOWEL LAPAROSCOPIC  2016       Social History     Socioeconomic History    Marital status: Single     Spouse name: Not on file    Number of children: 4    Years of education: 5    Highest education level: Not on file   Occupational History    Occupation: unemployed, looking for work   Tobacco Use    Smoking status: Current Every Day Smoker     Packs/day: 0 00     Types: Cigarettes    Smokeless tobacco: Never Used    Tobacco comment: 1-2 cigs/day   Vaping Use    Vaping Use: Never used   Substance and Sexual Activity    Alcohol use: Not Currently     Comment: socially    Drug use: Not Currently     Types: Marijuana     Comment: LAST USED 10/2021    Sexual activity: Yes     Partners: Male     Birth control/protection: None   Other Topics Concern    Not on file Social History Narrative    No caffeine use    Single Parent    3 yr boy and 1 5 yr girl    Flu shot:yes     Social Determinants of Health     Financial Resource Strain: Low Risk     Difficulty of Paying Living Expenses: Not hard at all   Food Insecurity: No Food Insecurity    Worried About Running Out of Food in the Last Year: Never true    Soy of Food in the Last Year: Never true   Transportation Needs: No Transportation Needs    Lack of Transportation (Medical): No    Lack of Transportation (Non-Medical): No   Physical Activity: Not on file   Stress: No Stress Concern Present    Feeling of Stress : Not at all   Social Connections: Not on file   Intimate Partner Violence: Not on file   Housing Stability: Low Risk     Unable to Pay for Housing in the Last Year: No    Number of Places Lived in the Last Year: 1    Unstable Housing in the Last Year: No       OBJECTIVE  Vitals:    02/10/22 1423   BP: 108/73   Pulse: 94     Physical Exam  Constitutional:       Appearance: She is normal weight  HENT:      Head: Normocephalic  Mouth/Throat:      Mouth: Mucous membranes are moist    Eyes:      Conjunctiva/sclera: Conjunctivae normal    Cardiovascular:      Rate and Rhythm: Normal rate and regular rhythm  Pulses: Normal pulses  Heart sounds: Normal heart sounds  Pulmonary:      Effort: Pulmonary effort is normal       Breath sounds: Normal breath sounds  Abdominal:      Palpations: Abdomen is soft  Tenderness: There is no abdominal tenderness  Neurological:      General: No focal deficit present  Mental Status: She is alert and oriented to person, place, and time  Skin:     General: Skin is warm and dry  Capillary Refill: Capillary refill takes less than 2 seconds     Psychiatric:         Mood and Affect: Mood normal          ASSESSMENT AND PLAN    23 y o , [id number], with /73   Pulse 94   Wt 48 1 kg (106 lb)   LMP  (LMP Unknown) , at 23w4d here for her prenatal H&P   by Doppler    Pregnancy: H&P completed today  PN Labs reviewed today  Labor expectations discussed with patient, including appointment schedule, nutrition, exercise, medications, sexual intercourse, and nausea/vomiting  Patient's BMI is 21 41  Recommended weight gain is 25-35 lbs  Counseled on adverse effects of nicotine and marijuana on fetal growth and pregnancy overall  Prescribed Nicotine patches to help wean off smoking cigarettes  Screening: Pap smear normal in 2020  GC/CT collected  NIPT done, with low risk  Consents: Delivery process including potential OVD and  reviewed  Sign delivery consent form at 28 weeks  Labor: For analgesia, patient plans on having no epidural, did not have it with last two pregnancies  Contraception: Different methods of contraception were discussed with patient, including progesterone only oral pills, depo provera, nexplanon, mirena, and paragard  Patient would like to have salpingectomy during postpartum phase  Follow up: RTC in 4 weeks  Precautions regarding labor, leakage, bleeding, and fetal movement reviewed  COVID Precautions: reviewed at length, discussed that she is eligible for the vaccine  She is not interested       Flu vaccine: vaccinated this season     Rosita Bae MD  2/10/2022  2:48 PM

## 2022-02-11 LAB
C TRACH DNA SPEC QL NAA+PROBE: NEGATIVE
N GONORRHOEA DNA SPEC QL NAA+PROBE: NEGATIVE

## 2022-02-14 ENCOUNTER — PATIENT OUTREACH (OUTPATIENT)
Dept: OBGYN CLINIC | Facility: CLINIC | Age: 27
End: 2022-02-14

## 2022-03-15 ENCOUNTER — ROUTINE PRENATAL (OUTPATIENT)
Dept: OBGYN CLINIC | Facility: CLINIC | Age: 27
End: 2022-03-15

## 2022-03-15 ENCOUNTER — PATIENT OUTREACH (OUTPATIENT)
Dept: OBGYN CLINIC | Facility: CLINIC | Age: 27
End: 2022-03-15

## 2022-03-15 VITALS
DIASTOLIC BLOOD PRESSURE: 73 MMHG | BODY MASS INDEX: 22.82 KG/M2 | HEART RATE: 90 BPM | SYSTOLIC BLOOD PRESSURE: 108 MMHG | WEIGHT: 113 LBS

## 2022-03-15 DIAGNOSIS — Z34.93 PRENATAL CARE IN THIRD TRIMESTER: Primary | ICD-10-CM

## 2022-03-15 DIAGNOSIS — Z3A.28 28 WEEKS GESTATION OF PREGNANCY: ICD-10-CM

## 2022-03-15 PROCEDURE — 90715 TDAP VACCINE 7 YRS/> IM: CPT

## 2022-03-15 PROCEDURE — 90471 IMMUNIZATION ADMIN: CPT

## 2022-03-15 PROCEDURE — 99215 OFFICE O/P EST HI 40 MIN: CPT | Performed by: NURSE PRACTITIONER

## 2022-03-15 NOTE — PROGRESS NOTES
LUDA CARRILLO met with pt in the office to introduce self  SW GERARDO and patient are familiar with one another from prior deliveries - hx of THC use in pregnancy  Hx of CYS involvement for THC use  LUDA CARRILLO checked in on pts mental health  She reports she has good support this time around, no concerns for her mental health  LUDA CARRILLO provided contact information incase she needs anything this pregnancy, enc her to reach out  LUDA CARRILLO will f/u in three months

## 2022-03-15 NOTE — PROGRESS NOTES
Assessment & Plan  33 y o  [id number] at 28w2d presenting for routine prenatal visit  28 week teaching completed today, all questions answered  Pt had TDAP today  Given slip for 28 week labs    WNL respiratory effort, negative cough or SOB    Problem List Items Addressed This Visit     None      Visit Diagnoses     Prenatal care in third trimester    -  Primary    Relevant Orders    CBC and differential    RPR    Glucose, 1H PG    Anemia Panel w/Reflex, OB    Type and screen    28 weeks gestation of pregnancy        Relevant Orders    CBC and differential    RPR    Glucose, 1H PG    Anemia Panel w/Reflex, OB    Type and screen        ____________________________________________________________  Subjective  She is without complaint  She denies contractions, loss of fluid, or vaginal bleeding  She feels regular fetal movements    Objective  /73   Pulse 90   Wt 51 3 kg (113 lb)   LMP  (LMP Unknown)   BMI 22 82 kg/m²          Patient's Active Problem List  Patient Active Problem List   Diagnosis    History of gestational diabetes in prior pregnancy, currently pregnant in second trimester    Previous pregnancy complicated by intrauterine growth restriction, antepartum, second trimester    Marijuana use    Tobacco smoking complicating pregnancy in first trimester     Plan  Complete lab work as directed  To call with vaginal bleeding, loss of fluid, regular contractions, decreased fetal movement, other needs or concerns  Return in 2 weeks  Pt verbalized understanding of all discussed

## 2022-03-15 NOTE — PATIENT INSTRUCTIONS
The Third Trimester  (28-42 weeks)  YOUR BABY   * your baby sucks its thumb now! * your baby can hear voices and respond to touch   so talk to him or her!!   * your babys brain grows and develops most in the last 2 months of pregnancy   * babys head and bones are soft and flexible so they can fit through the birth canal   * babys movements change towards the end of pregnancy because there is less room for kicking and stretching in your belly   * babys lungs are not fully developed and completely ready to breathe on their own until the last 3-4 weeks before your due date    YOUR BODY   * your belly is growing a lot now   * it may become more difficult to sleep well at night or to be as active as you usually are   * you may sweat more than usual   * you will become more off-balancebe careful not to fall!!   * you may develop hemorrhoids (which can be painful and make it difficult to sit down)   * the last two months of pregnancy can become very uncomfortablewith backaches, headaches, and heartburn   * you can start to have contractions  as long as they are irregular and less than 5 per hour, this is a normal part of your body getting ready to have a baby   * your cervix may start to thin out and open upto get ready for delivery   * you may find yourself needing to pee very often  because baby is pressing on your bladder so much   * you may get out of breathe more quickly than usual      FETAL KICK COUNTS    In the third trimester (after 28 weeks gestation) you should be performing fetal kick counts every day  Your baby should move at least 10 times in 2 hours during an active time, once a day  Choose atime of day when your baby is most active  Try to do this around the same time each day  Get into a comfortable position and then write down the time your baby first moves  Count each movement until the baby moves 10 times  These movements include kicks, punches, nudges, flutters, or rolls    This can take anywhere from 5 minutes to 2 hours  Write down the time you feel the baby's 10th movement  If 2 hours has passed and your baby has not moved at least 10 times, you should CALL THE OFFICE RIGHT AWAY  [phone]  PREMATURE LABOR     When to call [phone]:  * I need to call immediately if I have even a small amount of LIQUID leaking from my vagina, with or without contractions  * I need to call if I am BLEEDING from my vagina  * I need to call if I am feeling CRAMPING that continues after drinking 2-3 glasses of water and lying down on my side for one hour and that feels like I am having a period  * I need to call if I feel CONTRACTIONS  more than 4 times in an hour that feels like the baby is balling up even after I try drinking 2-3 glasses of water and lying down on my side for an hour  * I need to call if I notice a change in my vaginal DISCHARGE  * I need to call if I am feeling PELVIC PRESSURE  that feels like the baby is pushing down into my vagina and lasts more than an hour  * I need to call if I have LOW BACKACHE which is new and near my tailbone  It may either come and go several times during an hour or stay there constantly  PRE-ECLAMPSIA     What is it? Pre-eclampsia is a serious disease that can occur during pregnancy related to high blood pressures  It can happen to any woman  Why should I care? Women who develop pre-eclampsia have serious risks which can include seizures, stroke, organ damage, premature birth of their baby  In the very worst cases, it can cause death of the mother and/or their baby  What should I pay attention to?    Signs and symptoms of pre-eclampsia can include:   * Severe swelling of face or hands    * A headache that will not go away even after you have taken Tylenol   * Seeing spots or changes in eyesight    * Pain in the upper abdomen or shoulder    * New nausea and vomiting (in the second half of pregnancy)    * Sudden weight gain    * Difficulty breathing     What should I do? If you experience any of the above symptoms of pre-eclampsia, contact your OB provider  Finding pre-eclampsia early is important for you and your baby  Call us at [phone]  [facility] BREASTFEEDING     BENEFITS FOR BABIES   * stronger immune systems (less allergies, eczema, asthma, and childhood cancers)   * less diarrhea and constipation or other GI diseases   * fewer colds and ear infections   * better vision and teeth (fewer cavities)   * improves IQ   * lower rates of diabetes and obesity in childhood     BENEFITS FOR MOMS   * promotes faster weight loss after delivery   * lower risk for postpartum depression   * lower risk for breast, uterine, and ovarian cancers   * lower risk for osteoporosis developing with age   * easier than formula - is always right with you, clean, and the right temperature   * less expensive than formulaits FREE !!!!     KEYS TO SUCCESSFUL BREASTFEEDING   * keep baby skin-to-skin until after first feeding event   * keep baby in your room with you during your hospital stay after delivery   * avoid any bottle feedings (unless medically necessary)   * limit the use of pacifiers and swaddling   * ask for help if you are having any issueslactation consultants (who specialize in breastfeeding) are available to help you   * a healthy diet for momeating a variety of foods and portions in moderation    THINGS YOU SHOULD KNOW ABOUT BREASTFEEDING   * most medications are considered compatible with breastfeeding by the [address] [place] [address] Academy of Pediatrics, but you should check with your health care provider or lactation consultant prior to taking a new medicationjust to be sure it is safe   * alcohol (beer, wine, liquor) can be passed from mother to baby through breast milkan occasional, social drink is deemed acceptable by the American Academy of [place] [address]   more than that should be avoided   * breastfeeding is NOT an effective method of birth control   * nicotine (in cigarettes) can pass from mother to baby through breast milk   however, for mothers who smoke, it is still healthier to breastfeed than use formula   * caffeine should be limited to 1-3 cups per dayincludes coffee, soda, energy drinks         PERINEAL / VAGINAL MASSAGE    What can I do now to decrease my chances of tearing during delivery? Massaging around the vaginal opening by you (or your partner), either antepartum (before birth) or during the second stage of labor, can reduce the likelihood of perineal tearing during childbirth  Likewise, the use of warm packs held on the perineum during the pushing stage of labor can reduce the severity of your tear  This will happen during the pushing stage of labor  At home, you can also help reduce the chances of injury that may occur during the birth of your child through perineal massage  When should I do this? Starting around or shortly after 34 weeks of pregnancy, you or your partner should provide 5-10 minutes of vaginal massage 1-4 times per week  How? Use either almond, coconut, or olive oil and water mixture on 1 or 2 fingers (depending on comfort)  Insert finger(s) 3-5cm into the vagina  Apply sweeping downward/sideward pressure from 3 to 9 o'clock for 5-10 minutes, 1-4 times per week  WARNING SIGNS DURING PREGNANCY  Call our office at [phone] if you experience any of the followin  Vaginal bleeding  2  Sharp abdominal pain that does not go away  3  Fever (more than 100 4 and is not relieved by Tylenol)  4  Persistent vomiting lasting greater than 24 hours  5  Chest pain   6  Pain or burning when you urinate  7  Severe headache that doesn't resolve with Tylenol  8  Blurred vision or seeing spots in your vision  9  Sudden swelling of your face or hands  10  Redness, swelling or pain in a leg  11  A sudden weight gain in just a few days  12   Decrease in your baby's movement (after 28 weeks or the 6th month of pregnancy)  13  A loss of watery fluid from your vagina - can be a gush, a trickle or continuous wetness  14  After 20 weeks of pregnancy, rhythmic cramping (greater than 4 per hour) or menstrual like low/pelvic pain          VACCINES IN PREGNANCY    TDAP  Whopping cough (or pertusSsis) can be serious for anyone, but for your , it can be life-threatning  Up to 20 babies die each year in the [place]  Due to whopping cough  About half of babies younger than 3year old who get whopping cough need treatment in the hospital   The younger the baby is when he or she gets whopping cough, the more likely he or she will need to be treated in a hospital   When you receive the whopping cough vaccine (Tdap) during your pregnancy, your body will create protective antibodies and pass some of them to your baby before birth  These antibodies can help protect your baby from getting whopping cough until they are old enough to be vaccinated themselves (usually around 7 months of age)  INFLUENZA  Changes in your immune, heart, and lung functions during pregnancy make you more likely to get seriously ill from the flu  Catching the flu also increases your chances for serious problems for your developing baby, including premature labor and delivery  It is recommended that all women who are pregnant during flu season should receive an influenza vaccine  Coronavirus (GIYLO-71) pregnancy FAQs: Medical experts answer your questions  From [employer] cy  com/0_coronavirus-covid-19-pregnancy-faq-medical-inegquu-eguleh-bp_42006227  bc (courtesy of [facility])  As of 3/18/20  By Bakari Solis 39  Medically reviewed by Society for Maternal-Fetal Medicine       We asked parents-to-be to send us their most pressing questions about coronavirus (COVID-19)  Among them: Is it still safe to deliver in a hospital? What if my ob-gyn has the virus?  We sent those questions to the top docs at the Society for Maternal-Fetal Medicine  Here are their answers  The coronavirus (COVID-19) pandemic has been declared a national emergency in the [facility] by [employer]  Moms-to-be like you are concerned about everything from getting medicines to managing disruptions at work  But above and beyond any worry about lifestyle changes is a focus on your health and the impact of COVID-19 on your pregnancy  We asked obstetrics doctors who handle the most complicated pregnancy issues to answer your questions about the coronavirus  Here are their responses, provided by Dr Jin Royal and her colleagues at the Society for [place] [employer]  Am I at more risk for COVID-19 if I'm pregnant? We don't know  Pregnancy does change your immune system in ways that might make you more susceptible to viral respiratory infections like COVID-19  If you become infected, you might also be at higher risk for more severe illness compared to the general population  Although this does not appear to be the case with COVID-19, based on limited data so far, a higher risk has been true for pregnant women with other coronavirus infections (SARS-CoV and MERS-CoV) and other viral respiratory infections, such as flu  It's important to take preventive actions to avoid infection, such as washing your hands often and avoiding people who are sick  How might coronavirus affect my pregnancy? Again, we don't know  Women with other coronavirus infections (such as SARS-CoV) did not have miscarriage or stillbirth at higher rates than the general population  We know that having other respiratory viral infections during pregnancy, such as flu, has been associated with problems like low birth weight and  birth  Also, having a high fever early in pregnancy may increase the risk of certain birth defects  Could I transmit coronavirus to my baby during pregnancy or delivery?   We don't know whether you could transmit COVID-19 to your baby before or during delivery  However, among the few case studies of infants born to mothers with COVID-23 published in peer-reviewed literature, none of the infants tested positive for the virus  Also, there was no virus detected in samples of amniotic fluid or breast milk  There have been a few reports of newborns as young as a few days old with infection, suggesting that a mother can transmit the infection to her infant through close contact after delivery  There have been no reports of mother-to-baby transmission for other coronaviruses (MERS-CoV and SARS-CoV), although only limited information is available  Is it safe for me to deliver at a hospital where there have been COVID-19 cases? Yes  We know that COVID-19 is a very scary virus  The good news is that hospitals are taking great precautions to keep patients and healthcare providers safe  When a patient is even suspected to have COVID-19, they are placed in a negative pressure room  (Think of these rooms as vacuums that suck and filter the air so it's safe for the other people in the hospital)  This makes it possible for you to deliver at the hospital without putting you or your baby at risk  Hospitals are also implementing stricter visiting policies to keep patients safe  It's worth calling your hospital to check if there are any new regulations to be aware of  What plans should I make now in case the hospital system is overwhelmed when it's time for me to deliver? This is a great question to talk with your doctor or midwife about when you're 34 to 36 weeks pregnant  Every hospital is making different plans for dealing with this scenario  I work in healthcare  Should I ask my doctor to excuse me from work until the baby is born? What if I work in a school, the travel [address], or some other high-risk setting?   Healthcare facilities should take care to limit the exposure of pregnant employees to patients with confirmed or suspected COVID-19, just as they would with other infectious cases  If you continue working, be sure to follow the CDC's risk assessment and infection control guidelines  If you work in a school, travel [employer], or other high-risk setting, talk with your employer about what it's doing to protect employees and minimize infection risks  Wash your hands often  What if my OB gets COVID-19? If your doctor or midwife tests positive for COVID-19, they will need to be quarantined until they recover and are no longer at risk of transmitting the virus  In this case, you'll be assigned to another OB in your doctor's practice (or you may choose another practitioner yourself)  Ask your new OB or your doctor's office if you should self-quarantine or be tested for the virus  (It will depend on when you last saw your provider and when that person tested positive )    Should we hold off on trying to conceive because of COVID-19? At this time, there's no reason to hold off on trying to get pregnant, but the data we have is really limited  For example, we don't think the virus causes birth defects or increases your risk of miscarriage  But we don't know whether you could transmit COVID-19 to your baby before or during delivery  We also don't know if the virus lives in semen or can be sexually transmitted  We have a babymoon scheduled in the next few months - should we cancel? If you're planning to travel internationally or on a cruise, you should strongly consider canceling  At this time, the virus has reached more than 140 countries, and there are travel bans to [place], most of [place], and [place]  Places where large numbers of people gather are at highest risk, especially airports and cruise ships  If you're planning travel in the [place] , note that any travel setting increases your risk of exposure, and there may be travel bans even in [place] by the time you're scheduled to go   Even if you're state is not blocked, you'll definitely want to avoid traveling to communities where the virus is spreading  To find out where these places are, check The [place] Times map based on CDC data  For the most current advice to help you avoid exposure, check the CDC's COVID-19 travel page  Will the hospital separate me from my  and keep the baby in quarantine? If you test positive for COVID-19 or have been exposed but have no symptoms, the CDC, [employer] of Obstetricians and Gynecologists, and the Society for [place] [employer] all recommend that you be  from your baby to decrease the risk of transmission to the baby  This would last until you are no longer at risk of transmitting the virus  If you do not have COVID-19 and have not been exposed to the virus, the hospital will not separate you from your   My hospital is restricting visitors and only allowing one support person  If my support person leaves after the delivery, will they be allowed to come back? Every hospital has different policies  Contact your hospital or labor and delivery unit a week or so before delivery to get the most up-to-date restrictions  In general, if your support person needs to leave, they would be allowed back unless they knew they were exposed to COVID-19 after leaving your company  [facility] understands that the coronavirus (COVID-19) pandemic is an evolving story and that your questions will change over time  We'll continue asking moms and dads in our Community what they want to know, and we'll get the answers from experts to keep them - and you - informed and supported  My mom was planning to fly here to help me care for my new baby after delivery  Should I tell her not to come? Yes  If your mom is over 61 or has any serious chronic medical conditions (such as heart disease, lung disease, or diabetes), she is at higher risk of serious illness from COVID-19 and should avoid air travel   And remember that any travel setting increases a person's risk of exposure  So, it may be risky to have her around the baby after she has been traveling  For the most current advice on traveling, check the CDC's COVID-19 travel page  [facility] understands that the coronavirus pandemic is an evolving story and that your questions will change over time  We'll continue asking moms and dads in our Community what they want to know, and we'll get the answers from experts to keep them - and you - informed and supported  Thank you for your confidence in our team    We appreciate you and welcome your feedback  If you receive a survey from us, please take a few moments to let us know how we are doing     Sincerely,  SANJIV Chavez    Complete lab work as directed  Return in 2 weeks

## 2022-03-16 ENCOUNTER — ULTRASOUND (OUTPATIENT)
Dept: PERINATAL CARE | Facility: OTHER | Age: 27
End: 2022-03-16
Payer: MEDICARE

## 2022-03-16 VITALS
WEIGHT: 113.8 LBS | BODY MASS INDEX: 22.94 KG/M2 | HEIGHT: 59 IN | HEART RATE: 81 BPM | SYSTOLIC BLOOD PRESSURE: 113 MMHG | DIASTOLIC BLOOD PRESSURE: 76 MMHG

## 2022-03-16 DIAGNOSIS — O09.292 PREVIOUS PREGNANCY COMPLICATED BY INTRAUTERINE GROWTH RESTRICTION, ANTEPARTUM, SECOND TRIMESTER: ICD-10-CM

## 2022-03-16 DIAGNOSIS — Z36.2 ENCOUNTER FOR FOLLOW-UP ULTRASOUND OF FETAL ANATOMY: ICD-10-CM

## 2022-03-16 DIAGNOSIS — Z36.89 ENCOUNTER FOR ULTRASOUND TO ASSESS FETAL GROWTH: ICD-10-CM

## 2022-03-16 DIAGNOSIS — O99.331 TOBACCO SMOKING COMPLICATING PREGNANCY IN FIRST TRIMESTER: ICD-10-CM

## 2022-03-16 DIAGNOSIS — Z3A.28 28 WEEKS GESTATION OF PREGNANCY: Primary | ICD-10-CM

## 2022-03-16 PROCEDURE — 76816 OB US FOLLOW-UP PER FETUS: CPT | Performed by: OBSTETRICS & GYNECOLOGY

## 2022-03-16 NOTE — LETTER
March 16, 2022     Stephanie Varner MD  [address]    Patient: Keesha Snyder   YOB: 1995   Date of Visit: 3/16/2022       Dear Dr Ebony Hussein:    Thank you for referring Graham Hall to me for evaluation  Below are my notes for this consultation  If you have questions, please do not hesitate to call me  I look forward to following your patient along with you  Sincerely,        Juve Conley MD        CC: No Recipients  Juve Conley MD  3/16/2022 11:42 AM  Sign when Signing Visit  [facility]: Ms Finn Matson was seen today at 28w3d for fetal growth and followup missed anatomy ultrasound  See ultrasound report under "OB Procedures" tab    Please don't hesitate to contact our office with any concerns or questions   -Juve Conley MD

## 2022-03-16 NOTE — PATIENT INSTRUCTIONS
Please complete your gestational diabetes screening as soon as possible  Baby's growth should be re-evaluated in 4-6 weeks  Continue kick counting!

## 2022-03-29 ENCOUNTER — ROUTINE PRENATAL (OUTPATIENT)
Dept: OBGYN CLINIC | Facility: CLINIC | Age: 27
End: 2022-03-29

## 2022-03-29 VITALS
BODY MASS INDEX: 23.63 KG/M2 | SYSTOLIC BLOOD PRESSURE: 97 MMHG | HEART RATE: 91 BPM | WEIGHT: 117 LBS | DIASTOLIC BLOOD PRESSURE: 60 MMHG

## 2022-03-29 DIAGNOSIS — Z30.2 REQUEST FOR STERILIZATION: ICD-10-CM

## 2022-03-29 DIAGNOSIS — Z3A.30 30 WEEKS GESTATION OF PREGNANCY: Primary | ICD-10-CM

## 2022-03-29 PROCEDURE — 99213 OFFICE O/P EST LOW 20 MIN: CPT | Performed by: OBSTETRICS & GYNECOLOGY

## 2022-03-29 NOTE — PROGRESS NOTES
[facility] VISIT  Name: Vaughn Orlando  MRN: [id number]  : 1995      ASSESSMENT/PLAN: IUP @ 30w2d     Problem List Items Addressed This Visit        Other    30 weeks gestation of pregnancy - Primary     No OB complaints  Has not obtained 28w labs, encouraged patient to do so  Contraception: desires tubal, MA31 signed today  RTC in 2 weeks  Request for sterilization     MA31 signed today  SUBJECTIVE   33 y o  [id number] 30w2d here for PN visit  She has no obstetric complaints, including pelvic pain, contractions, vaginal bleeding, loss of fluid, or decreased fetal movement  OBJECTIVE:  Vitals:    22 1117   BP: 97/60   Pulse: 91     TWG: Not found      FHT: 149bpm         Oziel Liu MD  OB/GYN PGY-4  3/29/2022  11:18 AM

## 2022-03-30 PROBLEM — Z30.2 REQUEST FOR STERILIZATION: Status: ACTIVE | Noted: 2022-03-30

## 2022-03-31 NOTE — ASSESSMENT & PLAN NOTE
No OB complaints  Has not obtained 28w labs, encouraged patient to do so  Contraception: desires tubal, MA31 signed today  RTC in 2 weeks

## 2022-04-06 ENCOUNTER — HOSPITAL ENCOUNTER (INPATIENT)
Facility: HOSPITAL | Age: 27
LOS: 1 days | Discharge: HOME | DRG: 566 | End: 2022-04-07
Attending: OBSTETRICS & GYNECOLOGY | Admitting: OBSTETRICS & GYNECOLOGY
Payer: MEDICARE

## 2022-04-06 ENCOUNTER — HOSPITAL ENCOUNTER (INPATIENT)
Facility: HOSPITAL | Age: 27
LOS: 1 days | DRG: 566 | End: 2022-04-06
Attending: OBSTETRICS & GYNECOLOGY | Admitting: OBSTETRICS & GYNECOLOGY
Payer: MEDICARE

## 2022-04-06 VITALS
DIASTOLIC BLOOD PRESSURE: 57 MMHG | BODY MASS INDEX: 23.59 KG/M2 | HEART RATE: 100 BPM | HEIGHT: 59 IN | OXYGEN SATURATION: 100 % | SYSTOLIC BLOOD PRESSURE: 112 MMHG | WEIGHT: 117 LBS | TEMPERATURE: 98.2 F | RESPIRATION RATE: 18 BRPM

## 2022-04-06 DIAGNOSIS — O60.03 PRETERM LABOR IN THIRD TRIMESTER WITHOUT DELIVERY: Primary | ICD-10-CM

## 2022-04-06 DIAGNOSIS — Z3A.31 31 WEEKS GESTATION OF PREGNANCY: ICD-10-CM

## 2022-04-06 DIAGNOSIS — R10.9 ABDOMINAL PAIN AFFECTING PREGNANCY: ICD-10-CM

## 2022-04-06 DIAGNOSIS — O26.899 ABDOMINAL PAIN AFFECTING PREGNANCY: ICD-10-CM

## 2022-04-06 DIAGNOSIS — Z3A.30 30 WEEKS GESTATION OF PREGNANCY: Primary | ICD-10-CM

## 2022-04-06 LAB
ABO GROUP BLD: NORMAL
ABO GROUP BLD: NORMAL
ALBUMIN SERPL BCP-MCNC: 2.7 G/DL (ref 3.5–5)
ALP SERPL-CCNC: 193 U/L (ref 46–116)
ALT SERPL W P-5'-P-CCNC: 21 U/L (ref 12–78)
AMPHETAMINES SERPL QL SCN: NEGATIVE
ANION GAP SERPL CALCULATED.3IONS-SCNC: 8 MMOL/L (ref 4–13)
APTT PPP: 32 SECONDS (ref 23–37)
AST SERPL W P-5'-P-CCNC: 33 U/L (ref 5–45)
BARBITURATES UR QL: NEGATIVE
BASOPHILS # BLD AUTO: 0.05 THOUSANDS/ΜL (ref 0–0.1)
BASOPHILS NFR BLD AUTO: 1 % (ref 0–1)
BENZODIAZ UR QL: NEGATIVE
BILIRUB SERPL-MCNC: 0.42 MG/DL (ref 0.2–1)
BLD GP AB SCN SERPL QL: NEGATIVE
BLD GP AB SCN SERPL QL: NEGATIVE
BUN SERPL-MCNC: 8 MG/DL (ref 5–25)
C TRACH DNA SPEC QL NAA+PROBE: NEGATIVE
CALCIUM ALBUM COR SERPL-MCNC: 9.4 MG/DL (ref 8.3–10.1)
CALCIUM SERPL-MCNC: 8.4 MG/DL (ref 8.3–10.1)
CHLORIDE SERPL-SCNC: 103 MMOL/L (ref 100–108)
CO2 SERPL-SCNC: 26 MMOL/L (ref 21–32)
COCAINE UR QL: NEGATIVE
CREAT SERPL-MCNC: 0.36 MG/DL (ref 0.6–1.3)
EOSINOPHIL # BLD AUTO: 0.08 THOUSAND/ΜL (ref 0–0.61)
EOSINOPHIL NFR BLD AUTO: 1 % (ref 0–6)
ERYTHROCYTE [DISTWIDTH] IN BLOOD BY AUTOMATED COUNT: 12.6 % (ref 11.6–15.1)
FIBRINOGEN PPP-MCNC: 464 MG/DL (ref 227–495)
FIBRONECTIN FETAL VAG QL: NEGATIVE
GFR SERPL CREATININE-BSD FRML MDRD: 149 ML/MIN/1.73SQ M
GLUCOSE SERPL-MCNC: 83 MG/DL (ref 65–140)
HCT VFR BLD AUTO: 30.5 % (ref 34.8–46.1)
HGB BLD-MCNC: 11.3 G/DL (ref 11.5–15.4)
HOLD SPECIMEN: NORMAL
HOLD SPECIMEN: NORMAL
IMM GRANULOCYTES # BLD AUTO: 0.05 THOUSAND/UL (ref 0–0.2)
IMM GRANULOCYTES NFR BLD AUTO: 1 % (ref 0–2)
INR PPP: 0.99 (ref 0.84–1.19)
LYMPHOCYTES # BLD AUTO: 1.47 THOUSANDS/ΜL (ref 0.6–4.47)
LYMPHOCYTES NFR BLD AUTO: 14 % (ref 14–44)
MCH RBC QN AUTO: 34.7 PG (ref 26.8–34.3)
MCHC RBC AUTO-ENTMCNC: 37 G/DL (ref 31.4–37.4)
MCV RBC AUTO: 94 FL (ref 82–98)
METHADONE UR QL: NEGATIVE
MONOCYTES # BLD AUTO: 0.62 THOUSAND/ΜL (ref 0.17–1.22)
MONOCYTES NFR BLD AUTO: 6 % (ref 4–12)
N GONORRHOEA DNA SPEC QL NAA+PROBE: NEGATIVE
NEUTROPHILS # BLD AUTO: 8.16 THOUSANDS/ΜL (ref 1.85–7.62)
NEUTS SEG NFR BLD AUTO: 77 % (ref 43–75)
NRBC BLD AUTO-RTO: 0 /100 WBCS
OPIATES UR QL SCN: NEGATIVE
OXYCODONE+OXYMORPHONE UR QL SCN: NEGATIVE
PCP UR QL: NEGATIVE
PLATELET # BLD AUTO: 172 THOUSANDS/UL (ref 149–390)
PMV BLD AUTO: 11.6 FL (ref 8.9–12.7)
POTASSIUM SERPL-SCNC: 4.2 MMOL/L (ref 3.5–5.3)
PROT SERPL-MCNC: 7.2 G/DL (ref 6.4–8.2)
PROTHROMBIN TIME: 13.1 SECONDS (ref 11.6–14.5)
RBC # BLD AUTO: 3.26 MILLION/UL (ref 3.81–5.12)
RH BLD: POSITIVE
RH BLD: POSITIVE
RPR SER QL: NORMAL
SODIUM SERPL-SCNC: 137 MMOL/L (ref 136–145)
SPECIMEN EXPIRATION DATE: NORMAL
SPECIMEN EXPIRATION DATE: NORMAL
THC UR QL: POSITIVE
WBC # BLD AUTO: 10.43 THOUSAND/UL (ref 4.31–10.16)

## 2022-04-06 PROCEDURE — 86850 RBC ANTIBODY SCREEN: CPT | Performed by: OBSTETRICS & GYNECOLOGY

## 2022-04-06 PROCEDURE — 87491 CHLMYD TRACH DNA AMP PROBE: CPT | Performed by: OBSTETRICS & GYNECOLOGY

## 2022-04-06 PROCEDURE — 85730 THROMBOPLASTIN TIME PARTIAL: CPT | Performed by: OBSTETRICS & GYNECOLOGY

## 2022-04-06 PROCEDURE — 87086 URINE CULTURE/COLONY COUNT: CPT | Performed by: OBSTETRICS & GYNECOLOGY

## 2022-04-06 PROCEDURE — 87591 N.GONORRHOEAE DNA AMP PROB: CPT | Performed by: OBSTETRICS & GYNECOLOGY

## 2022-04-06 PROCEDURE — 85384 FIBRINOGEN ACTIVITY: CPT | Performed by: OBSTETRICS & GYNECOLOGY

## 2022-04-06 PROCEDURE — 87150 DNA/RNA AMPLIFIED PROBE: CPT | Performed by: OBSTETRICS & GYNECOLOGY

## 2022-04-06 PROCEDURE — 99215 OFFICE O/P EST HI 40 MIN: CPT

## 2022-04-06 PROCEDURE — 86901 BLOOD TYPING SEROLOGIC RH(D): CPT | Performed by: OBSTETRICS & GYNECOLOGY

## 2022-04-06 PROCEDURE — 99233 SBSQ HOSP IP/OBS HIGH 50: CPT | Performed by: OBSTETRICS & GYNECOLOGY

## 2022-04-06 PROCEDURE — 86592 SYPHILIS TEST NON-TREP QUAL: CPT | Performed by: OBSTETRICS & GYNECOLOGY

## 2022-04-06 PROCEDURE — G0379 DIRECT REFER HOSPITAL OBSERV: HCPCS

## 2022-04-06 PROCEDURE — 82731 ASSAY OF FETAL FIBRONECTIN: CPT | Performed by: OBSTETRICS & GYNECOLOGY

## 2022-04-06 PROCEDURE — 4A1HXCZ MONITORING OF PRODUCTS OF CONCEPTION, CARDIAC RATE, EXTERNAL APPROACH: ICD-10-PCS | Performed by: OBSTETRICS & GYNECOLOGY

## 2022-04-06 PROCEDURE — 86900 BLOOD TYPING SEROLOGIC ABO: CPT | Performed by: OBSTETRICS & GYNECOLOGY

## 2022-04-06 PROCEDURE — 80053 COMPREHEN METABOLIC PANEL: CPT | Performed by: OBSTETRICS & GYNECOLOGY

## 2022-04-06 PROCEDURE — 80307 DRUG TEST PRSMV CHEM ANLYZR: CPT | Performed by: OBSTETRICS & GYNECOLOGY

## 2022-04-06 PROCEDURE — NC001 PR NO CHARGE: Performed by: OBSTETRICS & GYNECOLOGY

## 2022-04-06 PROCEDURE — 99221 1ST HOSP IP/OBS SF/LOW 40: CPT | Performed by: OBSTETRICS & GYNECOLOGY

## 2022-04-06 PROCEDURE — 85025 COMPLETE CBC W/AUTO DIFF WBC: CPT | Performed by: OBSTETRICS & GYNECOLOGY

## 2022-04-06 PROCEDURE — 85610 PROTHROMBIN TIME: CPT | Performed by: OBSTETRICS & GYNECOLOGY

## 2022-04-06 RX ORDER — ONDANSETRON 2 MG/ML
4 INJECTION INTRAMUSCULAR; INTRAVENOUS EVERY 8 HOURS PRN
Status: DISCONTINUED | OUTPATIENT
Start: 2022-04-06 | End: 2022-04-06 | Stop reason: HOSPADM

## 2022-04-06 RX ORDER — MAGNESIUM SULFATE HEPTAHYDRATE 40 MG/ML
1 INJECTION, SOLUTION INTRAVENOUS CONTINUOUS
Status: DISCONTINUED | OUTPATIENT
Start: 2022-04-06 | End: 2022-04-07 | Stop reason: HOSPADM

## 2022-04-06 RX ORDER — SIMETHICONE 80 MG
80 TABLET,CHEWABLE ORAL 4 TIMES DAILY PRN
Status: DISCONTINUED | OUTPATIENT
Start: 2022-04-06 | End: 2022-04-07 | Stop reason: HOSPADM

## 2022-04-06 RX ORDER — BUTORPHANOL TARTRATE 1 MG/ML
1 INJECTION, SOLUTION INTRAMUSCULAR; INTRAVENOUS ONCE
Status: COMPLETED | OUTPATIENT
Start: 2022-04-06 | End: 2022-04-06

## 2022-04-06 RX ORDER — MAGNESIUM SULFATE HEPTAHYDRATE 40 MG/ML
1 INJECTION, SOLUTION INTRAVENOUS CONTINUOUS
Status: DISCONTINUED | OUTPATIENT
Start: 2022-04-06 | End: 2022-04-06 | Stop reason: HOSPADM

## 2022-04-06 RX ORDER — ONDANSETRON 2 MG/ML
4 INJECTION INTRAMUSCULAR; INTRAVENOUS EVERY 4 HOURS PRN
Status: DISCONTINUED | OUTPATIENT
Start: 2022-04-06 | End: 2022-04-07 | Stop reason: HOSPADM

## 2022-04-06 RX ORDER — ACETAMINOPHEN 325 MG/1
650 TABLET ORAL EVERY 4 HOURS PRN
Status: DISCONTINUED | OUTPATIENT
Start: 2022-04-06 | End: 2022-04-07 | Stop reason: HOSPADM

## 2022-04-06 RX ORDER — CALCIUM GLUCONATE 94 MG/ML
1 INJECTION, SOLUTION INTRAVENOUS ONCE AS NEEDED
Status: DISCONTINUED | OUTPATIENT
Start: 2022-04-06 | End: 2022-04-06 | Stop reason: HOSPADM

## 2022-04-06 RX ORDER — BETAMETHASONE SODIUM PHOSPHATE AND BETAMETHASONE ACETATE 3; 3 MG/ML; MG/ML
12 INJECTION, SUSPENSION INTRA-ARTICULAR; INTRALESIONAL; INTRAMUSCULAR; SOFT TISSUE EVERY 24 HOURS
Status: DISCONTINUED | OUTPATIENT
Start: 2022-04-06 | End: 2022-04-06 | Stop reason: HOSPADM

## 2022-04-06 RX ORDER — MAGNESIUM SULFATE HEPTAHYDRATE 40 MG/ML
4 INJECTION, SOLUTION INTRAVENOUS ONCE
Status: COMPLETED | OUTPATIENT
Start: 2022-04-06 | End: 2022-04-06

## 2022-04-06 RX ORDER — SODIUM CHLORIDE, SODIUM LACTATE, POTASSIUM CHLORIDE, CALCIUM CHLORIDE 600; 310; 30; 20 MG/100ML; MG/100ML; MG/100ML; MG/100ML
125 INJECTION, SOLUTION INTRAVENOUS CONTINUOUS
Status: DISCONTINUED | OUTPATIENT
Start: 2022-04-06 | End: 2022-04-06 | Stop reason: HOSPADM

## 2022-04-06 RX ORDER — NIFEDIPINE 10 MG/1
10 CAPSULE ORAL EVERY 6 HOURS
Status: DISCONTINUED | OUTPATIENT
Start: 2022-04-06 | End: 2022-04-06 | Stop reason: HOSPADM

## 2022-04-06 RX ORDER — NIFEDIPINE 10 MG/1
10 CAPSULE ORAL EVERY 6 HOURS
Status: DISCONTINUED | OUTPATIENT
Start: 2022-04-06 | End: 2022-04-07 | Stop reason: HOSPADM

## 2022-04-06 RX ORDER — NIFEDIPINE 10 MG/1
30 CAPSULE ORAL ONCE
Status: COMPLETED | OUTPATIENT
Start: 2022-04-06 | End: 2022-04-06

## 2022-04-06 RX ORDER — SODIUM CHLORIDE, SODIUM LACTATE, POTASSIUM CHLORIDE, CALCIUM CHLORIDE 600; 310; 30; 20 MG/100ML; MG/100ML; MG/100ML; MG/100ML
125 INJECTION, SOLUTION INTRAVENOUS CONTINUOUS
Status: DISCONTINUED | OUTPATIENT
Start: 2022-04-06 | End: 2022-04-07 | Stop reason: HOSPADM

## 2022-04-06 RX ORDER — CALCIUM CARBONATE 200(500)MG
1000 TABLET,CHEWABLE ORAL DAILY PRN
Status: DISCONTINUED | OUTPATIENT
Start: 2022-04-06 | End: 2022-04-07 | Stop reason: HOSPADM

## 2022-04-06 RX ORDER — BETAMETHASONE SODIUM PHOSPHATE AND BETAMETHASONE ACETATE 3; 3 MG/ML; MG/ML
12 INJECTION, SUSPENSION INTRA-ARTICULAR; INTRALESIONAL; INTRAMUSCULAR; SOFT TISSUE EVERY 24 HOURS
Status: COMPLETED | OUTPATIENT
Start: 2022-04-07 | End: 2022-04-07

## 2022-04-06 RX ORDER — TRISODIUM CITRATE DIHYDRATE AND CITRIC ACID MONOHYDRATE 500; 334 MG/5ML; MG/5ML
30 SOLUTION ORAL 4 TIMES DAILY PRN
Status: DISCONTINUED | OUTPATIENT
Start: 2022-04-06 | End: 2022-04-06 | Stop reason: HOSPADM

## 2022-04-06 RX ADMIN — BETAMETHASONE SODIUM PHOSPHATE AND BETAMETHASONE ACETATE 12 MG: 3; 3 INJECTION, SUSPENSION INTRA-ARTICULAR; INTRALESIONAL; INTRAMUSCULAR at 02:43

## 2022-04-06 RX ADMIN — NIFEDIPINE 10 MG: 10 CAPSULE, LIQUID FILLED ORAL at 20:25

## 2022-04-06 RX ADMIN — SODIUM CHLORIDE, SODIUM LACTATE, POTASSIUM CHLORIDE, AND CALCIUM CHLORIDE 125 ML/HR: .6; .31; .03; .02 INJECTION, SOLUTION INTRAVENOUS at 06:14

## 2022-04-06 RX ADMIN — BUTORPHANOL TARTRATE 1 MG: 1 INJECTION, SOLUTION INTRAMUSCULAR; INTRAVENOUS at 20:25

## 2022-04-06 RX ADMIN — SODIUM CHLORIDE 5 MILLION UNITS: 9 INJECTION, SOLUTION INTRAVENOUS at 02:50

## 2022-04-06 RX ADMIN — NIFEDIPINE 10 MG: 10 CAPSULE, LIQUID FILLED ORAL at 14:52

## 2022-04-06 RX ADMIN — BUTORPHANOL TARTRATE 1 MG: 1 INJECTION, SOLUTION INTRAMUSCULAR; INTRAVENOUS at 12:05

## 2022-04-06 RX ADMIN — SODIUM CHLORIDE, SODIUM LACTATE, POTASSIUM CHLORIDE, AND CALCIUM CHLORIDE 999 ML/HR: .6; .31; .03; .02 INJECTION, SOLUTION INTRAVENOUS at 02:35

## 2022-04-06 RX ADMIN — NIFEDIPINE 10 MG: 10 CAPSULE, LIQUID FILLED ORAL at 08:47

## 2022-04-06 RX ADMIN — ACETAMINOPHEN 650 MG: 325 TABLET ORAL at 18:58

## 2022-04-06 RX ADMIN — SODIUM CHLORIDE 5 MILLION UNITS: 9 INJECTION, SOLUTION INTRAVENOUS at 07:30

## 2022-04-06 RX ADMIN — SODIUM CHLORIDE, SODIUM LACTATE, POTASSIUM CHLORIDE, AND CALCIUM CHLORIDE 125 ML/HR: .6; .31; .03; .02 INJECTION, SOLUTION INTRAVENOUS at 17:51

## 2022-04-06 RX ADMIN — BUTORPHANOL TARTRATE 1 MG: 1 INJECTION, SOLUTION INTRAMUSCULAR; INTRAVENOUS at 06:13

## 2022-04-06 RX ADMIN — NIFEDIPINE 30 MG: 10 CAPSULE ORAL at 02:45

## 2022-04-06 RX ADMIN — SODIUM CHLORIDE 2.5 MILLION UNITS: 9 INJECTION, SOLUTION INTRAVENOUS at 16:04

## 2022-04-06 RX ADMIN — SODIUM CHLORIDE 2.5 MILLION UNITS: 9 INJECTION, SOLUTION INTRAVENOUS at 12:05

## 2022-04-06 RX ADMIN — MAGNESIUM SULFATE IN WATER 1 G/HR: 40 INJECTION, SOLUTION INTRAVENOUS at 03:04

## 2022-04-06 RX ADMIN — MAGNESIUM SULFATE HEPTAHYDRATE 4 G: 40 INJECTION, SOLUTION INTRAVENOUS at 02:46

## 2022-04-06 RX ADMIN — SODIUM CHLORIDE 2.5 MILLION UNITS: 9 INJECTION, SOLUTION INTRAVENOUS at 20:25

## 2022-04-06 NOTE — ASSESSMENT & PLAN NOTE
SVE 1/60/-2 at [facility], patient initially visibly uncomfortable --> 2/60/-2 at [facility]   Cervical length 1 5-1 6cm with contractions, 2 16-2 36 without contractions, no evidence of funneling or debris  S/p Magnesium for fetal neuroprotection  S/p BTM for fetal lung maturity   S/p NICU consult  Penicillin for GBS unknown --> consider d/c today   Continue Procardia for tocolysis  F/u GBS, GC/C, urine culture

## 2022-04-06 NOTE — UTILIZATION REVIEW
Inpatient Admission Authorization Request   NOTIFICATION OF INPATIENT ADMISSION/INPATIENT AUTHORIZATION REQUEST   SERVICING FACILITY:   [address] [place] [address], [address] [facility]  Tax ID: [id number]  NPI: [id number]  Place of Service: Inpatient [facility]  [address]  Place of Service Code: 24     ATTENDING PROVIDER:  Attending Name and NPI#: Stefanie Hernandez [place] [[id number]]  Address: [address] [place] [address], [address] [facility]  Phone: [phone]     UTILIZATION REVIEW CONTACT:  [facility] Utilization   Network Utilization Review Department  Phone: [phone]  Fax [phone]  Email: [email or web address]     PHYSICIAN ADVISORY SERVICES:  FOR SKIX-GY-IBWY REVIEW - MEDICAL NECESSITY DENIAL  Phone: [phone]  Fax: [phone]  Email: [email or web address]     TYPE OF REQUEST:  Inpatient Status     ADMISSION INFORMATION:  ADMISSION DATE/TIME: 4/6/22  2:30 AM  PATIENT DIAGNOSIS CODE/DESCRIPTION:  31 weeks gestation of pregnancy [Z3A 31]  DISCHARGE DATE/TIME: 4/6/2022  4:00 AM   IMPORTANT INFORMATION:  Please contact the [facility] directly with any questions or concerns regarding this request  Department voicemails are confidential     Send requests for admission clinical reviews, concurrent reviews, approvals, and administrative denials due to lack of clinical to fax [phone]

## 2022-04-06 NOTE — ASSESSMENT & PLAN NOTE
- SVE 1/60/-1, patient visibly uncomfortable with contractions  - Cervical length 1 5-1 6cm with contractions, 2 16-2 36 without contractions, no evidence of funneling or debris  - F/u GBS, GC/C, urine culture  - Betamethasone for fetal lung maturation  - IV Magnesium for fetal neuroprotection  - IV Pencillin for GBS unknown  - Procardia for tocolysis  - MFM consult  - Plan to transfer to [facility] for higher level NICU care at <32 weeks

## 2022-04-06 NOTE — PLAN OF CARE
Problem: ANTEPARTUM  Goal: Maintain pregnancy as long as maternal and/or fetal condition is stable  Description: INTERVENTIONS:  - Maternal surveillance  - Fetal surveillance  - Monitor uterine activity  - Medications as ordered  - Bedrest as applicable   Outcome: Progressing     Problem: Potential for Falls  Goal: Patient will remain free of falls  Description: INTERVENTIONS:  - Educate patient/family on patient safety including physical limitations  - Instruct patient to call for assistance with activity   - Consult OT/PT to assist with strengthening/mobility   - Keep Call bell within reach  - Keep bed low and locked with side rails adjusted as appropriate  - Keep care items and personal belongings within reach  - Initiate and maintain comfort rounds  - Make Fall Risk Sign visible to staff    - Apply yellow socks and bracelet for high fall risk patients  - Consider moving patient to room near nurses station  Outcome: Progressing     Problem: PAIN - ADULT  Goal: Verbalizes/displays adequate comfort level or baseline comfort level  Description: Interventions:  - Encourage patient to monitor pain and request assistance  - Assess pain using appropriate pain scale  - Administer analgesics based on type and severity of pain and evaluate response  - Implement non-pharmacological measures as appropriate and evaluate response  - Consider cultural and social influences on pain and pain management  - Notify physician/advanced practitioner if interventions unsuccessful or patient reports new pain  Outcome: Progressing     Problem: INFECTION - ADULT  Goal: Absence or prevention of progression during hospitalization  Description: INTERVENTIONS:  - Assess and monitor for signs and symptoms of infection  - Monitor lab/diagnostic results  - Monitor all insertion sites, i e  indwelling lines, tubes, and drains    - [place] appropriate cooling/warming therapies per order  - Administer medications as ordered  - Instruct and encourage patient and family to use good hand hygiene technique  - Identify and instruct in appropriate isolation precautions for identified infection/condition  Outcome: Progressing  Goal: Absence of fever/infection during neutropenic period  Description: INTERVENTIONS:  - Monitor WBC as ordered     Outcome: Progressing     Problem: SAFETY ADULT  Goal: Patient will remain free of falls  Description: INTERVENTIONS:  - Educate patient/family on patient safety including physical limitations  - Instruct patient to call for assistance with activity   - Consult OT/PT to assist with strengthening/mobility   - Keep Call bell within reach  - Keep bed low and locked with side rails adjusted as appropriate  - Keep care items and personal belongings within reach  - Initiate and maintain comfort rounds  - Make Fall Risk Sign visible to staff    - Apply yellow socks and bracelet for high fall risk patients  - Consider moving patient to room near nurses station  Outcome: Progressing  Goal: Maintain or return to baseline ADL function  Description: INTERVENTIONS:  -  Assess patient's ability to carry out ADLs; assess patient's baseline for ADL function and identify physical deficits which impact ability to perform ADLs (bathing, care of mouth/teeth, toileting, grooming, dressing, etc )  - Assess/evaluate cause of self-care deficits   - Assess range of motion  - Assess patient's mobility; develop plan if impaired  - Assess patient's need for assistive devices and provide as appropriate  - Encourage maximum independence but intervene and supervise when necessary  - Involve family in performance of ADLs  - Assess for home care needs following discharge   - Consider OT consult to assist with ADL evaluation and planning for discharge  - Provide patient education as appropriate  Outcome: Progressing  Goal: Maintains/Returns to pre admission functional level  Description: INTERVENTIONS:  - Perform BMAT or MOVE assessment daily    - Set and communicate daily mobility goal to care team and patient/family/caregiver  - Collaborate with rehabilitation services on mobility goals if consulted    - Out of bed for toileting  - Record patient progress and toleration of activity level   Outcome: Progressing     Problem: Knowledge Deficit  Goal: Patient/family/caregiver demonstrates understanding of disease process, treatment plan, medications, and discharge instructions  Description: Complete learning assessment and assess knowledge base    Interventions:  - Provide teaching at level of understanding  - Provide teaching via preferred learning methods  Outcome: Progressing     Problem: DISCHARGE PLANNING  Goal: Discharge to home or other facility with appropriate resources  Description: INTERVENTIONS:  - Identify barriers to discharge w/patient and caregiver  - Arrange for needed discharge resources and transportation as appropriate  - Identify discharge learning needs   - Arrange for interpretive services to assist at discharge as needed  - Refer to Case Management Department for coordinating discharge planning if the patient needs post-hospital services based on physician/advanced practitioner order or complex needs related to functional status, cognitive ability, or social support system  Outcome: Progressing

## 2022-04-06 NOTE — EMTALA/ACUTE CARE TRANSFER
[address] [place] [address] LABOR AND DELIVERY  [address] [facility] [address]  Dept: [phone]      EMTALA TRANSFER CONSENT    NAME Vickie Edwards                                         1995                              MRN [id number]    I have been informed of my rights regarding examination, treatment, and transfer   by Dr Jeannine Cardona MD    Benefits: Specialized equipment and/or services available at the receiving facility (Include comment)________________________ (higher level NICU care, maternal fetal medicine)    Risks: Potential deterioration of medical condition,Other: (Include comment)__________________________,Loss of IV ( delivery)      Consent for Transfer:  I acknowledge that my medical condition has been evaluated and explained to me by the emergency department physician or other qualified medical person and/or my attending physician, who has recommended that I be transferred to the service of  Accepting Physician: Alexsander Serrano DO at [facility] Name, [id number] : [address] [place] [address]  The above potential benefits of such transfer, the potential risks associated with such transfer, and the probable risks of not being transferred have been explained to me, and I fully understand them  The doctor has explained that, in my case, the benefits of transfer outweigh the risks  I agree to be transferred  I authorize the performance of emergency medical procedures and treatments upon me in both transit and upon arrival at the receiving facility  Additionally, I authorize the release of any and all medical records to the receiving facility and request they be transported with me, if possible  I understand that the safest mode of transportation during a medical emergency is an ambulance and that the Hospital advocates the use of this mode of transport   Risks of traveling to the receiving facility by car, including absence of medical control, life sustaining equipment, such as oxygen, and medical personnel has been explained to me and I fully understand them  (CANDE CORRECT BOX BELOW)  [  ]  I consent to the stated transfer and to be transported by ambulance/helicopter  [  ]  I consent to the stated transfer, but refuse transportation by ambulance and accept full responsibility for my transportation by car  I understand the risks of non-ambulance transfers and I exonerate the Hospital and its staff from any deterioration in my condition that results from this refusal     X___________________________________________    DATE  22  TIME________  Signature of patient or legally responsible individual signing on patient behalf           RELATIONSHIP TO PATIENT_________________________          Provider Certification    NAME Wyn Claude                                        LORRAINE 1995                              MRN [id number]    A medical screening exam was performed on the above named patient  Based on the examination:    Condition Necessitating Transfer  labor <32 weeks    Patient Condition: Pregnant woman having contractions    Reason for Transfer: Level of Care needed not available at this facility    Transfer Requirements: [place], [place]   · Space available and qualified personnel available for treatment as acknowledged by Shanna Hatfield  · Agreed to accept transfer and to provide appropriate medical treatment as acknowledged by       Zak Elena DO  · Appropriate medical records of the examination and treatment of the patient are provided at the time of transfer   [facility], [address] _______  · Transfer will be performed by qualified personnel from    and appropriate transfer equipment as required, including the use of necessary and appropriate life support measures      Provider Certification: I have examined the patient and explained the following risks and benefits of being transferred/refusing transfer to the patient/family:  General risk, such as traffic hazards, adverse weather conditions, rough terrain or turbulence, possible failure of equipment (including vehicle or aircraft), or consequences of actions of persons outside the control of the transport personnel,Risk of worsening condition,The possibility of a transport vehicle being unavailable      Based on these reasonable risks and benefits to the patient and/or the unborn child(jessica), and based upon the information available at the time of the patients examination, I certify that the medical benefits reasonably to be expected from the provision of appropriate medical treatments at another medical facility outweigh the increasing risks, if any, to the individuals medical condition, and in the case of labor to the unborn child, from effecting the transfer      X____________________________________________ DATE 04/06/22        TIME_______      ORIGINAL - SEND TO MEDICAL RECORDS   COPY - SEND WITH PATIENT DURING TRANSFER

## 2022-04-06 NOTE — CONSULTS
Consultation - Maternal Fetal Medicine   José Miguel Mcdowell 33 y o  female MRN: [id number]  Unit/Bed#: -01 Encounter: [id number]  Admit date: 2022  Today's date: 22    Assessment/Plan   Ms Derrick Carey is a 33y o  year-old  at [address] [facility] Day: 1, admitted for  labor  By issue:     labor in third trimester without delivery  Assessment & Plan  - SVE /-2 at [facility], patient visibly uncomfortable with contractions  - Cervical length 1 5-1 6cm with contractions, 2 16-2 36 without contractions, no evidence of funneling or debris  - F/u GBS, GC/C, urine culture  - Betamethasone for fetal lung maturation  - IV Magnesium for fetal neuroprotection  - IV Pencillin for GBS unknown  - Procardia for tocolysis  - Neonatology consult, notified    Request for sterilization  Assessment & Plan  - Patient desires permanent sterilization if  delivery indicated  MA-31 signed 3/29/22    Tobacco smoking complicating pregnancy in first trimester  Assessment & Plan    - Nicoderm patch ordered    Marijuana use  Assessment & Plan    - UDS positive for THC on admission    History of gestational diabetes in prior pregnancy, currently pregnant in second trimester  Assessment & Plan   - Has not completed glucose tolerance test yet   - Blood sugar 83 on admission    * 31 weeks gestation of pregnancy  Assessment & Plan  - Magnesium for fetal neuroprotection  - Procardia for tocolysis  - penicillin for GBS unknown  - Betamethasone for fetal lung maturity  - Last growth scan 3/16/22, EFW 36%  - Continuous fetal monitoring   - Continuous external tocometry   - NICU consult  - Confirmed vertex by transabdominal ultrasound today  - UDS positive for THC on admission    Sara Hanks is a 33y o  year-old [id number] with an RACHEL of Estimated Date of Delivery: 22 at [address] [facility] Day: 1, admitted for  labor   She initially presented to the [facility] this morning with painful contractions, and was subsequently transferred to the [facility] due to the availability of a Level III NICU  Since her initial presentation, she feels that her contractions are becoming more painful and closer together  She denies any vaginal bleeding or other discharge  She reports normal fetal movement  Her obstetric history is notable for five prior term vaginal deliveries  Review of Systems   Constitutional: Negative for chills and fever  HENT: Negative for ear pain and sore throat  Eyes: Negative for pain and visual disturbance  Respiratory: Negative for cough and shortness of breath  Cardiovascular: Negative for chest pain and palpitations  Gastrointestinal: Negative for abdominal pain and vomiting  Genitourinary: Positive for pelvic pain  Negative for dysuria and hematuria  Musculoskeletal: Negative for arthralgias and back pain  Skin: Negative for color change and rash  Neurological: Negative for seizures and syncope  All other systems reviewed and are negative  Other history is as follows:    Historical Information   OB History    Para Term  AB Living   6 5 5 0 0 5   SAB IAB Ectopic Multiple Live Births   0 0 0 0 5      # Outcome Date GA Lbr Nagi/2nd Weight Sex Delivery Anes PTL Lv   6 Current            5 Term 20 37w0d  2675 g (5 lb 14 4 oz) M Vag-Spont None N DELFIN   4 Term 19 37w2d  2705 g (5 lb 15 4 oz) M Vag-Spont None N DELFIN      Birth Comments: FOB1   3 Term 10/08/17 37w4d / 00:52 2608 g (5 lb 12 oz) M Vag-Spont EPI N DELFIN      Birth Comments: FOB2      Complications: Gestational diabetes mellitus   2 Term 14 38w6d  2466 g (5 lb 7 oz) F Vag-Spont EPI N DELFIN      Birth Comments: FOB2   1 Term 13 37w0d  2438 g (5 lb 6 oz) M Vag-Spont EPI Y DELFIN      Birth Comments: FOB1      Complications: Gestational diabetes mellitus     Gynecologic history: No LMP recorded (lmp unknown)  Patient is pregnant       Past Medical History:   Diagnosis Date    Chlamydia     GERD (gastroesophageal reflux disease)     has not req'd tx since 2019    Gestational diabetes      Past Surgical History:   Procedure Laterality Date    APPENDECTOMY      CHOLECYSTECTOMY      CT COLONOSCOPY FLX DX W/COLLJ SPEC WHEN PFRMD N/A 2017    Procedure: EGD AND COLONOSCOPY;  Surgeon: Racheal Srinivasan DO;  Location: [facility] GI LAB; Service: Gastroenterology    CT LAP,APPENDECTOMY N/A 2016    Procedure: APPENDECTOMY LAPAROSCOPIC;  Surgeon: Leroy Lassiter DO;  Location: [facility];  Service: General    CT LAP,DIAGNOSTIC ABDOMEN N/A 3/13/2016    Procedure: LAPAROSCOPY DIAGNOSTIC;  Surgeon: Patti Wallis MD;  [facility]: [facility];  Service: General    RESECTION SMALL BOWEL LAPAROSCOPIC       Social History   Social History     Substance and Sexual Activity   Alcohol Use Not Currently    Comment: socially     Social History     Substance and Sexual Activity   Drug Use Not Currently    Types: Marijuana    Comment: LAST USED 10/2021     Social History     Tobacco Use   Smoking Status Current Every Day Smoker    Packs/day: 0 00    Types: Cigarettes   Smokeless Tobacco Never Used   Tobacco Comment    1-2 cigs/day     Family History: non-contributory    Meds/Allergies   Prior to Admission Medications   Prescriptions Last Dose Informant Patient Reported? Taking?    Prenatal Multivit-Min-Fe-FA (Pre- Formula) TABS  Self No No   Sig: Take 1 tablet by mouth daily   acetaminophen (TYLENOL) 500 mg tablet  Self No No   Sig: Take 1 tablet (500 mg total) by mouth every 6 (six) hours as needed for mild pain   acetaminophen (TYLENOL) 500 mg tablet  Self No No   Sig: Take 1 tablet (500 mg total) by mouth every 6 (six) hours as needed (pain)   nicotine (NICODERM CQ) 7 mg/24hr TD 24 hr patch  Self No No   Sig: Place 1 patch on the skin every 24 hours   ondansetron (ZOFRAN) 4 mg tablet  Self No No   Sig: Take 1 tablet (4 mg total) by mouth every 8 (eight) hours as needed for nausea or vomiting      Facility-Administered Medications: None     Medication Administration - last 24 hours from 04/05/2022 0522 to 04/06/2022 0522       Date/Time Order Dose Route Action Action by     04/06/2022 0434 lactated ringers infusion 125 mL/hr Intravenous Rate/Dose Verify Wesley Wagner RN     04/06/2022 0434 magnesium sulfate 20 g/500 mL infusion (premix) 1 g/hr Intravenous Rate/Dose Verify Wesley Wagner RN        Current Facility-Administered Medications   Medication Dose Route Frequency    acetaminophen (TYLENOL) tablet 650 mg  650 mg Oral Q4H PRN    [START ON 4/7/2022] betamethasone acetate-betamethasone sodium phosphate (CELESTONE) injection 12 mg  12 mg Intramuscular Q24H    calcium carbonate (TUMS) chewable tablet 1,000 mg  1,000 mg Oral Daily PRN    lactated ringers infusion  125 mL/hr Intravenous Continuous    magnesium sulfate 20 g/500 mL infusion (premix)  1 g/hr Intravenous Continuous    nicotine (NICODERM CQ) 7 mg/24hr TD 24 hr patch 1 patch  1 patch Transdermal Q24H    NIFEdipine (PROCARDIA) capsule 10 mg  10 mg Oral Q6H    ondansetron (ZOFRAN) injection 4 mg  4 mg Intravenous Q4H PRN    penicillin G (PFIZERPEN-G) in 0 9% sodium chloride 250 mL IVPB 5 Million Units  5 Million Units Intravenous Once    Followed by    penicillin G (PFIZERPEN-G) in 0 9% sodium chloride 100 mL IVPB 2 5 Million Units  2 5 Million Units Intravenous Q4H    simethicone (MYLICON) chewable tablet 80 mg  80 mg Oral 4x Daily PRN     Allergies   Allergen Reactions    Ibuprofen Anaphylaxis       Objective    Patient Vitals for the past 24 hrs:   BP Temp Temp src Pulse Resp SpO2   04/06/22 0442 105/62 97 6 °F (36 4 °C) Oral 104 18 --   04/06/22 0440 -- -- -- -- -- 98 %   04/06/22 0200 120/70 98 5 °F (36 9 °C) -- 84 22 100 %     Vitals: Blood pressure 105/62, pulse 104, temperature 97 6 °F (36 4 °C), temperature source Oral, resp  rate 18, SpO2 98 %, not currently breastfeeding  There is no height or weight on file to calculate BMI  Physical Exam  Constitutional:       Appearance: Normal appearance  Comments: Appears uncomfortable with contractions   HENT:      Head: Normocephalic and atraumatic  Cardiovascular:      Rate and Rhythm: Normal rate  Pulmonary:      Effort: Pulmonary effort is normal  No respiratory distress  Abdominal:      Palpations: Abdomen is soft  Tenderness: There is no abdominal tenderness  Genitourinary:     General: Normal vulva  Vagina: No vaginal discharge  Musculoskeletal:         General: Normal range of motion  Skin:     General: Skin is warm and dry  Neurological:      Mental Status: She is alert  SVE 2/60/-2    Prenatal Labs:   Blood Type:   Lab Results   Component Value Date    ABO O 04/06/2022     , D (Rh type):   Lab Results   Component Value Date    RH Positive 04/06/2022     HCT/HGB:   Lab Results   Component Value Date    HCT 30 5 (L) 04/06/2022    HGB 11 3 (L) 04/06/2022      ,    Results from last 7 days   Lab Units 04/06/22  0232   WBC Thousand/uL 10 43*   NEUTROS ABS Thousands/µL 8 16*   HEMOGLOBIN g/dL 11 3*   MCV fL 94   PLATELETS Thousands/uL 172     Results from last 7 days   Lab Units 04/06/22  0232   NEUTROS PCT % 77*   MONOS PCT % 6   EOS PCT % 1     Results from last 7 days   Lab Units 04/06/22  0232   POTASSIUM mmol/L 4 2   CHLORIDE mmol/L 103   CO2 mmol/L 26   BUN mg/dL 8   CREATININE mg/dL 0 36*   EGFR ml/min/1 73sq m 149     Results from last 7 days   Lab Units 04/06/22  0232   AST U/L 33   ALT U/L 21   ALK PHOS U/L 193*     Results from last 7 days   Lab Units 04/06/22  0232   PLATELETS Thousands/uL 172                           Fetal data:  Baseline 145 bpm, moderate variability, 10x10 accels, occasional variable decels  Not tracing contractions well    MFM ultrasound report key findings: Growth scan on 3/16:   The LMP of this 33year old,  G7, P5-0-1-5 patient was unknown, her  working RACHEL is JUN 5 2022 and the current gestational age is 35 weeks 3  days by 1st Trimester Sono  A sonographic examination was performed on MAR  16 2022 using real time equipment  The ultrasound examination was  performed using abdominal technique  The patient has a BMI of 22 8  Her  blood pressure today was 113/76      Earliest US on record:  10/29/2021   8W5D  6/5/2022   RACHEL     Cardiac motion was observed at 140 bpm      INDICATIONS     Evaluate missed anatomy  previous IUGR  interval growth     Exam Types     Level I     RESULTS     Fetus # 1 of 1  Vertex presentation  Fetal growth appeared normal  Placenta Location = Posterior  Placenta Grade = II     MEASUREMENTS (* Included In Average GA)     AC              24 4 cm        28 weeks 4 days* (50%)  BPD              7 5 cm        29 weeks 6 days* (82%)  HC              26 4 cm        28 weeks 5 days* (26%)  Femur            5 2 cm        27 weeks 4 days* (14%)     Cerebellum       3 4 cm        28 weeks 4 days     HC/AC           1 08 [1 05 - 1 22]                 (45%)  FL/AC             21 [20 - 24]  FL/BPD            69 [71 - 87]  EFW Hadlock 4   1215 grams - 2 lbs 11 oz                 (36%)     THE AVERAGE GESTATIONAL AGE is 28 weeks 5 days +/- 21 days      AMNIOTIC FLUID     Q1: 3 4      Q2: 3 0      Q3: 4 7      Q4: 2 5  MENDEL Total = 13 5 cm  Amniotic Fluid: Normal     ANATOMY COMMENTS     The prior fetal anatomic survey was limited with respect to evaluation of  the cavum  These anatomic views were seen today as sonographically normal  within the inherent limitations of fetal ultrasound and the anatomic  survey is now complete  No abnormality is noted on level 1 survey      IMPRESSION     [place] IUP  28 weeks and 5 days by this ultrasound  (RACHEL=TANIA 3 2022)  28 weeks and 3 days by 1st Tri Sono  (RACHEL=JUN 5 2022)  Vertex presentation  Fetal growth appeared normal  Regular fetal heart rate of 140 bpm  Posterior placenta     GENERAL COMMENT     Mrs Yumiko Lozano is here today for evaluation of fetal weight and amniotic  fluid, and follow-up of missed anatomy from her fetal anatomic survey  Gestational diabetes screening is ordered but has not yet been completed      A viable fleming intrauterine pregnancy is seen  Estimated fetal weight  and amniotic fluid are within normal limits for gestational age  No fetal  anomalies are visualized on limited survey, which is now complete  Placenta is within normal limits      My recommendations are as follows:     1  Follow-up evaluation of fetal growth is recommended in 6 weeks given  her history of fetal growth restriction in a prior pregnancy  Kick  counting is recommended  I recommend completion of her gestational  diabetes screening as soon as possible  Imaging, EKG, Pathology, and Other Studies: I have personally reviewed pertinent reports              Ruby Neff MD  4/6/2022  5:22 AM

## 2022-04-06 NOTE — UTILIZATION REVIEW
Initial Clinical Review    Admission: Date/Time/Statement:   Admission Orders (From admission, onward)     Ordered        04/06/22 0452  Inpatient Admission  Once                      Orders Placed This Encounter   Procedures    Inpatient Admission     Standing Status:   Standing     Number of Occurrences:   1     Order Specific Question:   Level of Care     Answer:   Med Surg [16]     Order Specific Question:   Estimated length of stay     Answer:   More than 2 Midnights     Order Specific Question:   Certification     Answer:   I certify that inpatient services are medically necessary for this patient for a duration of greater than two midnights  See H&P and MD Progress Notes for additional information about the patient's course of treatment  ED Arrival Information     Expected Arrival Acuity    4/6/2022 01:09 4/6/2022 01:09 -         Means of arrival Escorted by Service Admission type    [facility] Member OB/GYN Elective         Arrival complaint    labor; 31 weeks preg        Chief Complaint   Patient presents with   Simran Gayatri       Initial Presentation: 33 y o  female  G 6 P 5  @ 31 1/7 weeks transferred from [facility] to [facility] as inpatient admission for PTL   SVE 1/60/-1, patient visibly uncomfortable with contractions Cervical length 1 5-1 6cm with contractions, 2 16-2 36 withcontractions,that are increasing in strength  no evidence of funneling or debris  Her obstetrical history is significant for gestational DM, type A1 and fetal growth restriction Plan BTM x 2 doses Contractions q 5 minutes lasting 50-90 seconds  IV MGSO4 starting Procardia as tocolytic continuous monitoring and supportive care         Date: 04-07-22 patient c/o cramping no contraction noted on monitor SVE 1/60/-2 at [place],--> 2/60/-2 at [facility] S/p MGSO4 BTM X 2 doses Procardia for tocolysis NST TID patient denies vag bleeding leasking (+) fetal movement    ED Triage Vitals   Temperature Pulse Respirations Blood Pressure SpO2   04/06/22 0200 04/06/22 0200 04/06/22 0200 04/06/22 0200 04/06/22 0200   98 5 °F (36 9 °C) 84 22 120/70 100 %      Temp Source Heart Rate Source Patient Position - Orthostatic VS BP Location FiO2 (%)   04/06/22 0442 04/06/22 0442 04/06/22 0800 04/06/22 0442 --   Oral Monitor Lying Right arm       Pain Score       04/06/22 0442       10 - Worst Possible Pain          Wt Readings from Last 1 Encounters:   04/06/22 53 1 kg (117 lb)     Additional Vital Signs:   06/22 0800 98 °F (36 7 °C) 80 16 114/69 100 % None (Room air) -- Lying   04/06/22 0759 -- -- -- -- -- -- -- --   04/06/22 0700 -- -- -- -- -- None (Room air) WDL --   04/06/22 0609 97 4 °F (36 3 °C) Abnormal  86 18 98/56 -- None (Room air) -- --   04/06/22 0606 -- 78 -- -- 99 % -- -- --   04/06/22 0504 -- -- -- -- -- -- -- --   04/06/22 0501 -- -- -- -- -- -- -- --   04/06/22 0442 97 6 °F (36 4 °C) 104 18 105/62 -- None (Room air) -- --       Pertinent Labs/Diagnostic Test Results:   No orders to display         Results from last 7 days   Lab Units 04/06/22  0232   WBC Thousand/uL 10 43*   HEMOGLOBIN g/dL 11 3*   HEMATOCRIT % 30 5*   PLATELETS Thousands/uL 172   NEUTROS ABS Thousands/µL 8 16*         Results from last 7 days   Lab Units 04/06/22  0232   SODIUM mmol/L 137   POTASSIUM mmol/L 4 2   CHLORIDE mmol/L 103   CO2 mmol/L 26   ANION GAP mmol/L 8   BUN mg/dL 8   CREATININE mg/dL 0 36*   EGFR ml/min/1 73sq m 149   CALCIUM mg/dL 8 4     Results from last 7 days   Lab Units 04/06/22  0232   AST U/L 33   ALT U/L 21   ALK PHOS U/L 193*   TOTAL PROTEIN g/dL 7 2   ALBUMIN g/dL 2 7*   TOTAL BILIRUBIN mg/dL 0 42         Results from last 7 days   Lab Units 04/06/22  0232   GLUCOSE RANDOM mg/dL 83     Results from last 7 days   Lab Units 04/06/22  0319   AMPH/METH  Negative   BARBITURATE UR  Negative   BENZODIAZEPINE UR  Negative   COCAINE UR  Negative   METHADONE URINE  Negative   OPIATE UR  Negative   PCP UR  Negative   THC UR Positive*       OB Treatment:   Medication Administration from 2022 0142 to 2022 0944       Date/Time Order Dose Route Action     2022 0618 nicotine (NICODERM CQ) 7 mg/24hr TD 24 hr patch 1 patch 1 patch Transdermal Not Given     2022 1302 lactated ringers infusion 125 mL/hr Intravenous New Bag     2022 0434 lactated ringers infusion 125 mL/hr Intravenous Rate/Dose Verify     2022 0730 penicillin G (PFIZERPEN-G) in 0 9% sodium chloride 250 mL IVPB 5 Million Units 5 Million Units Intravenous New Bag     2022 0645 penicillin G (PFIZERPEN-G) in 0 9% sodium chloride 250 mL IVPB 5 Million Units 5 Million Units Intravenous Not Given     2022 0700 penicillin G (PFIZERPEN-G) in 0 9% sodium chloride 100 mL IVPB 2 5 Million Units 2 5 Million Units Intravenous Not Given     2022 0847 NIFEdipine (PROCARDIA) capsule 10 mg 10 mg Oral Given     2022 0434 magnesium sulfate 20 g/500 mL infusion (premix) 1 g/hr Intravenous Rate/Dose Verify     2022 0613 butorphanol (STADOL) injection 1 mg 1 mg Intravenous Given        Past Medical History:   Diagnosis Date    Chlamydia     GERD (gastroesophageal reflux disease)     has not req'd tx since 2019    Gestational diabetes     Varicella     had chicken pox as child and vaccine     Present on Admission:    labor in third trimester without delivery   Marijuana use   Tobacco smoking complicating pregnancy in first trimester      Admitting Diagnosis: Abdominal pain [R10 9]  31 weeks gestation of pregnancy [Z3A 31]  Age/Sex: 33 y o  female  Admission Orders:  Scheduled Medications:  [START ON 2022] betamethasone acetate-betamethasone sodium phosphate, 12 mg, Intramuscular, Q24H  influenza vaccine, 0 5 mL, Intramuscular, Once  nicotine, 1 patch, Transdermal, Q24H  NIFEdipine, 10 mg, Oral, Q6H  penicillin G, 2 5 Million Units, Intravenous, Q4H      Continuous IV Infusions:  lactated ringers, 125 mL/hr, Intravenous, Continuous  magnesium sulfate, 1 g/hr, Intravenous, Continuous      PRN Meds:  acetaminophen, 650 mg, Oral, Q4H PRN  calcium carbonate, 1,000 mg, Oral, Daily PRN  ondansetron, 4 mg, Intravenous, Q4H PRN  simethicone, 80 mg, Oral, 4x Daily PRN        IP CONSULT TO NEONATOLOGY  IP CONSULT TO PERINATOLOGY  Continuous external fetal monitoring  VS, I/O lung assessments and deep tendon reflexes q 2 hrs while on MGSO 4    Network Utilization Review Department  ATTENTION: Please call with any questions or concerns to [phone] and carefully listen to the prompts so that you are directed to the right person  All voicemails are confidential   Africa Padgett all requests for admission clinical reviews, approved or denied determinations and any other requests to dedicated fax number below belonging to the campus where the patient is receiving treatment   List of dedicated fax numbers for the Facilities:  [address] DENIALS (Administrative/Medical Necessity) [phone]   [address] (Maternity/NICU/Pediatrics) [phone]   [address] [place] [address]  [address] [place] [address] [address] [place] [address] [address] [facility] [place] [address] [phone]

## 2022-04-06 NOTE — PLAN OF CARE
Problem: ANTEPARTUM  Goal: Maintain pregnancy as long as maternal and/or fetal condition is stable  Description: INTERVENTIONS:  - Maternal surveillance  - Fetal surveillance  - Monitor uterine activity  - Medications as ordered  - Bedrest  Outcome: Progressing     Problem: BIRTH - VAGINAL/ SECTION  Goal: Fetal and maternal status remain reassuring during the birth process  Description: INTERVENTIONS:  - Monitor vital signs  - Monitor fetal heart rate  - Monitor uterine activity  - Monitor labor progression (vaginal delivery)  - DVT prophylaxis  - Antibiotic prophylaxis  Outcome: Progressing  Goal: Emotionally satisfying birthing experience for mother/fetus  Description: Interventions:  - Assess, plan, implement and evaluate the nursing care given to the patient in labor  - Advocate the philosophy that each childbirth experience is a unique experience and support the family's chosen level of involvement and control during the labor process   - Actively participate in both the patient's and family's teaching of the birth process  - Consider cultural, Nondenominational and age-specific factors and plan care for the patient in labor  Outcome: Progressing     Problem: Potential for Falls  Goal: Patient will remain free of falls  Description: INTERVENTIONS:  - Educate patient/family on patient safety including physical limitations  - Instruct patient to call for assistance with activity   - Consult OT/PT to assist with strengthening/mobility   - Keep Call bell within reach  - Keep bed low and locked with side rails adjusted as appropriate  - Keep care items and personal belongings within reach  - Initiate and maintain comfort rounds  - Make Fall Risk Sign visible to staff  - Offer Toileting every  Hours, in advance of need  - Initiate/Maintain alarm  - Obtain necessary fall risk management equipment:   - Apply yellow socks and bracelet for high fall risk patients  - Consider moving patient to room near nurses Need for prophylactic measure station  Outcome: Progressing     Problem: PAIN - ADULT  Goal: Verbalizes/displays adequate comfort level or baseline comfort level  Description: Interventions:  - Encourage patient to monitor pain and request assistance  - Assess pain using appropriate pain scale  - Administer analgesics based on type and severity of pain and evaluate response  - Implement non-pharmacological measures as appropriate and evaluate response  - Consider cultural and social influences on pain and pain management  - Notify physician/advanced practitioner if interventions unsuccessful or patient reports new pain  Outcome: Progressing     Problem: INFECTION - ADULT  Goal: Absence or prevention of progression during hospitalization  Description: INTERVENTIONS:  - Assess and monitor for signs and symptoms of infection  - Monitor lab/diagnostic results  - Monitor all insertion sites, i e  indwelling lines, tubes, and drains  - Monitor endotracheal if appropriate and nasal secretions for changes in amount and color  - [place] appropriate cooling/warming therapies per order  - Administer medications as ordered  - Instruct and encourage patient and family to use good hand hygiene technique  - Identify and instruct in appropriate isolation precautions for identified infection/condition  Outcome: Progressing  Goal: Absence of fever/infection during neutropenic period  Description: INTERVENTIONS:  - Monitor WBC    Outcome: Progressing     Problem: SAFETY ADULT  Goal: Patient will remain free of falls  Description: INTERVENTIONS:  - Educate patient/family on patient safety including physical limitations  - Instruct patient to call for assistance with activity   - Consult OT/PT to assist with strengthening/mobility   - Keep Call bell within reach  - Keep bed low and locked with side rails adjusted as appropriate  - Keep care items and personal belongings within reach  - Initiate and maintain comfort rounds  - Make Fall Risk Sign visible to staff  - Offer Toileting every  Hours, in advance of need  - Initiate/Maintain alarm  - Obtain necessary fall risk management equipment:   - Apply yellow socks and bracelet for high fall risk patients  - Consider moving patient to room near nurses station  Outcome: Progressing  Goal: Maintain or return to baseline ADL function  Description: INTERVENTIONS:  -  Assess patient's ability to carry out ADLs; assess patient's baseline for ADL function and identify physical deficits which impact ability to perform ADLs (bathing, care of mouth/teeth, toileting, grooming, dressing, etc )  - Assess/evaluate cause of self-care deficits   - Assess range of motion  - Assess patient's mobility; develop plan if impaired  - Assess patient's need for assistive devices and provide as appropriate  - Encourage maximum independence but intervene and supervise when necessary  - Involve family in performance of ADLs  - Assess for home care needs following discharge   - Consider OT consult to assist with ADL evaluation and planning for discharge  - Provide patient education as appropriate  Outcome: Progressing  Goal: Maintains/Returns to pre admission functional level  Description: INTERVENTIONS:  - Perform BMAT or MOVE assessment daily    - Set and communicate daily mobility goal to care team and patient/family/caregiver  - Collaborate with rehabilitation services on mobility goals if consulted  - Perform Range of Motion  times a day  - Reposition patient every  hours    - Dangle patient  times a day  - Stand patient  times a day  - Ambulate patient  times a day  - Out of bed to chair  times a day   - Out of bed for meals  times a day  - Out of bed for toileting  - Record patient progress and toleration of activity level   Outcome: Progressing     Problem: Knowledge Deficit  Goal: Patient/family/caregiver demonstrates understanding of disease process, treatment plan, medications, and discharge instructions  Description: Complete learning assessment and assess knowledge base    Interventions:  - Provide teaching at level of understanding  - Provide teaching via preferred learning methods  Outcome: Progressing     Problem: DISCHARGE PLANNING  Goal: Discharge to home or other facility with appropriate resources  Description: INTERVENTIONS:  - Identify barriers to discharge w/patient and caregiver  - Arrange for needed discharge resources and transportation as appropriate  - Identify discharge learning needs (meds, wound care, etc )  - Arrange for interpretive services to assist at discharge as needed  - Refer to Case Management Department for coordinating discharge planning if the patient needs post-hospital services based on physician/advanced practitioner order or complex needs related to functional status, cognitive ability, or social support system  Outcome: Progressing Need for prophylactic measure Need for prophylactic measure Need for prophylactic measure

## 2022-04-06 NOTE — PLAN OF CARE
Problem: ANTEPARTUM  Goal: Maintain pregnancy as long as maternal and/or fetal condition is stable  Description: INTERVENTIONS:  - Maternal surveillance  - Fetal surveillance  - Monitor uterine activity  - Medications as ordered  - Bedrest  Outcome: Progressing     Problem: BIRTH - VAGINAL/ SECTION  Goal: Fetal and maternal status remain reassuring during the birth process  Description: INTERVENTIONS:  - Monitor vital signs  - Monitor fetal heart rate  - Monitor uterine activity  - Monitor labor progression (vaginal delivery)  - DVT prophylaxis  - Antibiotic prophylaxis  Outcome: Progressing  Goal: Emotionally satisfying birthing experience for mother/fetus  Description: Interventions:  - Assess, plan, implement and evaluate the nursing care given to the patient in labor  - Advocate the philosophy that each childbirth experience is a unique experience and support the family's chosen level of involvement and control during the labor process   - Actively participate in both the patient's and family's teaching of the birth process  - Consider cultural, Sabianism and age-specific factors and plan care for the patient in labor  Outcome: Progressing

## 2022-04-06 NOTE — COVID-19 HEALTH CARE FACILITY TRANSFER FORM
[facility] to [facility] Transfer - COVID-19 Assessment             Name of Patient: Maricel Orellana                : 1995          Transport Date: 22       Has the patient been laboratory tested for COVID-19? [x]  NO  If No,Test was not indicated per  CDC Testing Criteria   May Transfer Patient   [] YES  If Tested Results below     COVID-19 References              SARS-CoV-2   Date/Time Value Ref Range Status   10/29/2021 09:50 AM Negative Negative Final            Question is to be completed for any patient who tests positive for COVID-19        1  [x] Yes [May Transfer] [] No [May Not Transfer]          Question is to be completed for any patient who is tested for COVID-19            2    [] Yes [May Not Transfer] [x] No [May Transfer]          Signature of Physician or Health Care Professional: Manuelito Hines MD 22          Form updated as of 3/24/2020

## 2022-04-06 NOTE — H&P
H&P- OB/GYN  Emir Blood 33 y o  female MRN: [id number]  Unit/Bed#: L&D 325-01 Encounter: [id number]  Admit date: 2022  Today's date: 22    Assessment/Plan   Ms Norma Jaramillo is a 33y o  year-old  at [address] [facility] Day: 1, admitted for  labor  By issue:    *  labor in third trimester without delivery  Assessment & Plan  - SVE , patient visibly uncomfortable with contractions  - Cervical length 1 5-1 6cm with contractions, 2 16-2 36 without contractions, no evidence of funneling or debris  - F/u GBS, GC/C, urine culture  - Betamethasone for fetal lung maturation  - IV Magnesium for fetal neuroprotection  - IV Pencillin for GBS unknown  - Procardia for tocolysis  - MFM consult  - Plan to transfer to [facility] for higher level NICU care at <32 weeks     31 weeks gestation of pregnancy  Assessment & Plan  - See PTL plan  - Last growth scan 3/16/22, EFW 36%  - Continuous fetal monitoring   - Continuous external tocometry   - NICU consult    Request for sterilization  Assessment & Plan  Patient desires permanent sterilization if  delivery indicated  MA-31 signed 3/29/22    Marijuana use  Assessment & Plan  Rapid UDS ordered    History of gestational diabetes in prior pregnancy, currently pregnant in second trimester  Assessment & Plan  Has not yet completed 1h gtt         Chief Complaint   Patient presents with    Contractions     HPI    Liz Orosco is a 33y o  year-old  with an RACHEL of Estimated Date of Delivery: 22 at [address] [facility] Day: 1, admitted for  labor  Patient reports painful contractions starting yesterday which have worsened over the last several hours, prompting presentation to [facility] L&D Triage  She tried taking a hot shower which did not resolve her symptoms  She denies loss of fluid, vaginal bleeding or decreased fetal movement  She denies recent intercourse or anything in the vagina   Denies dysuria, hematuria, malodorous or purulent vaginal discharge  Patient states the contractions she is feeling now are similar to labor contractions  Her current pregnancy is significant for grand multiparity, tobacco and marijuana use  Her obstetrical history is significant for gestational DM, type A1 and fetal growth restriction  She has not yet completed her 1h gtt in this pregnancy  Review of Systems   Constitutional: Negative for chills and fever  HENT: Negative  Eyes: Negative for visual disturbance  Respiratory: Negative for shortness of breath  Cardiovascular: Negative for chest pain  Gastrointestinal: Negative for abdominal pain, constipation, diarrhea, nausea and vomiting  Genitourinary: Positive for pelvic pain and vaginal pain  Negative for dysuria, hematuria, vaginal bleeding and vaginal discharge  Neurological: Negative for headaches  Other history is as follows:    Historical Information   OB History    Para Term  AB Living   6 5 5 0 0 5   SAB IAB Ectopic Multiple Live Births   0 0 0 0 5      # Outcome Date GA Lbr Nagi/2nd Weight Sex Delivery Anes PTL Lv   6 Current            5 Term 20 37w0d  2675 g (5 lb 14 4 oz) M Vag-Spont None N DELFIN   4 Term 19 37w2d  2705 g (5 lb 15 4 oz) M Vag-Spont None N DELFIN      Birth Comments: FOB1   3 Term 10/08/17 37w4d / 00:52 2608 g (5 lb 12 oz) M Vag-Spont EPI N DELFIN      Birth Comments: FOB2      Complications: Gestational diabetes mellitus   2 Term 14 38w6d  2466 g (5 lb 7 oz) F Vag-Spont EPI N DELFIN      Birth Comments: FOB2   1 Term 13 37w0d  2438 g (5 lb 6 oz) M Vag-Spont EPI Y DELFIN      Birth Comments: FOB1      Complications: Gestational diabetes mellitus     Gynecologic history: No LMP recorded (lmp unknown)  Patient is pregnant       Past Medical History:   Diagnosis Date    Chlamydia     GERD (gastroesophageal reflux disease)     has not req'd tx since 2019    Gestational diabetes      Past Surgical History:   Procedure Laterality Date  APPENDECTOMY      CHOLECYSTECTOMY      ME COLONOSCOPY FLX DX W/COLLJ SPEC WHEN PFRMD N/A 2017    Procedure: EGD AND COLONOSCOPY;  Surgeon: Marnie Leon DO;  Location: [facility] GI LAB; Service: Gastroenterology    ME LAP,APPENDECTOMY N/A 2016    Procedure: APPENDECTOMY LAPAROSCOPIC;  Surgeon: Lacy Schwarz DO;  Location: [facility];  Service: General    ME LAP,DIAGNOSTIC ABDOMEN N/A 3/13/2016    Procedure: LAPAROSCOPY DIAGNOSTIC;  Surgeon: Merlinda Garin, MD;  Location: [facility];  Service: General    RESECTION SMALL BOWEL LAPAROSCOPIC       Social History   Social History     Substance and Sexual Activity   Alcohol Use Not Currently    Comment: socially     Social History     Substance and Sexual Activity   Drug Use Not Currently    Types: Marijuana    Comment: LAST USED 10/2021     Social History     Tobacco Use   Smoking Status Current Every Day Smoker    Packs/day: 0 00    Types: Cigarettes   Smokeless Tobacco Never Used   Tobacco Comment    1-2 cigs/day     Family History: non-contributory    Meds/Allergies   Prior to Admission Medications   Prescriptions Last Dose Informant Patient Reported? Taking?    Prenatal Multivit-Min-Fe-FA (Pre-Ana Formula) TABS  Self No No   Sig: Take 1 tablet by mouth daily   acetaminophen (TYLENOL) 500 mg tablet 2022 at Unknown time Self No Yes   Sig: Take 1 tablet (500 mg total) by mouth every 6 (six) hours as needed for mild pain   acetaminophen (TYLENOL) 500 mg tablet 2022 at Unknown time Self No Yes   Sig: Take 1 tablet (500 mg total) by mouth every 6 (six) hours as needed (pain)   nicotine (NICODERM CQ) 7 mg/24hr TD 24 hr patch Unknown at Unknown time Self No No   Sig: Place 1 patch on the skin every 24 hours   ondansetron (ZOFRAN) 4 mg tablet 2022 at Unknown time Self No Yes   Sig: Take 1 tablet (4 mg total) by mouth every 8 (eight) hours as needed for nausea or vomiting      Facility-Administered Medications: None       Current Facility-Administered Medications   Medication Dose Route Frequency    betamethasone acetate-betamethasone sodium phosphate (CELESTONE) injection 12 mg  12 mg Intramuscular Q24H    calcium gluconate 10 % injection 1 g  1 g Intravenous Once PRN    lactated ringers infusion  125 mL/hr Intravenous Continuous    magnesium sulfate 20 g/500 mL infusion (premix)  1 g/hr Intravenous Continuous    magnesium sulfate 4 g/100 mL IVPB (premix) 4 g  4 g Intravenous Once    NIFEdipine (PROCARDIA) capsule 30 mg  30 mg Oral Once    Followed by    NIFEdipine (PROCARDIA) capsule 10 mg  10 mg Oral Q6H    ondansetron (ZOFRAN) injection 4 mg  4 mg Intravenous Q8H PRN    penicillin G (PFIZERPEN-G) in 0 9% sodium chloride 250 mL IVPB 5 Million Units  5 Million Units Intravenous Once    Followed by    penicillin G (PFIZERPEN-G) in 0 9% sodium chloride 100 mL IVPB 2 5 Million Units  2 5 Million Units Intravenous Q4H    sod citrate-citric acid (BICITRA) oral solution 30 mL  30 mL Oral 4x Daily PRN     Allergies   Allergen Reactions    Ibuprofen Anaphylaxis       Objective    Patient Vitals for the past 24 hrs:   BP Temp Temp src Pulse Resp Height Weight   04/06/22 0224 109/70 98 2 °F (36 8 °C) Oral 80 18 4' 11" (1 499 m) 53 1 kg (117 lb)     Vitals: Blood pressure 109/70, pulse 80, temperature 98 2 °F (36 8 °C), temperature source Oral, resp  rate 18, height 4' 11" (1 499 m), weight 53 1 kg (117 lb), not currently breastfeeding  Body mass index is 23 63 kg/m²  Physical Exam  Vitals reviewed  Exam conducted with a chaperone present  Constitutional:       General: She is not in acute distress  Appearance: She is well-developed  Comments: Visibly uncomfortable with contractions every 5 minutes   HENT:      Head: Normocephalic and atraumatic  Eyes:      General: No scleral icterus  Conjunctiva/sclera: Conjunctivae normal    Cardiovascular:      Rate and Rhythm: Normal rate     Pulmonary:      Effort: Pulmonary effort is normal  No respiratory distress  Abdominal:      Palpations: Abdomen is soft  Tenderness: There is no abdominal tenderness  Comments: Palpable contractions   Genitourinary:     General: Normal vulva  Vagina: No signs of injury  No vaginal discharge, erythema, tenderness or bleeding  Cervix: No cervical motion tenderness, friability, lesion or erythema  Comments: Multiparous cervix, visually dilated 1 cm  Microscopic evaluation of discharge negative for clue cells, yeast, trichomonas, ferning  Musculoskeletal:      Right lower leg: No edema  Left lower leg: No edema  Skin:     General: Skin is warm and dry  Neurological:      General: No focal deficit present  Mental Status: She is alert and oriented to person, place, and time  Psychiatric:         Mood and Affect: Mood normal          Behavior: Behavior normal        Dilation: 1cm, Effacement:  60%, Station:  -1, Consistency: soft and Position:  anterior    Prenatal Labs: I have personally reviewed pertinent reports       Blood Type: O Positive  Antibody: Negative  Gonorrhea: Negative  Chlamydia: Negative  HIV: Non-reactive  HBsAg: Non-reactive  RPR: Non-reactive  1 Hour Glucose: Not completed  GBS: unknown    Lab Results   Component Value Date    WBC 10 43 (H) 04/06/2022    HGB 11 3 (L) 04/06/2022    HCT 30 5 (L) 04/06/2022    MCV 94 04/06/2022     04/06/2022     Lab Results   Component Value Date     11/21/2015    SODIUM 137 04/06/2022    K 4 2 04/06/2022     04/06/2022    CO2 26 04/06/2022    ANIONGAP 9 11/21/2015    AGAP 8 04/06/2022    BUN 8 04/06/2022    CREATININE 0 36 (L) 04/06/2022    GLUC 83 04/06/2022    GLUF 76 09/29/2017    CALCIUM 8 4 04/06/2022    AST 33 04/06/2022    ALT 21 04/06/2022    ALKPHOS 193 (H) 04/06/2022    PROT 8 2 11/21/2015    TP 7 2 04/06/2022    BILITOT 0 50 11/21/2015    TBILI 0 42 04/06/2022    EGFR 149 04/06/2022     TVUS:  - Cervical length 1 5-1 6 cm with contractions, 2 1-2 36 without contractions  - No funneling or debris  - Vertex presentation    Fetal data:  Nonstress test: date 04/06/22   Baseline: 145 bpm  Variability: moderate  Accelerations: present, 10x10  Decelerations: absent  Contractions: not detected by Daril Cristina secondary to patient discomfort/moving  Assessment: reactive  Plan: continuous    MFM ultrasound report key findings: interval growth from 3/16/22 at 28w3d gestational age  - Posterior placenta  MEASUREMENTS (* Included In Average GA)     AC              24 4 cm        28 weeks 4 days* (50%)  BPD              7 5 cm        29 weeks 6 days* (82%)  HC              26 4 cm        28 weeks 5 days* (26%)  Femur            5 2 cm        27 weeks 4 days* (14%)     Cerebellum       3 4 cm        28 weeks 4 days     HC/AC           1 08 [1 05 - 1 22]                 (45%)  FL/AC             21 [20 - 24]  FL/BPD            69 [71 - 87]  EFW Hadlock 4   1215 grams - 2 lbs 11 oz                 (36%)    Imaging, EKG, Pathology, and Other Studies: I have personally reviewed pertinent reports              Jordyn Ro MD  4/6/2022  2:31 AM

## 2022-04-06 NOTE — UTILIZATION REVIEW
Initial Clinical Review    Admission: Date/Time/Statement:   Admission Orders (From admission, onward)     Ordered        22 0229  Inpatient Admission  Once                      Orders Placed This Encounter   Procedures    Inpatient Admission     Standing Status:   Standing     Number of Occurrences:   1     Order Specific Question:   Level of Care     Answer:   Med Surg [16]     Order Specific Question:   Estimated length of stay     Answer:   More than 2 Midnights     Order Specific Question:   Certification     Answer:   I certify that inpatient services are medically necessary for this patient for a duration of greater than two midnights  See H&P and MD Progress Notes for additional information about the patient's course of treatment  ED Arrival Information     Patient not seen in ED                     Chief Complaint   Patient presents with    Contractions       Initial Presentation: 33 y o  female  [id number] at 31w3d admit Inpatient from  labor reports painful contractions beginning 1 day prior worsening over several hours  Attempted hot shower for unsuccessful relief  Contractions similar to prior labor contractions  PMH grand multiparity, tobacco & marijuana use, GDM1 w fetal restriction  Physical Exam In triage initial /-1, patient visibly uncomfortable with contractions Q 5 MIN   Multiparous cervix, visually dilated 1 cm  Microscopic evaluation of discharge negative for clue cells, yeast, trichomonas, ferning  TVUS Cervical length 1 5-1 6 cm with contractions, 2 1-2 36 without contractions    PLAN  TRANSFER TO [facility] for higher level of NICU care <32 wk   BTM, IV MAG, IV penecillin for unk GBS, Procardia; MFM consult, continuous fetal monitoring  Fetal data:  Nonstress test: date 22   Baseline: 145 bpm  Variability: moderate  Accelerations: present, 10x10  Decelerations: absent  Contractions: not detected by toco secondary to patient discomfort/moving  Assessment: reactive  Plan: continuous   Triage Vitals   Temperature Pulse Respirations Blood Pressure SpO2   04/06/22 0224 04/06/22 0224 04/06/22 0224 04/06/22 0224 04/06/22 0245   98 2 °F (36 8 °C) 80 18 109/70 100 %      Temp Source Heart Rate Source Patient Position - Orthostatic VS BP Location FiO2 (%)   04/06/22 0224 04/06/22 0224 04/06/22 0224 04/06/22 0224 --   Oral Monitor Lying Right arm       Pain Score       04/06/22 0224       9          Wt Readings from Last 1 Encounters:   04/06/22 53 1 kg (117 lb)     Additional Vital Signs:   Date/Time Temp Pulse Resp BP SpO2 O2 Device Patient Position - Orthostatic VS   04/06/22 0310 -- 100 -- 112/57 -- -- --   04/06/22 0245 -- -- -- -- 100 % None (Room air) --   04/06/22 0224 98 2 °F (36 8 °C) 80 18 109/70 -- -- Lying       Weights (last 14 days) before discharge    Date/Time Weight Height   04/06/22 0224 53 1 kg (117 lb) 4' 11" (1 499 m)       Pertinent Labs/Diagnostic Test Results:   No orders to display         Results from last 7 days   Lab Units 04/06/22  0232   WBC Thousand/uL 10 43*   HEMOGLOBIN g/dL 11 3*   HEMATOCRIT % 30 5*   PLATELETS Thousands/uL 172   NEUTROS ABS Thousands/µL 8 16*         Results from last 7 days   Lab Units 04/06/22  0232   SODIUM mmol/L 137   POTASSIUM mmol/L 4 2   CHLORIDE mmol/L 103   CO2 mmol/L 26   ANION GAP mmol/L 8   BUN mg/dL 8   CREATININE mg/dL 0 36*   EGFR ml/min/1 73sq m 149   CALCIUM mg/dL 8 4     Results from last 7 days   Lab Units 04/06/22  0232   AST U/L 33   ALT U/L 21   ALK PHOS U/L 193*   TOTAL PROTEIN g/dL 7 2   ALBUMIN g/dL 2 7*   TOTAL BILIRUBIN mg/dL 0 42         Results from last 7 days   Lab Units 04/06/22  0232   GLUCOSE RANDOM mg/dL 83             No results found for: BETA-HYDROXYBUTYRATE                           Results from last 7 days   Lab Units 04/06/22  1357   PROTIME seconds 13 1   INR  0 99   PTT seconds 32           Results from last 7 days   Lab Units 04/06/22  0319   AMPH/METH  Negative   BARBITURATE UR Negative   BENZODIAZEPINE UR  Negative   COCAINE UR  Negative   METHADONE URINE  Negative   OPIATE UR  Negative   PCP UR  Negative   THC UR  Positive*       ED Treatment:   Medication Administration - No Administrations Displayed (No Start Event Found)     None        Past Medical History:   Diagnosis Date    Chlamydia     GERD (gastroesophageal reflux disease)     has not req'd tx since     Gestational diabetes     Varicella     had chicken pox as child and vaccine     Present on Admission:    labor in third trimester without delivery   Marijuana use      Admitting Diagnosis: 31 weeks gestation of pregnancy [Z3A 31]  Age/Sex: 33 y o  female  Admission Orders:  continuous external uterine contraction & fetal HR monitoring  BTM    Scheduled Medications:  IM betamethasone acetate-betamethasone sodium phosphate (CELESTONE) injection 12 mg  0243  Dose: 12 mg  Freq: Every 24 hours Route: IM    IV  magnesium sulfate 4 g/100 mL IVPB (premix) 4 g  0246  Dose: 4 g  Freq: Once Route: IV    PO NIFEdipine (PROCARDIA) capsule 30 mg  0245  Dose: 30 mg  Freq: Once Route: PO  IV  penicillin G (PFIZERPEN-G) in 0 9% sodium chloride 250 mL IVPB 5 Million Units  0250  Dose: 5 Million Units  Continuous IV Infusions:  lactated ringers infusion  0235 0427  Rate: 125 mL/hr Dose: 125 mL/hr  Freq: Continuous Route: IV    IV magnesium sulfate 20 g/500 mL infusion (premix)  0304 & DC 0427  Rate: 25 mL/hr Dose: 1 g/hr  Freq: Continuous Route: IV  PRN Meds:  Network Utilization Review Department  ATTENTION: Please call with any questions or concerns to [phone] and carefully listen to the prompts so that you are directed to the right person  All voicemails are confidential   [employer] all requests for admission clinical reviews, approved or denied determinations and any other requests to dedicated fax number below belonging to the campus where the patient is receiving treatment   List of dedicated fax numbers for the Facilities:  FACILITY NAME UR FAX NUMBER   ADMISSION DENIALS (Administrative/Medical Necessity) [phone]   [address] (Maternity/NICU/Pediatrics) [address] [facility],[address]  [address] [place] [address] [address] [place] [address] [address] [facility] [address] [phone]

## 2022-04-06 NOTE — ASSESSMENT & PLAN NOTE
- See PTL plan  - Last growth scan 3/16/22, EFW 36%  - Continuous fetal monitoring   - Continuous external tocometry   - NICU consult

## 2022-04-06 NOTE — OB LABOR/OXYTOCIN SAFETY PROGRESS
Labor Progress Note - Radha Frazier 33 y o  female MRN: [id number]    Unit/Bed#: -01 Encounter: [id number]       Contraction Frequency (minutes): 2-3 (irregular, irritability present)  Contraction Quality: Mild  Tachysystole: No   Cervical Dilation: 2        Cervical Effacement: 60  Fetal Station: -2  Baseline Rate: 125 bpm  Fetal Heart Rate: 122 BPM  FHR Category: Category I               Vital Signs:Appears uncomfortable with contractions  Vitals:    04/06/22 1000   BP: 107/61   Pulse: 83   Resp: 18   Temp: 97 8 °F (36 6 °C)   SpO2: 99%       Notes/comments:    Checked her CVX because she was feeling more uncomfortable and requesting mary carmen Pina MD 4/6/2022 11:53 AM

## 2022-04-07 VITALS
OXYGEN SATURATION: 98 % | BODY MASS INDEX: 23.59 KG/M2 | HEIGHT: 59 IN | HEART RATE: 78 BPM | WEIGHT: 117 LBS | RESPIRATION RATE: 18 BRPM | DIASTOLIC BLOOD PRESSURE: 60 MMHG | SYSTOLIC BLOOD PRESSURE: 100 MMHG | TEMPERATURE: 97.9 F

## 2022-04-07 LAB
BACTERIA UR CULT: NORMAL
GP B STREP DNA SPEC QL NAA+PROBE: NEGATIVE

## 2022-04-07 PROCEDURE — 99232 SBSQ HOSP IP/OBS MODERATE 35: CPT | Performed by: OBSTETRICS & GYNECOLOGY

## 2022-04-07 PROCEDURE — 59025 FETAL NON-STRESS TEST: CPT | Performed by: OBSTETRICS & GYNECOLOGY

## 2022-04-07 PROCEDURE — NC001 PR NO CHARGE: Performed by: OBSTETRICS & GYNECOLOGY

## 2022-04-07 RX ORDER — NIFEDIPINE 10 MG/1
10 CAPSULE ORAL EVERY 6 HOURS
Qty: 24 CAPSULE | Refills: 0 | Status: SHIPPED | OUTPATIENT
Start: 2022-04-07 | End: 2022-05-06 | Stop reason: HOSPADM

## 2022-04-07 RX ORDER — ACETAMINOPHEN 500 MG
500 TABLET ORAL EVERY 6 HOURS PRN
Qty: 30 TABLET | Refills: 0 | Status: SHIPPED | OUTPATIENT
Start: 2022-04-07 | End: 2022-05-11

## 2022-04-07 RX ADMIN — MAGNESIUM SULFATE IN WATER 1 G/HR: 40 INJECTION, SOLUTION INTRAVENOUS at 00:14

## 2022-04-07 RX ADMIN — SODIUM CHLORIDE 2.5 MILLION UNITS: 9 INJECTION, SOLUTION INTRAVENOUS at 04:02

## 2022-04-07 RX ADMIN — BETAMETHASONE SODIUM PHOSPHATE AND BETAMETHASONE ACETATE 12 MG: 3; 3 INJECTION, SUSPENSION INTRA-ARTICULAR; INTRALESIONAL; INTRAMUSCULAR at 03:02

## 2022-04-07 RX ADMIN — SODIUM CHLORIDE 2.5 MILLION UNITS: 9 INJECTION, SOLUTION INTRAVENOUS at 00:14

## 2022-04-07 RX ADMIN — SODIUM CHLORIDE, SODIUM LACTATE, POTASSIUM CHLORIDE, AND CALCIUM CHLORIDE 125 ML/HR: .6; .31; .03; .02 INJECTION, SOLUTION INTRAVENOUS at 04:03

## 2022-04-07 RX ADMIN — SODIUM CHLORIDE 2.5 MILLION UNITS: 9 INJECTION, SOLUTION INTRAVENOUS at 07:44

## 2022-04-07 RX ADMIN — NIFEDIPINE 10 MG: 10 CAPSULE, LIQUID FILLED ORAL at 08:53

## 2022-04-07 NOTE — OB LABOR/OXYTOCIN SAFETY PROGRESS
Labor Progress Note - Lacy Vega 33 y o  female MRN: [id number]    Unit/Bed#: -01 Encounter: [id number]       Contraction Frequency (minutes): irritability  Contraction Quality: Mild  Tachysystole: No   Cervical Dilation: 2        Cervical Effacement: 60  Fetal Station: -2  Baseline Rate: 140 bpm  Fetal Heart Rate: 141 BPM  FHR Category: Category I               Vital Signs:   Vitals:    04/06/22 1800   BP: 108/69   Pulse: 86   Resp: 18   Temp: 98 1 °F (36 7 °C)   SpO2: 99%       Notes/comments:   Patient reports increasing pain  SVE unchanged  Cat I FHT  Will give another dose of IV Stadol       Manuel Mcknight MD 4/6/2022 8:22 PM

## 2022-04-07 NOTE — DISCHARGE INSTRUCTIONS
Labor   WHAT YOU NEED TO KNOW:    (premature) labor occurs when the uterus contracts and your cervix opens earlier than normal  The cervix is the opening of your uterus   labor happens after the 20th week of pregnancy but before the 37th week  You may have premature rupture of membranes (PROM)  PROM means your water broke before labor began  An early labor could cause you to have your baby before he or she is ready to be born  DISCHARGE INSTRUCTIONS:   Call your local emergency number (911 in the [address]) if:   · You see or feel like there is something in your vagina  Call your doctor if:   · You have bright red, painless vaginal bleeding  · Your symptoms do not get better or they get worse  · Your water broke or you feel warm water gushing or trickling from your vagina  · You have contractions that get stronger and closer together for more than 1 hour  · You notice a decrease in your baby's movement  · You have abdominal cramps, pressure, or tightening  · You have a change in vaginal discharge  · You have a fever  · You have burning when you urinate or you are urinating less than is normal for you  · You have questions or concerns about your condition or care  Medicines:   · Antibiotics  may be given to treat a bacterial infection  · Take your medicine as directed  Contact your healthcare provider if you think your medicine is not helping or if you have side effects  Tell him or her if you are allergic to any medicine  Keep a list of the medicines, vitamins, and herbs you take  Include the amounts, and when and why you take them  Bring the list or the pill bottles to follow-up visits  Carry your medicine list with you in case of an emergency  Self-care:   · Rest  as much as possible  You may need to lie on your left side to improve circulation to the uterus and baby   You may be able to prevent  labor by resting and reducing your physical activity  · Ask your healthcare provider about activities that are safe for you to do  Your healthcare provider or obstetrician may recommend that you avoid sexual intercourse  Ask your healthcare provider if exercise is safe  · Drink liquids as directed  Ask how much liquid to drink each day and which liquids are best for you  · Do not smoke  Your baby may not grow well and he or she may weigh less at birth if you smoke during pregnancy  Smoking also increases the risk that your baby will be born too early  Nicotine and other chemicals in cigarettes and cigars can cause lung damage  Ask your healthcare provider for information if you currently smoke and need help to quit  E-cigarettes or smokeless tobacco still contain nicotine  Talk to your healthcare provider before you use these products  · Do not drink alcohol  Alcohol may harm your unborn baby and cause  labor  · Maintain a healthy weight  A healthy weight may prevent  labor  Ask your healthcare provider how much weight you should gain during your pregnancy  Follow up with your doctor as directed:  Write down your questions so you remember to ask them during your visits  © Copyright [employer]  Information is for End User's use only and may not be sold, redistributed or otherwise used for commercial purposes  All illustrations and images included in CareNotes® are the copyrighted property of A D A M , Inc  or [facility] Billie Ho   The above information is an  only  It is not intended as medical advice for individual conditions or treatments  Talk to your doctor, nurse or pharmacist before following any medical regimen to see if it is safe and effective for you

## 2022-04-07 NOTE — PROGRESS NOTES
Progress Note - Maternal Fetal Medicine   Hilario Santanar 33 y o  female MRN: [id number]  Unit/Bed#: -01 Encounter: [id number]  Admit date: 2022  Today's date: 22    Assessment/Plan     Ms Zoe Esquivel is a 33 y o  [place] [facility] at [facility] 112 Day: 2, admitted with concern for  labor  By issue:     labor in third trimester without delivery  Assessment & Plan  SVE /-2 at [facility], patient initially visibly uncomfortable --> /-2 at [facility]   Cervical length 1 5-1 6cm with contractions, 2 16-2 36 without contractions, no evidence of funneling or debris  S/p Magnesium for fetal neuroprotection  S/p BTM for fetal lung maturity   S/p NICU consult  Penicillin for GBS unknown --> consider d/c today   Continue Procardia for tocolysis  F/u GBS, GC/C, urine culture    Request for sterilization  Assessment & Plan  Patient desires permanent sterilization if  delivery indicated  MA-31 signed 3/29/22    Tobacco smoking complicating pregnancy in first trimester  Assessment & Plan  Nicoderm patch ordered    Marijuana use  Assessment & Plan  UDS positive for THC on admission    History of gestational diabetes in prior pregnancy, currently pregnant in second trimester  Assessment & Plan  Has not completed glucose tolerance test yet  Blood sugar 83 on admission    * 31 weeks gestation of pregnancy  Assessment & Plan  Last growth scan 3/16/22, EFW 36%   --> NST TID   Vertex presentation            Subjective    Sherrie Ye is feeling well this morning  Was sleeping comfortably initially on my arrival  Continuing Procardia and was given a dose of Stadol last night, after which she rested well and slept overnight  She feels her pain level is less than before; she thinks the contractions are more spaced out  Denies bleeding, leakage of fluid  She feels fetal movements  Denies nausea, vomiting, chest pain, SOB  She is tolerating PO             Objective      Patient Vitals for the past 24 hrs:   BP Temp Temp src Pulse Resp SpO2   04/07/22 0724 -- -- -- 76 -- 97 %   04/07/22 0400 115/71 (!) 97 4 °F (36 3 °C) Oral 94 18 98 %   04/07/22 0200 99/64 97 7 °F (36 5 °C) Oral 70 18 98 %   04/07/22 0014 92/64 97 6 °F (36 4 °C) Oral 85 18 99 %   04/06/22 2200 100/59 97 6 °F (36 4 °C) Oral -- 18 99 %   04/06/22 2000 103/63 97 7 °F (36 5 °C) Oral 85 18 99 %   04/06/22 1800 108/69 98 1 °F (36 7 °C) Oral 86 18 99 %   04/06/22 1600 103/58 97 6 °F (36 4 °C) Oral 76 16 99 %   04/06/22 1400 (!) 86/52 97 8 °F (36 6 °C) Oral 78 18 98 %   04/06/22 1200 112/57 97 7 °F (36 5 °C) Oral 88 16 99 %   04/06/22 1000 107/61 97 8 °F (36 6 °C) Oral 83 18 99 %   04/06/22 0800 114/69 98 °F (36 7 °C) Oral 80 16 100 %       Physical Exam  Constitutional:       General: She is not in acute distress  Appearance: She is normal weight  She is not ill-appearing or diaphoretic  HENT:      Head: Normocephalic and atraumatic  Eyes:      Conjunctiva/sclera: Conjunctivae normal       Pupils: Pupils are equal, round, and reactive to light  Cardiovascular:      Rate and Rhythm: Normal rate  Pulmonary:      Effort: Pulmonary effort is normal  No respiratory distress  Abdominal:      General: There is distension (gravid)  Palpations: Abdomen is soft  Tenderness: There is no abdominal tenderness  Comments: No palpable contractions   Genitourinary:     Comments: Deferred   Musculoskeletal:         General: No swelling  Normal range of motion  Cervical back: Normal range of motion  Right lower leg: No edema  Left lower leg: No edema  Skin:     General: Skin is warm and dry  Coloration: Skin is not pale  Neurological:      General: No focal deficit present  Mental Status: She is alert and oriented to person, place, and time  Mental status is at baseline  Psychiatric:         Mood and Affect: Mood normal          Behavior: Behavior normal          Thought Content:  Thought content normal  Most recent cervical exam:  Cervical Dilation: 2  Cervical Effacement: 60  Cervical Consistency: Medium  Fetal Station: -2  Presentation: Vertex  Method: Manual  OB Examiner: Jazz Escobar      I/O       04/05 0701 04/06 0700 04/06 0701 04/07 0700 04/07 0701 04/08 0700    P  O   2570     I V  (mL/kg) 241 9 (4 6) 1891 3 (35 6)     IV Piggyback  550     Total Intake(mL/kg) 241 9 (4 6) 5011 3 (94 4)     Urine (mL/kg/hr) 900 5400 (4 2)     Total Output 900 5400     Net -658 1 -388 8                  Invasive Devices  Report    Peripheral Intravenous Line            Peripheral IV 04/06/22 Left Forearm 1 day                Results from last 7 days   Lab Units 04/06/22  0232   WBC Thousand/uL 10 43*   NEUTROS ABS Thousands/µL 8 16*   HEMOGLOBIN g/dL 11 3*   MCV fL 94   PLATELETS Thousands/uL 172     Results from last 7 days   Lab Units 04/06/22  0232   POTASSIUM mmol/L 4 2   CHLORIDE mmol/L 103   CO2 mmol/L 26   BUN mg/dL 8   CREATININE mg/dL 0 36*   EGFR ml/min/1 73sq m 149     Results from last 7 days   Lab Units 04/06/22  0232   AST U/L 33   ALT U/L 21     Results from last 7 days   Lab Units 04/06/22  1357 04/06/22  0232   PLATELETS Thousands/uL  --  172   INR  0 99  --    PROTIME seconds 13 1  --    PTT seconds 32  --    FIBRINOGEN mg/dL 464  --                            Brief review of pertinent history:  Past Medical History:   Diagnosis Date    Chlamydia 2013    GERD (gastroesophageal reflux disease)     has not req'd tx since 2019    Gestational diabetes     Varicella     had chicken pox as child and vaccine     Past Surgical History:   Procedure Laterality Date    APPENDECTOMY      CHOLECYSTECTOMY  2015    [place] COLONOSCOPY FLX DX W/COLLJ SPEC WHEN PFRMD N/A 1/19/2017    Procedure: EGD AND COLONOSCOPY;  Surgeon: Rosaura Purvis DO;  Location: [facility] GI [facility];   Service: Gastroenterology    [place] LAP,APPENDECTOMY N/A 5/17/2016    Procedure: APPENDECTOMY LAPAROSCOPIC;  Surgeon: Sol Robles DO;  Location:  MAIN OR;  Service: General    [place] LAP,DIAGNOSTIC ABDOMEN N/A 3/13/2016    Procedure: LAPAROSCOPY DIAGNOSTIC;  Surgeon: Rena Dean MD;  Location: [facility]IN [facility];  Service: General    RESECTION SMALL BOWEL LAPAROSCOPIC       OB History    Para Term  AB Living   6 5 5 0 0 5   SAB IAB Ectopic Multiple Live Births   0 0 0 0 5      # Outcome Date GA Lbr Nagi/2nd Weight Sex Delivery Anes PTL Lv   6 Current            5 Term 20 37w0d  2675 g (5 lb 14 4 oz) M Vag-Spont None N DELFIN      Birth Comments: FOB1   4 Term 19 37w2d  2705 g (5 lb 15 4 oz) M Vag-Spont None N DELFIN      Birth Comments: FOB1   3 Term 10/08/17 37w4d / 00:52 2608 g (5 lb 12 oz) M Vag-Spont EPI N DELFIN      Birth Comments: FOB2      Complications: Gestational diabetes mellitus   2 Term 14 38w6d  2466 g (5 lb 7 oz) F Vag-Spont EPI N DELFIN      Birth Comments: FOB2   1 Term 13 37w0d  2438 g (5 lb 6 oz) M Vag-Spont EPI Y DELFIN      Birth Comments: FOB1      Complications: Gestational diabetes mellitus       Fetal data:  Nonstress test: date 22 Time: conitnuous monitoring   Baseline: 125  Variability: moderate  Accelerations: present, 15x15  Decelerations: absent  Contractions: not detected on monitor  Assessment: reactive  Plan: will transition to NST TID       MFM ultrasound report key findings:   3/16/2022 at [address]  Fetus # 1 of 1  Vertex presentation  Fetal growth appeared normal  Placenta Location = Posterior  Placenta Grade = II     MEASUREMENTS (* Included In Average GA)     AC              24 4 cm        28 weeks 4 days* (50%)  BPD              7 5 cm        29 weeks 6 days* (82%)  HC              26 4 cm        28 weeks 5 days* (26%)  Femur            5 2 cm        27 weeks 4 days* (14%)     Cerebellum       3 4 cm        28 weeks 4 days     HC/AC           1 08 [1 05 - 1 22]                 (45%)  FL/AC             21 [20 - 24]  FL/BPD            69 [71 - 87]  EFW [place] 4   1215 grams - 2 lbs 11 oz                 (36%)     THE AVERAGE GESTATIONAL AGE is 28 weeks 5 days +/- 21 days      AMNIOTIC FLUID     Q1: 3 4      Q2: 3 0      Q3: 4 7      Q4: 2 5  MENDEL Total = 13 5 cm  Amniotic Fluid: Normal      MEDS:   Medication Administration - last 24 hours from 04/06/2022 0744 to 04/07/2022 0744       Date/Time Order Dose Route Action Action by     04/07/2022 0500 nicotine (NICODERM CQ) 7 mg/24hr TD 24 hr patch 1 patch 0 patch Transdermal Hold Suyapa Cardenas, EL     04/07/2022 0505 lactated ringers infusion 0 mL/hr Intravenous Stopped Clementina Flores, EL     04/07/2022 0403 lactated ringers infusion 125 mL/hr Intravenous New [address] [facility] Clementina Champion, EL     04/06/2022 1751 lactated ringers infusion 125 mL/hr Intravenous [address]     04/06/2022 1858 acetaminophen (TYLENOL) tablet 650 mg 650 mg Oral Given Zunilda Johnson, EL     04/06/2022 0830 penicillin G (PFIZERPEN-G) in 0 9% sodium chloride 250 mL IVPB 5 Million Units 0 Million Units Intravenous Stopped Patricia Starch, RN     04/07/2022 0744 penicillin G (PFIZERPEN-G) in 0 9% sodium chloride 100 mL IVPB 2 5 Million Units 2 5 Million Units Intravenous [address] [facility]     04/07/2022 0505 penicillin G (PFIZERPEN-G) in 0 9% sodium chloride 100 mL IVPB 2 5 Million Units 0 Million Units Intravenous Stopped Suyapa Cardenas, RN     04/07/2022 0402 penicillin G (PFIZERPEN-G) in 0 9% sodium chloride 100 mL IVPB 2 5 Million Units 2 5 Million Units Intravenous [address] Suyapa Cardenas, RN     04/07/2022 0014 penicillin G (PFIZERPEN-G) in 0 9% sodium chloride 100 mL IVPB 2 5 Million Units 2 5 Million Units Intravenous [address] Zunilda Johnson, RN     04/06/2022 2025 penicillin G (PFIZERPEN-G) in 0 9% sodium chloride 100 mL IVPB 2 5 Million Units 2 5 Million Units Intravenous [address] Zunilda Johnsno RN     04/06/2022 1604 penicillin G (PFIZERPEN-G) in 0 9% sodium chloride 100 mL IVPB 2 5 Million Units 2 5 Million Units Intravenous [address] [facility]     04/06/2022 1305 penicillin G (PFIZERPEN-G) in 0 9% sodium chloride 100 mL IVPB 2 5 Million Units 0 Million Units Intravenous Stopped Jessica Pappas RN     04/06/2022 1205 penicillin G (PFIZERPEN-G) in 0 9% sodium chloride 100 mL IVPB 2 5 Million Units 2 5 Million Units Intravenous [address] [facility]     04/07/2022 0303 NIFEdipine (PROCARDIA) capsule 10 mg 0 mg Oral Hold Sunni Quezada RN     04/06/2022 2025 NIFEdipine (PROCARDIA) capsule 10 mg 10 mg Oral Given Vianca Wolf RN     04/06/2022 1452 NIFEdipine (PROCARDIA) capsule 10 mg 10 mg Oral Given [facility] EL Pappas     04/06/2022 0847 NIFEdipine (PROCARDIA) capsule 10 mg 10 mg Oral Given Jessica Pappas RN     04/07/2022 0302 betamethasone acetate-betamethasone sodium phosphate (CELESTONE) injection 12 mg 12 mg Intramuscular Given Sunni Quezada RN     04/07/2022 0303 magnesium sulfate 20 g/500 mL infusion (premix) 0 g/hr Intravenous Stopped Sunni Quezada RN     04/07/2022 0014 magnesium sulfate 20 g/500 mL infusion (premix) 1 g/hr Intravenous [address] Vianca Wolf RN     04/06/2022 1205 butorphanol (STADOL) injection 1 mg 1 mg Intravenous Given Jessica Pappas RN     04/06/2022 2025 butorphanol (STADOL) injection 1 mg 1 mg Intravenous Given Vianca Wolf RN        Current Facility-Administered Medications   Medication Dose Route Frequency    acetaminophen (TYLENOL) tablet 650 mg  650 mg Oral Q4H PRN    calcium carbonate (TUMS) chewable tablet 1,000 mg  1,000 mg Oral Daily PRN    influenza vaccine, quadrivalent (FLUARIX) IM injection 0 5 mL  0 5 mL Intramuscular Once    lactated ringers infusion  125 mL/hr Intravenous Continuous    magnesium sulfate 20 g/500 mL infusion (premix)  1 g/hr Intravenous Continuous    nicotine (NICODERM CQ) 7 mg/24hr TD 24 hr patch 1 patch  1 patch Transdermal Q24H    NIFEdipine (PROCARDIA) capsule 10 mg  10 mg Oral Q6H    ondansetron (ZOFRAN) injection 4 mg  4 mg Intravenous Q4H PRN    penicillin G (PFIZERPEN-G) in 0 9% sodium chloride 100 mL IVPB 2 5 Million Units  2 5 Million Units Intravenous Q4H    simethicone (MYLICON) chewable tablet 80 mg  80 mg Oral 4x Daily PRN            Princess Isidro MD  OBGYROMY, PGY-3  4/7/2022  7:44 AM

## 2022-04-07 NOTE — PLAN OF CARE
Problem: ANTEPARTUM  Goal: Maintain pregnancy as long as maternal and/or fetal condition is stable  Description: INTERVENTIONS:  - Maternal surveillance  - Fetal surveillance  - Monitor uterine activity  - Medications as ordered  - Bedrest  4/7/2022 1452 by Alex Randle RN  Outcome: Adequate for Discharge  4/7/2022 0749 by Alex Randle RN  Outcome: Progressing     Problem: Potential for Falls  Goal: Patient will remain free of falls  Description: INTERVENTIONS:  - Educate patient/family on patient safety including physical limitations  - Instruct patient to call for assistance with activity   - Consult OT/PT to assist with strengthening/mobility   - Keep Call bell within reach  - Keep bed low and locked with side rails adjusted as appropriate  - Keep care items and personal belongings within reach  - Initiate and maintain comfort rounds  - Make Fall Risk Sign visible to staff  - Apply yellow socks and bracelet for high fall risk patients  - Consider moving patient to room near nurses station  4/7/2022 1452 by Alex Randle RN  Outcome: Adequate for Discharge  4/7/2022 0749 by Alex Randle RN  Outcome: Progressing     Problem: PAIN - ADULT  Goal: Verbalizes/displays adequate comfort level or baseline comfort level  Description: Interventions:  - Encourage patient to monitor pain and request assistance  - Assess pain using appropriate pain scale  - Administer analgesics based on type and severity of pain and evaluate response  - Implement non-pharmacological measures as appropriate and evaluate response  - Consider cultural and social influences on pain and pain management  - Notify physician/advanced practitioner if interventions unsuccessful or patient reports new pain  4/7/2022 1452 by Alex Randle RN  Outcome: Adequate for Discharge  4/7/2022 0749 by Alex Randle RN  Outcome: Progressing     Problem: INFECTION - ADULT  Goal: Absence or prevention of progression during hospitalization  Description: INTERVENTIONS:  - Assess and monitor for signs and symptoms of infection  - Monitor lab/diagnostic results  - Monitor all insertion sites, i e  indwelling lines, tubes, and drains  - Monitor endotracheal if appropriate and nasal secretions for changes in amount and color  - [place] appropriate cooling/warming therapies per order  - Administer medications as ordered  - Instruct and encourage patient and family to use good hand hygiene technique  - Identify and instruct in appropriate isolation precautions for identified infection/condition  4/7/2022 1452 by Odilia Childers RN  Outcome: Adequate for Discharge  4/7/2022 0749 by Odilia Childers RN  Outcome: Progressing  Goal: Absence of fever/infection during neutropenic period  Description: INTERVENTIONS:  - Monitor WBC    4/7/2022 1452 by Odilia Childers RN  Outcome: Adequate for Discharge  4/7/2022 0749 by Odilia Childers RN  Outcome: Progressing     Problem: SAFETY ADULT  Goal: Patient will remain free of falls  Description: INTERVENTIONS:  - Educate patient/family on patient safety including physical limitations  - Instruct patient to call for assistance with activity   - Consult OT/PT to assist with strengthening/mobility   - Keep Call bell within reach  - Keep bed low and locked with side rails adjusted as appropriate  - Keep care items and personal belongings within reach  - Initiate and maintain comfort rounds  - Make Fall Risk Sign visible to staff  - Apply yellow socks and bracelet for high fall risk patients  - Consider moving patient to room near nurses station  4/7/2022 1452 by Odilia Childers RN  Outcome: Adequate for Discharge  4/7/2022 0749 by Odilia Childers RN  Outcome: Progressing  Goal: Maintain or return to baseline ADL function  Description: INTERVENTIONS:  -  Assess patient's ability to carry out ADLs; assess patient's baseline for ADL function and identify physical deficits which impact ability to perform ADLs (bathing, care of mouth/teeth, toileting, grooming, dressing, etc )  - Assess/evaluate cause of self-care deficits   - Assess range of motion  - Assess patient's mobility; develop plan if impaired  - Assess patient's need for assistive devices and provide as appropriate  - Encourage maximum independence but intervene and supervise when necessary  - Involve family in performance of ADLs  - Assess for home care needs following discharge   - Consider OT consult to assist with ADL evaluation and planning for discharge  - Provide patient education as appropriate  4/7/2022 1452 by Lety Burnette RN  Outcome: Adequate for Discharge  4/7/2022 0749 by Lety Burnette RN  Outcome: Progressing  Goal: Maintains/Returns to pre admission functional level  Description: INTERVENTIONS:  - Perform BMAT or MOVE assessment daily    - Set and communicate daily mobility goal to care team and patient/family/caregiver  - Collaborate with rehabilitation services on mobility goals if consulted  - Out of bed for toileting  - Record patient progress and toleration of activity level   4/7/2022 1452 by Lety Burnette RN  Outcome: Adequate for Discharge  4/7/2022 0749 by Lety Burnette RN  Outcome: Progressing     Problem: Knowledge Deficit  Goal: Patient/family/caregiver demonstrates understanding of disease process, treatment plan, medications, and discharge instructions  Description: Complete learning assessment and assess knowledge base    Interventions:  - Provide teaching at level of understanding  - Provide teaching via preferred learning methods  4/7/2022 1452 by Lety Burnette RN  Outcome: Adequate for Discharge  4/7/2022 0749 by Lety Burnette RN  Outcome: Progressing     Problem: DISCHARGE PLANNING  Goal: Discharge to home or other facility with appropriate resources  Description: INTERVENTIONS:  - Identify barriers to discharge w/patient and caregiver  - Arrange for needed discharge resources and transportation as appropriate  - Identify discharge learning needs (meds, wound care, etc )  - Arrange for interpretive services to assist at discharge as needed  - Refer to Case Management Department for coordinating discharge planning if the patient needs post-hospital services based on physician/advanced practitioner order or complex needs related to functional status, cognitive ability, or social support system  4/7/2022 1452 by Chris Driver RN  Outcome: Adequate for Discharge  4/7/2022 0749 by Chris Driver, RN  Outcome: Progressing

## 2022-04-07 NOTE — PLAN OF CARE
Problem: ANTEPARTUM  Goal: Maintain pregnancy as long as maternal and/or fetal condition is stable  Description: INTERVENTIONS:  - Maternal surveillance  - Fetal surveillance  - Monitor uterine activity  - Medications as ordered  - Bedrest  Outcome: Progressing     Problem: Potential for Falls  Goal: Patient will remain free of falls  Description: INTERVENTIONS:  - Educate patient/family on patient safety including physical limitations  - Instruct patient to call for assistance with activity   - Consult OT/PT to assist with strengthening/mobility   - Keep Call bell within reach  - Keep bed low and locked with side rails adjusted as appropriate  - Keep care items and personal belongings within reach  - Initiate and maintain comfort rounds  - Make Fall Risk Sign visible to staff  - Apply yellow socks and bracelet for high fall risk patients  - Consider moving patient to room near nurses station  Outcome: Progressing     Problem: PAIN - ADULT  Goal: Verbalizes/displays adequate comfort level or baseline comfort level  Description: Interventions:  - Encourage patient to monitor pain and request assistance  - Assess pain using appropriate pain scale  - Administer analgesics based on type and severity of pain and evaluate response  - Implement non-pharmacological measures as appropriate and evaluate response  - Consider cultural and social influences on pain and pain management  - Notify physician/advanced practitioner if interventions unsuccessful or patient reports new pain  Outcome: Progressing     Problem: INFECTION - ADULT  Goal: Absence or prevention of progression during hospitalization  Description: INTERVENTIONS:  - Assess and monitor for signs and symptoms of infection  - Monitor lab/diagnostic results  - Monitor all insertion sites, i e  indwelling lines, tubes, and drains  - Monitor endotracheal if appropriate and nasal secretions for changes in amount and color  - May appropriate cooling/warming therapies per order  - Administer medications as ordered  - Instruct and encourage patient and family to use good hand hygiene technique  - Identify and instruct in appropriate isolation precautions for identified infection/condition  Outcome: Progressing  Goal: Absence of fever/infection during neutropenic period  Description: INTERVENTIONS:  - Monitor WBC    Outcome: Progressing     Problem: SAFETY ADULT  Goal: Patient will remain free of falls  Description: INTERVENTIONS:  - Educate patient/family on patient safety including physical limitations  - Instruct patient to call for assistance with activity   - Consult OT/PT to assist with strengthening/mobility   - Keep Call bell within reach  - Keep bed low and locked with side rails adjusted as appropriate  - Keep care items and personal belongings within reach  - Initiate and maintain comfort rounds  - Make Fall Risk Sign visible to staff  - Apply yellow socks and bracelet for high fall risk patients  - Consider moving patient to room near nurses station  Outcome: Progressing  Goal: Maintain or return to baseline ADL function  Description: INTERVENTIONS:  -  Assess patient's ability to carry out ADLs; assess patient's baseline for ADL function and identify physical deficits which impact ability to perform ADLs (bathing, care of mouth/teeth, toileting, grooming, dressing, etc )  - Assess/evaluate cause of self-care deficits   - Assess range of motion  - Assess patient's mobility; develop plan if impaired  - Assess patient's need for assistive devices and provide as appropriate  - Encourage maximum independence but intervene and supervise when necessary  - Involve family in performance of ADLs  - Assess for home care needs following discharge   - Consider OT consult to assist with ADL evaluation and planning for discharge  - Provide patient education as appropriate  Outcome: Progressing  Goal: Maintains/Returns to pre admission functional level  Description: INTERVENTIONS:  - Perform BMAT or MOVE assessment daily    - Set and communicate daily mobility goal to care team and patient/family/caregiver  - Collaborate with rehabilitation services on mobility goals if consulted  - Out of bed for toileting  - Record patient progress and toleration of activity level   Outcome: Progressing     Problem: Knowledge Deficit  Goal: Patient/family/caregiver demonstrates understanding of disease process, treatment plan, medications, and discharge instructions  Description: Complete learning assessment and assess knowledge base    Interventions:  - Provide teaching at level of understanding  - Provide teaching via preferred learning methods  Outcome: Progressing     Problem: DISCHARGE PLANNING  Goal: Discharge to home or other facility with appropriate resources  Description: INTERVENTIONS:  - Identify barriers to discharge w/patient and caregiver  - Arrange for needed discharge resources and transportation as appropriate  - Identify discharge learning needs (meds, wound care, etc )  - Arrange for interpretive services to assist at discharge as needed  - Refer to Case Management Department for coordinating discharge planning if the patient needs post-hospital services based on physician/advanced practitioner order or complex needs related to functional status, cognitive ability, or social support system  Outcome: Progressing     Problem: BIRTH - VAGINAL/ SECTION  Goal: Fetal and maternal status remain reassuring during the birth process  Description: INTERVENTIONS:  - Monitor vital signs  - Monitor fetal heart rate  - Monitor uterine activity  - Monitor labor progression (vaginal delivery)  - DVT prophylaxis  - Antibiotic prophylaxis  Outcome: Completed  Goal: Emotionally satisfying birthing experience for mother/fetus  Description: Interventions:  - Assess, plan, implement and evaluate the nursing care given to the patient in labor  - Advocate the philosophy that each childbirth experience is a unique experience and support the family's chosen level of involvement and control during the labor process   - Actively participate in both the patient's and family's teaching of the birth process  - Consider cultural, Protestant and age-specific factors and plan care for the patient in labor  Outcome: Completed

## 2022-04-07 NOTE — CONSULTS
2022  At 0830 am  I spoke with Karen Tinajero a 33 yr old  at [address] Day: 1, admitted for  labor  [facility] 2022   Sherrie told me she has had PTL contractions with all her pregnancy's and has been induced between 37-38 weeks gestation with each of her  previous pregnancy's   She is obviously  uncomfortable this morning, needs to stop talking with contractions   She is currently on  IV Magnesium sulfate for fetal neuroprotection, Procardia for tocolysis, Betamethasone for fetal lung maturation, IV Pencillin for GBS unknown  Neonatology consult was requested     NICU Prenatal Consult   John Bryan 33 y o  female MRN: [id number]  Unit/Bed#: -01 Encounter: [id number]    Consulting Physician: Aj Katz MD      A NICU Prenatal Consult has been requested by Dr Tanis Koyanagi  due to PTL    John Bryan is a 33 y o  [id number], with an RACHEL of 2022 at [address]      Estimated fetal weight is 36 % (as of 3/16)  She intends to breastfeed /formula feed        Prenatal Care:Yes, description to present        Prenatal Labs:  Lab Results   Component Value Date/Time    ABO Grouping O 2022 05:07 AM    ABO Grouping O 2014 07:30 PM    Rh Factor Positive 2022 05:07 AM    Rh Factor Positive 2014 07:30 PM    Antibody Screen Negative 2014 07:30 PM    HEPATITIS B SURFACE ANTIGEN Nonreactive (NR 2015 05:54 AM    Hepatitis B Surface Ag Non-reactive 2021 12:27 PM    HEPATITIS C ANTIBODY Nonreactive (NR 2015 05:54 AM    Hepatitis C Ab Non-reactive 2021 12:27 PM    RPR SCREEN Non-Reactive (q 2014 07:30 PM    RPR Non-Reactive 2022 05:07 AM    RUBELLA IGG QUANTITATION 6 3 2014 12:25 PM    Rubella IgG Quant 14 5 2021 12:27 PM    HIV-1/2 AB-AG Non-Reactive (q 2014 12:25 PM    HIV-1/HIV-2 Ab Non-Reactive 2021 12:27 PM       Unknown labs at this time: GBS, urine screen, G/C  UDS positive for THC on admission originally seen at [facility]      Pregnancy complications:   Patient Active Problem List   Diagnosis    History of gestational diabetes in prior pregnancy, currently pregnant in second trimester    Previous pregnancy complicated by intrauterine growth restriction, antepartum, second trimester    Marijuana use    Tobacco smoking complicating pregnancy in first trimester    31 weeks gestation of pregnancy    Request for sterilization     labor in third trimester without delivery     Maternal medical history:   Past Medical History:   Diagnosis Date    Chlamydia     GERD (gastroesophageal reflux disease)     has not req'd tx since     Gestational diabetes     Varicella     had chicken pox as child and vaccine     Medications at home:   Medications Prior to Admission   Medication    acetaminophen (TYLENOL) 500 mg tablet    ondansetron (ZOFRAN) 4 mg tablet    Prenatal Multivit-Min-Fe-FA (Pre- Formula) TABS    acetaminophen (TYLENOL) 500 mg tablet    nicotine (NICODERM CQ) 7 mg/24hr TD 24 hr patch     Maternal social history:  Social History     Tobacco Use    Smoking status: Current Every Day Smoker     Packs/day: 0 25     Years: 10 00     Pack years: 2 50     Types: Cigarettes    Smokeless tobacco: Never Used    Tobacco comment: 1-2 cigs/day   Substance Use Topics    Alcohol use: Not Currently     Comment: socially     Maternal  medications: Tocolytics:  Magnesium and procardia       I spoke with Boogie Milks today regarding the upcoming(potential ) birth of her 32 3/7   We discussed the baby's possible medical problems and the specialized care needed to address these problems  This discussion included, but was not limited to:      Attendance of physician/TOMÁS/ nursing, and/or respiratory therapist at the delivery and delivery room management , Possible need for IV access, umbilical lines, and/or PICC lines, Possible need for prolonged parenteral nutrition, Potential need for transfusion of blood products, Risk of feedings problems and the potential need for IV fluids and gavage tube feeds, Risk of hypoglycemia requiring dextrose infusion, Risk of hypothermia and need for radiant warmer and/or isolette, Risk of immature cardiorespiratory control and need for monitoring and/or caffeine, Risk of intraventricular hemorrhage and possible need for brain ultrasound, Risk of jaundice requiring phototherapy, Risk of necrotizing enterocolitis and advantages of expressed breast milk, Risk of respiratory distress and possible need for support (oxygen, CPAP, intubation, and/or surfactant), Risk of sepsis and possible need for lab tests and IV antibiotics and Survival and neurodevelopmental outcomes for babies born at 32 3/7 weeks 31 3/7 weeks gestation         All of Sherrie's questions were answered to the best of my ability, and she was encouraged to contact us with any further questions  Thank you for including us in the care of [facility]  Please reach out to the on-call Neonatologist via [address] [place] [address] for any further questions that may arise  I spent 20 minutes in consultation with [facility], of which 100 % was in direct communication      Dilai Fletcher, Alison Bunch

## 2022-04-08 NOTE — UTILIZATION REVIEW
Inpatient Admission Authorization Request   NOTIFICATION OF INPATIENT ADMISSION/INPATIENT AUTHORIZATION REQUEST   [facility] FAC[facility]   [facility]  [address]  Tax ID: [id number]  NPI: [id number]  Place of Service: Inpatient [facility]  [address]  Place of Service Code: 24     ATTENDING PROVIDER:  Attending Name and NPI#:   Address: Alvarez Bryan  [address]  Phone: [phone]     UTILIZATION REVIEW CONTACT:  Paty Hemphill Utilization Review Supervisor  Network Utilization Review Department  Phone: [phone]  Fax: [phone]  Email: [email or web address]     PHYSICIAN ADVISORY SERVICES:  FOR WKEK-UI-UXTI REVIEW - MEDICAL NECESSITY DENIAL  Phone: [phone]  Fax: [phone]  Email: [email or web address]     TYPE OF REQUEST:  Inpatient Status     ADMISSION INFORMATION:  ADMISSION DATE/TIME: 4/6/22  4:15 AM  PATIENT DIAGNOSIS CODE/DESCRIPTION:  Abdominal pain [R10 9]  31 weeks gestation of pregnancy [Z3A 31]  DISCHARGE DATE/TIME: 4/7/2022  3:00 PM   IMPORTANT INFORMATION:  Please contact the Paty Hemphill directly with any questions or concerns regarding this request  Department voicemails are confidential     Send requests for admission clinical reviews, concurrent reviews, approvals, and administrative denials due to lack of clinical to fax [phone]  Notification of Discharge   This is a Notification of Discharge from our facility [address]  Please be advised that this patient has been discharge from our facility  Below you will find the admission and discharge date and time including the patients disposition  UTILIZATION REVIEW CONTACT:  Paty Hemphill  Utilization   Network Utilization Review Department  Phone: [phone] x carefully listen to the prompts  All voicemails are confidential   Email: [email or web address]     PHYSICIAN ADVISORY SERVICES:  FOR [id number]-[id number]-[id number] REVIEW - MEDICAL NECESSITY DENIAL  Phone: [phone]  Fax: [phone]  Email: [email or web address]     PRESENTATION DATE: 4/6/2022  4:15 AM  OBERVATION ADMISSION DATE:   INPATIENT ADMISSION DATE: 4/6/22  4:15 AM   DISCHARGE DATE: 4/7/2022  3:00 PM  DISPOSITION: Home/Self Care Home/Self Care      IMPORTANT INFORMATION:  Send all requests for admission clinical reviews, approved or denied determinations and any other requests to dedicated fax number below belonging to the campus where the patient is receiving treatment   List of dedicated fax numbers:  [address] [place] [address] DENIALS (Administrative/Medical Necessity) [phone]   [address] (Maternity/NICU/Pediatrics) [phone]   Anthony Bills [phone]   [address] [place] [address] [phone]   [address] [place] [address] [phone]   [address] [phone]   [facility]  [phone]   [address] [place] [address], [address] W  [address] [facility] 1000 [address] [facility] [phone]

## 2022-04-11 PROBLEM — Z3A.31 31 WEEKS GESTATION OF PREGNANCY: Status: RESOLVED | Noted: 2022-03-16 | Resolved: 2022-04-11

## 2022-04-12 ENCOUNTER — ROUTINE PRENATAL (OUTPATIENT)
Dept: OBGYN CLINIC | Facility: CLINIC | Age: 27
End: 2022-04-12

## 2022-04-12 VITALS
HEIGHT: 59 IN | SYSTOLIC BLOOD PRESSURE: 110 MMHG | BODY MASS INDEX: 23.18 KG/M2 | DIASTOLIC BLOOD PRESSURE: 71 MMHG | HEART RATE: 85 BPM | WEIGHT: 115 LBS

## 2022-04-12 DIAGNOSIS — Z34.93 PRENATAL CARE IN THIRD TRIMESTER: Primary | ICD-10-CM

## 2022-04-12 DIAGNOSIS — Z3A.32 32 WEEKS GESTATION OF PREGNANCY: ICD-10-CM

## 2022-04-12 PROCEDURE — 99213 OFFICE O/P EST LOW 20 MIN: CPT | Performed by: OBSTETRICS & GYNECOLOGY

## 2022-04-12 NOTE — PROGRESS NOTES
Maricel Orellana presents today for routine OB visit at [place] [address]  Blood Pressure: 110/71  Wt=52 2 kg (115 lb); Body mass index is 23 23 kg/m² ; TWG=Not found  Fetal Heart Rate: 150; Fundal Height (cm): 32 cm  Abdomen: gravid, soft, non-tender  She reports irregular ctx  Received BTM  Denies vaginal bleeding or leaking of fluid  Reports adequate fetal movement of at least 10 movements in 2 hours once daily  Scheduled for ultrasound 22  Smoking 2 cigs a day  Needs to do one hour glucola  Reviewed premature labor precautions and fetal kick counts  Advised to continue medications and return in 2 weeks        Current Outpatient Medications   Medication Instructions    acetaminophen (TYLENOL) 500 mg, Oral, Every 6 hours PRN    acetaminophen (TYLENOL) 500 mg, Oral, Every 6 hours PRN    NIFEdipine (PROCARDIA) 10 mg, Oral, Every 6 hours    ondansetron (ZOFRAN) 4 mg, Oral, Every 8 hours PRN    Prenatal Multivit-Min-Fe-FA (Pre- Formula) TABS 1 tablet, Oral, Daily         Pregnancy Problems (from 21 to present)     Problem Noted Resolved     labor in third trimester without delivery 2022 by Manuelito Hines MD No

## 2022-04-13 NOTE — UTILIZATION REVIEW
Inpatient Admission Authorization Request   NOTIFICATION OF INPATIENT ADMISSION/INPATIENT AUTHORIZATION REQUEST   SERVICING FACILITY:   [address] [place] [address], [place] [address] [facility]  Tax ID: [id number]  NPI: [id number]  Place of Service: Inpatient [facility]  [address]  Place of Service Code: 24     ATTENDING PROVIDER:  Attending Name and NPI#: Isis Acuña [[id number]]  Address: [address] [place] [address], [place] [address] [facility]  Phone: [phone]     UTILIZATION REVIEW CONTACT:  Tony Client, Utilization   Network Utilization Review Department  Phone: [phone]  Fax [phone]  Email: [email or web address]     PHYSICIAN ADVISORY SERVICES:  FOR UULR-PA-JXVL REVIEW - MEDICAL NECESSITY DENIAL  Phone: [phone]  Fax: [phone]  Email: [email or web address]     TYPE OF REQUEST:  Inpatient Status     ADMISSION INFORMATION:  ADMISSION DATE/TIME: 4/6/22  2:30 AM  PATIENT DIAGNOSIS CODE/DESCRIPTION:  31 weeks gestation of pregnancy [Z3A 31]  DISCHARGE DATE/TIME: 4/6/2022  4:00 AM   IMPORTANT INFORMATION:  Please contact the Tony Client directly with any questions or concerns regarding this request  Department voicemails are confidential     Send requests for admission clinical reviews, concurrent reviews, approvals, and administrative denials due to lack of clinical to fax [phone]         Initial Clinical Review    Admission: Date/Time/Statement:   Admission Orders (From admission, onward)     Ordered        04/06/22 0229  Inpatient Admission  Once                      Orders Placed This Encounter   Procedures    Inpatient Admission     Standing Status:   Standing     Number of Occurrences:   1     Order Specific Question:   Level of Care     Answer:   Med Surg [16]     Order Specific Question:   Estimated length of stay     Answer:   More than 2 Midnights     Order Specific Question:   Certification     Answer: I certify that inpatient services are medically necessary for this patient for a duration of greater than two midnights  See H&P and MD Progress Notes for additional information about the patient's course of treatment  ED Arrival Information     Patient not seen in ED                     Chief Complaint   Patient presents with    Contractions       Initial Presentation: 33 y o  female  [id number] at 31w3d admit Inpatient from  labor reports painful contractions beginning 1 day prior worsening over several hours  Attempted hot shower for unsuccessful relief  Contractions similar to prior labor contractions  PMH grand multiparity, tobacco & marijuana use, GDM1 w fetal restriction  Physical Exam In triage initial SVE /-1, patient visibly uncomfortable with contractions Q 5 MIN   Multiparous cervix, visually dilated 1 cm  Microscopic evaluation of discharge negative for clue cells, yeast, trichomonas, ferning  TVUS Cervical length 1 5-1 6 cm with contractions, 2 1-2 36 without contractions    PLAN  TRANSFER TO [facility] for higher level of NICU care <32 wk   BTM, IV MAG, IV penecillin for unk GBS, Procardia; MFM consult, continuous fetal monitoring  Fetal data:  Nonstress test: date 22   Baseline: 145 bpm  Variability: moderate  Accelerations: present, 10x10  Decelerations: absent  Contractions: not detected by toco secondary to patient discomfort/moving  Assessment: reactive  Plan: continuous   Triage Vitals   Temperature Pulse Respirations Blood Pressure SpO2   224 224 22 0245   98 2 °F (36 8 °C) 80 18 109/70 100 %      Temp Source Heart Rate Source Patient Position - Orthostatic VS BP Location FiO2 (%)   22 0224 22 0224 22 02222 --   Oral Monitor Lying Right arm       Pain Score       22 0224       9          Wt Readings from Last 1 Encounters:   22 52 2 kg (115 lb)     Additional Vital Signs:   Date/Time Temp Pulse Resp BP SpO2 O2 Device Patient Position - Orthostatic VS   22 -- 100 -- 112/57 -- -- --   22 -- -- -- -- 100 % None (Room air) --   22 98 2 °F (36 8 °C) 80 18 109/70 -- -- Lying       Weights (last 14 days) before discharge    Date/Time Weight Height   22 53 1 kg (117 lb) 4' 11" (1 499 m)       Pertinent Labs/Diagnostic Test Results:   No orders to display                                         No results found for: BETA-HYDROXYBUTYRATE                                           ED Treatment:   Medication Administration - No Administrations Displayed (No Start Event Found)     None        Past Medical History:   Diagnosis Date    Chlamydia     GERD (gastroesophageal reflux disease)     has not req'd tx since     Gestational diabetes     Varicella     had chicken pox as child and vaccine     Present on Admission:    labor in third trimester without delivery   Marijuana use      Admitting Diagnosis: 31 weeks gestation of pregnancy [Z3A 31]  Age/Sex: 33 y o  female  Admission Orders:  continuous external uterine contraction & fetal HR monitoring  BTM    Scheduled Medications:  IM betamethasone acetate-betamethasone sodium phosphate (CELESTONE) injection 12 mg 243  Dose: 12 mg  Freq: Every 24 hours Route: IM    IV  magnesium sulfate 4 g/100 mL IVPB (premix) 4 g  0246  Dose: 4 g  Freq: Once Route: IV    PO NIFEdipine (PROCARDIA) capsule 30 mg 245  Dose: 30 mg  Freq:  Once Route: PO  IV  penicillin G (PFIZERPEN-G) in 0 9% sodium chloride 250 mL IVPB 5 Million Units  0250  Dose: 5 Million Units  Continuous IV Infusions:  lactated ringers infusion 235 0427  Rate: 125 mL/hr Dose: 125 mL/hr  Freq: Continuous Route: IV    IV magnesium sulfate 20 g/500 mL infusion (premix)  0304 & DC 0427  Rate: 25 mL/hr Dose: 1 g/hr  Freq: Continuous Route: IV  PRN Meds:  Network Utilization Review Department  ATTENTION: Please call with any questions or concerns to [phone] and carefully listen to the prompts so that you are directed to the right person  All voicemails are confidential   Rosalba Liu all requests for admission clinical reviews, approved or denied determinations and any other requests to dedicated fax number below belonging to the campus where the patient is receiving treatment  List of dedicated fax numbers for the Facilities:  [address] DENIALS (Administrative/Medical Necessity) [phone]   [address] (Maternity/NICU/Pediatrics) [phone]   [address] [facility] [address] [place] [address] [address] [facility] [address] [facility] [address] [facility] [phone]       Notification of Discharge   This is a Notification of Discharge from our facility [address]  Please be advised that this patient has been discharge from our facility  Below you will find the admission and discharge date and time including the patients disposition  UTILIZATION REVIEW CONTACT:  Ursula Boggs  Utilization   Network Utilization Review Department  Phone: [phone] x carefully listen to the prompts  All voicemails are confidential   Email: [email or web address]     PHYSICIAN ADVISORY SERVICES:  FOR USCR-XZ-AQTD REVIEW - MEDICAL NECESSITY DENIAL  Phone: [phone]  Fax: [phone]  Email: [email or web address]     PRESENTATION DATE: 4/6/2022  1:20 AM  OBERVATION ADMISSION DATE:   INPATIENT ADMISSION DATE: 4/6/22  2:30 AM   DISCHARGE DATE: 4/6/2022  4:00 AM  DISPOSITION: PRA - Home PRA - Home      IMPORTANT INFORMATION:  Send all requests for admission clinical reviews, approved or denied determinations and any other requests to dedicated fax number below belonging to the campus where the patient is receiving treatment   List of dedicated fax numbers:  [address] DENIALS (Administrative/Medical Necessity) [phone]   [address] (Maternity/NICU/Pediatrics) [phone]   [facility] [phone]   130 [facility] [phone]   [address] [place] [address] [phone]   [address] [phone]   [facility]  [phone]   2205 [facility] [address] [facility]  2401 [facility] 1000 [address] [facility] [phone]

## 2022-04-13 NOTE — UTILIZATION REVIEW
Inpatient Admission Authorization Request   NOTIFICATION OF INPATIENT ADMISSION/INPATIENT AUTHORIZATION REQUEST   SERVICING FACILITY:   [address]  Tax ID: [id number]  NPI: [id number]  Place of Service: Inpatient [facility]  60[address]  Place of Service Code: 24     ATTENDING PROVIDER:  Attending Name and NPI#:   Address: Terrence [address]  Phone: [phone]     UTILIZATION REVIEW CONTACT:  Wolfgang Padilla, Utilization Review Supervisor  Network Utilization Review Department  Phone: [phone]  Fax: [phone]  Email: [email or web address]     PHYSICIAN ADVISORY SERVICES:  FOR IOKH-CP-XTGZ REVIEW - MEDICAL NECESSITY DENIAL  Phone: [phone]  Fax: [phone]  Email: [email or web address]     TYPE OF REQUEST:  Inpatient Status     ADMISSION INFORMATION:  ADMISSION DATE/TIME: 4/6/22  4:15 AM  PATIENT DIAGNOSIS CODE/DESCRIPTION:  Abdominal pain [R10 9]  31 weeks gestation of pregnancy [Z3A 31]  DISCHARGE DATE/TIME: 4/7/2022  3:00 PM   IMPORTANT INFORMATION:  Please contact the Wolfgang Padilla directly with any questions or concerns regarding this request  Department voicemails are confidential     Send requests for admission clinical reviews, concurrent reviews, approvals, and administrative denials due to lack of clinical to fax [phone]  Notification of Discharge   This is a Notification of Discharge from our facility [address]  Please be advised that this patient has been discharge from our facility  Below you will find the admission and discharge date and time including the patients disposition  UTILIZATION REVIEW CONTACT:  Cynthia Doherty  Utilization   Network Utilization Review Department  Phone: [phone] x carefully listen to the prompts  All voicemails are confidential   Email: [email or web address]     PHYSICIAN ADVISORY SERVICES:  FOR WDLO-BS-VUZM REVIEW - MEDICAL NECESSITY DENIAL  Phone: [phone]  Fax: [phone]  Email: [email or web address]     PRESENTATION DATE: 4/6/2022  4:15 AM  OBERVATION ADMISSION DATE:   INPATIENT ADMISSION DATE: 4/6/22  4:15 AM   DISCHARGE DATE: 4/7/2022  3:00 PM  DISPOSITION: PRA - Home PRA - Home      IMPORTANT INFORMATION:  Send all requests for admission clinical reviews, approved or denied determinations and any other requests to dedicated fax number below belonging to the campus where the patient is receiving treatment  List of dedicated fax numbers:  [address] DENIALS (Administrative/Medical Necessity) [phone]   [address] (Maternity/NICU/Pediatrics) [phone]   [facility] [phone]   [address] [place] [address] [phone]   [address] [place] [address] [phone]   [address] [place], [place] [address] Floor [address] [phone]   [address] [place] [address] [phone]        Initial Clinical Review    Admission: Date/Time/Statement:   Admission Orders (From admission, onward)     Ordered        04/06/22 0452  Inpatient Admission  Once                      Orders Placed This Encounter   Procedures    Inpatient Admission     Standing Status:   Standing     Number of Occurrences:   1     Order Specific Question:   Level of Care     Answer:   Med Surg [16]     Order Specific Question:   Estimated length of stay     Answer:   More than 2 Midnights     Order Specific Question:   Certification     Answer:   I certify that inpatient services are medically necessary for this patient for a duration of greater than two midnights   See H&P and MD Progress Notes for additional information about the patient's course of treatment  ED Arrival Information     Expected Arrival Acuity    4/6/2022 01:09 4/6/2022 01:09 -         Means of arrival Escorted by Service Admission type    [facility] Member OB/GYN Elective         Arrival complaint    labor; 31 weeks preg        Chief Complaint   Patient presents with   Alyne Sequin       Initial Presentation: 33 y o  female  G 6 P 5  @ 31 1/7 weeks transferred from [facility] to [address] [place] [address] as inpatient admission for PTL   SVE 1/60/-1, patient visibly uncomfortable with contractions Cervical length 1 5-1 6cm with contractions, 2 16-2 36 withcontractions,that are increasing in strength  no evidence of funneling or debris  Her obstetrical history is significant for gestational DM, type A1 and fetal growth restriction Plan BTM x 2 doses Contractions q 5 minutes lasting 50-90 seconds  IV MGSO4 starting Procardia as tocolytic continuous monitoring and supportive care         Date: 04-07-22 patient c/o cramping no contraction noted on monitor SVE 1/60/-2 at [place],--> 2/60/-2 at [address] [place] [address] S/p MGSO4 BTM X 2 doses Procardia for tocolysis NST TID patient denies vag bleeding leasking (+) fetal movement    ED Triage Vitals   Temperature Pulse Respirations Blood Pressure SpO2   04/06/22 0200 04/06/22 0200 04/06/22 0200 04/06/22 0200 04/06/22 0200   98 5 °F (36 9 °C) 84 22 120/70 100 %      Temp Source Heart Rate Source Patient Position - Orthostatic VS BP Location FiO2 (%)   04/06/22 0442 04/06/22 0442 04/06/22 0800 04/06/22 0442 --   Oral Monitor Lying Right arm       Pain Score       04/06/22 0442       10 - Worst Possible Pain          Wt Readings from Last 1 Encounters:   04/12/22 52 2 kg (115 lb)     Additional Vital Signs:   06/22 0800 98 °F (36 7 °C) 80 16 114/69 100 % None (Room air) -- Lying   04/06/22 0759 -- -- -- -- -- -- -- --   04/06/22 0700 -- -- -- -- -- None (Room air) WDL --   04/06/22 0609 97 4 °F (36 3 °C) Abnormal  86 18 98/56 -- None (Room air) -- --   22 0606 -- 78 -- -- 99 % -- -- --   22 0504 -- -- -- -- -- -- -- --   22 0501 -- -- -- -- -- -- -- --   22 0442 97 6 °F (36 4 °C) 104 18 105/62 -- None (Room air) -- --       Pertinent Labs/Diagnostic Test Results:   No orders to display                                       OB Treatment:   Medication Administration from 2022 0142 to 2022 0944       Date/Time Order Dose Route Action     2022 0618 nicotine (NICODERM CQ) 7 mg/24hr TD 24 hr patch 1 patch 1 patch Transdermal Not Given     2022 4258 lactated ringers infusion 125 mL/hr Intravenous New Bag     2022 0434 lactated ringers infusion 125 mL/hr Intravenous Rate/Dose Verify     2022 0730 penicillin G (PFIZERPEN-G) in 0 9% sodium chloride 250 mL IVPB 5 Million Units 5 Million Units Intravenous New Bag     2022 0645 penicillin G (PFIZERPEN-G) in 0 9% sodium chloride 250 mL IVPB 5 Million Units 5 Million Units Intravenous Not Given     2022 0700 penicillin G (PFIZERPEN-G) in 0 9% sodium chloride 100 mL IVPB 2 5 Million Units 2 5 Million Units Intravenous Not Given     2022 0847 NIFEdipine (PROCARDIA) capsule 10 mg 10 mg Oral Given     2022 0434 magnesium sulfate 20 g/500 mL infusion (premix) 1 g/hr Intravenous Rate/Dose Verify     2022 0613 butorphanol (STADOL) injection 1 mg 1 mg Intravenous Given        Past Medical History:   Diagnosis Date    Chlamydia     GERD (gastroesophageal reflux disease)     has not req'd tx since 2019    Gestational diabetes     Varicella     had chicken pox as child and vaccine     Present on Admission:    labor in third trimester without delivery   Marijuana use   Tobacco smoking complicating pregnancy in first trimester      Admitting Diagnosis: Abdominal pain [R10 9]  31 weeks gestation of pregnancy [Z3A 31]  Age/Sex: 33 y o  female  Admission Orders:  Scheduled Medications:  No current facility-administered medications for this encounter  Continuous IV Infusions:  No current facility-administered medications for this encounter  PRN Meds:  No current facility-administered medications for this encounter  IP CONSULT TO NEONATOLOGY  IP CONSULT TO PERINATOLOGY  Continuous external fetal monitoring  VS, I/O lung assessments and deep tendon reflexes q 2 hrs while on MGSO 4    Network Utilization Review Department  ATTENTION: Please call with any questions or concerns to [phone] and carefully listen to the prompts so that you are directed to the right person  All voicemails are confidential   Africa Padgett all requests for admission clinical reviews, approved or denied determinations and any other requests to dedicated fax number below belonging to the campus where the patient is receiving treatment  List of dedicated fax numbers for the Facilities:  [address] DENIALS (Administrative/Medical Necessity) [phone]   [address] (Maternity/NICU/Pediatrics) [phone]   [address] [facility] [place] [address] [address] [facility] [address] [phone]       Notification of Discharge   This is a Notification of Discharge from our facility [address]  Please be advised that this patient has been discharge from our facility   Below you will find the admission and discharge date and time including the patients disposition  UTILIZATION REVIEW CONTACT:  Hayley Allen  Utilization   Network Utilization Review Department  Phone: [phone] x carefully listen to the prompts  All voicemails are confidential   Email: [email or web address]     PHYSICIAN ADVISORY SERVICES:  FOR RDPC-IS-ZVHJ REVIEW - MEDICAL NECESSITY DENIAL  Phone: [phone]  Fax: [phone]  Email: [email or web address]     PRESENTATION DATE: 4/6/2022  4:15 AM  OBERVATION ADMISSION DATE:   INPATIENT ADMISSION DATE: 4/6/22  4:15 AM   DISCHARGE DATE: 4/7/2022  3:00 PM  DISPOSITION: PRA - Home PRA - Home      IMPORTANT INFORMATION:  Send all requests for admission clinical reviews, approved or denied determinations and any other requests to dedicated fax number below belonging to the campus where the patient is receiving treatment   List of dedicated fax numbers:  [address] DENIALS (Administrative/Medical Necessity) [phone]   [address] (Maternity/NICU/Pediatrics) [phone]   [facility] [phone]   [address] [place] [address] [phone]   [address] [place] [address] [phone]   [address] [place] [address] [address] [place] [address],[address] [place] [address] [phone]   [facility]  [phone]   [address] [facility]  [address] [facility] [address] [facility] [phone]

## 2022-04-25 NOTE — PROGRESS NOTES
OB/GYN  PN Visit  Katherine Carrizales  [id number]  2022  12:44 PM  Dr Saira Kearney MD    S: 33 y o  [id number] 34w4d here for PN visit  She denies contractions  She denies leakage of fluid and vaginal bleeding  She reports good fetal movement  Her pregnancy is complicated by grand multiparity, history of GDM, history of FGR, history of MJ use, current tobacco use,  contractions and request for permanent sterilization  O:  Vitals:    22 1046   BP: 118/70   Pulse: 73     Physical Exam  Vitals reviewed  Constitutional:       General: She is not in acute distress  Appearance: She is well-developed  She is not diaphoretic  Cardiovascular:      Rate and Rhythm: Normal rate  Pulmonary:      Effort: Pulmonary effort is normal    Abdominal:      Palpations: Abdomen is soft  Skin:     General: Skin is warm and dry  Neurological:      Mental Status: She is alert and oriented to person, place, and time     Psychiatric:         Behavior: Behavior normal        Fundal height: 34cm  FHT: 145bpm     A/P:        Problem List        Unprioritized    History of gestational diabetes in prior pregnancy, currently pregnant in second trimester    Current Assessment & Plan     Patient encouraged to complete Glucola screening ASAP  Patient did not complete early Glucola as instructed          Previous pregnancy complicated by intrauterine growth restriction, antepartum, second trimester    Current Assessment & Plan     Next ultrasound scheduled for 5/3/22  Patient counseled regarding the risks of FGR and tobacco use         Marijuana use    Overview     Patient denies any current MJ use         Tobacco smoking complicating pregnancy in first trimester    Current Assessment & Plan     Patient currently smoking 2 cigarettes daily  Patient encouraged to attempt to cut down to 1 cigarette daily before next appointment         Request for sterilization    Overview     - ANAHY Sanford signed 3/29/22  - Plan for tubal if  section indicated  - Discussed with pt LARC methods of contraception including intrauterine device, Nexplanon, and Depo Provera in addition to surgical sterilization  Discussed the risks, benefits, and alternatives to each  Discussed that the Nexplanon has been proven to be the most effective form of contraception; pt desires permanence  Discussed that Mirena IUD has excellent bleeding profile that she would be unable to achieve with surgical sterilization alone; pt is uninterested in bleeding profile  Discussed that surgical sterilization is a PERMANENT and IRREVERSIBLE surgical procedure; pt demonstrated understanding and continued desire to proceed  Patient is okay if she does not have any more children, even if she dates a different partner or loses her children              labor in third trimester without delivery    Current Assessment & Plan     -Patient admitted - with concern for  labor  - s/p BTM x2, s/p Mag, s/p NICU consult, s/p Procardia for tocolysis   - SVE /-2 to /-2  - CL 1 5-1 6cm w/ contractions; 2 16-2 36cm without contractions         34 weeks gestation of pregnancy    Overview     - Continue PNV  - Labor precautions reviewed  - Fetal kick counts reviewed  - Labs: 1st trimester labs completed; 3rd trimester labs completed - Patient encouraged to complete Glucola (Patient states that she will complete by next week)   - Genetics: QUAD screen ordered but not completed   - Ultrasounds: most recent ultrasound on 3/16/22 wnl; Repeat growth scan scheduled for 5/3/22   - Tdap: Administered 3/15/22  - Delivery:  without epidural  - Contraception: Tubal with Depo bridge   - RTO in 2 weeks             Future Appointments   Date Time Provider Seamus Corrales   5/3/2022 11:15 AM  [address]   2022 11:00 AM SANJIV Sosa [address] Destiny Santos MD  2022  12:44 PM

## 2022-04-28 ENCOUNTER — ROUTINE PRENATAL (OUTPATIENT)
Dept: OBGYN CLINIC | Facility: CLINIC | Age: 27
End: 2022-04-28

## 2022-04-28 VITALS
SYSTOLIC BLOOD PRESSURE: 118 MMHG | DIASTOLIC BLOOD PRESSURE: 70 MMHG | WEIGHT: 122 LBS | BODY MASS INDEX: 24.64 KG/M2 | HEART RATE: 73 BPM

## 2022-04-28 DIAGNOSIS — Z30.2 REQUEST FOR STERILIZATION: ICD-10-CM

## 2022-04-28 DIAGNOSIS — F12.90 MARIJUANA USE: ICD-10-CM

## 2022-04-28 DIAGNOSIS — O09.292 HISTORY OF GESTATIONAL DIABETES IN PRIOR PREGNANCY, CURRENTLY PREGNANT IN SECOND TRIMESTER: ICD-10-CM

## 2022-04-28 DIAGNOSIS — Z86.32 HISTORY OF GESTATIONAL DIABETES IN PRIOR PREGNANCY, CURRENTLY PREGNANT IN SECOND TRIMESTER: ICD-10-CM

## 2022-04-28 DIAGNOSIS — Z3A.34 34 WEEKS GESTATION OF PREGNANCY: Primary | ICD-10-CM

## 2022-04-28 DIAGNOSIS — O99.331 TOBACCO SMOKING COMPLICATING PREGNANCY IN FIRST TRIMESTER: ICD-10-CM

## 2022-04-28 DIAGNOSIS — O09.292 PREVIOUS PREGNANCY COMPLICATED BY INTRAUTERINE GROWTH RESTRICTION, ANTEPARTUM, SECOND TRIMESTER: ICD-10-CM

## 2022-04-28 DIAGNOSIS — O60.03 PRETERM LABOR IN THIRD TRIMESTER WITHOUT DELIVERY: ICD-10-CM

## 2022-04-28 PROCEDURE — 99213 OFFICE O/P EST LOW 20 MIN: CPT | Performed by: OBSTETRICS & GYNECOLOGY

## 2022-04-29 PROBLEM — Z3A.34 34 WEEKS GESTATION OF PREGNANCY: Status: ACTIVE | Noted: 2022-04-29

## 2022-04-29 NOTE — ASSESSMENT & PLAN NOTE
-Patient admitted - with concern for  labor  - s/p BTM x2, s/p Mag, s/p NICU consult, s/p Procardia for tocolysis   - SVE /-2 to /-2  - CL 1 5-1 6cm w/ contractions; 2 16-2 36cm without contractions

## 2022-04-29 NOTE — ASSESSMENT & PLAN NOTE
Patient currently smoking 2 cigarettes daily  Patient encouraged to attempt to cut down to 1 cigarette daily before next appointment

## 2022-04-29 NOTE — ASSESSMENT & PLAN NOTE
Patient encouraged to complete Glucola screening ASAP  Patient did not complete early Glucola as instructed

## 2022-05-03 ENCOUNTER — ULTRASOUND (OUTPATIENT)
Dept: PERINATAL CARE | Facility: OTHER | Age: 27
End: 2022-05-03
Payer: MEDICARE

## 2022-05-03 VITALS
DIASTOLIC BLOOD PRESSURE: 79 MMHG | HEART RATE: 86 BPM | SYSTOLIC BLOOD PRESSURE: 120 MMHG | BODY MASS INDEX: 24.15 KG/M2 | HEIGHT: 59 IN | WEIGHT: 119.8 LBS

## 2022-05-03 DIAGNOSIS — Z3A.35 35 WEEKS GESTATION OF PREGNANCY: ICD-10-CM

## 2022-05-03 DIAGNOSIS — O09.293 PREVIOUS PREGNANCY COMPLICATED BY INTRAUTERINE GROWTH RESTRICTION, ANTEPARTUM, THIRD TRIMESTER: Primary | ICD-10-CM

## 2022-05-03 PROCEDURE — 76816 OB US FOLLOW-UP PER FETUS: CPT | Performed by: OBSTETRICS & GYNECOLOGY

## 2022-05-03 PROCEDURE — 99212 OFFICE O/P EST SF 10 MIN: CPT | Performed by: OBSTETRICS & GYNECOLOGY

## 2022-05-03 RX ORDER — AMOXICILLIN 500 MG/1
CAPSULE ORAL
COMMUNITY
Start: 2022-04-29 | End: 2022-05-11

## 2022-05-03 NOTE — PROGRESS NOTES
The patient was seen today for an ultrasound  Please see ultrasound report (located under Ob Procedures) for additional details  Thank you very much for allowing us to participate in the care of this very nice patient  Should you have any questions, please do not hesitate to contact me  Damon Summers MD [address]  Attending Physician, Santhosh

## 2022-05-05 ENCOUNTER — HOSPITAL ENCOUNTER (INPATIENT)
Facility: HOSPITAL | Age: 27
LOS: 1 days | Discharge: HOME | DRG: 566 | End: 2022-05-06
Attending: OBSTETRICS & GYNECOLOGY | Admitting: OBSTETRICS & GYNECOLOGY
Payer: MEDICARE

## 2022-05-05 DIAGNOSIS — Z3A.34 34 WEEKS GESTATION OF PREGNANCY: Primary | ICD-10-CM

## 2022-05-05 PROBLEM — O60.03 PRETERM LABOR IN THIRD TRIMESTER: Status: ACTIVE | Noted: 2022-05-05

## 2022-05-05 LAB
ABO GROUP BLD: NORMAL
BLD GP AB SCN SERPL QL: NEGATIVE
ERYTHROCYTE [DISTWIDTH] IN BLOOD BY AUTOMATED COUNT: 13 % (ref 11.6–15.1)
GLUCOSE SERPL-MCNC: 117 MG/DL (ref 65–140)
GLUCOSE SERPL-MCNC: 81 MG/DL (ref 65–140)
HCT VFR BLD AUTO: 33.4 % (ref 34.8–46.1)
HGB BLD-MCNC: 11.3 G/DL (ref 11.5–15.4)
MCH RBC QN AUTO: 32.4 PG (ref 26.8–34.3)
MCHC RBC AUTO-ENTMCNC: 33.8 G/DL (ref 31.4–37.4)
MCV RBC AUTO: 96 FL (ref 82–98)
PLATELET # BLD AUTO: 167 THOUSANDS/UL (ref 149–390)
PMV BLD AUTO: 11.2 FL (ref 8.9–12.7)
RBC # BLD AUTO: 3.49 MILLION/UL (ref 3.81–5.12)
RH BLD: POSITIVE
SPECIMEN EXPIRATION DATE: NORMAL
WBC # BLD AUTO: 10.25 THOUSAND/UL (ref 4.31–10.16)

## 2022-05-05 PROCEDURE — 99221 1ST HOSP IP/OBS SF/LOW 40: CPT | Performed by: OBSTETRICS & GYNECOLOGY

## 2022-05-05 PROCEDURE — 86850 RBC ANTIBODY SCREEN: CPT | Performed by: OBSTETRICS & GYNECOLOGY

## 2022-05-05 PROCEDURE — 82948 REAGENT STRIP/BLOOD GLUCOSE: CPT

## 2022-05-05 PROCEDURE — 4A1HXCZ MONITORING OF PRODUCTS OF CONCEPTION, CARDIAC RATE, EXTERNAL APPROACH: ICD-10-PCS | Performed by: OBSTETRICS & GYNECOLOGY

## 2022-05-05 PROCEDURE — 86900 BLOOD TYPING SEROLOGIC ABO: CPT | Performed by: OBSTETRICS & GYNECOLOGY

## 2022-05-05 PROCEDURE — NC001 PR NO CHARGE: Performed by: OBSTETRICS & GYNECOLOGY

## 2022-05-05 PROCEDURE — 99213 OFFICE O/P EST LOW 20 MIN: CPT

## 2022-05-05 PROCEDURE — 86901 BLOOD TYPING SEROLOGIC RH(D): CPT | Performed by: OBSTETRICS & GYNECOLOGY

## 2022-05-05 PROCEDURE — 86592 SYPHILIS TEST NON-TREP QUAL: CPT | Performed by: OBSTETRICS & GYNECOLOGY

## 2022-05-05 PROCEDURE — 85027 COMPLETE CBC AUTOMATED: CPT | Performed by: OBSTETRICS & GYNECOLOGY

## 2022-05-05 RX ORDER — BUTORPHANOL TARTRATE 1 MG/ML
1 INJECTION, SOLUTION INTRAMUSCULAR; INTRAVENOUS ONCE
Status: COMPLETED | OUTPATIENT
Start: 2022-05-05 | End: 2022-05-05

## 2022-05-05 RX ORDER — BETAMETHASONE SODIUM PHOSPHATE AND BETAMETHASONE ACETATE 3; 3 MG/ML; MG/ML
12 INJECTION, SUSPENSION INTRA-ARTICULAR; INTRALESIONAL; INTRAMUSCULAR; SOFT TISSUE EVERY 24 HOURS
Status: DISCONTINUED | OUTPATIENT
Start: 2022-05-05 | End: 2022-05-06 | Stop reason: HOSPADM

## 2022-05-05 RX ORDER — SODIUM CHLORIDE, SODIUM LACTATE, POTASSIUM CHLORIDE, CALCIUM CHLORIDE 600; 310; 30; 20 MG/100ML; MG/100ML; MG/100ML; MG/100ML
125 INJECTION, SOLUTION INTRAVENOUS CONTINUOUS
Status: DISCONTINUED | OUTPATIENT
Start: 2022-05-05 | End: 2022-05-06 | Stop reason: HOSPADM

## 2022-05-05 RX ADMIN — BETAMETHASONE SODIUM PHOSPHATE AND BETAMETHASONE ACETATE 12 MG: 3; 3 INJECTION, SUSPENSION INTRA-ARTICULAR; INTRALESIONAL; INTRAMUSCULAR at 19:40

## 2022-05-05 RX ADMIN — SODIUM CHLORIDE, SODIUM LACTATE, POTASSIUM CHLORIDE, AND CALCIUM CHLORIDE 125 ML/HR: .6; .31; .03; .02 INJECTION, SOLUTION INTRAVENOUS at 19:33

## 2022-05-05 RX ADMIN — BUTORPHANOL TARTRATE 1 MG: 1 INJECTION, SOLUTION INTRAMUSCULAR; INTRAVENOUS at 21:42

## 2022-05-05 NOTE — PROGRESS NOTES
L&D Triage Note - OB/GYN  Grecia Edwards 33 y o  female MRN: [id number]  Unit/Bed#: L&D 329-01 Encounter: [id number]        Patient is seen by [address]    ASSESSMENT/PLAN  Grecia Edwards is a 33 y o  [id number] at 35w4d who presents for rule out  labor      1) Rule out  Labor  - reports painful contractions for the past two days   - Contractions worsened this AM   - SVE on arrival 3/60/-2  - s/p BTM X2 at 31 weeks   - Will plan for two hour recheck     SVE:  Cervical Dilation: 3  Cervical Effacement: 61  Fetal Station: -2    FHT:  Baseline 135  Variability Moderate   Accelerations: Present    TOCO:   Q2 mins on toco    2)  Dispo  - Night team to recheck in two hours     D/w Dr Stanford Minor   ______________    SUBJECTIVE    RACHEL: Estimated Date of Delivery: 22    HPI:  33 y o  [id number] 35w4d presents with complaint of painful contractions  Reports contractions began two days ago  States that contractions worsened this AM  Denies any leakage of fluid or vaginal bleeding  Reports history of five prior SVDs  Received full course of betamethasone at 31 weeks  Does not desire epidural  Would like depo for contraception postpartum  Contractions: Yes  Leakage of fluid: Denies  Vaginal Bleeding: Denies  Fetal movement: present    Her obstetrical history is significant for five prior vaginal deliveries    ROS:  Constitutional: Negative  Respiratory: Negative  Cardiovascular: Negative    Gastrointestinal: Reports painful abdominal contractions    Physical Exam  GEN: The patient was alert, pleasant well-appearing female in no acute distress     CV: Regular rate  PULM: nonlabored respirations  MSK: Normal gait  Skin: warm, dry  Neuro: no focal deficits  Psych: normal affect and judgement, cooperative   ABD: Gravid, with palpable contractions  SVE: Cervical Dilation: 3  Cervical Effacement: 60  Fetal Station: -2    OBJECTIVE:  /84   Pulse 71   Temp 97 5 °F (36 4 °C) (Oral)   Resp 18   LMP  (LMP Unknown)   SpO2 99%   There is no height or weight on file to calculate BMI  Labs: No results found for this or any previous visit (from the past 24 hour(s))  Dea Garcia MD  OB/GYN PGY-1  5/5/2022  6:39 PM      Portions of the record may have been created with voice recognition software  Occasional wrong word or "sound a like" substitutions may have occurred due to the inherent limitations of voice recognition software    Read the chart carefully and recognize, using context, where substitutions have occurred

## 2022-05-05 NOTE — OB LABOR/OXYTOCIN SAFETY PROGRESS
Labor Progress Note - Lawrence Scott 33 y o  female MRN: [id number]    Unit/Bed#: L&D 326-01 Encounter: [id number]       Contraction Frequency (minutes): 1-3 (irritable)  Contraction Quality: Moderate  Tachysystole: No   Cervical Dilation: 4        Cervical Effacement: 70  Fetal Station: -2     Fetal Heart Rate: 135 BPM                  Vital Signs:   Vitals:    05/05/22 1822   BP: 120/84   Pulse: 71   Resp: 18   Temp: 97 5 °F (36 4 °C)   SpO2:        Notes/comments:     Pt uncomfortable  Declines any medication for pain at this time  SVE unchanged  Cat I tracing   D/w Dr Ashok Medina MD 5/5/2022 7:58 PM

## 2022-05-05 NOTE — H&P
[address] 33 y o  female MRN: [id number]  Unit/Bed#: L&D 326-01 Encounter: [id number]      Assessment: 33 y o  [id number] at 35w4d admitted for concern for  labor  SVE: /-2  FHT: 140s reactive  Clinical EFW: 5-6lb ; Vertex confirmed by US  GBS status: neg     Plan:   · Admit  · CBC, RPR, Type & Screen  · Analgesia at maternal request  · Expectant management   · NICU aware  · BTM for fetal lung maturity    Dr José Spencer aware      SUBJECTIVE:    Chief Complaint: contractions    HPI: Ja Chapa is a 33 y o  [id number] with an RACHEL of 2022, by Ultrasound at 35w4d who is being admitted for concern for  labor  She complains of uterine contractions, occurring every 2 minutes, has no LOF, and reports no VB  She states she has felt good FM  Reji Yuni     Pregnancy complications: hx labor x4 at 37 weeks, marijuana use, tobacco use, hx GDM    Baby complications/comments: none    Patient Active Problem List   Diagnosis    History of gestational diabetes in prior pregnancy, currently pregnant in second trimester    Previous pregnancy complicated by intrauterine growth restriction, antepartum, third trimester    Marijuana use    Tobacco smoking complicating pregnancy in first trimester    Request for sterilization     labor in third trimester without delivery    34 weeks gestation of pregnancy     labor in third trimester       OB History    Para Term  AB Living   6 5 5 0 0 5   SAB IAB Ectopic Multiple Live Births   0 0 0 0 5      # Outcome Date GA Lbr Nagi/2nd Weight Sex Delivery Anes PTL Lv   6 Current            5 Term 20 37w0d  2675 g (5 lb 14 4 oz) M Vag-Spont None N DELFIN      Birth Comments: FOB1   4 Term 19 37w2d  2705 g (5 lb 15 4 oz) M Vag-Spont None N DELFIN      Birth Comments: FOB1   3 Term 10/08/17 37w4d / 00:52 2608 g (5 lb 12 oz) M Vag-Spont EPI N DELFIN      Birth Comments: FOB2      Complications: Gestational diabetes mellitus 2 Term 14 38w6d  2466 g (5 lb 7 oz) F Vag-Spont EPI N DELFIN      Birth Comments: FOB2   1 Term 13 37w0d  2438 g (5 lb 6 oz) M Vag-Spont EPI Y DELFIN      Birth Comments: FOB1      Complications: Gestational diabetes mellitus       Past Medical History:   Diagnosis Date    Chlamydia     GERD (gastroesophageal reflux disease)     has not req'd tx since     Gestational diabetes     Varicella     had chicken pox as child and vaccine       Past Surgical History:   Procedure Laterality Date    APPENDECTOMY      CHOLECYSTECTOMY      TN COLONOSCOPY FLX DX W/COLLJ SPEC WHEN PFRMD N/A 2017    Procedure: EGD AND COLONOSCOPY;  Surgeon: Marnie Leon DO;  Location: [facility];   Service: Gastroenterology    TN LAP,APPENDECTOMY N/A 2016    Procedure: APPENDECTOMY LAPAROSCOPIC;  Surgeon: Lacy Schwarz DO;  Location: [facility];  Service: General    TN LAP,DIAGNOSTIC ABDOMEN N/A 3/13/2016    Procedure: LAPAROSCOPY DIAGNOSTIC;  Surgeon: Merlinda Garin, MD;  [facility];  Service: General    RESECTION SMALL BOWEL LAPAROSCOPIC         Social History     Tobacco Use    Smoking status: Current Every Day Smoker     Packs/day: 0 25     Years: 10 00     Pack years: 2 50     Types: Cigarettes    Smokeless tobacco: Never Used    Tobacco comment: 1-2 cigs/day   Substance Use Topics    Alcohol use: Not Currently     Comment: socially       Allergies   Allergen Reactions    Ibuprofen Anaphylaxis       Medications Prior to Admission   Medication    acetaminophen (TYLENOL) 500 mg tablet    acetaminophen (TYLENOL) 500 mg tablet    amoxicillin (AMOXIL) 500 mg capsule    NIFEdipine (PROCARDIA) 10 mg capsule    ondansetron (ZOFRAN) 4 mg tablet    Prenatal Multivit-Min-Fe-FA (Pre-Ana Formula) TABS           OBJECTIVE:  Vitals:  Temp:  [97 5 °F (36 4 °C)-97 7 °F (36 5 °C)] 97 5 °F (36 4 °C)  HR:  [71] 71  Resp:  [18] 18  BP: (120-133)/(82-84) 120/84  There is no height or weight on file to calculate BMI  Physical Exam:  Physical Exam  Constitutional:       Appearance: She is well-developed  HENT:      Head: Normocephalic and atraumatic  Eyes:      Extraocular Movements: Extraocular movements intact  Conjunctiva/sclera: Conjunctivae normal    Cardiovascular:      Rate and Rhythm: Normal rate and regular rhythm  Heart sounds: Normal heart sounds  Pulmonary:      Effort: Pulmonary effort is normal       Breath sounds: Normal breath sounds  Abdominal:      Comments: Gravid   Neurological:      Mental Status: She is alert and oriented to person, place, and time  Skin:     General: Skin is warm     Psychiatric:         Mood and Affect: Mood normal          Behavior: Behavior normal           SVE:       FHT:  Variability: Moderate 6-25 bpm  Accelerations: 15 x 15 or greater    TOCO:   Contraction Frequency (minutes): 1-3 (irritable)  Contraction Duration (seconds): 40-60  Contraction Quality: Moderate    Lab Results   Component Value Date    WBC 10 43 (H) 04/06/2022    HGB 11 3 (L) 04/06/2022    HCT 30 5 (L) 04/06/2022     04/06/2022     Lab Results   Component Value Date     11/21/2015    K 4 2 04/06/2022     04/06/2022    CO2 26 04/06/2022    BUN 8 04/06/2022    CREATININE 0 36 (L) 04/06/2022    GLUCOSE 80 05/05/2017    AST 33 04/06/2022    ALT 21 04/06/2022       Prenatal Labs   Blood type: O+  Antibody: negative  Group B strep: negative  HIV: negative  Hepatitis B: negative  RPR: non-reactive  Rubella: Immune  Varicella: Unknown  1 hour Glucose: not completed    >2 Midnights  INPATIENT       Dustin Cerda MD  PGY-2 OB/GYN   5/5/2022 7:26 PM

## 2022-05-06 ENCOUNTER — TELEPHONE (OUTPATIENT)
Dept: LABOR AND DELIVERY | Facility: HOSPITAL | Age: 27
End: 2022-05-06

## 2022-05-06 ENCOUNTER — HOSPITAL ENCOUNTER (OUTPATIENT)
Facility: HOSPITAL | Age: 27
Discharge: HOME | DRG: 566 | End: 2022-05-06
Attending: OBSTETRICS & GYNECOLOGY | Admitting: OBSTETRICS & GYNECOLOGY
Payer: MEDICARE

## 2022-05-06 VITALS
TEMPERATURE: 97.9 F | HEART RATE: 90 BPM | OXYGEN SATURATION: 99 % | WEIGHT: 119.8 LBS | HEIGHT: 59 IN | BODY MASS INDEX: 24.15 KG/M2 | SYSTOLIC BLOOD PRESSURE: 116 MMHG | DIASTOLIC BLOOD PRESSURE: 75 MMHG | RESPIRATION RATE: 18 BRPM

## 2022-05-06 LAB
AMPHETAMINES SERPL QL SCN: NEGATIVE
BARBITURATES UR QL: NEGATIVE
BENZODIAZ UR QL: NEGATIVE
COCAINE UR QL: NEGATIVE
METHADONE UR QL: NEGATIVE
OPIATES UR QL SCN: NEGATIVE
OXYCODONE+OXYMORPHONE UR QL SCN: NEGATIVE
PCP UR QL: NEGATIVE
RPR SER QL: NORMAL
THC UR QL: NEGATIVE

## 2022-05-06 PROCEDURE — 80307 DRUG TEST PRSMV CHEM ANLYZR: CPT | Performed by: OBSTETRICS & GYNECOLOGY

## 2022-05-06 PROCEDURE — 99214 OFFICE O/P EST MOD 30 MIN: CPT

## 2022-05-06 PROCEDURE — 96372 THER/PROPH/DIAG INJ SC/IM: CPT

## 2022-05-06 RX ORDER — BETAMETHASONE SODIUM PHOSPHATE AND BETAMETHASONE ACETATE 3; 3 MG/ML; MG/ML
12 INJECTION, SUSPENSION INTRA-ARTICULAR; INTRALESIONAL; INTRAMUSCULAR; SOFT TISSUE ONCE
Status: COMPLETED | OUTPATIENT
Start: 2022-05-06 | End: 2022-05-06

## 2022-05-06 RX ADMIN — BETAMETHASONE SODIUM PHOSPHATE AND BETAMETHASONE ACETATE 12 MG: 3; 3 INJECTION, SUSPENSION INTRA-ARTICULAR; INTRALESIONAL; INTRAMUSCULAR at 19:55

## 2022-05-06 NOTE — OB LABOR/OXYTOCIN SAFETY PROGRESS
Labor Progress Note - Taiwo Mcneal 33 y o  female MRN: [id number]    Unit/Bed#: L&D 326-01 Encounter: [id number]       Contraction Frequency (minutes): 1-3 (irritable)  Contraction Quality: Moderate  Tachysystole: No   Cervical Dilation: 4-5        Cervical Effacement: 70  Fetal Station: -1     Fetal Heart Rate: 135 BPM                  Vital Signs:   Vitals:    05/05/22 1822   BP: 120/84   Pulse: 71   Resp: 18   Temp: 97 5 °F (36 4 °C)   SpO2:        Notes/comments: The patient feels overall the same  Fetal head has descended  Cat I tracing  Will continue SVE as needed   D/w Dr Samantha Rodriguez MD 5/5/2022 11:22 PM

## 2022-05-06 NOTE — OB LABOR/OXYTOCIN SAFETY PROGRESS
Labor Progress Note - Lacy Vega 33 y o  female MRN: [id number]    Unit/Bed#: L&D 326-01 Encounter: [id number]       Contraction Frequency (minutes): irregular  Contraction Quality: Moderate  Tachysystole: No   Cervical Dilation: 5        Cervical Effacement: 70  Fetal Station: -1  Baseline Rate: 125 bpm  Fetal Heart Rate: 135 BPM  FHR Category: Category I               Vital Signs:   Vitals:    05/06/22 0704   BP: 117/60   Pulse: 72   Resp: 18   Temp: 97 8 °F (36 6 °C)   SpO2:        Notes/comments:   Patient feeling more pressure  SVE unchanged  Irregular contractions on tocometer  FHT category 1          Shazia De Anda MD 5/6/2022 10:45 AM

## 2022-05-06 NOTE — TELEPHONE ENCOUNTER
Called patient to discuss betamethasone series  Patient due for second dose at [address] [place] [address] for arrival to [facility] L&D for second dose of BTM for fetal lung maturity    Fanny Womack MD

## 2022-05-06 NOTE — OB LABOR/OXYTOCIN SAFETY PROGRESS
Labor Progress Note - Hiwot Haley 33 y o  female MRN: [id number]    Unit/Bed#: L&D 326-01 Encounter: [id number]       Contraction Frequency (minutes): 1-3 (irritable)  Contraction Quality: Moderate  Tachysystole: No   Cervical Dilation: 5        Cervical Effacement: 70  Fetal Station: -1  Baseline Rate: 135 bpm  Fetal Heart Rate: 135 BPM                  Vital Signs:   Vitals:    05/06/22 0000   BP: 118/76   Pulse: 61   Resp: 20   Temp: 97 9 °F (36 6 °C)   SpO2:        Notes/comments: The patient is feeling more pressure and is very uncomfortable  She is unchanged on SVE  Cat I tracing  D/w Dr Natalio Bronson MD 5/6/2022 6:50 AM

## 2022-05-06 NOTE — UTILIZATION REVIEW
Initial Clinical Review    Admission: Date/Time/Statement:   Admission Orders (From admission, onward)     Ordered        22  Inpatient Admission  Once                      Orders Placed This Encounter   Procedures    Inpatient Admission     Standing Status:   Standing     Number of Occurrences:   1     Order Specific Question:   Level of Care     Answer:   Med Surg [16]     Order Specific Question:   Estimated length of stay     Answer:   More than 2 Midnights     Order Specific Question:   Certification     Answer:   I certify that inpatient services are medically necessary for this patient for a duration of greater than two midnights  See H&P and MD Progress Notes for additional information about the patient's course of treatment  ED Arrival Information     Expected Arrival Acuity    2022 18:08 Non-Urgent         Means of arrival Escorted by Service Admission type    [facility] Member OB/GYN Elective         Arrival complaint    contractions        Chief Complaint   Patient presents with    Contractions       Initial Presentation: 33 y o  female  [id number] at 35w4d admit Inpatient for concern for  labor  Pregnancy complicated by PTL earlier & s/p BTM  In triage SVE: /-2, reports uterine contractions Q 2 min w no LOF, VB & good FM  Cont continuous fetal monitoring, BTM, NICU consult  11:22 PM  Contraction Frequency (minutes): 1-3 (irritable)  Contraction Quality: Moderate  Tachysystole: No   Cervical Dilation: 4-5  Cervical Effacement: 79  Fetal Station: -1, fetal head descended  Date: 2022   Day 2:   MAGALY PLASENCIA  4:29 AM  Contraction Frequency (minutes): 2-6 (irritable)  Contraction Quality: Moderate  Tachysystole: No   Cervical Dilation: 5  Cervical Effacement: 79  Fetal Station: -1  3:09 PM  MAGALY PLASENCIA  Not in labor   She has not made any cervical chagned in 4 hours     She only lives 4 blocks and wishes to go home   Advised to come in if contraction intensify the water breaks or bleeding    Triage Vitals   Temperature Pulse Respirations Blood Pressure SpO2   05/05/22 1802 05/05/22 1802 05/05/22 1802 05/05/22 1802 05/05/22 1802   97 7 °F (36 5 °C) 71 18 133/82 99 %      Temp Source Heart Rate Source Patient Position - Orthostatic VS BP Location FiO2 (%)   05/05/22 1802 05/05/22 1822 05/06/22 0000 05/06/22 0000 --   Oral Monitor Lying Right arm       Pain Score       05/05/22 1822       7          Wt Readings from Last 1 Encounters:   05/06/22 54 3 kg (119 lb 12 8 oz)     Additional Vital Signs:   Date/Time Temp Pulse Resp BP SpO2 Patient Position - Orthostatic VS   05/06/22 1153 -- 90 -- 116/75 -- --   05/06/22 1130 97 9 °F (36 6 °C) -- -- -- -- --   05/06/22 0704 97 8 °F (36 6 °C) 72 18 117/60 -- Lying   05/06/22 0000 97 9 °F (36 6 °C) 61 20 118/76 -- Lying   05/05/22 2350 -- 61 -- -- -- --   05/05/22 1822 97 5 °F (36 4 °C) 71 18 120/84 -- --   05/05/22 1802 97 7 °F (36 5 °C) 71 18 133/82 99 % --       Weights (last 14 days) before discharge    Date/Time Weight Weight Method Height   05/06/22 0000 54 3 kg (119 lb 12 8 oz) Estimated 4' 11" (1 499 m)       Pertinent Labs/Diagnostic Test Results:   No orders to display         Results from last 7 days   Lab Units 05/05/22  1923   WBC Thousand/uL 10 25*   HEMOGLOBIN g/dL 11 3*   HEMATOCRIT % 33 4*   PLATELETS Thousands/uL 167                 Results from last 7 days   Lab Units 05/05/22 2224 05/05/22 2016   POC GLUCOSE mg/dl 117 81           Results from last 7 days   Lab Units 05/06/22  0012   AMPH/METH  Negative   BARBITURATE UR  Negative   BENZODIAZEPINE UR  Negative   COCAINE UR  Negative   METHADONE URINE  Negative   OPIATE UR  Negative   PCP UR  Negative   THC UR  Negative       ED Treatment:   Medication Administration from 05/05/2022 1751 to 05/06/2022 1547       Date/Time Order Dose Route Action     05/05/2022 1940 betamethasone acetate-betamethasone sodium phosphate (CELESTONE) injection 12 mg 12 mg Intramuscular Given 05/05/2022 1933 lactated ringers infusion 125 mL/hr Intravenous New Bag     05/05/2022 2142 butorphanol (STADOL) injection 1 mg 1 mg Intravenous Given        Past Medical History:   Diagnosis Date    Chlamydia 2013    GERD (gastroesophageal reflux disease)     has not req'd tx since 2019    Gestational diabetes     Varicella     had chicken pox as child and vaccine     Present on Admission:  **None**      Admitting Diagnosis: Abdominal pain during pregnancy in third trimester [O26 893, R10 9]  Age/Sex: 33 y o  female  Admission Orders:  Continuous external uterine contraction & fetal HR monitoring  BTM     Scheduled Medications:  betamethasone acetate-betamethasone sodium phosphate, 12 mg, Intramuscular, Q24H      Continuous IV Infusions:  lactated ringers, 125 mL/hr, Intravenous, Continuous      PRN Meds:       IP CONSULT TO ANESTHESIOLOGY    Network Utilization Review Department  ATTENTION: Please call with any questions or concerns to [phone] and carefully listen to the prompts so that you are directed to the right person  All voicemails are confidential   Josi Galeano all requests for admission clinical reviews, approved or denied determinations and any other requests to dedicated fax number below belonging to the campus where the patient is receiving treatment   List of dedicated fax numbers for the Facilities:  [address] DENIALS (Administrative/Medical Necessity) [phone]   [address] (Maternity/NICU/Pediatrics) [phone]   [address]  [phone]   Mohan Ascencio 50 [address] [facility] [place] [facility] [id number] [address] [facility] [phone]   Nicko Franco 37 [address] [place] [address] [phone]

## 2022-05-06 NOTE — OB LABOR/OXYTOCIN SAFETY PROGRESS
Labor Progress Note - José Miguel Mcdowell 33 y o  female MRN: [id number]    Unit/Bed#: L&D 326-01 Encounter: [id number]       Contraction Frequency (minutes): 2-6 (irritable)  Contraction Quality: Moderate  Tachysystole: No   Cervical Dilation: 5        Cervical Effacement: 70  Fetal Station: -1  Baseline Rate: 130 bpm  Fetal Heart Rate: 135 BPM                  Vital Signs:   Vitals:    05/06/22 0000   BP: 118/76   Pulse: 61   Resp: 20   Temp: 97 9 °F (36 6 °C)   SpO2:        Notes/comments: The patient is 5 cm dilated  Cat I tracing  Will continue to monitor and recheck as needed      Blue Glez MD 5/6/2022 4:29 AM

## 2022-05-06 NOTE — OB LABOR/OXYTOCIN SAFETY PROGRESS
Labor Progress Note - Vitor Anderson 33 y o  female MRN: [id number]    Unit/Bed#: L&D 326-01 Encounter: [id number]       Contraction Frequency (minutes): irritability  Contraction Quality: Moderate  Tachysystole: No   Cervical Dilation: 4-5        Cervical Effacement: 60  Fetal Station: Ballotable  Baseline Rate: 135 bpm  Fetal Heart Rate: 135 BPM  FHR Category: Category I               Vital Signs:     Vitals:    05/06/22 1153   BP: 116/75   Pulse: 90   Resp:    Temp:    SpO2:        Notes/comments:     Not in labor   She has not made any cervical chagned in 4 hours     She only lives 4 blocks and wishes to go home   Advised to come in if contraction intensify the water breaks or bleeding     Marquise Goodwin MD 5/6/2022 3:09 PM

## 2022-05-06 NOTE — UTILIZATION REVIEW
Inpatient Admission Authorization Request   NOTIFICATION OF INPATIENT ADMISSION/INPATIENT AUTHORIZATION REQUEST   SERVICING FACILITY:   [address] [place] [address], [place] [address] [facility]  Tax ID: [id number]  NPI: [id number]  Place of Service: Inpatient [facility]  [facility]  Place of Service Code: 24     ATTENDING PROVIDER:  Attending Name and NPI#: Jonel Christy, Emily Tonyas Cory [[id number]]  Address: [address] [place] [address], [place] [address] [address] [facility]  Phone: [phone]     UTILIZATION REVIEW CONTACT:  Clary Tarango Utilization   Network Utilization Review Department  Phone: [phone]  Fax [phone]  Email: [email or web address]     PHYSICIAN ADVISORY SERVICES:  FOR [id number] REVIEW - MEDICAL NECESSITY DENIAL  Phone: [phone]  Fax: [phone]  Email: [email or web address]     TYPE OF REQUEST:  Inpatient Status     ADMISSION INFORMATION:  ADMISSION DATE/TIME: 5/5/22  7:25 PM  PATIENT DIAGNOSIS CODE/DESCRIPTION:  Abdominal pain during pregnancy in third trimester [O26 893, R10 9]  DISCHARGE DATE/TIME: 5/6/2022  3:29 PM   IMPORTANT INFORMATION:  Please contact the Clary Tarango directly with any questions or concerns regarding this request  Department voicemails are confidential     Send requests for admission clinical reviews, concurrent reviews, approvals, and administrative denials due to lack of clinical to fax [phone]

## 2022-05-06 NOTE — OB LABOR/OXYTOCIN SAFETY PROGRESS
Labor Progress Note - Alejandro Mendoza 33 y o  female MRN: [id number]    Unit/Bed#: L&D 326-01 Encounter: [id number]       Contraction Frequency (minutes): 1-3 (irritable)  Contraction Quality: Moderate  Tachysystole: No   Cervical Dilation: 4        Cervical Effacement: 70  Fetal Station: -2     Fetal Heart Rate: 135 BPM                  Vital Signs:   Vitals:    05/05/22 1822   BP: 120/84   Pulse: 71   Resp: 18   Temp: 97 5 °F (36 4 °C)   SpO2:        Notes/comments: The patient is uncomfortable  SVE unchanged   Will administer a dose of stadol at this time D/w Dr Evaristo Mustafa MD 5/5/2022 9:58 PM

## 2022-05-10 ENCOUNTER — HOSPITAL ENCOUNTER (INPATIENT)
Facility: HOSPITAL | Age: 27
LOS: 1 days | Discharge: HOME | DRG: 560 | End: 2022-05-11
Attending: OBSTETRICS & GYNECOLOGY | Admitting: OBSTETRICS & GYNECOLOGY
Payer: MEDICARE

## 2022-05-10 DIAGNOSIS — O60.03 PRETERM LABOR IN THIRD TRIMESTER WITHOUT DELIVERY: ICD-10-CM

## 2022-05-10 DIAGNOSIS — Z3A.36 36 WEEKS GESTATION OF PREGNANCY: Primary | ICD-10-CM

## 2022-05-10 LAB
ABO GROUP BLD: NORMAL
BASE EXCESS BLDCOA CALC-SCNC: -2.3 MMOL/L (ref 3–11)
BASE EXCESS BLDCOV CALC-SCNC: -0.3 MMOL/L (ref 1–9)
BASOPHILS # BLD AUTO: 0.03 THOUSANDS/ΜL (ref 0–0.1)
BASOPHILS NFR BLD AUTO: 0 % (ref 0–1)
BLD GP AB SCN SERPL QL: NEGATIVE
EOSINOPHIL # BLD AUTO: 0.05 THOUSAND/ΜL (ref 0–0.61)
EOSINOPHIL NFR BLD AUTO: 0 % (ref 0–6)
ERYTHROCYTE [DISTWIDTH] IN BLOOD BY AUTOMATED COUNT: 13.3 % (ref 11.6–15.1)
HCO3 BLDCOA-SCNC: 26.4 MMOL/L (ref 17.3–27.3)
HCO3 BLDCOV-SCNC: 27.2 MMOL/L (ref 12.2–28.6)
HCT VFR BLD AUTO: 33.2 % (ref 34.8–46.1)
HGB BLD-MCNC: 11.3 G/DL (ref 11.5–15.4)
IMM GRANULOCYTES # BLD AUTO: 0.09 THOUSAND/UL (ref 0–0.2)
IMM GRANULOCYTES NFR BLD AUTO: 1 % (ref 0–2)
LYMPHOCYTES # BLD AUTO: 1.86 THOUSANDS/ΜL (ref 0.6–4.47)
LYMPHOCYTES NFR BLD AUTO: 15 % (ref 14–44)
MCH RBC QN AUTO: 33.7 PG (ref 26.8–34.3)
MCHC RBC AUTO-ENTMCNC: 34 G/DL (ref 31.4–37.4)
MCV RBC AUTO: 99 FL (ref 82–98)
MONOCYTES # BLD AUTO: 0.75 THOUSAND/ΜL (ref 0.17–1.22)
MONOCYTES NFR BLD AUTO: 6 % (ref 4–12)
NEUTROPHILS # BLD AUTO: 9.35 THOUSANDS/ΜL (ref 1.85–7.62)
NEUTS SEG NFR BLD AUTO: 78 % (ref 43–75)
NRBC BLD AUTO-RTO: 0 /100 WBCS
O2 CT VFR BLDCOA CALC: 12.8 ML/DL
OXYHGB MFR BLDCOA: 50.1 %
OXYHGB MFR BLDCOV: 44.9 %
PCO2 BLDCOA: 59.9 MM[HG] (ref 30–60)
PCO2 BLDCOV: 54.2 MM HG (ref 27–43)
PH BLDCOA: 7.26 [PH] (ref 7.23–7.43)
PH BLDCOV: 7.32 [PH] (ref 7.19–7.49)
PLATELET # BLD AUTO: 202 THOUSANDS/UL (ref 149–390)
PMV BLD AUTO: 10.7 FL (ref 8.9–12.7)
PO2 BLDCOA: 21 MM HG (ref 5–25)
PO2 BLDCOV: 17.7 MM HG (ref 15–45)
RBC # BLD AUTO: 3.35 MILLION/UL (ref 3.81–5.12)
RH BLD: POSITIVE
RPR SER QL: NORMAL
SAO2 % BLDCOV: 11.4 ML/DL
SPECIMEN EXPIRATION DATE: NORMAL
WBC # BLD AUTO: 12.13 THOUSAND/UL (ref 4.31–10.16)

## 2022-05-10 PROCEDURE — 88307 TISSUE EXAM BY PATHOLOGIST: CPT | Performed by: PATHOLOGY

## 2022-05-10 PROCEDURE — 82805 BLOOD GASES W/O2 SATURATION: CPT | Performed by: OBSTETRICS & GYNECOLOGY

## 2022-05-10 PROCEDURE — 10907ZC DRAINAGE OF AMNIOTIC FLUID, THERAPEUTIC FROM PRODUCTS OF CONCEPTION, VIA NATURAL OR ARTIFICIAL OPENING: ICD-10-PCS | Performed by: OBSTETRICS & GYNECOLOGY

## 2022-05-10 PROCEDURE — 99203 OFFICE O/P NEW LOW 30 MIN: CPT

## 2022-05-10 PROCEDURE — 4A1HXCZ MONITORING OF PRODUCTS OF CONCEPTION, CARDIAC RATE, EXTERNAL APPROACH: ICD-10-PCS | Performed by: OBSTETRICS & GYNECOLOGY

## 2022-05-10 PROCEDURE — 86900 BLOOD TYPING SEROLOGIC ABO: CPT | Performed by: OBSTETRICS & GYNECOLOGY

## 2022-05-10 PROCEDURE — 86592 SYPHILIS TEST NON-TREP QUAL: CPT | Performed by: OBSTETRICS & GYNECOLOGY

## 2022-05-10 PROCEDURE — 86850 RBC ANTIBODY SCREEN: CPT | Performed by: OBSTETRICS & GYNECOLOGY

## 2022-05-10 PROCEDURE — 85025 COMPLETE CBC W/AUTO DIFF WBC: CPT | Performed by: OBSTETRICS & GYNECOLOGY

## 2022-05-10 PROCEDURE — NC001 PR NO CHARGE: Performed by: OBSTETRICS & GYNECOLOGY

## 2022-05-10 PROCEDURE — 86901 BLOOD TYPING SEROLOGIC RH(D): CPT | Performed by: OBSTETRICS & GYNECOLOGY

## 2022-05-10 PROCEDURE — 59409 OBSTETRICAL CARE: CPT | Performed by: OBSTETRICS & GYNECOLOGY

## 2022-05-10 RX ORDER — DIPHENHYDRAMINE HYDROCHLORIDE 50 MG/ML
25 INJECTION INTRAMUSCULAR; INTRAVENOUS EVERY 6 HOURS PRN
Status: DISCONTINUED | OUTPATIENT
Start: 2022-05-10 | End: 2022-05-10

## 2022-05-10 RX ORDER — CALCIUM CARBONATE 200(500)MG
1000 TABLET,CHEWABLE ORAL 3 TIMES DAILY PRN
Status: DISCONTINUED | OUTPATIENT
Start: 2022-05-10 | End: 2022-05-11 | Stop reason: HOSPADM

## 2022-05-10 RX ORDER — BUPIVACAINE HYDROCHLORIDE 2.5 MG/ML
30 INJECTION, SOLUTION EPIDURAL; INFILTRATION; INTRACAUDAL ONCE
Status: DISCONTINUED | OUTPATIENT
Start: 2022-05-10 | End: 2022-05-10

## 2022-05-10 RX ORDER — ONDANSETRON 2 MG/ML
4 INJECTION INTRAMUSCULAR; INTRAVENOUS EVERY 6 HOURS PRN
Status: DISCONTINUED | OUTPATIENT
Start: 2022-05-10 | End: 2022-05-10

## 2022-05-10 RX ORDER — SODIUM CHLORIDE, SODIUM LACTATE, POTASSIUM CHLORIDE, CALCIUM CHLORIDE 600; 310; 30; 20 MG/100ML; MG/100ML; MG/100ML; MG/100ML
125 INJECTION, SOLUTION INTRAVENOUS CONTINUOUS
Status: DISCONTINUED | OUTPATIENT
Start: 2022-05-10 | End: 2022-05-10

## 2022-05-10 RX ORDER — ACETAMINOPHEN 325 MG/1
650 TABLET ORAL EVERY 4 HOURS PRN
Status: DISCONTINUED | OUTPATIENT
Start: 2022-05-10 | End: 2022-05-11 | Stop reason: HOSPADM

## 2022-05-10 RX ORDER — DOCUSATE SODIUM 100 MG/1
100 CAPSULE, LIQUID FILLED ORAL 2 TIMES DAILY
Status: DISCONTINUED | OUTPATIENT
Start: 2022-05-10 | End: 2022-05-11 | Stop reason: HOSPADM

## 2022-05-10 RX ORDER — DIPHENHYDRAMINE HCL 25 MG
25 TABLET ORAL EVERY 6 HOURS PRN
Status: DISCONTINUED | OUTPATIENT
Start: 2022-05-10 | End: 2022-05-11 | Stop reason: HOSPADM

## 2022-05-10 RX ORDER — OXYTOCIN/RINGER'S LACTATE 30/500 ML
PLASTIC BAG, INJECTION (ML) INTRAVENOUS
Status: COMPLETED
Start: 2022-05-10 | End: 2022-05-10

## 2022-05-10 RX ORDER — DIAPER,BRIEF,INFANT-TODD,DISP
1 EACH MISCELLANEOUS DAILY PRN
Status: DISCONTINUED | OUTPATIENT
Start: 2022-05-10 | End: 2022-05-11 | Stop reason: HOSPADM

## 2022-05-10 RX ORDER — OXYTOCIN/RINGER'S LACTATE 30/500 ML
250 PLASTIC BAG, INJECTION (ML) INTRAVENOUS
Status: DISCONTINUED | OUTPATIENT
Start: 2022-05-11 | End: 2022-05-10

## 2022-05-10 RX ORDER — OXYTOCIN/RINGER'S LACTATE 30/500 ML
250 PLASTIC BAG, INJECTION (ML) INTRAVENOUS ONCE
Status: COMPLETED | OUTPATIENT
Start: 2022-05-10 | End: 2022-05-10

## 2022-05-10 RX ORDER — OXYCODONE HYDROCHLORIDE 5 MG/1
5 TABLET ORAL ONCE
Status: COMPLETED | OUTPATIENT
Start: 2022-05-10 | End: 2022-05-10

## 2022-05-10 RX ORDER — ONDANSETRON 2 MG/ML
4 INJECTION INTRAMUSCULAR; INTRAVENOUS EVERY 8 HOURS PRN
Status: DISCONTINUED | OUTPATIENT
Start: 2022-05-10 | End: 2022-05-11 | Stop reason: HOSPADM

## 2022-05-10 RX ORDER — BUTORPHANOL TARTRATE 1 MG/ML
1 INJECTION, SOLUTION INTRAMUSCULAR; INTRAVENOUS ONCE
Status: COMPLETED | OUTPATIENT
Start: 2022-05-10 | End: 2022-05-10

## 2022-05-10 RX ADMIN — ACETAMINOPHEN 650 MG: 325 TABLET, FILM COATED ORAL at 08:37

## 2022-05-10 RX ADMIN — Medication 250 MILLI-UNITS/MIN: at 08:15

## 2022-05-10 RX ADMIN — BENZOCAINE AND LEVOMENTHOL 1 APPLICATION: 200; 5 SPRAY TOPICAL at 10:44

## 2022-05-10 RX ADMIN — OXYCODONE HYDROCHLORIDE 5 MG: 5 TABLET ORAL at 14:41

## 2022-05-10 RX ADMIN — ACETAMINOPHEN 650 MG: 325 TABLET, FILM COATED ORAL at 18:44

## 2022-05-10 RX ADMIN — ACETAMINOPHEN 650 MG: 325 TABLET, FILM COATED ORAL at 22:32

## 2022-05-10 RX ADMIN — SODIUM CHLORIDE, SODIUM LACTATE, POTASSIUM CHLORIDE, AND CALCIUM CHLORIDE 1000 ML: .6; .31; .03; .02 INJECTION, SOLUTION INTRAVENOUS at 04:47

## 2022-05-10 RX ADMIN — DOCUSATE SODIUM 100 MG: 100 CAPSULE, LIQUID FILLED ORAL at 11:42

## 2022-05-10 RX ADMIN — DOCUSATE SODIUM 100 MG: 100 CAPSULE, LIQUID FILLED ORAL at 18:44

## 2022-05-10 RX ADMIN — ACETAMINOPHEN 650 MG: 325 TABLET, FILM COATED ORAL at 14:41

## 2022-05-10 RX ADMIN — BUTORPHANOL TARTRATE 1 MG: 1 INJECTION, SOLUTION INTRAMUSCULAR; INTRAVENOUS at 04:46

## 2022-05-10 NOTE — DISCHARGE SUMMARY
Discharge Summary - OB/GYN   Joesph Ariza 33 y o  female MRN: [id number]  Unit/Bed#: L&D 324-01 Encounter: [id number]      Admission Date: 5/10/2022     Discharge Date: 22    Admitting Diagnosis:   1  Pregnancy at 36 weeks   2   Labor  3  [address] Multiparity   4  History of tobacco use   5  History of GDM  6  History of FGR    Discharge Diagnosis:   Same, delivered      Procedures: spontaneous vaginal delivery    Delivery Attending: Barrington Benito MD  Discharge Attending: Dr Myesha Malloy Course:     Joesph Ariza is a 33 y o  Janit Abi at 36w2d wks who was initially admitted for labor  She quickly progressed to complete and amniotomy was completed for meconium fluid  She delivered a viable male  on 5/10/22 at 26  Weight 5lbs 0 6oz via spontaneous vaginal delivery  Apgars were 8 (1 min) and 9 (5 min)   was transferred to  nursery  Patient tolerated the procedure well and was transferred to recovery in stable condition  Her postpartum course was uncomplicated  Her postpartum pain was well controlled with oral analgesics  On day of discharge, she was ambulating and able to reasonably perform all ADLs  She was voiding and had appropriate bowel function  Pain was well controlled  She was discharged home on post-partum day #1 without complications  Patient was instructed to follow up with her OB as an outpatient and was given appropriate warnings to call provider if she develops signs of infection or uncontrolled pain  Complications: none apparent    Condition at discharge: good     Discharge instructions/Information to patient and family:   - Do not place anything (no partner, tampons or douche) in your vagina for 6 weeks    -You may walk for exercise for the first 6 weeks then gradually return to your usual activities    -Please do not drive for 1 week if you have no stitches and for 2 weeks if you have stitches or underwent a  delivery     -You may take baths or shower per your preference    -Please look at your bust (breasts) in the mirror daily and call for redness or tenderness or increased warmth    -Please call us for temperature > 100 4*F or 38* C, worsening pain or a foul discharge  Discharge Medications:   Prenatal vitamin daily for 6 months or the duration of nursing whichever is longer    Motrin 600 mg orally every 6 hours as needed for pain  Tylenol (over the counter) per bottle directions as needed for pain: do NOT use with percocet  Hydrocortisone cream 1% (over the counter) applied 1-2x daily to hemorrhoids as needed    Planned Readmission: No      Rosina Rodríguez MD  OBGYN PGY-2  4:49 PM  5/11/2022

## 2022-05-10 NOTE — CASE MANAGEMENT
Case Management Assessment    Patient name John Brennan  Location L&D 312/L&D 680-67 MRN [id number]  : 1995 Date 5/10/2022       Current Admission Date: 5/10/2022  Current Admission Diagnosis: (spontaneous vaginal delivery)   Patient Active Problem List    Diagnosis Date Noted     (spontaneous vaginal delivery) 05/10/2022     labor in third trimester 2022    36 weeks gestation of pregnancy 2022     labor in third trimester without delivery 2022    Request for sterilization 2022    Marijuana use 02/10/2022    Tobacco smoking complicating pregnancy in first trimester 02/10/2022    History of gestational diabetes in prior pregnancy, currently pregnant in second trimester 2022    Previous pregnancy complicated by intrauterine growth restriction, antepartum, third trimester 2022      LOS (days): 0  Geometric Mean LOS (GMLOS) (days):   Days to GMLOS:     OBJECTIVE:  PATIENT READMITTED TO HOSPITAL  Risk of Unplanned Readmission Score: 14         Current admission status: Inpatient       Preferred Pharmacy:   [facility] [address] , [address] Anjelica Roa [address] [address]  Phone: [phone] Fax: [phone]    Primary Care Provider: No primary care provider on file  Primary Insurance: Jovanni LANDRUM  Secondary Insurance:     ASSESSMENT:  Lucian Leblanc Proxies    There are no active Health Care Proxies on file         · Consult(s): THC use during pregnancy (last positive 2022       SW met w/MOB who provided the following information:     · Baby's name/gender: Baby Boy Rhianna Hale   · Mother of baby: Mercedes Flores  · Father of baby//SO: Jana Mouse  · Other Legal Guardian(s) for baby: no  · Alternate emergency contact: No  · Other children: 5 other children, MOB reports having custody of all   · Lives with: MOB lives w/ her 5 other children, FOB lives separately  · Support System: FOB, MOB's mother  · Baby Supplies: have all  · Bottle or Breast Feeding: bottle/formula only  · Breast Pump if breast feeding: N/A  · Government Assistance Programs/WIC/EBT/SSI: MOB reports she needs to set up [address] appointment but has the contact information   · Work/School: 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; 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  · Transportation: B  · Pediatrician: [address] [place] [address] [place]  · [address] Hx or Treatment:   · Substance Abuse: MOB UDS+ last month for THC, per chart review hx of THC use  MOB reports last use 2 months ago  · Hx DV/IPV: no  · Community Referrals/C&Y/NFP: MOB reports hx of CYS involvement for THC use  UDS negatiev this admission  D/t positive UDS screen last month, CM placed referral to CYS -  Nakia #[phone]  Case will assigned to [facility]  · Insurance for baby: [facility] ANAHY CLARK denies any other SW needs at this time  Encouraged family to contact SW as needed

## 2022-05-10 NOTE — PLAN OF CARE

## 2022-05-10 NOTE — PLAN OF CARE
Problem: POSTPARTUM  Goal: Experiences normal postpartum course  Description: INTERVENTIONS:  - Monitor maternal vital signs  - Assess uterine involution and lochia  Outcome: Progressing  Goal: Appropriate maternal -  bonding  Description: INTERVENTIONS:  - Identify family support  - Assess for appropriate maternal/infant bonding   -Encourage maternal/infant bonding opportunities  - Referral to  or  as needed  Outcome: Progressing  Goal: Establishment of infant feeding pattern  Description: INTERVENTIONS:  - Assess breast/bottle feeding  - Refer to lactation as needed  Outcome: Progressing  Goal: Incision(s), wounds(s) or drain site(s) healing without S/S of infection  Description: INTERVENTIONS  - Assess and document dressing, incision, wound bed, drain sites and surrounding tissue  - Provide patient and family education  -Outcome: Progressing     Problem: PAIN - ADULT  Goal: Verbalizes/displays adequate comfort level or baseline comfort level  Description: Interventions:  - Encourage patient to monitor pain and request assistance  - Assess pain using appropriate pain scale  - Administer analgesics based on type and severity of pain and evaluate response  - Implement non-pharmacological measures as appropriate and evaluate response  - Consider cultural and social influences on pain and pain management  - Notify physician/advanced practitioner if interventions unsuccessful or patient reports new pain  Outcome: Progressing     Problem: INFECTION - ADULT  Goal: Absence or prevention of progression during hospitalization  Description: INTERVENTIONS:  - Assess and monitor for signs and symptoms of infection  - Monitor lab/diagnostic results  - Monitor all insertion sites, i e  indwelling lines, tubes, and drains  - Monitor endotracheal if appropriate and nasal secretions for changes in amount and color  - [place] appropriate cooling/warming therapies per order  - Administer medications as ordered  - Instruct and encourage patient and family to use good hand hygiene technique  - Identify and instruct in appropriate isolation precautions for identified infection/condition  Outcome: Progressing  Goal: Absence of fever/infection during neutropenic period  Description: INTERVENTIONS:  - Monitor WBC    Outcome: Progressing     Problem: SAFETY ADULT  Goal: Patient will remain free of falls  Description: INTERVENTIONS:  - Educate patient/family on patient safety including physical limitations  - Instruct patient to call for assistance with activity   - Consult OT/PT to assist with strengthening/mobility   - Keep Call bell within reach  - Keep bed low and locked with side rails adjusted as appropriate  - Keep care items and personal belongings within reach  - Initiate and maintain comfort rounds  - Make Fall Risk Sign visible to staff  -- Apply yellow socks and bracelet for high fall risk patients  - Consider moving patient to room near nurses station  Outcome: Progressing  Goal: Maintain or return to baseline ADL function  Description: INTERVENTIONS:  -  Assess patient's ability to carry out ADLs; assess patient's baseline for ADL function and identify physical deficits which impact ability to perform ADLs (bathing, care of mouth/teeth, toileting, grooming, dressing, etc )  - Assess/evaluate cause of self-care deficits   - Assess range of motion  - Assess patient's mobility; develop plan if impaired  - Assess patient's need for assistive devices and provide as appropriate  - Encourage maximum independence but intervene and supervise when necessary  - Involve family in performance of ADLs  - Assess for home care needs following discharge   - Consider OT consult to assist with ADL evaluation and planning for discharge  - Provide patient education as appropriate  Outcome: Progressing  Goal: Maintains/Returns to pre admission functional level  Description: INTERVENTIONS:  - Perform BMAT or MOVE assessment daily    - Set and communicate daily mobility goal to care team and patient/family/caregiver  - Collaborate with rehabilitation services on mobility goals if consulted  -- Out of bed for toileting  - Record patient progress and toleration of activity level   Outcome: Progressing     Problem: Knowledge Deficit  Goal: Patient/family/caregiver demonstrates understanding of disease process, treatment plan, medications, and discharge instructions  Description: Complete learning assessment and assess knowledge base    Interventions:  - Provide teaching at level of understanding  - Provide teaching via preferred learning methods  Outcome: Progressing     Problem: DISCHARGE PLANNING  Goal: Discharge to home or other facility with appropriate resources  Description: INTERVENTIONS:  - Identify barriers to discharge w/patient and caregiver  - Arrange for needed discharge resources and transportation as appropriate  - Identify discharge learning needs (meds, wound care, etc )  - Arrange for interpretive services to assist at discharge as needed  - Refer to Case Management Department for coordinating discharge planning if the patient needs post-hospital services based on physician/advanced practitioner order or complex needs related to functional status, cognitive ability, or social support system  Outcome: Progressing

## 2022-05-10 NOTE — DISCHARGE INSTRUCTIONS
Self Care After Delivery   AMBULATORY CARE:   The postpartum period  is the period of time from delivery to about 6 weeks  During this time you may experience many physical and emotional changes  It is important to understand what is normal and when you need to call your healthcare provider  It is also important to know how to care for yourself during this time  Call your local emergency number (911 in the [address] [facility]) for any of the following:   · You see or hear things that are not there, or have thoughts of harming yourself or your baby  · You soak through 1 pad in 15 minutes, have blurry vision, clammy or pale skin, and feel faint  · You faint or lose consciousness  · You have trouble breathing  · You cough up blood  · Your  incision comes apart  Seek care immediately if:   · Your heart is beating faster than usual     · You have a bad headache or changes in your vision  · Your episiotomy or  incision is red, swollen, bleeding, or draining pus  · You have severe abdominal pain  Call your doctor or obstetrician if:   · Your leg is painful, red, and larger than usual     · You soak through 1 or more pads in an hour, or pass blood clots larger than a quarter from your vagina  · You have a fever  · You have new or worsening pain in your abdomen or vagina  · You continue to have depression 1 to 2 weeks after you deliver  · You have trouble sleeping  · You have foul-smelling discharge from your vagina  · You have pain or burning when you urinate  · You do not have a bowel movement for 3 days or more  · You have nausea or are vomiting  · You have hard lumps or red streaks over your breasts  · You have cracked nipples or bleed from your nipples  · You have questions or concerns about your condition or care  Physical changes:   The following are normal changes after you give birth:  · Pain in the area between your anus and vagina    · Breast pain    · Constipation or hemorrhoids    · Hot or cold flashes    · Vaginal bleeding or discharge    · Mild to moderate abdominal cramping    · Difficulty controlling bowel movements or urine    Emotional changes:  A drop in hormone levels after you deliver may cause changes in your emotions  You may feel irritable, sad, or anxious  You may cry easily or for no reason  You may also feel depressed  Depression that continues can be a sign of postpartum depression, a condition that can be treated  Treatment may include talk therapy, medicines, or both  Healthcare providers will ask how you are feeling and if you have any depression  These talks can happen during appointments for your medical care and for your baby's care, such as well child visits  Providers can help you find ways to care for yourself and your baby  Talk to your providers about the following:  · When emotional changes or depression started, and if it is getting worse over time    · Problems you are having with daily activities, sleep, or caring for your baby    · If anything makes you feel worse, or makes you feel better    · Feeling that you are not bonding with your baby the way you want    · Any problems your baby has with sleeping or feeding    · Your baby is fussy or cries a lot    · Support you have from friends, family, or others    Breast care for breastfeeding mothers: You may have sore breasts for 3 to 6 days after you give birth  This happens as your milk begins to fill your breasts  You may also have sore breasts if you do not breastfeed frequently  Do the following to care for your breasts:  · Apply a moist, warm, compress to your breast as directed  This may help soothe your breasts  Make sure the washcloth is not too hot before you apply it to your breast     · Nurse your baby or pump your milk frequently  This may prevent clogged milk ducts  Ask your healthcare provider how often to nurse or pump      · Massage your breasts as directed  This may help increase your milk flow  Gently rub your breasts in a circular motion before you breastfeed  You may need to gently squeeze your breast or nipple to help release milk  You can also use a breast pump to help release milk from your breast     · Wash your breasts with warm water only  Do not put soap on your nipples  Soap may cause your nipples to become dry  · Apply lanolin cream to your nipples as directed  Lanolin cream may add moisture to your skin and prevent nipple dryness  Always  wash off lanolin cream with warm water before you breastfeed  · Place pads in your bra  Your nipples may leak milk when you are not breastfeeding  You can place pads inside of your bra to help prevent leaking onto your clothing  Ask your healthcare provider where to purchase bra pads  · Get breastfeeding support if needed  Healthcare providers can answer questions about breastfeeding and provide you with support  Ask your healthcare provider who you can contact if you need breastfeeding support  Breast care for non-breastfeeding mothers:  Milk will fill your breasts even if you bottle feed your baby  Do the following to help stop your milk from filling your breasts and causing pain:  · Wear a bra with support at all times  A sports bra or a tight-fitting bra will help stop your milk from coming in  · Apply ice on each breast for 15 to 20 minutes every hour or as directed  Use an ice pack, or put crushed ice in a plastic bag  Cover it with a towel before you apply it to your breast  Ice helps your milk ducts shrink  · Keep your breasts away from warm water  Warm water will make it easier for milk to fill your breasts  Stand with your breasts away from warm water in the shower  · Limit how much you touch your breasts  This will prevent them from filling with milk  Perineum care: Your perineum is the area between your rectum and vagina   It is normal to have swelling and pain in this area after you give birth  If you had an episiotomy, your healthcare provider may give you special instructions  · Clean your perineum after you use the bathroom  This may prevent infection and help with healing  Use a spray bottle with warm water to clean your perineum  You may also gently spray warm water against your perineum when you urinate  Always wipe front to back  · Take a sitz bath as directed  A sitz bath may help relieve swelling and pain  Fill your bath tub or bucket with water up to your hips and sit in the water  Use cold water for 2 days after you deliver  Then use warm water  Ask your healthcare provider for more information about a sitz bath  · Apply ice packs for the first 24 hours or as directed  Use a plastic glove filled with ice or buy an ice pack  Wrap the ice pack or plastic glove in a small towel or wash cloth  Place the ice pack on your perineum for 20 minutes at a time  · Sit on a donut-shaped pillow  This may relieve pressure on your perineum when you sit  · Use wipes that contain medicine or take pills as directed  Your healthcare provider may tell you to use witch hazel pads  You can place witch hazel pads in the refrigerator before you apply them to your perineum  Your provider may also tell you to take NSAIDs  Ask him or her how often to take pills or use the wipes  · Do not go swimming or take tub baths for 4 to 6 weeks or as directed  This will help prevent an infection in your vagina or uterus  Bowel and bladder care: It may take 3 to 5 days to have a bowel movement after you deliver your baby  You can do the following to prevent or manage constipation, and get control of your bowel or bladder:  · Take stool softeners as directed  A stool softener is medicine that will make your bowel movements softer  This may prevent or relieve constipation  A stool softener may also make bowel movements less painful  · Drink plenty of liquids    Ask how much liquid to drink each day and which liquids are best for you  Liquids may help prevent constipation  · Eat foods high in fiber  Examples include fruits, vegetables, grains, beans, and lentils  Ask your healthcare provider how much fiber you need each day  Fiber may prevent constipation  · Do Kegel exercises as directed  Kegel exercises will help strengthen the muscles that control bowel movements and urination  Ask your healthcare provider for more information on Kegel exercises  · Apply cold compresses or medicine to hemorrhoids as directed  This may relieve swelling and pain  Your healthcare provider may tell you to apply ice or wipes that contain medicine to your hemorrhoids  He or she may also tell you to use a sitz bath  Ask your provider for more information on how to manage hemorrhoids  Nutrition:  Good nutrition is important in the postpartum period  It will help you return to a healthy weight, increase your energy levels, and prevent constipation  It will also help you get enough nutrients and calories if you are going to breastfeed your baby  · Eat a variety of healthy foods  Healthy foods include fruits, vegetables, whole-grain breads, low-fat dairy products, beans, lean meats, and fish  You may need 500 to 700 extra calories each day if you breastfeed your baby  You may also need extra protein  · Limit foods with added sugar and high amounts of fat  These foods are high in calories and low in healthy nutrients  Read food labels so you know how much sugar and fat is in the food you want to eat  · Drink 8 to 10 glasses of water per day  Water will help you make plenty of milk for your baby  It will also help prevent constipation  Drink a glass of water every time you breastfeed your baby  · Take vitamins as directed  Ask your healthcare provider what vitamins you need  · Limit caffeine and alcohol if you are breastfeeding    Caffeine and alcohol can get into your breast milk  Caffeine and alcohol can make your baby fussy  They can also interfere with your baby's sleep  Ask your healthcare provider if you can drink alcohol or caffeine  Rest and sleep: You may feel very tired in the postpartum period  Enough sleep will help you heal and give you energy to care for your baby  The following may help you get sleep and rest:  · Nap when your baby naps  Your baby may nap several times during the day  Get rest during this time  · Limit visitors  Many people may want to see you and your baby  Ask friends or family to visit on different days  This will give you time to rest     · Do not plan too much for one day  Put off household chores so that you have time to rest  Gradually do more each day  · Ask for help from family, friends, or neighbors  Ask them to help you with laundry, cleaning, or errands  Also ask someone to watch the baby while you take a nap or relax  Ask your partner to help with the care of your baby  Pump some of your breast milk so your partner can feed your baby during the night  Exercise after delivery:  Wait until your healthcare provider says it is okay to exercise  Exercise can help you lose weight, increase your energy levels, and manage your mood  It can also prevent constipation and blood clots  Start with gentle exercises such as walking  Do more as you have more energy  You may need to avoid abdominal exercises for 1 to 2 weeks after you deliver  Talk to your healthcare provider about an exercise plan that is right for you  Sexual activity after delivery:   · Do not have sex until your healthcare provider says it is okay  You may need to wait 4 to 6 weeks before you have sex  This may prevent infection and allow time to heal     · Your menstrual cycle may begin as soon as 3 weeks after you deliver  Your period may be delayed if you breastfeed your baby  You can become pregnant before you get your first postpartum period   Talk to your healthcare provider about birth control that is right for you  Some types of birth control are not safe during breastfeeding  For support and more information:  Join a support group for new mothers  Ask for help from family and friends with chores, errands, and care of your baby  · Office of Women's Health,  Department of Health and Human Services  [address], [address] [facility] [address]  [address], [address] [facility] [address]  Phone: [phone]  Web Address: www womenshealth gov    · [facility] [address] [facility] , [address]  [address] [facility] , [address] [facility]  Web Address: [email or web address]    Follow up with your doctor or obstetrician as directed: You will need to follow up within 2 to 6 weeks of delivery  Write down your questions so you remember to ask them at your visits  © Copyright [employer] 2022 Information is for End User's use only and may not be sold, redistributed or otherwise used for commercial purposes  All illustrations and images included in CareNotes® are the copyrighted property of A D A M , Inc  or [facility]   The above information is an  only  It is not intended as medical advice for individual conditions or treatments  Talk to your doctor, nurse or pharmacist before following any medical regimen to see if it is safe and effective for you  Vaginal Delivery   AMBULATORY CARE:   What you need to know about vaginal delivery:  A vaginal delivery occurs when your baby is born through your vagina (birth canal)  How to prepare for vaginal delivery:   · You can ask someone to be with you during labor and delivery  The person can be a spouse, friend, or family member  This person can help make you more comfortable  Arrange to be able to contact the person when labor begins      · You may need tests to check for certain infections that can be passed to your baby  You may be given antibiotics to fight a bacterial infection you have or prevent an infection during delivery  · Ask if it is okay to eat while you are in labor  · Your healthcare provider can give you medicines for pain relief if you choose to have them  You may need medicine to induce (start) your labor if your labor is not moving forward  You may need to move in bed, stand, or walk to help your baby move into position for birth  What will happen during vaginal delivery:   · You can move into several positions during delivery  You can lie on your back, have your feet up in stirrups, or squat  You may feel pressure on your rectum  This pressure is caused by the movement of your baby's head down the birth canal  You may feel the urge to push  Your healthcare provider will have you push when you feel the urge  He or she will guide your baby out of the birth canal  Forceps or suction may be used to help deliver your baby  You may also need an episiotomy (incision) to make the vaginal opening larger  This will make more room for your baby  · At least 1 minute after your baby is born, your healthcare provider will put clamps on the umbilical cord  The cord will then be cut  Your uterus will continue to contract after delivery to push out the placenta  You may be given medicine to prevent heavy bleeding when the placenta is pushed out  Your healthcare provider may close your episiotomy incision or any tears with stitches  What will happen after vaginal delivery:   · Healthcare providers will examine your baby  Your baby may be placed on your chest right away  He or she may also start breastfeeding  Your baby will also be given vitamin K as a shot  · Healthcare providers will examine you  Your blood pressure will be checked right after you give birth  Providers will check for vaginal bleeding, and check that your uterus is galilea   Your temperature and heart rate will be checked regularly  · You may be taken to another room to rest with your baby  Call for a healthcare provider if you are holding your baby and start to feel tired  The provider can put him or her in a bassinet near you while you rest or sleep  This will help prevent an accidental drop or fall of your baby  · A healthcare provider may massage your abdomen several times to make your uterus firm  This can be uncomfortable  You may have abdominal pains for up to 3 days after you give birth because your uterus is still galilea  The contractions help release blood from inside your uterus so it shrinks back to its normal size  These contractions may hurt more while you breastfeed your baby  · Your healthcare provider may suggest you get out of bed to sit in a chair or walk  Activity can help prevent blood clots  · You may be able to go home within 24 to 48 hours after delivery  If you need support at home, ask your healthcare provider about home visits by another healthcare provider  This healthcare provider can help you learn about breastfeeding, bottle feeding, baby care, and perineum care  Call your doctor or obstetrician if:   · Your leg feels warm, tender, and painful  It may look swollen and red  · You have a fever  · You are urinating very little, or not at all  · You have heavy vaginal bleeding that fills 1 or more sanitary pads in 1 hour  · You feel weak, dizzy, or faint  · Your abdominal or perineal pain does not go away, or gets worse  · You feel depressed  · You have questions or concerns about your condition or care  Medicines:   · NSAIDs , such as ibuprofen, help decrease swelling, pain, and fever  This medicine is available with or without a doctor's order  NSAIDs can cause stomach bleeding or kidney problems in certain people  If you take blood thinner medicine, always ask your healthcare provider if NSAIDs are safe for you   Always read the medicine label and follow directions  · Stool softeners  make it easier for you to have a bowel movement  You may need this medicine to treat or prevent constipation  · Take your medicine as directed  Contact your healthcare provider if you think your medicine is not helping or if you have side effects  Tell him or her if you are allergic to any medicine  Keep a list of the medicines, vitamins, and herbs you take  Include the amounts, and when and why you take them  Bring the list or the pill bottles to follow-up visits  Carry your medicine list with you in case of an emergency  Activity:  Rest as much as possible  Try to keep all activities short  You may be able to do some exercise soon after you have your baby  Talk with your healthcare provider before you start exercising  If you work outside the home, ask when you can return to your job  Kegel exercises:  Kegel exercises may help your vaginal and rectal muscles heal faster  You can do Kegel exercises by tightening and relaxing the muscles around your vagina  Kegel exercises help make the muscles stronger  Breast care:  When your milk comes in, your breasts may feel full and hard  Ask how to care for your breasts, even if you are not breastfeeding  Constipation:  You may have constipation for a period of time after you have your baby  Do not try to push the bowel movement out if it is too hard  High-fiber foods and extra liquids can help you prevent constipation  Examples of high-fiber foods are fruit and bran  Prune juice and water are good liquids to drink  You may also be told to take over-the-counter fiber and stool softener medicines  Take these items as directed  Ask how to prevent or treat hemorrhoids  Perineum care: Your perineum is the area between your vagina and anus  Keep the area clean and dry  This will help it heal and prevent infection  Wash the area gently with soap and water when you bathe or shower   Rinse your perineum with warm water after you urinate or have a bowel movement  A warm sitz bath can help decrease pain  To take a sitz bath, fill a bathtub with 4 to 6 inches of warm water  You may also use a sitz bath pan that fits inside the toilet  Sit in the sitz bath for 20 minutes  Do this 2 to 3 times a day, or as directed  The warm water can help decrease pain and swelling  Vaginal discharge: You will have vaginal discharge, called lochia, after your delivery  The lochia is red or dark brown with clots for 1 to 3 days after the birth  The amount will decrease and turn pale pink or brown for 3 to 10 days  It will turn white or yellow on the 10th or 14th day  Use a sanitary pad instead of a tampon to prevent a vaginal infection  You will have lochia for up to 3 weeks after your baby is born  Monthly periods: Your period may start again within 7 to 9 weeks after your baby is born  If you are breastfeeding, it may take longer for your period to start again  You can still get pregnant again even though you do not have your monthly period  Talk with your healthcare provider about a birth control method if you do not want to get pregnant  Mood changes: Many new mothers have some kind of mood changes after delivery  Some of these changes occur because of lack of sleep, hormone changes, and caring for a new baby  Some mood changes can be more serious, such as postpartum depression  Talk with your healthcare provider if you feel unable to care for yourself or your baby  Sexual activity:  Do not have sex until your healthcare provider says it is okay  You may notice you have a decreased desire for sex, or sex may be painful  You may need to use a vaginal lubricant (gel) to help make sex more comfortable  Follow up with your doctor or obstetrician as directed:  Most women need to return 6 weeks after a vaginal delivery  Ask how to care for any wounds or stitches   Write down your questions so you remember to ask them during your visits  © Copyright [employer] 2022 Information is for End User's use only and may not be sold, redistributed or otherwise used for commercial purposes  All illustrations and images included in CareNotes® are the copyrighted property of A [employer] A M , Inc  or [employer]  The above information is an  only  It is not intended as medical advice for individual conditions or treatments  Talk to your doctor, nurse or pharmacist before following any medical regimen to see if it is safe and effective for you

## 2022-05-10 NOTE — PROGRESS NOTES
Triage Note - OB  Teresa Feeling 33 y o  female MRN: [id number]  Unit/Bed#: L&D 324-01 Encounter: [id number]    Chief Complaint: contractions    SUBJECTIVE    HPI: 33 y o   at 36w2d with c/o contractions every few minutes  They started to  again during the night  She denies any leaking of fluid  She denies any VB  She reports good fetal movement  She has a history of deliveries at 37 and 38 weeks  She received a course of betamethasone on 22  OBJECTIVE  Estimated Date of Delivery: 22    Vitals: VSS  Vitals:    05/10/22 0310   BP: 118/76   Pulse: 74   Resp: 20   Temp: 97 7 °F (36 5 °C)   SpO2:      There is no height or weight on file to calculate BMI  FHR: 130s, reactive  [place]: irregular    Physical Exam  Constitutional:       Appearance: Normal appearance  HENT:      Head: Normocephalic and atraumatic  Eyes:      Extraocular Movements: Extraocular movements intact  Cardiovascular:      Rate and Rhythm: Normal rate  Pulmonary:      Effort: Pulmonary effort is normal    Abdominal:      Comments: Gravid   Skin:     General: Skin is warm and dry  Neurological:      Mental Status: She is alert  Mental status is at baseline  Psychiatric:         Mood and Affect: Mood normal          Behavior: Behavior normal           Labs:   No visits with results within 1 Day(s) from this visit     Latest known visit with results is:   Admission on 2022, Discharged on 2022   Component Date Value    ABO Grouping 2022 O     Rh Factor 2022 Positive     Antibody Screen 2022 Negative     Specimen Expiration Date 2022 [id number]     WBC 2022 10 25*    RBC 2022 3 49*    Hemoglobin 2022 11 3*    Hematocrit 2022 33 4*    MCV 2022 96     MCH 2022 32 4     MCHC 2022 33 8     RDW 2022 13 0     Platelets  167     MPV 2022 11 2     RPR 2022 Non-Reactive     POC Glucose 2022 81  POC Glucose 2022 117     Amph/Meth UR 2022 Negative     Barbiturate Ur 2022 Negative     Benzodiazepine Urine 2022 Negative     Cocaine Urine 2022 Negative     Methadone Urine 2022 Negative     Opiate Urine 2022 Negative     PCP Ur 2022 Negative     THC Urine 2022 Negative     Oxycodone Urine 2022 Negative            A/P  33 y o  female  at 36w2d with c/o contractions  SVE 4-/-1 initially and last exam /  Patient s/p stadol but is having pain again  Will plan to recheck in 2h  Signed out to day shift       Fabio Bautista MD  OB-GYN, PGY-2  5/10/2022 6:29 AM

## 2022-05-10 NOTE — L&D DELIVERY NOTE
Vaginal Delivery Summary - OB/GYN   Elle Blackmon 33 y o  female MRN: [id number]  Unit/Bed#: L&D 324-01 Encounter: [id number]    Pre-delivery Diagnosis:   1  Pregnancy at 36 weeks   2   Labor  3  [address] Multiparity   4  History of tobacco use   5  History of GDM  6  History of FGR    Post-delivery Diagnosis: same, delivered    Procedure: Spontaneous Vaginal Delivery     Attending:   [facility]    Assistant(s): Dr Aidee King     Anesthesia: None    QBL: 147PK    Complications: none apparent    Specimens:   1  Arterial and venous cord gases  2  Cord blood  3  Segment of umbilical cord  4  Placenta to pathology      Findings:  1  Viable male on 5/10/2022 at [address], with APGARS of 8 and 9 at 1 and 5 minutes respectively,  2  Meconium stained fluid  3  Spontaneous delivery of intact placenta at 0803  4  Intact vagina and perineum   5  GBS negative as of 31 weeks      Gases:    Umbilical Cord Venous Blood Gas:  Results from last 7 days   Lab Units 05/10/22  0806   PH COV  7 318   PCO2 COV mm HG 54 2*   HCO3 COV mmol/L 27 2   BASE EXC COV mmol/L -0 3*   O2 CT CD VB mL/dL 11 4   O2 HGB, VENOUS CORD % 33 5     Umbilical Cord Arterial Blood Gas:  Results from last 7 days   Lab Units 05/10/22  0806   PH COA  7 262   PCO2 COA  59 9   PO2 COA mm HG 21 0   HCO3 COA mmol/L 26 4   BASE EXC COA mmol/L -2 3*   O2 CONTENT CORD ART ml/dl 12 8   O2 HGB, ARTERIAL CORD % 50 1       Disposition:  Patient tolerated the procedure well and was recovering in labor and delivery room       Brief history and labor course:  Ms Elle Blackmon is a 33 y o    at 36w2d  She had previous admissions and was s/p betamethasone x2 doses 4 1/2 weeks prior to delivery  She presented to L&D with contractions  She was initially 4-/-1 on presentation  She received one dose of Stadol  Patient became more uncomfortable and was examined again  She was /0  Patient was admitted for labor  She quickly progressed to complete   AROM was completed for meconium stained fluid  Description of procedure:    After pushing for 2 minutes, at [address] patient delivered a viable male , wt pending, apgars of 8 (1 min) and 9 (5 min)  The fetal vertex delivered spontaneously  There was no nuchal cord  The left anterior shoulder delivered atraumatically with maternal expulsive forces and the assistance of gentle downward traction  The right posterior shoulder delivered with maternal expulsive forces and the assistance of gentle upward traction  The remainder of the fetus delivered spontaneously  Upon delivery, the infant was placed on the mothers abdomen and, after 60 seconds delay, the cord was doubly clamped and cut  The infant was noted to cry spontaneously and was moving all extremities appropriately  There was no evidence for injury  Awaiting nurse resuscitators evaluated the   Arterial and venous cord blood gases and cord blood was collected for analysis  These were promptly sent to the lab  In the immediate post-partum, 30 units of IV pitocin was administered, and the uterus was noted to contract down well with massage and pitocin  The placenta delivered spontaneously at 0803 and was noted to have a centrally inserted 3 vessel cord  Placenta was sent to pathology for prematurity  The vagina, cervix, perineum, and rectum were inspected and intact  At the conclusion of the procedure, all needle, sponge, and instrument counts were noted to be correct  Patient tolerated the procedure well and was allowed to recover in labor and delivery room with family and  before being transferred to the post-partum floor  Dr Bassem Kinney was present and participated in all key portions of the case  Suraj White MD  OB/GYN PGY-2  5/10/2022  8:20 AM    I was the attending physician and I was present for the entire procedure  I agree with the note by Dr Gwen Macdonald MD  OB/GYN  5/10/2022 8:33 AM

## 2022-05-10 NOTE — H&P
[address] 33 y o  female MRN: [id number]  Unit/Bed#: L&D 324-01 Encounter: [id number]    Arturo Wong is a patient of [facility]    SUBJECTIVE:    Chief Complaint: contractions    HPI: Arturo Wong is a 33 y o  [id number] with an RACHEL of 2022, by Ultrasound at 36w2d who is being admitted for labor   She complains of uterine contractions, occurring all the time, has no LOF, and reports no VB  She states she has felt good FM  Pregnancy Complications/Comments:   Grand multiparity   History of    Tobacco Use in pregnancy     Baby complications/comments:  MEASUREMENTS (* Included In Average GA)     AC              30 8 cm        34 weeks 6 days* (43%)  BPD              8 5 cm        34 weeks 0 days* (20%)  HC              30 3 cm        33 weeks 5 days* (<5%)  Femur            6 6 cm        33 weeks 6 days* (11%)     Humerus          5 5 cm        32 weeks 1 day   (9%)  Tibia            5 7 cm        33 weeks 1 day   (17%)  Fibula           5 3 cm        30 weeks 6 days     Cerebellum       4 8 cm        35 weeks 6 days     HC/AC           0 98 [0 93 - 1 11]                 (24%)  FL/AC             21 [20 - 24]  FL/BPD            78 [71 - 87]  EFW Hadlock 4   2400 grams - 5 lbs 5 oz                 (23%)    Review of Systems   Constitutional: Negative for chills and fever  Eyes: Negative for visual disturbance  Respiratory: Negative for shortness of breath  Cardiovascular: Negative for chest pain  Gastrointestinal: Positive for abdominal pain  Negative for nausea and vomiting  Genitourinary: Negative for dysuria, vaginal bleeding, vaginal discharge and vaginal pain  Musculoskeletal: Negative  Neurological: Negative          OB History    Para Term  AB Living   6 5 5 0 0 5   SAB IAB Ectopic Multiple Live Births   0 0 0 0 5      # Outcome Date GA Lbr Nagi/2nd Weight Sex Delivery Anes PTL Lv   6 Current            5 Term 20 37w0d  2675 g (5 lb 14 4 oz) M Vag-Spont None N DELFIN      Birth Comments: FOB1   4 Term 19 37w2d  2705 g (5 lb 15 4 oz) M Vag-Spont None N DELFIN      Birth Comments: FOB1   3 Term 10/08/17 37w4d / 00:52 2608 g (5 lb 12 oz) M Vag-Spont EPI N DELFIN      Birth Comments: FOB2      Complications: Gestational diabetes mellitus   2 Term 14 38w6d  2466 g (5 lb 7 oz) F Vag-Spont EPI N DELFIN      Birth Comments: FOB2   1 Term 13 37w0d  2438 g (5 lb 6 oz) M Vag-Spont EPI Y DELFIN      Birth Comments: FOB1      Complications: Gestational diabetes mellitus       Past Surgical History:   Procedure Laterality Date    APPENDECTOMY      CHOLECYSTECTOMY      GA COLONOSCOPY FLX DX W/COLLJ SPEC WHEN PFRMD N/A 2017    Procedure: EGD AND COLONOSCOPY;  Surgeon: Polina Smith DO;  Location: [facility]; Service: Gastroenterology    GA LAP,APPENDECTOMY N/A 2016    Procedure: APPENDECTOMY LAPAROSCOPIC;  Surgeon: Lm Grullon DO;  Location: [facility];  Service: General    GA LAP,DIAGNOSTIC ABDOMEN N/A 3/13/2016    Procedure: LAPAROSCOPY DIAGNOSTIC;  Surgeon: Parke Fothergill, MD;  Location: [facility];  Service: General    RESECTION SMALL BOWEL LAPAROSCOPIC         Social History     Tobacco Use    Smoking status: Former Smoker    Smokeless tobacco: Never Used   Substance Use Topics    Alcohol use: Not Currently     Comment: socially       Allergies   Allergen Reactions    Ibuprofen Anaphylaxis       Medications Prior to Admission   Medication    acetaminophen (TYLENOL) 500 mg tablet    acetaminophen (TYLENOL) 500 mg tablet    amoxicillin (AMOXIL) 500 mg capsule    ondansetron (ZOFRAN) 4 mg tablet    Prenatal Multivit-Min-Fe-FA (Pre-Ana Formula) TABS           OBJECTIVE:  Vitals:  Temp:  [97 7 °F (36 5 °C)] 97 7 °F (36 5 °C)  HR:  [74-82] 74  Resp:  [20] 20  BP: (118-163)/(71-76) 118/76  There is no height or weight on file to calculate BMI       Physical Exam:    General Appearance: Awake, alert and not in acute distress  CV: RRR   Pulm: Lungs CTA, bilaterally   GI: Gravid Abdomen, Soft, non-tender to palpation in all four quadrants  MSK: Moves upper and lower extremities without difficulty   Lower Extremities: Not edematous, non-erythematous, not painful to palpation      SVE: Cervical Dilation: 6  Cervical Effacement: 100  Cervical Consistency: Soft  Fetal Station: 0  Method: Manual  OB Examiner: DeFruscio    FHT:  Baseline Rate: 130 bpm  Variability: Moderate 6-25 bpm (with periods of minimal)  Accelerations: 15 x 15 or greater  Decelerations: Variable    TOCO:   Contraction Frequency (minutes): 3-5  Contraction Duration (seconds): 70-90  Contraction Quality: Strong    Prenatal Labs: Reviewed      Blood type: O+  Antibody: negative  Group B strep: negative  HIV: negative  Hepatitis B: negative  RPR: non-reactive  Rubella: Immune  1 hour Glucose: not done  Platelets: 676  Hgb: 11 9    Assessment: 33 y o  [id number] at 36w2d admitted for labor     SVE: 6/90/0  FHT: cat 1  Clinical EFW: 6 ; Vertex confirmed by manual exam  GBS status: negative   Postpartum contraception plan: depo      Plan:     Labor   · Admit  · CBC, RPR, Type & Screen  · Analgesia at maternal request  · Expectant management   · Clear Liquid diet   · IVF 125cc/hr LR   · Continuous fetal monitoring and tocometry   · Antibiotics     Dr Izabella Foster aware    >2 Midnights  [address] Row[address] MD  OB/GYN PGY-2  5/10/2022  8:14 AM

## 2022-05-11 ENCOUNTER — TELEPHONE (OUTPATIENT)
Dept: CASE MANAGEMENT | Facility: HOSPITAL | Age: 27
End: 2022-05-11

## 2022-05-11 VITALS
SYSTOLIC BLOOD PRESSURE: 127 MMHG | RESPIRATION RATE: 16 BRPM | HEART RATE: 89 BPM | TEMPERATURE: 97.5 F | DIASTOLIC BLOOD PRESSURE: 61 MMHG | OXYGEN SATURATION: 100 %

## 2022-05-11 PROCEDURE — 99024 POSTOP FOLLOW-UP VISIT: CPT | Performed by: OBSTETRICS & GYNECOLOGY

## 2022-05-11 RX ORDER — MEDROXYPROGESTERONE ACETATE 150 MG/ML
150 INJECTION, SUSPENSION INTRAMUSCULAR ONCE
Status: COMPLETED | OUTPATIENT
Start: 2022-05-11 | End: 2022-05-11

## 2022-05-11 RX ADMIN — DOCUSATE SODIUM 100 MG: 100 CAPSULE, LIQUID FILLED ORAL at 07:53

## 2022-05-11 RX ADMIN — ACETAMINOPHEN 650 MG: 325 TABLET, FILM COATED ORAL at 04:19

## 2022-05-11 RX ADMIN — MEDROXYPROGESTERONE ACETATE 150 MG: 150 INJECTION, SUSPENSION, EXTENDED RELEASE INTRAMUSCULAR at 06:03

## 2022-05-11 RX ADMIN — ACETAMINOPHEN 650 MG: 325 TABLET, FILM COATED ORAL at 11:03

## 2022-05-11 RX ADMIN — ACETAMINOPHEN 650 MG: 325 TABLET, FILM COATED ORAL at 15:44

## 2022-05-11 NOTE — CASE MANAGEMENT
Case Management Discharge Planning Note    Patient name Krystal Collazo  Location L&D 312/L&D 759-24 MRN [id number]  : 1995 Date 2022       Current Admission Date: 5/10/2022  Current Admission Diagnosis: (spontaneous vaginal delivery)   Patient Active Problem List    Diagnosis Date Noted     (spontaneous vaginal delivery) 05/10/2022     labor in third trimester 2022    36 weeks gestation of pregnancy 2022     labor in third trimester without delivery 2022    Request for sterilization 2022    Marijuana use 02/10/2022    Tobacco smoking complicating pregnancy in first trimester 02/10/2022    History of gestational diabetes in prior pregnancy, currently pregnant in second trimester 2022    Previous pregnancy complicated by intrauterine growth restriction, antepartum, third trimester 2022      LOS (days): 1  Geometric Mean LOS (GMLOS) (days):   Days to GMLOS:     OBJECTIVE:  Risk of Unplanned Readmission Score: 14 26         Current admission status: Inpatient   Preferred Pharmacy:   [facility] [address]  Phone: [phone] Fax: [phone]    Primary Care Provider: No primary care provider on file  Primary Insurance: Esther LANDRUM  Secondary Insurance:     DISCHARGE DETAILS:    CM received f/u call from [address] ([phone]) who stated baby and MOB are cleared to go home  Dennis to f/u w/ family at home next week  CM updated MOB who expressed understanding

## 2022-05-11 NOTE — CASE MANAGEMENT
Case Management Progress Note    Patient name Koki Hinds  Location L&D 312/L&D 163-07 MRN [id number]  : 1995 Date 2022       LOS (days): 1  Geometric Mean LOS (GMLOS) (days):   Days to GMLOS:        OBJECTIVE:        Current admission status: Inpatient  [facility]:   [facility] [address] [place] [address], [place] - [address] [place] [address]  Phone: [phone] Fax: [phone]    Primary Care Provider: No primary care provider on file  Primary Insurance: Niko Franklin MA WEI  Secondary Insurance:     PROGRESS NOTE:    CM called [address]; case has not yet been assigned to a   CM to f/u later today to determine  and dc plan       Update 11:30am -  Case assigned to [address] [facility] ([phone]) - CM left  requesting a call back

## 2022-05-11 NOTE — PROGRESS NOTES
Progress Note - OB/GYN   Livia Aguilar 33 y o  female MRN: [id number]  Unit/Bed#: L&D 312-01 Encounter: [id number]      Assessment:  Post partum day #1 s/p , stable, and doing well    Plan:  1  Continue routine post partum care   - Patient is ambulating, encouraged to continue    - Encourage breastfeeding  2  Continue current meds    - See list below   - Pain well controlled on PO analgesia  3  Future contraception   - Pt desires depo, ordered   4  Disposition   - Anticipate discharge home today, pending baby's discharge      Subjective/Objective       Subjective:   Pain: no  Tolerating Oral Intake: yes  Voiding: yes  Flatus: yes  Bowel Movement: no  Ambulating: yes  Breastfeeding: Bottle feeding  Chest Pain: no  Shortness of Breath: no  Leg Pain/Discomfort: no    Objective:   Vitals:   /53 (BP Location: Left arm)   Pulse 63   Temp 98 °F (36 7 °C) (Oral)   Resp 16   LMP  (LMP Unknown)   SpO2 100%   Breastfeeding No   There is no height or weight on file to calculate BMI    I/O       05/09 0701  05/10 0700 05/10 0701  05/11 07    Urine  750    Blood  238    Total Output  988    Net  -988              Lab Results   Component Value Date    WBC 12 13 (H) 05/10/2022    HGB 11 3 (L) 05/10/2022    HCT 33 2 (L) 05/10/2022    MCV 99 (H) 05/10/2022     05/10/2022       Meds/Allergies   Current Facility-Administered Medications   Medication Dose Route Frequency    acetaminophen (TYLENOL) tablet 650 mg  650 mg Oral Q4H PRN    benzocaine-menthol-lanolin-aloe (DERMOPLAST) 20-0 5 % topical spray 1 application  1 application Topical W8B PRN    calcium carbonate (TUMS) chewable tablet 1,000 mg  1,000 mg Oral TID PRN    diphenhydrAMINE (BENADRYL) tablet 25 mg  25 mg Oral Q6H PRN    docusate sodium (COLACE) capsule 100 mg  100 mg Oral BID    hydrocortisone 1 % cream 1 application  1 application Topical Daily PRN    ondansetron (ZOFRAN) injection 4 mg  4 mg Intravenous Q8H PRN    witch alisa-glycerin (TUCKS) topical pad 1 pad  1 pad Topical Q4H PRN       Physical Exam:  General: in no apparent distress  Cardiovascular: Cor RRR  Lungs: clear to auscultation bilaterally  Fundus: Firm, 3 cm below the umbilicus    Tami Hannah MD  PGY-2 OB/GYN   5/11/2022 5:46 AM

## 2022-05-11 NOTE — PLAN OF CARE
Problem: POSTPARTUM  Goal: Experiences normal postpartum course  Description: INTERVENTIONS:  - Monitor maternal vital signs  - Assess uterine involution and lochia  2022 by Arin Becker RN  Outcome: Completed  2022 154 by Arin Becker RN  Outcome: Progressing  Goal: Appropriate maternal -  bonding  Description: INTERVENTIONS:  - Identify family support  - Assess for appropriate maternal/infant bonding   -Encourage maternal/infant bonding opportunities  - Referral to  or  as needed  2022 by Arin Becker RN  Outcome: Completed  2022 154 by Arin Becker RN  Outcome: Progressing  Goal: Establishment of infant feeding pattern  Description: INTERVENTIONS:  - Assess breast/bottle feeding  - Refer to lactation as needed  2022 by Arin Becker RN  Outcome: Completed  2022 154 by Arin Becker RN  Outcome: Progressing  Goal: Incision(s), wounds(s) or drain site(s) healing without S/S of infection  Description: INTERVENTIONS  - Assess and document dressing, incision, wound bed, drain sites and surrounding tissue  - Provide patient and family education  -Outcome: Completed  2022 154 by Arin Becker RN  Outcome: Progressing     Problem: PAIN - ADULT  Goal: Verbalizes/displays adequate comfort level or baseline comfort level  Description: Interventions:  - Encourage patient to monitor pain and request assistance  - Assess pain using appropriate pain scale  - Administer analgesics based on type and severity of pain and evaluate response  - Implement non-pharmacological measures as appropriate and evaluate response  - Consider cultural and social influences on pain and pain management  - Notify physician/advanced practitioner if interventions unsuccessful or patient reports new pain  2022 by Arin Becker RN  Outcome: Completed  2022 154 by Arin Becker RN  Outcome: Progressing     Problem: INFECTION - ADULT  Goal: Absence or prevention of progression during hospitalization  Description: INTERVENTIONS:  - Assess and monitor for signs and symptoms of infection  - Monitor lab/diagnostic results  - Monitor all insertion sites, i e  indwelling lines, tubes, and drains  - Monitor endotracheal if appropriate and nasal secretions for changes in amount and color  - [place] appropriate cooling/warming therapies per order  - Administer medications as ordered  - Instruct and encourage patient and family to use good hand hygiene technique  - Identify and instruct in appropriate isolation precautions for identified infection/condition  5/11/2022 1755 by Adriane Martinez RN  Outcome: Completed  5/11/2022 1546 by Adriane Martinez RN  Outcome: Progressing  Goal: Absence of fever/infection during neutropenic period  Description: INTERVENTIONS:  - Monitor WBC    5/11/2022 1755 by Adriane Martinez RN  Outcome: Completed  5/11/2022 1546 by Adriane Martinez RN  Outcome: Progressing     Problem: SAFETY ADULT  Goal: Patient will remain free of falls  Description: INTERVENTIONS:  - Educate patient/family on patient safety including physical limitations  - Instruct patient to call for assistance with activity   - Consult OT/PT to assist with strengthening/mobility   - Keep Call bell within reach  - Keep bed low and locked with side rails adjusted as appropriate  - Keep care items and personal belongings within reach  - Initiate and maintain comfort rounds  - Make Fall Risk Sign visible to staff  - Consider moving patient to room near nurses station  5/11/2022 1755 by Adriane Martinez RN  Outcome: Completed  5/11/2022 1546 by Adriane Martinez RN  Outcome: Progressing  Goal: Maintain or return to baseline ADL function  Description: INTERVENTIONS:  -  Assess patient's ability to carry out ADLs; assess patient's baseline for ADL function and identify physical deficits which impact ability to perform ADLs (bathing, care of mouth/teeth, toileting, grooming, dressing, etc )  - Assess/evaluate cause of self-care deficits   - Assess range of motion  - Assess patient's mobility; develop plan if impaired  - Assess patient's need for assistive devices and provide as appropriate  - Encourage maximum independence but intervene and supervise when necessary  - Involve family in performance of ADLs  - Assess for home care needs following discharge   - Consider OT consult to assist with ADL evaluation and planning for discharge  - Provide patient education as appropriate  5/11/2022 1755 by Dewayne Aschoff, RN  Outcome: Completed  5/11/2022 1546 by Dewayne Aschoff, RN  Outcome: Progressing  Goal: Maintains/Returns to pre admission functional level  Description: INTERVENTIONS:  - Perform BMAT or MOVE assessment daily    - Set and communicate daily mobility goal to care team and patient/family/caregiver  - Collaborate with rehabilitation services on mobility goals if consulted  - Record patient progress and toleration of activity level   5/11/2022 1755 by Dewayne Aschoff, RN  Outcome: Completed  5/11/2022 1546 by Dewayne Aschoff, RN  Outcome: Progressing     Problem: Knowledge Deficit  Goal: Patient/family/caregiver demonstrates understanding of disease process, treatment plan, medications, and discharge instructions  Description: Complete learning assessment and assess knowledge base    Interventions:  - Provide teaching at level of understanding  - Provide teaching via preferred learning methods  5/11/2022 1755 by Dewayne Aschoff, RN  Outcome: Completed  5/11/2022 1546 by Dewayne Aschoff, RN  Outcome: Progressing     Problem: DISCHARGE PLANNING  Goal: Discharge to home or other facility with appropriate resources  Description: INTERVENTIONS:  - Identify barriers to discharge w/patient and caregiver  - Arrange for needed discharge resources and transportation as appropriate  - Identify discharge learning needs (meds, wound care, etc )  - Arrange for interpretive services to assist at discharge as needed  - Refer to Case Management Department for coordinating discharge planning if the patient needs post-hospital services based on physician/advanced practitioner order or complex needs related to functional status, cognitive ability, or social support system  5/11/2022 1755 by Arin Becker RN  Outcome: Completed  5/11/2022 1546 by Arin Becker, RN  Outcome: Progressing

## 2022-05-11 NOTE — PLAN OF CARE
Problem: POSTPARTUM  Goal: Experiences normal postpartum course  Description: INTERVENTIONS:  - Monitor maternal vital signs  - Assess uterine involution and lochia  Outcome: Progressing  Goal: Appropriate maternal -  bonding  Description: INTERVENTIONS:  - Identify family support  - Assess for appropriate maternal/infant bonding   -Encourage maternal/infant bonding opportunities  - Referral to  or  as needed  Outcome: Progressing  Goal: Establishment of infant feeding pattern  Description: INTERVENTIONS:  - Assess breast/bottle feeding  - Refer to lactation as needed  Outcome: Progressing  Goal: Incision(s), wounds(s) or drain site(s) healing without S/S of infection  Description: INTERVENTIONS  - Assess and document dressing, incision, wound bed, drain sites and surrounding tissue  - Provide patient and family education  -Outcome: Progressing     Problem: PAIN - ADULT  Goal: Verbalizes/displays adequate comfort level or baseline comfort level  Description: Interventions:  - Encourage patient to monitor pain and request assistance  - Assess pain using appropriate pain scale  - Administer analgesics based on type and severity of pain and evaluate response  - Implement non-pharmacological measures as appropriate and evaluate response  - Consider cultural and social influences on pain and pain management  - Notify physician/advanced practitioner if interventions unsuccessful or patient reports new pain  Outcome: Progressing     Problem: INFECTION - ADULT  Goal: Absence or prevention of progression during hospitalization  Description: INTERVENTIONS:  - Assess and monitor for signs and symptoms of infection  - Monitor lab/diagnostic results  - Monitor all insertion sites, i e  indwelling lines, tubes, and drains  - Monitor endotracheal if appropriate and nasal secretions for changes in amount and color  - Spencer appropriate cooling/warming therapies per order  - Administer medications as ordered  - Instruct and encourage patient and family to use good hand hygiene technique  - Identify and instruct in appropriate isolation precautions for identified infection/condition  Outcome: Progressing  Goal: Absence of fever/infection during neutropenic period  Description: INTERVENTIONS:  - Monitor WBC    Outcome: Progressing     Problem: SAFETY ADULT  Goal: Patient will remain free of falls  Description: INTERVENTIONS:  - Educate patient/family on patient safety including physical limitations  - Instruct patient to call for assistance with activity   - Consult OT/PT to assist with strengthening/mobility   - Keep Call bell within reach  - Keep bed low and locked with side rails adjusted as appropriate  - Keep care items and personal belongings within reach  - Initiate and maintain comfort rounds  - Make Fall Risk Sign visible to staff  -Outcome: Progressing  Goal: Maintain or return to baseline ADL function  Description: INTERVENTIONS:  -  Assess patient's ability to carry out ADLs; assess patient's baseline for ADL function and identify physical deficits which impact ability to perform ADLs (bathing, care of mouth/teeth, toileting, grooming, dressing, etc )  - Assess/evaluate cause of self-care deficits   - Assess range of motion  - Assess patient's mobility; develop plan if impaired  - Assess patient's need for assistive devices and provide as appropriate  - Encourage maximum independence but intervene and supervise when necessary  - Involve family in performance of ADLs  - Assess for home care needs following discharge   - Consider OT consult to assist with ADL evaluation and planning for discharge  - Provide patient education as appropriate  Outcome: Progressing  Goal: Maintains/Returns to pre admission functional level  Description: INTERVENTIONS:  - Perform BMAT or MOVE assessment daily    - Set and communicate daily mobility goal to care team and patient/family/caregiver     - Collaborate with rehabilitation services on mobility goals if consulted  -- Out of bed for toileting  - Record patient progress and toleration of activity level   Outcome: Progressing     Problem: Knowledge Deficit  Goal: Patient/family/caregiver demonstrates understanding of disease process, treatment plan, medications, and discharge instructions  Description: Complete learning assessment and assess knowledge base    Interventions:  - Provide teaching at level of understanding  - Provide teaching via preferred learning methods  Outcome: Progressing     Problem: DISCHARGE PLANNING  Goal: Discharge to home or other facility with appropriate resources  Description: INTERVENTIONS:  - Identify barriers to discharge w/patient and caregiver  - Arrange for needed discharge resources and transportation as appropriate  - Identify discharge learning needs (meds, wound care, etc )  - Arrange for interpretive services to assist at discharge as needed  - Refer to Case Management Department for coordinating discharge planning if the patient needs post-hospital services based on physician/advanced practitioner order or complex needs related to functional status, cognitive ability, or social support system  Outcome: Progressing

## 2022-05-12 NOTE — UTILIZATION REVIEW
Inpatient Admission Authorization Request   Notification of Maternity/Delivery &  Birth Information for Admission   SERVICING FACILITY:   [address] [place] [address] [facility]  Tax ID: [id number]  NPI: [id number]  Place of Service: Inpatient [address] [facility]  [address]  Place of Service Code: 24     ATTENDING PROVIDER:  Attending Name and NPI#: Jacquie Piercema [[id number]]  Address: [address] [place] [address]  Phone: [phone]     UTILIZATION REVIEW CONTACT:  Hermilo Stratton Utilization   Network Utilization Review Department  Phone: [phone]  Fax [phone]  Email: [email or web address]     PHYSICIAN ADVISORY SERVICES:  FOR [id number]-CXOF REVIEW - MEDICAL NECESSITY DENIAL  Phone: [phone]  Fax: [phone]  Email: [email or web address]     TYPE OF REQUEST:  Inpatient Status     ADMISSION INFORMATION:  ADMISSION DATE/TIME: 5/10/22  7:45 AM  PATIENT DIAGNOSIS CODE/DESCRIPTION:  Abdominal pain [R10 9] The primary encounter diagnosis was 36 weeks gestation of pregnancy  A diagnosis of  labor in third trimester without delivery was also pertinent to this visit  1  36 weeks gestation of pregnancy    2   labor in third trimester without delivery      DISCHARGE DATE/TIME: 2022  5:40 PM   MOTHER AND  INFORMATION:  Mother: Aaron Saini 1995   Delivering clinician:    OB History        6    Para   6    Term   5       1    AB   0    Living   6       SAB   0    IAB   0    Ectopic   0    Multiple   0    Live Births   6                Name & MRN:   Information for the patient's :  Raoul Rodríguez [[id number]]     [place] Delivery Information:  Sex: male  Delivered 5/10/2022 8:01 AM by Vaginal, Spontaneous; Gestational Age: 37w1d    [place] Measurements:  Weight: 5 lb 0 6 oz (2285 g);   Height: 18 5"    APGAR 1 minute 5 minutes 10 minutes   Totals: 8 9       Birth Information: 33 y o  female MRN: [id number] Unit/Bed#: L&D 312-01 Estimated Date of Delivery: 22  Birthweight: No birth weight on file  Gestational Age: <None> Delivery Type: Vaginal, Spontaneous      IMPORTANT INFORMATION:  Please contact the [facility] directly with any questions or concerns regarding this request  Department voicemails are confidential     Send requests for admission clinical reviews, concurrent reviews, approvals, and administrative denials due to lack of clinical to fax [phone]

## 2022-06-02 ENCOUNTER — POSTPARTUM VISIT (OUTPATIENT)
Dept: OBGYN CLINIC | Facility: CLINIC | Age: 27
End: 2022-06-02

## 2022-06-02 VITALS
HEART RATE: 76 BPM | DIASTOLIC BLOOD PRESSURE: 80 MMHG | BODY MASS INDEX: 22.94 KG/M2 | HEIGHT: 59 IN | WEIGHT: 113.8 LBS | SYSTOLIC BLOOD PRESSURE: 118 MMHG

## 2022-06-02 PROCEDURE — 99213 OFFICE O/P EST LOW 20 MIN: CPT | Performed by: OBSTETRICS & GYNECOLOGY

## 2022-06-02 NOTE — PROGRESS NOTES
Post-Partum Visit    Lawrence Scott is a 33 y o  y o  female [id number] who presents for a postpartum visit  She is 3 week postpartum following a spontaneous vaginal delivery on 5/10/22  I have fully reviewed the prenatal and intrapartum course  The delivery was at 39 gestational weeks  Outcome: spontaneous vaginal delivery  Postpartum course has been uncomplicated  Baby's course has been uncomplicated  Baby is feeding by breast  Bleeding thin lochia  Bowel function is normal  Bladder function is normal  Patient is not sexually active  Contraception method is Depo-Provera injections  Postpartum depression screening: negative  She denies breast pain or concerns  She denies pelvic pain      Review of Systems  Pertinent items are noted in HPI       Objective     Vitals:    06/02/22 1352   BP: 118/80   Pulse: 76       General:   appears stated age, cooperative, alert normal mood and affect   Abdomen: soft, non-tender, without masses or organomegaly     Assessment/Plan     Deniz Almeida postpartum exam  Patient received depo provera injection prior to discharge from the hospital  She would like to proceed with permanent sterilization procedure  Risks, benefits and alternatives to procedure discussed today  Patient is aware of risks of bleeding, infection and injury to surrounding structures  She is aware of option of LARCs or partner vasectomy  All questions asked and patient would like to proceed  Surgical consents signed   MA31 previously signed    - Surgical scheduler to call patient to schedule surgery  - RTC for pre-op H&P      Shannan Philip MD, PGY-4  6/3/2022  4:05 PM

## 2022-06-28 ENCOUNTER — OFFICE VISIT (OUTPATIENT)
Dept: OBGYN CLINIC | Facility: CLINIC | Age: 27
End: 2022-06-28

## 2022-06-28 VITALS
BODY MASS INDEX: 23.47 KG/M2 | HEART RATE: 78 BPM | DIASTOLIC BLOOD PRESSURE: 77 MMHG | HEIGHT: 59 IN | SYSTOLIC BLOOD PRESSURE: 124 MMHG | WEIGHT: 116.4 LBS

## 2022-06-28 DIAGNOSIS — Z30.2 REQUEST FOR STERILIZATION: Primary | ICD-10-CM

## 2022-06-28 PROBLEM — Z3A.36 36 WEEKS GESTATION OF PREGNANCY: Status: RESOLVED | Noted: 2022-04-29 | Resolved: 2022-06-28

## 2022-06-28 PROBLEM — O60.03 PRETERM LABOR IN THIRD TRIMESTER: Status: RESOLVED | Noted: 2022-05-05 | Resolved: 2022-06-28

## 2022-06-28 PROBLEM — O60.03 PRETERM LABOR IN THIRD TRIMESTER WITHOUT DELIVERY: Status: RESOLVED | Noted: 2022-04-06 | Resolved: 2022-06-28

## 2022-06-28 PROCEDURE — 99213 OFFICE O/P EST LOW 20 MIN: CPT | Performed by: OBSTETRICS & GYNECOLOGY

## 2022-06-28 NOTE — PROGRESS NOTES
H&P Exam  Wyn Claude 33 y o  female MRN: [id number]  Unit/Bed#:  Encounter: [id number]      HPI:  Wyn Claude is a 33 y o  [id number] female who will be undergoing bilateral salpingectomy on 2022  No questions or complaints at this time  Currently bottle feeding  LMP: 22  Contraception: depo  Last pap smear:     Blood use during admission if needed: yes  CPR during admission if needed: yes    OB History    Para Term  AB Living   6 6 5 1 0 6   SAB IAB Ectopic Multiple Live Births   0 0 0 0 6      # Outcome Date GA Lbr Nagi/2nd Weight Sex Delivery Anes PTL Lv   6  05/10/22 36w2d / 00:02 2285 g (5 lb 0 6 oz) M Vag-Spont None  DELFIN   5 Term 20 37w0d  2675 g (5 lb 14 4 oz) M Vag-Spont None N DELFIN      Birth Comments: FOB1   4 Term 19 37w2d  2705 g (5 lb 15 4 oz) M Vag-Spont None N DELFIN      Birth Comments: FOB1   3 Term 10/08/17 37w4d / 00:52 2608 g (5 lb 12 oz) M Vag-Spont EPI N DELFIN      Birth Comments: FOB2      Complications: Gestational diabetes mellitus   2 Term 14 38w6d  2466 g (5 lb 7 oz) F Vag-Spont EPI N DELFIN      Birth Comments: FOB2   1 Term 13 37w0d  2438 g (5 lb 6 oz) M Vag-Spont EPI Y DELFIN      Birth Comments: FOB1      Complications: Gestational diabetes mellitus       Review of Systems   Constitutional: Negative  HENT: Negative  Respiratory: Negative  Cardiovascular: Negative  Gastrointestinal: Negative  Genitourinary: Negative  Musculoskeletal: Negative  Neurological: Negative  Psychiatric/Behavioral: Negative  All other systems reviewed and are negative        Historical Information   Past Medical History:   Diagnosis Date    Chlamydia     GERD (gastroesophageal reflux disease)     has not req'd tx since 2019    Gestational diabetes     Varicella     had chicken pox as child and vaccine     Past Surgical History:   Procedure Laterality Date    APPENDECTOMY      CHOLECYSTECTOMY  2015    [place] COLONOSCOPY FLX DX W/COLLJ SPEC WHEN PFRMD N/A 1/19/2017    Procedure: EGD AND COLONOSCOPY;  Surgeon: Pepito Santoyo DO;  Location: [facility] GI LAB; Service: Gastroenterology    [place] LAP,APPENDECTOMY N/A 5/17/2016    Procedure: APPENDECTOMY LAPAROSCOPIC;  Surgeon: Donovan Reynoso DO;  [facility];  Service: General    ND LAP,DIAGNOSTIC ABDOMEN N/A 3/13/2016    Procedure: LAPAROSCOPY DIAGNOSTIC;  Surgeon: Sarah Elder MD;  [facility];  Service: General    RESECTION SMALL BOWEL LAPAROSCOPIC  2016     Social History   Social History     Substance and Sexual Activity   Alcohol Use Not Currently    Comment: socially     Social History     Substance and Sexual Activity   Drug Use Not Currently    Types: Marijuana    Comment: LAST USED 03/31/2022, daily      Social History     Tobacco Use   Smoking Status Former Smoker   Smokeless Tobacco Never Used     Family History: no history of breast, ovarian, uterine, cervical cancer  Meds/Allergies    (Not in a hospital admission)       Allergies   Allergen Reactions    Ibuprofen Anaphylaxis       OBJECTIVE:    Vitals: Blood pressure 124/77, pulse 78, height 4' 11" (1 499 m), weight 52 8 kg (116 lb 6 4 oz), not currently breastfeeding  Body mass index is 23 51 kg/m²  Physical Exam  Vitals reviewed  Constitutional:       Appearance: Normal appearance  She is normal weight  HENT:      Mouth/Throat:      Mouth: Mucous membranes are moist       Pharynx: Oropharynx is clear  Eyes:      Conjunctiva/sclera: Conjunctivae normal       Pupils: Pupils are equal, round, and reactive to light  Cardiovascular:      Rate and Rhythm: Normal rate and regular rhythm  Pulses: Normal pulses  Heart sounds: Normal heart sounds  Pulmonary:      Effort: Pulmonary effort is normal  No respiratory distress  Breath sounds: Normal breath sounds  Abdominal:      General: There is no distension  Palpations: Abdomen is soft  Tenderness:  There is no abdominal tenderness  Musculoskeletal:         General: Normal range of motion  Cervical back: Normal range of motion  Skin:     General: Skin is warm and dry  Neurological:      General: No focal deficit present  Mental Status: She is alert and oriented to person, place, and time  Mental status is at baseline  Assessment/Plan     ASSESSMENT:  26yo  female who is scheduled for laparoscopic bilateral salpingectomy on 22  PLAN:    NPO after midnight, Hibiclens given to patient    Explained expectations, will receive phone call from [address] [place] [address] staff prior    Linda Stein MD  2022  9:52 AM

## 2022-06-28 NOTE — H&P (VIEW-ONLY)
H&P Exam  Vaughn Orlando 33 y o  female MRN: [id number]  Unit/Bed#:  Encounter: [id number]      HPI:  Vaughn Orlando is a 33 y o  [id number] female who will be undergoing bilateral salpingectomy on 2022  No questions or complaints at this time  Currently bottle feeding  LMP: 22  Contraception: depo  Last pap smear:     Blood use during admission if needed: yes  CPR during admission if needed: yes    OB History    Para Term  AB Living   6 6 5 1 0 6   SAB IAB Ectopic Multiple Live Births   0 0 0 0 6      # Outcome Date GA Lbr Nagi/2nd Weight Sex Delivery Anes PTL Lv   6  05/10/22 36w2d / 00:02 2285 g (5 lb 0 6 oz) M Vag-Spont None  DELFIN   5 Term 20 37w0d  2675 g (5 lb 14 4 oz) M Vag-Spont None N DELFIN      Birth Comments: FOB1   4 Term 19 37w2d  2705 g (5 lb 15 4 oz) M Vag-Spont None N DELFIN      Birth Comments: FOB1   3 Term 10/08/17 37w4d / 00:52 2608 g (5 lb 12 oz) M Vag-Spont EPI N DELFIN      Birth Comments: FOB2      Complications: Gestational diabetes mellitus   2 Term 14 38w6d  2466 g (5 lb 7 oz) F Vag-Spont EPI N DELFIN      Birth Comments: FOB2   1 Term 13 37w0d  2438 g (5 lb 6 oz) M Vag-Spont EPI Y DELFIN      Birth Comments: FOB1      Complications: Gestational diabetes mellitus       Review of Systems   Constitutional: Negative  HENT: Negative  Respiratory: Negative  Cardiovascular: Negative  Gastrointestinal: Negative  Genitourinary: Negative  Musculoskeletal: Negative  Neurological: Negative  Psychiatric/Behavioral: Negative  All other systems reviewed and are negative        Historical Information   Past Medical History:   Diagnosis Date    Chlamydia     GERD (gastroesophageal reflux disease)     has not req'd tx since 2019    Gestational diabetes     Varicella     had chicken pox as child and vaccine     Past Surgical History:   Procedure Laterality Date    APPENDECTOMY      CHOLECYSTECTOMY  2015    WI COLONOSCOPY FLX DX W/COLLJ SPEC WHEN PFRMD N/A 1/19/2017    Procedure: EGD AND COLONOSCOPY;  Surgeon: Tyrone Candelaria DO;  Location: [facility] GI LAB; Service: Gastroenterology    [place] LAP,APPENDECTOMY N/A 5/17/2016    Procedure: APPENDECTOMY LAPAROSCOPIC;  Surgeon: Hussain Gray DO;  [facility];  Service: General    [place] LAP,DIAGNOSTIC ABDOMEN N/A 3/13/2016    Procedure: LAPAROSCOPY DIAGNOSTIC;  Surgeon: Pippa Nelson MD;  [facility];  Service: General    RESECTION SMALL BOWEL LAPAROSCOPIC  2016     Social History   Social History     Substance and Sexual Activity   Alcohol Use Not Currently    Comment: socially     Social History     Substance and Sexual Activity   Drug Use Not Currently    Types: Marijuana    Comment: LAST USED 03/31/2022, daily      Social History     Tobacco Use   Smoking Status Former Smoker   Smokeless Tobacco Never Used     Family History: no history of breast, ovarian, uterine, cervical cancer  Meds/Allergies    (Not in a hospital admission)       Allergies   Allergen Reactions    Ibuprofen Anaphylaxis       OBJECTIVE:    Vitals: Blood pressure 124/77, pulse 78, height 4' 11" (1 499 m), weight 52 8 kg (116 lb 6 4 oz), not currently breastfeeding  Body mass index is 23 51 kg/m²  Physical Exam  Vitals reviewed  Constitutional:       Appearance: Normal appearance  She is normal weight  HENT:      Mouth/Throat:      Mouth: Mucous membranes are moist       Pharynx: Oropharynx is clear  Eyes:      Conjunctiva/sclera: Conjunctivae normal       Pupils: Pupils are equal, round, and reactive to light  Cardiovascular:      Rate and Rhythm: Normal rate and regular rhythm  Pulses: Normal pulses  Heart sounds: Normal heart sounds  Pulmonary:      Effort: Pulmonary effort is normal  No respiratory distress  Breath sounds: Normal breath sounds  Abdominal:      General: There is no distension  Palpations: Abdomen is soft  Tenderness:  There is no abdominal tenderness  Musculoskeletal:         General: Normal range of motion  Cervical back: Normal range of motion  Skin:     General: Skin is warm and dry  Neurological:      General: No focal deficit present  Mental Status: She is alert and oriented to person, place, and time  Mental status is at baseline  Assessment/Plan     ASSESSMENT:  26yo  female who is scheduled for laparoscopic bilateral salpingectomy on 22  PLAN:    NPO after midnight, Hibiclens given to patient    Explained expectations, will receive phone call from [address] [place] [address] staff prior    Trent Whiting MD  2022  9:52 AM

## 2022-06-29 NOTE — PRE-PROCEDURE INSTRUCTIONS
Pre-Surgery Instructions:   Medication Instructions    acetaminophen (TYLENOL) 500 mg tablet Uses PRN- OK to take day of surgery   Have you had / have a sore throat? No  Have you had / have a cough less than 1 week? No  Have you had / have a fever greater than 100 0 - 100  4? No  Are you experiencing any shortness of breath? No    Review with patient via phone medications and showering instructions  Instructed to avoid all ASA and OTC Vit/Supp 1 week prior to surgery and to avoid NSAIDs 3 days prior to surgery per anesthesia instructions  Tylenol ok to take prn  [facility] call with surgery schedule time, nothing eat or drink after midnight  Verbalized understanding

## 2022-07-07 ENCOUNTER — ANESTHESIA EVENT (OUTPATIENT)
Dept: PERIOP | Facility: HOSPITAL | Age: 27
End: 2022-07-07
Payer: MEDICARE

## 2022-07-07 PROBLEM — F17.200 SMOKING: Status: ACTIVE | Noted: 2022-07-07

## 2022-07-07 PROBLEM — IMO0001 SMOKING: Status: ACTIVE | Noted: 2022-07-07

## 2022-07-08 ENCOUNTER — ANESTHESIA (OUTPATIENT)
Dept: PERIOP | Facility: HOSPITAL | Age: 27
End: 2022-07-08
Payer: MEDICARE

## 2022-07-08 ENCOUNTER — HOSPITAL ENCOUNTER (OUTPATIENT)
Facility: HOSPITAL | Age: 27
Setting detail: OUTPATIENT SURGERY
Discharge: HOME | End: 2022-07-08
Attending: OBSTETRICS & GYNECOLOGY | Admitting: OBSTETRICS & GYNECOLOGY
Payer: MEDICARE

## 2022-07-08 VITALS
BODY MASS INDEX: 23.73 KG/M2 | HEART RATE: 74 BPM | WEIGHT: 117.5 LBS | OXYGEN SATURATION: 98 % | SYSTOLIC BLOOD PRESSURE: 130 MMHG | DIASTOLIC BLOOD PRESSURE: 73 MMHG | TEMPERATURE: 98.5 F | RESPIRATION RATE: 16 BRPM

## 2022-07-08 DIAGNOSIS — O26.899 ABDOMINAL PAIN AFFECTING PREGNANCY: ICD-10-CM

## 2022-07-08 DIAGNOSIS — Z30.2 STERILIZATION: ICD-10-CM

## 2022-07-08 DIAGNOSIS — R10.9 ABDOMINAL PAIN AFFECTING PREGNANCY: ICD-10-CM

## 2022-07-08 DIAGNOSIS — Z98.890 S/P LAPAROSCOPY: Primary | ICD-10-CM

## 2022-07-08 PROBLEM — Z90.79 STATUS POST BILATERAL SALPINGECTOMY: Status: ACTIVE | Noted: 2022-07-08

## 2022-07-08 LAB
ABO GROUP BLD: NORMAL
BLD GP AB SCN SERPL QL: NEGATIVE
ERYTHROCYTE [DISTWIDTH] IN BLOOD BY AUTOMATED COUNT: 12 % (ref 11.6–15.1)
EXT PREGNANCY TEST URINE: NEGATIVE
EXT. CONTROL: NORMAL
GLUCOSE SERPL-MCNC: 84 MG/DL (ref 65–140)
HCT VFR BLD AUTO: 36.2 % (ref 34.8–46.1)
HGB BLD-MCNC: 12.2 G/DL (ref 11.5–15.4)
MCH RBC QN AUTO: 31.2 PG (ref 26.8–34.3)
MCHC RBC AUTO-ENTMCNC: 33.7 G/DL (ref 31.4–37.4)
MCV RBC AUTO: 93 FL (ref 82–98)
PLATELET # BLD AUTO: 211 THOUSANDS/UL (ref 149–390)
PMV BLD AUTO: 9.8 FL (ref 8.9–12.7)
RBC # BLD AUTO: 3.91 MILLION/UL (ref 3.81–5.12)
RH BLD: POSITIVE
SPECIMEN EXPIRATION DATE: NORMAL
WBC # BLD AUTO: 5.56 THOUSAND/UL (ref 4.31–10.16)

## 2022-07-08 PROCEDURE — 82948 REAGENT STRIP/BLOOD GLUCOSE: CPT

## 2022-07-08 PROCEDURE — 81025 URINE PREGNANCY TEST: CPT | Performed by: ANESTHESIOLOGY

## 2022-07-08 PROCEDURE — 88302 TISSUE EXAM BY PATHOLOGIST: CPT | Performed by: PATHOLOGY

## 2022-07-08 PROCEDURE — 85027 COMPLETE CBC AUTOMATED: CPT | Performed by: OBSTETRICS & GYNECOLOGY

## 2022-07-08 PROCEDURE — 86900 BLOOD TYPING SEROLOGIC ABO: CPT | Performed by: OBSTETRICS & GYNECOLOGY

## 2022-07-08 PROCEDURE — 86901 BLOOD TYPING SEROLOGIC RH(D): CPT | Performed by: OBSTETRICS & GYNECOLOGY

## 2022-07-08 PROCEDURE — 86850 RBC ANTIBODY SCREEN: CPT | Performed by: OBSTETRICS & GYNECOLOGY

## 2022-07-08 PROCEDURE — 58661 LAPAROSCOPY REMOVE ADNEXA: CPT | Performed by: OBSTETRICS & GYNECOLOGY

## 2022-07-08 RX ORDER — MAGNESIUM HYDROXIDE 1200 MG/15ML
LIQUID ORAL AS NEEDED
Status: DISCONTINUED | OUTPATIENT
Start: 2022-07-08 | End: 2022-07-08 | Stop reason: HOSPADM

## 2022-07-08 RX ORDER — FENTANYL CITRATE 50 UG/ML
INJECTION, SOLUTION INTRAMUSCULAR; INTRAVENOUS AS NEEDED
Status: DISCONTINUED | OUTPATIENT
Start: 2022-07-08 | End: 2022-07-08

## 2022-07-08 RX ORDER — SODIUM CHLORIDE, SODIUM LACTATE, POTASSIUM CHLORIDE, CALCIUM CHLORIDE 600; 310; 30; 20 MG/100ML; MG/100ML; MG/100ML; MG/100ML
125 INJECTION, SOLUTION INTRAVENOUS CONTINUOUS
Status: DISCONTINUED | OUTPATIENT
Start: 2022-07-08 | End: 2022-07-08

## 2022-07-08 RX ORDER — PROPOFOL 10 MG/ML
INJECTION, EMULSION INTRAVENOUS AS NEEDED
Status: DISCONTINUED | OUTPATIENT
Start: 2022-07-08 | End: 2022-07-08

## 2022-07-08 RX ORDER — ALBUTEROL SULFATE 2.5 MG/3ML
2.5 SOLUTION RESPIRATORY (INHALATION) ONCE AS NEEDED
Status: DISCONTINUED | OUTPATIENT
Start: 2022-07-08 | End: 2022-07-08 | Stop reason: HOSPADM

## 2022-07-08 RX ORDER — HYDROMORPHONE HCL/PF 1 MG/ML
0.5 SYRINGE (ML) INJECTION
Status: DISCONTINUED | OUTPATIENT
Start: 2022-07-08 | End: 2022-07-08 | Stop reason: HOSPADM

## 2022-07-08 RX ORDER — DEXAMETHASONE SODIUM PHOSPHATE 10 MG/ML
INJECTION, SOLUTION INTRAMUSCULAR; INTRAVENOUS AS NEEDED
Status: DISCONTINUED | OUTPATIENT
Start: 2022-07-08 | End: 2022-07-08

## 2022-07-08 RX ORDER — MIDAZOLAM HYDROCHLORIDE 2 MG/2ML
INJECTION, SOLUTION INTRAMUSCULAR; INTRAVENOUS AS NEEDED
Status: DISCONTINUED | OUTPATIENT
Start: 2022-07-08 | End: 2022-07-08

## 2022-07-08 RX ORDER — ACETAMINOPHEN 325 MG/1
975 TABLET ORAL EVERY 6 HOURS PRN
Status: DISCONTINUED | OUTPATIENT
Start: 2022-07-08 | End: 2022-07-08 | Stop reason: HOSPADM

## 2022-07-08 RX ORDER — LIDOCAINE HYDROCHLORIDE 10 MG/ML
INJECTION, SOLUTION EPIDURAL; INFILTRATION; INTRACAUDAL; PERINEURAL AS NEEDED
Status: DISCONTINUED | OUTPATIENT
Start: 2022-07-08 | End: 2022-07-08

## 2022-07-08 RX ORDER — PROMETHAZINE HYDROCHLORIDE 25 MG/ML
12.5 INJECTION, SOLUTION INTRAMUSCULAR; INTRAVENOUS ONCE AS NEEDED
Status: DISCONTINUED | OUTPATIENT
Start: 2022-07-08 | End: 2022-07-08 | Stop reason: HOSPADM

## 2022-07-08 RX ORDER — NEOSTIGMINE METHYLSULFATE 1 MG/ML
INJECTION INTRAVENOUS AS NEEDED
Status: DISCONTINUED | OUTPATIENT
Start: 2022-07-08 | End: 2022-07-08

## 2022-07-08 RX ORDER — ROCURONIUM BROMIDE 10 MG/ML
INJECTION, SOLUTION INTRAVENOUS AS NEEDED
Status: DISCONTINUED | OUTPATIENT
Start: 2022-07-08 | End: 2022-07-08

## 2022-07-08 RX ORDER — OXYCODONE HYDROCHLORIDE 5 MG/1
5 TABLET ORAL EVERY 4 HOURS PRN
Status: DISCONTINUED | OUTPATIENT
Start: 2022-07-08 | End: 2022-07-08 | Stop reason: HOSPADM

## 2022-07-08 RX ORDER — FENTANYL CITRATE/PF 50 MCG/ML
25 SYRINGE (ML) INJECTION
Status: DISCONTINUED | OUTPATIENT
Start: 2022-07-08 | End: 2022-07-08 | Stop reason: HOSPADM

## 2022-07-08 RX ORDER — ONDANSETRON 2 MG/ML
INJECTION INTRAMUSCULAR; INTRAVENOUS AS NEEDED
Status: DISCONTINUED | OUTPATIENT
Start: 2022-07-08 | End: 2022-07-08

## 2022-07-08 RX ORDER — OXYCODONE HYDROCHLORIDE 10 MG/1
10 TABLET ORAL EVERY 4 HOURS PRN
Status: DISCONTINUED | OUTPATIENT
Start: 2022-07-08 | End: 2022-07-08 | Stop reason: HOSPADM

## 2022-07-08 RX ORDER — BUPIVACAINE HYDROCHLORIDE 5 MG/ML
INJECTION, SOLUTION PERINEURAL AS NEEDED
Status: DISCONTINUED | OUTPATIENT
Start: 2022-07-08 | End: 2022-07-08 | Stop reason: HOSPADM

## 2022-07-08 RX ORDER — ACETAMINOPHEN 500 MG
500 TABLET ORAL EVERY 6 HOURS PRN
Qty: 30 TABLET | Refills: 0 | Status: SHIPPED | OUTPATIENT
Start: 2022-07-08

## 2022-07-08 RX ORDER — ONDANSETRON 2 MG/ML
4 INJECTION INTRAMUSCULAR; INTRAVENOUS ONCE AS NEEDED
Status: DISCONTINUED | OUTPATIENT
Start: 2022-07-08 | End: 2022-07-08 | Stop reason: HOSPADM

## 2022-07-08 RX ORDER — OXYCODONE HYDROCHLORIDE 5 MG/1
5 TABLET ORAL EVERY 4 HOURS PRN
Qty: 10 TABLET | Refills: 0 | Status: SHIPPED | OUTPATIENT
Start: 2022-07-08 | End: 2022-07-18

## 2022-07-08 RX ORDER — GLYCOPYRROLATE 0.2 MG/ML
INJECTION INTRAMUSCULAR; INTRAVENOUS AS NEEDED
Status: DISCONTINUED | OUTPATIENT
Start: 2022-07-08 | End: 2022-07-08

## 2022-07-08 RX ADMIN — GLYCOPYRROLATE 0.6 MG: 0.2 INJECTION, SOLUTION INTRAMUSCULAR; INTRAVENOUS at 08:24

## 2022-07-08 RX ADMIN — MIDAZOLAM 2 MG: 1 INJECTION INTRAMUSCULAR; INTRAVENOUS at 07:27

## 2022-07-08 RX ADMIN — FENTANYL CITRATE 25 MCG: 50 INJECTION, SOLUTION INTRAMUSCULAR; INTRAVENOUS at 08:56

## 2022-07-08 RX ADMIN — LIDOCAINE HYDROCHLORIDE 100 MG: 10 INJECTION, SOLUTION EPIDURAL; INFILTRATION; INTRACAUDAL; PERINEURAL at 07:37

## 2022-07-08 RX ADMIN — ROCURONIUM BROMIDE 40 MG: 50 INJECTION, SOLUTION INTRAVENOUS at 07:37

## 2022-07-08 RX ADMIN — FENTANYL CITRATE 50 MCG: 50 INJECTION INTRAMUSCULAR; INTRAVENOUS at 07:37

## 2022-07-08 RX ADMIN — DEXAMETHASONE SODIUM PHOSPHATE 10 MG: 10 INJECTION INTRAMUSCULAR; INTRAVENOUS at 07:37

## 2022-07-08 RX ADMIN — FENTANYL CITRATE 25 MCG: 50 INJECTION INTRAMUSCULAR; INTRAVENOUS at 07:54

## 2022-07-08 RX ADMIN — NEOSTIGMINE METHYLSULFATE 4 MG: 1 INJECTION INTRAVENOUS at 08:24

## 2022-07-08 RX ADMIN — SODIUM CHLORIDE, SODIUM LACTATE, POTASSIUM CHLORIDE, AND CALCIUM CHLORIDE 125 ML/HR: .6; .31; .03; .02 INJECTION, SOLUTION INTRAVENOUS at 06:08

## 2022-07-08 RX ADMIN — ONDANSETRON 4 MG: 2 INJECTION INTRAMUSCULAR; INTRAVENOUS at 08:25

## 2022-07-08 RX ADMIN — ROCURONIUM BROMIDE 10 MG: 50 INJECTION, SOLUTION INTRAVENOUS at 08:10

## 2022-07-08 RX ADMIN — FENTANYL CITRATE 25 MCG: 50 INJECTION INTRAMUSCULAR; INTRAVENOUS at 08:02

## 2022-07-08 RX ADMIN — FENTANYL CITRATE 25 MCG: 50 INJECTION, SOLUTION INTRAMUSCULAR; INTRAVENOUS at 08:47

## 2022-07-08 RX ADMIN — PROPOFOL 200 MG: 10 INJECTION, EMULSION INTRAVENOUS at 07:37

## 2022-07-08 RX ADMIN — OXYCODONE HYDROCHLORIDE 5 MG: 5 TABLET ORAL at 10:33

## 2022-07-08 NOTE — OP NOTE
OPERATIVE REPORT  PATIENT NAME: Adi Parr    :  1995  MRN: [id number]  Pt Location: [facility] OR ROOM 06    SURGERY DATE: 2022    Surgeon(s) and Role:     * Kingsley Wells MD - Primary     * Thea Beltran MD - Assisting    Preop Diagnosis:  Sterilization [Z30 2]    Post-Op Diagnosis Codes:     * Sterilization [Z30 2]    Procedure(s) (LRB):  SALPINGECTOMY, LAPAROSCOPIC (Bilateral)    Specimen(s):  ID Type Source Tests Collected by Time Destination   1 : Left Fallopian Tube Tissue Fallopian Tube, Left TISSUE EXAM Kingsley Wells MD 2022 [id number]    2 : Right Fallopian Tube Tissue Fallopian Tube, Right TISSUE EXAM Kingsley Wells MD 2022 [id number]        Estimated Blood Loss:   Minimal    Drains:  * No LDAs found *    Anesthesia Type:   General    Operative Indications:  Sterilization [Z30 2]      Operative Findings:  Normal external female genitalia- no evidence of lesions or lacerations   Bimanual exam revealed a anteverted uterus, normal size and contour, freely mobile  No adnexal masses appreciated   Speculum exam revealed a normal vagina and cervix  No evidence of bleeding, lesions or lacerations  Laparoscopic exam revealed normal bowel, bladder, vasculature and liver edge  There was no evidence of bowel injury under the umbilical port with trocar entry  On inspection of the pelvis the uterus, bilateral fallopian tubes and ovaries were grossly normal in appearance  Excellent hemostasis at surgical sites       Complications:   None    Procedure and Technique:    Brief History  Patient is a 32 yo [id number] with desire for permanent sterilization  She has a history of cholecystectomy and appendectomy and small bowel resection  Patient's last delivery was 5/10/22  She was seen in the office and consented for sterilization  She understands that this is permanent and irreversible  UPT is negative today        Description of Procedure    Patient was taken to the operating room were a time out was performed to confirm correct patient and correct procedure  General endotracheal anesthesia (GET) was administered and the patient was positioned on the OR table in the dorsal lithotomy position  All pressure points were padded and a gio hugger was placed to maintain control of core body temperature  A bimanual exam was performed and the uterus was noted to be anteverted, normal in size and consistency with no palpable adnexal masses or fullness  The patient was prepped and draped in the usual sterile fashion with chloroprep on the abdomen and diluted chlorhexidine on the vagina and perineum  Operative Technique    A brady catheter was introduced into the bladder, which was drained of 65cc of clear yellow urine  A bivalved speculum was inserted into the vagina and used to visualize  the cervix  Cervix was dilated and sounded to 8cm  A Zumi manipulator was placed  The speculum was removed from the vagina  Sterile gloves were then exchanged and attention was turned to the abdomen  A 5 mm incision was made at the inferior edge of the umbilicus  The Veress needle was placed and proper positioning was confirmed with opening pressure <10mmHg  Pneumoperitoneum was then established to a maximum of 15mmHg  A 5mm trocar was introduced into the abdomen under direct visualization  The entire abdomen and pelvis was inspected and there was no evidence of injury to bowel, bladder, vasculature, or other structures  Attention was then turned to the pelvis  Patient was placed in Trendelenburg and the uterus was elevated to visualize the fallopian tubes  There was noted to be grossly normal tubes and ovaries bilaterally  Two additional port sites were selected in the left and right lower abdomen approximately 2cm superior and medial to the iliac crests  0 5% marcaine was infiltrated prior to making a 5mm incision was made for introduction of a 5 mm trocar under direct visualization at each site   An atraumatic grasper was inserted through this port and used to visualize the fimbriated ends of the tubes  The right fallopian tube was grasped at its fimbriated end with a blunt grasper and elevated to visualize the mesosalpinx  Enseal device was used to ligate along the mesosalpinx, working proximally and taking care to avoid ovarian vasculature  Approximately 2cm from the cornua, Enseal was used to amputate fallopian tube  This was then withdrawn from the abdominal cavity and sent for pathology  Attention was then turned to the contralateral tube, which was amputated in similar fashion  Good hemostasis was confirmed following salpingectomy  The trocars and laparoscope were removed  Pneumoperitoneum was allowed to escape  Skin incisions were closed with subcuticular stitches of 4-0 monocryl  Attention was turned to the vagina  Bivalved speculum was reinserted into the vagina and the uterine manipulator was withdrawn  Good hemostasis was confirmed  Speculum was then removed from the vagina  [place]o catheter was removed  At the conclusion of the procedure, all needle, sponge, and instrument counts were noted to be correct x2  Patient tolerated the procedure well and was transferred to PACU in stable condition prior to discharge with follow up in 1-2 weeks  Dr Zachery Marino was present and participated in all key portions of the case      Patient Disposition:  PACU  and extubated and stable      SIGNATURE: Yesenia Aponte MD  DATE: July 8, 2022  TIME: 8:41 AM

## 2022-07-08 NOTE — INTERVAL H&P NOTE
H&P reviewed  After examining the patient I find no changes in the patients condition since the H&P had been written      Vitals:    07/08/22 0555   BP: 108/73   Pulse: 74   Resp: 16   Temp: 98 1 °F (36 7 °C)   SpO2: 98%

## 2022-07-08 NOTE — ANESTHESIA POSTPROCEDURE EVALUATION
Post-Op Assessment Note    CV Status:  Stable  Pain Score: 0    Pain management: adequate     Mental Status:  Alert and awake   Hydration Status:  Euvolemic   PONV Controlled:  Controlled   Airway Patency:  Patent      Post Op Vitals Reviewed: Yes      Staff: CRNA         No complications documented      BP   122/63   Temp   97 3   Pulse  80   Resp   15   SpO2   100%

## 2022-07-26 ENCOUNTER — OFFICE VISIT (OUTPATIENT)
Dept: OBGYN CLINIC | Facility: CLINIC | Age: 27
End: 2022-07-26

## 2022-07-26 VITALS
DIASTOLIC BLOOD PRESSURE: 78 MMHG | HEART RATE: 76 BPM | WEIGHT: 119.6 LBS | SYSTOLIC BLOOD PRESSURE: 112 MMHG | BODY MASS INDEX: 24.11 KG/M2 | HEIGHT: 59 IN

## 2022-07-26 DIAGNOSIS — Z98.890 POST-OPERATIVE STATE: Primary | ICD-10-CM

## 2022-07-26 PROCEDURE — 99212 OFFICE O/P EST SF 10 MIN: CPT | Performed by: OBSTETRICS & GYNECOLOGY

## 2022-07-26 NOTE — PROGRESS NOTES
Subjective:     Hussain Carrillo is a 33 y o  [id number] female who presents for a post-op visit after an uncomplicated bilateral salpingectomy on 7/8/22  She is feeling well with minimal pain, ambulating, having bowel movements, voiding without issues  Objective:    Vitals: not currently breastfeeding  There is no height or weight on file to calculate BMI  Physical Exam  Constitutional:       General: She is not in acute distress  Appearance: She is not ill-appearing or toxic-appearing  HENT:      Head: Normocephalic and atraumatic  Pulmonary:      Effort: Pulmonary effort is normal  No respiratory distress  Abdominal:      Palpations: Abdomen is soft  Tenderness: There is no abdominal tenderness  There is no guarding  Comments: All three incisions are intact and healing well    Skin:     General: Skin is warm and dry  Neurological:      General: No focal deficit present  Mental Status: She is alert and oriented to person, place, and time  Psychiatric:         Mood and Affect: Mood normal          Thought Content:  Thought content normal          Judgment: Judgment normal            Assessment/Plan:  Patient is a 34 yo s/p bilateral salpingectomy, healing well      - RTO next year for annual and pap         David Odonnell MD  7/26/2022  7:44 AM

## 2023-03-27 ENCOUNTER — OFFICE VISIT (OUTPATIENT)
Dept: FAMILY MEDICINE CLINIC | Facility: CLINIC | Age: 28
End: 2023-03-27

## 2023-03-27 VITALS
RESPIRATION RATE: 16 BRPM | SYSTOLIC BLOOD PRESSURE: 110 MMHG | HEART RATE: 90 BPM | OXYGEN SATURATION: 99 % | DIASTOLIC BLOOD PRESSURE: 70 MMHG | WEIGHT: 103 LBS | BODY MASS INDEX: 20.76 KG/M2 | TEMPERATURE: 98.5 F | HEIGHT: 59 IN

## 2023-03-27 DIAGNOSIS — N92.6 MENSTRUAL PERIOD LATE: ICD-10-CM

## 2023-03-27 DIAGNOSIS — R11.0 NAUSEA: ICD-10-CM

## 2023-03-27 DIAGNOSIS — F17.200 SMOKING: ICD-10-CM

## 2023-03-27 DIAGNOSIS — K59.00 CONSTIPATION, UNSPECIFIED CONSTIPATION TYPE: ICD-10-CM

## 2023-03-27 DIAGNOSIS — R22.1 NECK NODULE: ICD-10-CM

## 2023-03-27 DIAGNOSIS — Z76.89 ENCOUNTER TO ESTABLISH CARE: Primary | ICD-10-CM

## 2023-03-27 PROBLEM — Z30.2 REQUEST FOR STERILIZATION: Status: RESOLVED | Noted: 2022-03-30 | Resolved: 2023-03-27

## 2023-03-27 LAB — SL AMB POCT URINE HCG: NEGATIVE

## 2023-03-27 RX ORDER — DOCUSATE SODIUM 100 MG/1
100 CAPSULE, LIQUID FILLED ORAL 2 TIMES DAILY
Qty: 30 CAPSULE | Refills: 2 | Status: SHIPPED | OUTPATIENT
Start: 2023-03-27

## 2023-03-27 NOTE — PROGRESS NOTES
Name: Ally Martin      : 1995      MRN: [id number]  Encounter Provider: Meryle Posner, MD  Encounter Date: 3/27/2023   Encounter department: [address] [place] [address]     1  Encounter to establish care  -     TSH + Free T4; Future  -     CBC and differential; Future  -     Comprehensive metabolic panel; Future    2  Smoking  Assessment & Plan:  · 3 cigarettes per day, smokes distant from kids  · Started at age 17  · Has been lowering down from before (0 5 pack per day); congratulated patient  · She has tried patches before, but did not like it  · Counseling provided; patient refuses medications to help with quitting      3  Menstrual period late  Comments:  Pregancy test done today is negative  Assessment & Plan:  · Pregnancy test is negative today  · S/p bilateral salpingectomy in     Orders:  -     POCT urine HCG    4  Constipation, unspecified constipation type  Assessment & Plan:  · Reports bowel movements every 3-4 days  · She was educated about the importance of increasing amount of fibers in her diet and maintain good hydration  · Will order Colace today    Orders:  -     docusate sodium (COLACE) 100 mg capsule; Take 1 capsule (100 mg total) by mouth 2 (two) times a day    5  Neck nodule  Assessment & Plan:  · Small mobile nodule noted during palpation of thyroid, left  · Since patient has family history of thyroid problems, will order an ultrasound for further investigation  · Will order TSH + T4 today    Orders:  -     TSH + Free T4; Future  -     US thyroid; Future; Expected date: 2023    6   Nausea  Assessment & Plan:  · Likely influenced by cigarette smoking and/or contipation  · Denies burning sensation, vomiting, blood in stool, unintentional weight loss  · Advised patient to quit smoking completely\  · Will order colace for constipation  · If no relieve, will consider referral to GI for EGD           Subjective "  Patricia Ivy is a 56-year-old female who presents today to establish care  She has 6 kids and believes it is time to take care of herself  She denies any medical conditions  She is concerned that her menstrual period is late and she has intermittent episodes of nausea without vomiting, despite having bilateral salpingectomy last year  Her LMP was on 2/23/2022 and just noted some spotting  Hasn't had no periods, usually monthly  Last mentrual epriod on 3/23, just spotting  She uses Tylenol PRN for menstrual cramping, otherwise, no daily medications  Surgeries: Appendectomy, gallbladder removal and \"intestinal surgery\" when she was younger  Breast cancer runs in family (grandmother and aunt at 39years old), thyroid problems, hypertension  Smokes cigarettes, 3 cigarettes per day, cut down from 0 5 pack  Started at age 17  Review of Systems   Constitutional: Negative for chills and fever  HENT: Negative for ear pain and sore throat  Eyes: Negative for pain and visual disturbance  Respiratory: Negative for cough and shortness of breath  Cardiovascular: Negative for chest pain and palpitations  Gastrointestinal: Positive for nausea  Negative for abdominal pain and vomiting  Genitourinary: Negative for dysuria and hematuria  Musculoskeletal: Negative for arthralgias and back pain  Skin: Negative for color change and rash  Neurological: Negative for seizures and syncope  All other systems reviewed and are negative        Current Outpatient Medications on File Prior to Visit   Medication Sig   • acetaminophen (TYLENOL) 500 mg tablet Take 1 tablet (500 mg total) by mouth every 6 (six) hours as needed for mild pain (pain)       Objective     /70 (BP Location: Right arm, Patient Position: Sitting, Cuff Size: Standard)   Pulse 90   Temp 98 5 °F (36 9 °C) (Temporal)   Resp 16   Ht 4' 11\" (1 499 m)   Wt 46 7 kg (103 lb)   LMP 02/20/2023 (Approximate)   SpO2 99%   Breastfeeding No   " BMI 20 80 kg/m²     Physical Exam  Constitutional:       General: She is not in acute distress  Appearance: Normal appearance  She is normal weight  She is not ill-appearing, toxic-appearing or diaphoretic  HENT:      Head: Normocephalic and atraumatic  Right Ear: Tympanic membrane, ear canal and external ear normal       Left Ear: Tympanic membrane, ear canal and external ear normal       Nose: Nose normal  No congestion  Mouth/Throat:      Mouth: Mucous membranes are moist    Eyes:      General:         Right eye: No discharge  Left eye: No discharge  Conjunctiva/sclera: Conjunctivae normal    Cardiovascular:      Rate and Rhythm: Normal rate and regular rhythm  Heart sounds: Normal heart sounds  No murmur heard  Pulmonary:      Effort: Pulmonary effort is normal  No respiratory distress  Breath sounds: Normal breath sounds  No wheezing or rhonchi  Abdominal:      General: Bowel sounds are normal  There is no distension  Palpations: Abdomen is soft  There is no mass  Tenderness: There is no abdominal tenderness  Musculoskeletal:         General: Normal range of motion  Right lower leg: No edema  Left lower leg: No edema  Skin:     General: Skin is warm  Neurological:      General: No focal deficit present  Mental Status: She is alert and oriented to person, place, and time  Cranial Nerves: No cranial nerve deficit  Psychiatric:         Mood and Affect: Mood normal          Behavior: Behavior normal          Thought Content:  Thought content normal          Judgment: Judgment normal        Nova Dickens MD

## 2023-03-27 NOTE — ASSESSMENT & PLAN NOTE
· Likely influenced by cigarette smoking and/or contipation  · Denies burning sensation, vomiting, blood in stool, unintentional weight loss  · Advised patient to quit smoking completely\  · Will order colace for constipation  · If no relieve, will consider referral to GI for EGD

## 2023-03-27 NOTE — ASSESSMENT & PLAN NOTE
· Reports bowel movements every 3-4 days  · She was educated about the importance of increasing amount of fibers in her diet and maintain good hydration  · Will order Colace today

## 2023-03-27 NOTE — ASSESSMENT & PLAN NOTE
· Small mobile nodule noted during palpation of thyroid, left     · Since patient has family history of thyroid problems, will order an ultrasound for further investigation  · Will order TSH + T4 today

## 2023-03-27 NOTE — ASSESSMENT & PLAN NOTE
· 3 cigarettes per day, smokes distant from kids  · Started at age 17  · Has been lowering down from before (0 5 pack per day); congratulated patient  · She has tried patches before, but did not like it    · Counseling provided; patient refuses medications to help with quitting

## 2023-08-21 ENCOUNTER — ANNUAL EXAM (OUTPATIENT)
Dept: OBGYN CLINIC | Facility: CLINIC | Age: 28
End: 2023-08-21

## 2023-08-21 VITALS
WEIGHT: 103 LBS | HEIGHT: 59 IN | BODY MASS INDEX: 20.76 KG/M2 | DIASTOLIC BLOOD PRESSURE: 81 MMHG | HEART RATE: 69 BPM | SYSTOLIC BLOOD PRESSURE: 123 MMHG

## 2023-08-21 DIAGNOSIS — R10.2 PELVIC PAIN: ICD-10-CM

## 2023-08-21 DIAGNOSIS — Z12.4 CERVICAL CANCER SCREENING: ICD-10-CM

## 2023-08-21 DIAGNOSIS — Z20.2 POSSIBLE EXPOSURE TO STD: ICD-10-CM

## 2023-08-21 DIAGNOSIS — Z01.419 ENCOUNTER FOR GYNECOLOGICAL EXAMINATION (GENERAL) (ROUTINE) WITHOUT ABNORMAL FINDINGS: Primary | ICD-10-CM

## 2023-08-21 PROCEDURE — 99395 PREV VISIT EST AGE 18-39: CPT | Performed by: OBSTETRICS & GYNECOLOGY

## 2023-08-21 PROCEDURE — 87491 CHLMYD TRACH DNA AMP PROBE: CPT | Performed by: OBSTETRICS & GYNECOLOGY

## 2023-08-21 PROCEDURE — 87591 N.GONORRHOEAE DNA AMP PROB: CPT | Performed by: OBSTETRICS & GYNECOLOGY

## 2023-08-21 PROCEDURE — G0145 SCR C/V CYTO,THINLAYER,RESCR: HCPCS | Performed by: OBSTETRICS & GYNECOLOGY

## 2023-08-21 NOTE — PROGRESS NOTES
Assessment/Plan:     No problem-specific Assessment & Plan notes found for this encounter. Diagnoses and all orders for this visit:    Encounter for gynecological examination (general) (routine) without abnormal findings    Possible exposure to STD  -     Chlamydia/GC amplified DNA by PCR    Cervical cancer screening  -     Liquid-based pap, screening    Pelvic pain    Depression Screening Follow-up Plan: Patient's depression screening was positive with a PHQ-2 score of 0. Their PHQ-9 score was 2. Clinically patient does not have depression. No treatment is required. RTO prn. Subjective:      Patient ID: Hema Tripp is a 33 y.o. female who presents for annual exam.    Her last pap was 05/06/20 and was negative. She has a salpingectomy for birth control. She agrees to STD testing. She reports worse pains since having her salpingectomy but her periods are regular. HPI    The following portions of the patient's history were reviewed and updated as appropriate: allergies, current medications, past family history, past medical history, past social history, past surgical history and problem list.    Review of Systems      Objective:      /81   Pulse 69   Ht 4' 11" (1.499 m)   Wt 46.7 kg (103 lb)   LMP 07/20/2023 (Approximate)   BMI 20.80 kg/m²          Physical Exam  Vitals and nursing note reviewed. Exam conducted with a chaperone present. Constitutional:       General: She is not in acute distress. Appearance: She is well-developed. HENT:      Head: Normocephalic. Neck:      Thyroid: No thyromegaly. Cardiovascular:      Rate and Rhythm: Normal rate and regular rhythm. Heart sounds: Normal heart sounds. No murmur heard. Pulmonary:      Effort: Pulmonary effort is normal. No respiratory distress. Breath sounds: Normal breath sounds. No wheezing or rales. Chest:      Chest wall: No tenderness.    Breasts:     Right: No swelling, bleeding, inverted nipple, mass, nipple discharge, skin change or tenderness. Left: No swelling, bleeding, inverted nipple, mass, nipple discharge, skin change or tenderness. Abdominal:      General: Bowel sounds are normal. There is no distension. Palpations: Abdomen is soft. There is no mass. Tenderness: There is no abdominal tenderness. There is no guarding or rebound. Genitourinary:     Labia:         Right: No rash, tenderness, lesion or injury. Left: No rash, tenderness, lesion or injury. Vagina: Normal.      Cervix: No cervical motion tenderness, discharge or friability. Adnexa:         Right: No mass, tenderness or fullness. Left: No mass, tenderness or fullness. Rectum: No external hemorrhoid. Skin:     General: Skin is warm and dry. Neurological:      Mental Status: She is alert and oriented to person, place, and time. Psychiatric:         Behavior: Behavior normal.         Thought Content:  Thought content normal.         Judgment: Judgment normal.

## 2023-08-22 LAB
C TRACH DNA SPEC QL NAA+PROBE: NEGATIVE
N GONORRHOEA DNA SPEC QL NAA+PROBE: NEGATIVE

## 2023-08-25 LAB
LAB AP GYN PRIMARY INTERPRETATION: NORMAL
Lab: NORMAL

## 2023-09-03 ENCOUNTER — HOSPITAL ENCOUNTER (EMERGENCY)
Facility: HOSPITAL | Age: 28
Discharge: HOME | End: 2023-09-04
Attending: EMERGENCY MEDICINE
Payer: MEDICARE

## 2023-09-03 DIAGNOSIS — S09.90XA CLOSED HEAD INJURY, INITIAL ENCOUNTER: ICD-10-CM
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
## 2023-09-03 PROCEDURE — 99283 EMERGENCY DEPT VISIT LOW MDM: CPT

## 2023-09-04 VITALS
SYSTOLIC BLOOD PRESSURE: 123 MMHG | HEART RATE: 71 BPM | DIASTOLIC BLOOD PRESSURE: 79 MMHG | BODY MASS INDEX: 20.08 KG/M2 | TEMPERATURE: 98.2 F | RESPIRATION RATE: 18 BRPM | OXYGEN SATURATION: 99 % | WEIGHT: 99.43 LBS

## 2023-09-04 PROCEDURE — 99284 EMERGENCY DEPT VISIT MOD MDM: CPT

## 2023-09-04 RX ORDER — ACETAMINOPHEN 325 MG/1
975 TABLET ORAL ONCE
Status: COMPLETED | OUTPATIENT
Start: 2023-09-04 | End: 2023-09-04

## 2023-09-04 RX ORDER — METOCLOPRAMIDE 10 MG/1
10 TABLET ORAL ONCE
Status: COMPLETED | OUTPATIENT
Start: 2023-09-04 | End: 2023-09-04

## 2023-09-04 RX ORDER — DIPHENHYDRAMINE HCL 25 MG
25 TABLET ORAL ONCE
Status: COMPLETED | OUTPATIENT
Start: 2023-09-04 | End: 2023-09-04

## 2023-09-04 RX ADMIN — DIPHENHYDRAMINE HCL 25 MG: 25 TABLET, COATED ORAL at 00:16

## 2023-09-04 RX ADMIN — ACETAMINOPHEN 325MG 975 MG: 325 TABLET ORAL at 00:16

## 2023-09-04 RX ADMIN — METOCLOPRAMIDE 10 MG: 10 TABLET ORAL at 00:16
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
## 2023-09-04 NOTE — DISCHARGE INSTRUCTIONS
Please return to the emergency department for any concerns as outlined in the after visit summary or for any other concerns. Please follow-up with your primary care provider in 2 days for re-evaluation and further management. Continue acetaminophen as needed pain control. Continue to stay well-hydrated.

## 2024-04-03 ENCOUNTER — HOSPITAL ENCOUNTER (EMERGENCY)
Facility: HOSPITAL | Age: 29
Discharge: HOME | End: 2024-04-03
Attending: INTERNAL MEDICINE
Payer: MEDICARE

## 2024-04-03 VITALS
WEIGHT: 104.06 LBS | SYSTOLIC BLOOD PRESSURE: 115 MMHG | OXYGEN SATURATION: 99 % | BODY MASS INDEX: 21.02 KG/M2 | TEMPERATURE: 98 F | DIASTOLIC BLOOD PRESSURE: 70 MMHG | HEART RATE: 84 BPM | RESPIRATION RATE: 16 BRPM

## 2024-04-03 DIAGNOSIS — H00.019 STYE: Primary | ICD-10-CM

## 2024-04-03 PROCEDURE — 99283 EMERGENCY DEPT VISIT LOW MDM: CPT

## 2024-04-03 PROCEDURE — 99284 EMERGENCY DEPT VISIT MOD MDM: CPT

## 2024-04-03 RX ORDER — TETRACAINE HYDROCHLORIDE 5 MG/ML
1 SOLUTION OPHTHALMIC ONCE
Status: COMPLETED | OUTPATIENT
Start: 2024-04-03 | End: 2024-04-03

## 2024-04-03 RX ORDER — ERYTHROMYCIN 5 MG/G
OINTMENT OPHTHALMIC
Qty: 1 G | Refills: 0 | Status: SHIPPED | OUTPATIENT
Start: 2024-04-03

## 2024-04-03 RX ORDER — ERYTHROMYCIN 5 MG/G
0.5 OINTMENT OPHTHALMIC ONCE
Status: COMPLETED | OUTPATIENT
Start: 2024-04-03 | End: 2024-04-03

## 2024-04-03 RX ORDER — ACETAMINOPHEN 325 MG/1
975 TABLET ORAL ONCE
Status: COMPLETED | OUTPATIENT
Start: 2024-04-03 | End: 2024-04-03

## 2024-04-03 RX ADMIN — ERYTHROMYCIN 0.5 INCH: 5 OINTMENT OPHTHALMIC at 19:34

## 2024-04-03 RX ADMIN — ACETAMINOPHEN 325MG 975 MG: 325 TABLET ORAL at 19:33

## 2024-04-03 RX ADMIN — TETRACAINE HYDROCHLORIDE 1 DROP: 5 SOLUTION OPHTHALMIC at 19:34

## 2024-04-03 NOTE — ED PROVIDER NOTES
History  Chief Complaint   Patient presents with    Facial Swelling     Patient reports right eye swelling and pain in right ear, patient denies injury, denies any drainage      29-year-old female presenting to the ED with complaints of right eye redness, pain and swelling for the last 2 days.  States she first began with some discomfort yesterday with no known inciting event.  No trauma.  Nothing got in her eye.  States today she woke up from a nap in the pain was worse as well as worsening swelling and redness, concerned her so she came to the ED.  No specific discharge from the eye.  Intermittent slight blurry vision but no double vision, vision loss or visual field loss.  Pain is slightly exacerbated with looking around but because it is irritating the 1 spot on the lower eyelid not specific pain with eye movement.  No left eye complaints.  Pain in the right eye is because her right ear started hurting she has slight headache.  No stiff neck or headaches.  No dental or mouth complaints.  Reports she is otherwise been well and has no other complaints today.  Specifically denies fever, chills, cough, URI symptoms, chest pain or shortness of breath, abdominal pain, N/V/D, urinary complaints, confusion, weakness, rash and any other specific complaint.  No concern for pregnancy.  No pain medications PTA.  Does not wear contacts or glasses.          Prior to Admission Medications   Prescriptions Last Dose Informant Patient Reported? Taking?   acetaminophen (TYLENOL) 500 mg tablet   No No   Sig: Take 1 tablet (500 mg total) by mouth every 6 (six) hours as needed for mild pain (pain)   docusate sodium (COLACE) 100 mg capsule   No No   Sig: Take 1 capsule (100 mg total) by mouth 2 (two) times a day      Facility-Administered Medications: None       Past Medical History:   Diagnosis Date    Chlamydia 2013    GERD (gastroesophageal reflux disease)     has not req'd tx since 2019    Gestational diabetes     Request for  sterilization 3/30/2022    - MA 31 signed 3/29/22 - Plan for tubal if  section indicated - Discussed with pt LARC methods of contraception including intrauterine device, Nexplanon, and Depo Provera in addition to surgical sterilization. Discussed the risks, benefits, and alternatives to each. Discussed that the Nexplanon has been proven to be the most effective form of contraception; pt desires permanence. Discussed     (spontaneous vaginal delivery) 5/10/2022    Varicella     had chicken pox as child and vaccine       Past Surgical History:   Procedure Laterality Date    APPENDECTOMY      CHOLECYSTECTOMY      NV COLONOSCOPY FLX DX W/COLLJ SPEC WHEN PFRMD N/A 2017    Procedure: EGD AND COLONOSCOPY;  Surgeon: Anjana Cristina DO;  Location: [facility] GI LAB;  Service: Gastroenterology    NV LAPAROSCOPIC APPENDECTOMY N/A 2016    Procedure: APPENDECTOMY LAPAROSCOPIC;  Surgeon: Tavon Araiza DO;  [facility];  Service: General    NV LAPAROSCOPY W/RMVL ADNEXAL STRUCTURES Bilateral 2022    Procedure: SALPINGECTOMY, LAPAROSCOPIC;  Surgeon: Kim Alcazar MD;  Location: [facility];  Service: Gynecology    NV LAPS ABD PRTM&OMENTUM DX W/WO SPEC BR/WA SPX N/A 3/13/2016    Procedure: LAPAROSCOPY DIAGNOSTIC;  Surgeon: Arnold Gaytan MD;  [facility];  Service: General    RESECTION SMALL BOWEL LAPAROSCOPIC         Family History   Problem Relation Age of Onset    Asthma Mother     No Known Problems Father     No Known Problems Sister     No Known Problems Brother     Diabetes Maternal Grandmother     Hypothyroidism Maternal Grandmother     No Known Problems Daughter     No Known Problems Son     Breast cancer Maternal Aunt      I have reviewed and agree with the history as documented.    E-Cigarette/Vaping    E-Cigarette Use Never User      E-Cigarette/Vaping Substances     Social History     Tobacco Use    Smoking status: Former    Smokeless tobacco: Never   Vaping Use     Vaping status: Never Used   Substance Use Topics    Alcohol use: Not Currently     Comment: socially    Drug use: Yes     Types: Marijuana     Comment: LAST USED 03/31/2022, daily        Review of Systems   Constitutional:  Negative for chills and fever.   Eyes:  Positive for pain and redness. Negative for discharge, itching and visual disturbance.   Musculoskeletal:  Negative for neck pain and neck stiffness.       Physical Exam  Physical Exam  Vitals and nursing note reviewed.   Constitutional:       General: She is not in acute distress.     Appearance: She is well-developed.   HENT:      Head: Normocephalic and atraumatic.   Eyes:      General: Vision grossly intact.      Conjunctiva/sclera: Conjunctivae normal.      Right eye: Right conjunctiva is not injected. No chemosis or exudate.     Pupils: Pupils are equal, round, and reactive to light.      Comments: Mild erythema with swelling over the right lower eyelid.  No purulent head or fluctuance appreciated.  No findings on the inner eyelid.  No scleral or conjunctival erythema.  No discharge from the eye.  PERRL.  Full, painless EOMI.  No periorbital erythema or associated TTP.  No proptosis.  See attached picture.   Cardiovascular:      Rate and Rhythm: Normal rate and regular rhythm.      Heart sounds: No murmur heard.  Pulmonary:      Effort: Pulmonary effort is normal. No respiratory distress.      Breath sounds: Normal breath sounds.   Abdominal:      Palpations: Abdomen is soft.      Tenderness: There is no abdominal tenderness.   Musculoskeletal:         General: No swelling.      Cervical back: Neck supple.   Skin:     General: Skin is warm and dry.      Capillary Refill: Capillary refill takes less than 2 seconds.   Neurological:      Mental Status: She is alert.   Psychiatric:         Mood and Affect: Mood normal.             Vital Signs  ED Triage Vitals   Temperature Pulse Respirations Blood Pressure SpO2   04/03/24 1817 04/03/24 1817 04/03/24 1817  04/03/24 1819 04/03/24 1817   98 °F (36.7 °C) 84 16 115/70 99 %      Temp Source Heart Rate Source Patient Position - Orthostatic VS BP Location FiO2 (%)   04/03/24 1817 04/03/24 1817 04/03/24 1817 04/03/24 1817 --   Oral Monitor Sitting Right arm       Pain Score       04/03/24 1817       10 - Worst Possible Pain           Vitals:    04/03/24 1817 04/03/24 1819   BP:  115/70   Pulse: 84    Patient Position - Orthostatic VS: Sitting          Visual Acuity      ED Medications  Medications   acetaminophen (TYLENOL) tablet 975 mg (975 mg Oral Given 4/3/24 1933)   tetracaine 0.5 % ophthalmic solution 1 drop (1 drop Right Eye Given 4/3/24 1934)   erythromycin (ILOTYCIN) 0.5 % ophthalmic ointment 0.5 inch (0.5 inches Right Eye Given 4/3/24 1934)       Diagnostic Studies  Results Reviewed       None                   No orders to display              Procedures  Procedures         ED Course                                             Medical Decision Making  29-year-old female presents ED with complaints of right eye pain which began 2 days ago.  No known inciting event.  Pain has caused her to have somewhat of a mild headache and some right ear pain.  No other specific complaints today.  No eye complaints.  DDx include limited to hordeolum, chalazon.  Doubt abscess, orbital cellulitis, periorbital cellulitis, cellulitis,.  Will treat patient supportively with acetaminophen, tetracaine and start patient on erythromycin in light of possible infectious etiology.  Will patient follow-up with PCP in the [facility] for sight, as needed.  Discussed supportive care at home.  Strict return precautions verbally communicated to the patient as outlined in the AVS.  All patient questions and concerns were answered.  Patient verbally communicated their understanding and agreement to the above plan.  Patient stable at discharge.       Portions of the record may have been created with voice recognition software. Occasional  "wrong word or \"sound a like\" substitutions may have occurred due to the inherent limitations of voice recognition software. Read the chart carefully and recognize, using context, where substitutions have occurred.      Risk  OTC drugs.  Prescription drug management.             Disposition  Final diagnoses:   Stye     Time reflects when diagnosis was documented in both MDM as applicable and the Disposition within this note       Time User Action Codes Description Comment    4/3/2024  7:25 PM Eros Maria Add [H00.019] Stye           ED Disposition       ED Disposition   Discharge    Condition   Stable    Date/Time   Wed Apr 3, 2024  7:25 PM    Comment   Sherrie Ocasio discharge to home/self care.                   Follow-up Information       Follow up With Specialties Details Why Contact Info Additional Information    [facility] Emergency Department Emergency Medicine Go to  As needed, If symptoms worsen [address] [place] [address] [phone] [facility], [address] [place] [address], [address] [place] [address] Ophthalmology Call in 2 days For further evaluation, As needed [address] [place] [address]  [phone]               Patient's Medications   Discharge Prescriptions    ERYTHROMYCIN (ILOTYCIN) OPHTHALMIC OINTMENT    Place a 1/2 inch ribbon of ointment into the lower eyelid.       Start Date: 4/3/2024  End Date: --       Order Dose: --       Quantity: 1 g    Refills: 0       No discharge procedures on file.    PDMP Review       None            ED Provider  Electronically Signed by             Eros Maria PA-C  04/03/24 1936    "

## 2024-04-03 NOTE — DISCHARGE INSTRUCTIONS
Please return to the emergency department for any concerns as outlined in the after visit summary or for any other concerns.    Please follow-up with your primary care provider and the [facility] for sight in 2 days for re-evaluation and further management.    Continue acetaminophen as needed pain control.    Continue warm compresses, a few times throughout the day to help with the pain/swelling.    Can continue erythromycin 4 times per day for 5 days.    Avoid eye make-up.

## 2024-05-11 NOTE — PROGRESS NOTES
2017         RE: Sawyer Stratton                                 To: Sam Billronnell G[facility]   MR#: [id number]                                    [address]   : 1995                                  [address]   ENC: [id number]                             [facility], [address] [facility]   (Exam #: [id number])                           Fax: [phone]      The LMP of this 25year old,  G3, P1-1-0-2 patient was 2017, her   working RACHEL is OCT 25 2017 and the current gestational age is 17 weeks 1   day by 1st Trimester Sono  A sonographic examination was performed on 2017 using real time equipment  The ultrasound examination was   performed using abdominal technique  The patient has a BMI of 18 6  Her   blood pressure today was 111/76  Earliest ultrasound found in her record: 3/22/17  9w0d  RACHEL 10/25/17   Multiple longitudinal and transverse sections revealed a fleming   intrauterine pregnancy with the fetus in transverse presentation  The   placenta is posterior in implantation, grade 0 in appearance  Cardiac motion was observed at 153 bpm       INDICATIONS      first trimester genetic screening   diabetes, gestation-previous pregnancy   previous  delivery      Exam Types      Level I      RESULTS      Fetus # 1 of 1   Transverse presentation   Fetal growth appeared normal      MEASUREMENTS (* Included In Average GA)      CRL              7 8 cm        13 weeks 4 days*   Nuchal Trans    1 20 mm      THE AVERAGE GESTATIONAL AGE is 13 weeks 4 days +/- 7 days  ANATOMY COMMENTS      Anatomic detail is limited at this gestational age  The yolk sac was not   noted  The fetal cranium appeared normal in shape and the nuchal   translucency was normal in size (1 2mm)  The nasal bone appears to be   present  The intracranial anatomy was unremarkable  Evaluation of the   spine revealed no obvious evidence for a neural tube defect    Anatomy of   the fetal thorax appeared within normal limits  The cardiac rhythm was   regular  Within the abdomen, stomach & bladder were visualized and the   abdominal wall appeared intact  A three vessel cord appears to be present  Active movement of the fetal body & extremities was seen  There is no   suspicion of a subchorionic bleed  The placental cord insertion was   normal    There is no suspicion of a uterine myoma  Free fluid is not seen   in the posterior cul-de-sac  ADNEXA      The left ovary appeared normal and measured 2 2 x 2 1 x 1 8 cm with a   volume of 4 3 cc  The right ovary appeared normal and measured 2 8 x 2 2 x   1 0 cm with a volume of 3 2 cc  AMNIOTIC FLUID         Largest Vertical Pocket = 3 7 cm   Amniotic Fluid: Normal      IMPRESSION      Pederson IUP   13 weeks and 4 days by this ultrasound  (RACHEL=OCT 22 2017)   13 weeks and 1 day by 1st Tri Sono  (RACHEL=OCT 25 2017)   Transverse presentation   Fetal growth appeared normal   Regular fetal heart rate of 153 bpm   Posterior placenta      CONSULT COMMENT      Thank you very much for requesting consultations very nice patient for the   indication of prior  birth and history of gestational diabetes  This is the patient's third pregnancy  Her first pregnancy was   complicated by  premature rupture membranes at 36 weeks followed by   subsequent induction  She also had A2 gestational diabetes during the   pregnancy  Her second pregnancy was also complicated by premature rupture   of membranes although it occurred at term  She did not utilize   progesterone during the pregnancy and she also had A2 gestational   diabetes  She has a history of bipolar depression not currently requiring   medications  She has a surgical history of a cholecystectomy and   appendectomy  She denies the current use of tobacco, alcohol, or drugs  She currently takes prenatal vitamins and has no significant drug   allergies    Her family medical history is significant for diabetes  No   other family members appear to have significant congenital birth defects  A review of systems is otherwise negative  On exam, the patient appears   well, in no acute distress, and her abdomen is nontender  We discussed the options for genetic screening, including but not limited   to first trimester screening, second trimester screening, combined first   and second trimester screening, noninvasive prenatal testing (NIPT) for   patients at high risk and diagnostic screening through the use of CVS and   amniocentesis  We discussed the risks and benefits of each approach   including the sensitivities and false positive rates as well as the   difference between a screening test and a diagnostic test   At the   conclusion of our discussion the patient elected Sequential Screening to   delineate her risk for fetal aneuploidy  A maternal blood sample was   obtained on the day of sonogram   The first trimester portion of the   screening results, encompassing age, nuchal translucency, and biochemistry   should be available within one week of testing and will be reported from   [address] [place] [address]   The second stage of sequential screening should be completed   between the 15th and 21st week of pregnancy (ideally between 16-18 weeks)  Given that the patient has a history of a previous  birth with   delivery less than 37 weeks, I recommend initiation of 17 alpha   hydroxyprogesterone caproate IM weekly starting at gestational age 12 and   continuing until 42 weeks  This therapy has been shown to reduce the risk   of subsequent  birth by approximately 30% and is the best   medication we have at this time to reduce the risk of future  birth   in women with a history of  birth    Serial cervical length   screening between 16 and 24 weeks every 2 weeks with placement of a   cerclage if the cervical length is less than 25 mm is recommended in all   patients who have a history of a prior  birth  The patient's history of A2 gestational diabetes and 2 prior pregnancies   places her at increased risk for pregestational diabetes  Early Glucola   screen is recommended and if normal a repeat Screening at 24-28 weeks is   recommended  We discussed follow-up in detail and I recommend an anatomy ultrasound be   scheduled for 20 weeks gestation  Thank you very much for allowing us to participate in the care of this   very nice patient  Should you have any questions, please do not hesitate   to contact our office  Please note, in addition to the time spent discussing the results of the   ultrasound, I spent approximately 15 minutes of face-to-face time with the   patient, greater than 50% of which was spent in counseling and the   coordination of care for this patient  Portions of the record may have been created with voice recognition   software  Occasional wrong word or "sound a like" substitutions may have   occurred due to the inherent limitations of voice recognition software  Read the chart carefully and recognize, using context, where substitutions   have occurred  JORY Richardson M D     Electronically signed 17 14:05           Electronically signed by:HUMZA Gomez DO  2017  4:13PM EST 4 = No assist / stand by assistance

## 2024-09-04 ENCOUNTER — ANNUAL EXAM (OUTPATIENT)
Dept: OBGYN CLINIC | Facility: CLINIC | Age: 29
End: 2024-09-04

## 2024-09-04 VITALS
DIASTOLIC BLOOD PRESSURE: 75 MMHG | HEIGHT: 59 IN | SYSTOLIC BLOOD PRESSURE: 129 MMHG | HEART RATE: 71 BPM | WEIGHT: 100.2 LBS | BODY MASS INDEX: 20.2 KG/M2

## 2024-09-04 DIAGNOSIS — Z01.419 ROUTINE GYNECOLOGICAL EXAMINATION: Primary | ICD-10-CM

## 2024-09-04 DIAGNOSIS — Z23 NEED FOR HPV VACCINATION: ICD-10-CM

## 2024-09-04 PROBLEM — Z90.79 STATUS POST BILATERAL SALPINGECTOMY: Status: RESOLVED | Noted: 2022-07-08 | Resolved: 2024-09-04

## 2024-09-04 PROBLEM — R11.0 NAUSEA: Status: RESOLVED | Noted: 2023-03-27 | Resolved: 2024-09-04

## 2024-09-04 PROBLEM — N92.6 MENSTRUAL PERIOD LATE: Status: RESOLVED | Noted: 2023-03-27 | Resolved: 2024-09-04

## 2024-09-04 PROBLEM — Z86.32 HISTORY OF GESTATIONAL DIABETES IN PRIOR PREGNANCY, CURRENTLY PREGNANT IN SECOND TRIMESTER: Status: RESOLVED | Noted: 2022-01-19 | Resolved: 2024-09-04

## 2024-09-04 PROBLEM — K59.00 CONSTIPATION: Status: RESOLVED | Noted: 2023-03-27 | Resolved: 2024-09-04

## 2024-09-04 PROBLEM — O09.293 PREVIOUS PREGNANCY COMPLICATED BY INTRAUTERINE GROWTH RESTRICTION, ANTEPARTUM, THIRD TRIMESTER: Status: RESOLVED | Noted: 2022-01-19 | Resolved: 2024-09-04

## 2024-09-04 PROBLEM — O09.292 HISTORY OF GESTATIONAL DIABETES IN PRIOR PREGNANCY, CURRENTLY PREGNANT IN SECOND TRIMESTER: Status: RESOLVED | Noted: 2022-01-19 | Resolved: 2024-09-04

## 2024-09-04 PROCEDURE — 99395 PREV VISIT EST AGE 18-39: CPT | Performed by: OBSTETRICS & GYNECOLOGY

## 2024-09-04 PROCEDURE — 90471 IMMUNIZATION ADMIN: CPT | Performed by: OBSTETRICS & GYNECOLOGY

## 2024-09-04 PROCEDURE — 90651 9VHPV VACCINE 2/3 DOSE IM: CPT | Performed by: OBSTETRICS & GYNECOLOGY

## 2024-09-04 NOTE — PROGRESS NOTES
ANNUAL GYNECOLOGICAL EXAMINATION    Sherrie Ocasio is a 29 y.o. female who presents today for annual GYN exam.  Her last pap smear was performed 2023 and result was negative.  She reports no history of abnormal pap smears in her past.   She had HIV screening performed 2021 and it was negative.  She reports menses as regular.  Patient's last menstrual period was 2024.  Her general medical history has been reviewed and she reports it as follows:    Past Medical History:   Diagnosis Date    Chlamydia     GERD (gastroesophageal reflux disease)     has not req'd tx since 2019    Gestational diabetes     Request for sterilization 3/30/2022    - MA 31 signed 3/29/22 - Plan for tubal if  section indicated - Discussed with pt LARC methods of contraception including intrauterine device, Nexplanon, and Depo Provera in addition to surgical sterilization. Discussed the risks, benefits, and alternatives to each. Discussed that the Nexplanon has been proven to be the most effective form of contraception; pt desires permanence. Discussed     (spontaneous vaginal delivery) 5/10/2022    Varicella     had chicken pox as child and vaccine     Past Surgical History:   Procedure Laterality Date    APPENDECTOMY      CHOLECYSTECTOMY      NV COLONOSCOPY FLX DX W/COLLJ SPEC WHEN PFRMD N/A 2017    Procedure: EGD AND COLONOSCOPY;  Surgeon: Anjana Cristina DO;  Location: [facility];  Service: Gastroenterology    NV LAPAROSCOPIC APPENDECTOMY N/A 2016    Procedure: APPENDECTOMY LAPAROSCOPIC;  Surgeon: Tavon Araiza DO;  Location: [facility];  Service: General    NV LAPAROSCOPY W/RMVL ADNEXAL STRUCTURES Bilateral 2022    Procedure: SALPINGECTOMY, LAPAROSCOPIC;  Surgeon: Kim Alcazar MD;  Location: [facility];  Service: Gynecology    NV LAPS ABD PRTM&OMENTUM DX W/WO SPEC BR/WA SPX N/A 3/13/2016    Procedure: LAPAROSCOPY DIAGNOSTIC;  Surgeon: Arnold Gaytan MD;  Location: [facility];   "Service: General    RESECTION SMALL BOWEL LAPAROSCOPIC  2016     OB History          6    Para   6    Term   5       1    AB   0    Living   6         SAB   0    IAB   0    Ectopic   0    Multiple   0    Live Births   6               Social History     Tobacco Use    Smoking status: Every Day     Types: Cigarettes    Smokeless tobacco: Never   Vaping Use    Vaping status: Never Used   Substance Use Topics    Alcohol use: Yes     Comment: socially    Drug use: Not Currently     Types: Marijuana     Comment: LAST USED 2022, daily      Social History     Substance and Sexual Activity   Sexual Activity Yes    Partners: Male    Birth control/protection: Female Sterilization     Cancer-related family history includes Breast cancer in her maternal aunt.    Current Outpatient Medications   Medication Instructions    acetaminophen (TYLENOL) 500 mg, Oral, Every 6 hours PRN    docusate sodium (COLACE) 100 mg, Oral, 2 times daily    erythromycin (ILOTYCIN) ophthalmic ointment Place a 1/2 inch ribbon of ointment into the lower eyelid.       Review of Systems:  Review of Systems   Genitourinary:  Negative for menstrual problem, pelvic pain, vaginal bleeding and vaginal discharge.   All other systems reviewed and are negative.      Physical Exam:  /75 (BP Location: Left arm, Patient Position: Sitting, Cuff Size: Standard)   Pulse 71   Ht 4' 11\" (1.499 m)   Wt 45.5 kg (100 lb 3.2 oz)   LMP 2024   BMI 20.24 kg/m²   Physical Exam  Constitutional:       Appearance: Normal appearance.   Genitourinary:      Bladder and urethral meatus normal.      No lesions in the vagina.      Right Labia: No rash, tenderness or lesions.     Left Labia: No tenderness, lesions or rash.     No inguinal adenopathy present in the right or left side.     Vaginal exam comments: Positive menses.        Right Adnexa: not tender, not full and no mass present.     Left Adnexa: not tender, not full and no mass present.     No " cervical motion tenderness or lesion.      Uterus is not enlarged or tender.      No uterine mass detected.     No urethral tenderness or mass present.   Breasts:     Breasts are soft.     Right: No swelling, inverted nipple, mass, nipple discharge, skin change or tenderness.      Left: No swelling, inverted nipple, mass, nipple discharge, skin change or tenderness.   HENT:      Head: Normocephalic and atraumatic.   Cardiovascular:      Rate and Rhythm: Normal rate and regular rhythm.   Pulmonary:      Effort: Pulmonary effort is normal.      Breath sounds: Normal breath sounds.   Abdominal:      General: Bowel sounds are normal.      Palpations: Abdomen is soft.      Hernia: There is no hernia in the left inguinal area or right inguinal area.   Musculoskeletal:         General: Normal range of motion.      Cervical back: Normal range of motion and neck supple.   Lymphadenopathy:      Upper Body:      Right upper body: No supraclavicular or axillary adenopathy.      Left upper body: No supraclavicular or axillary adenopathy.      Lower Body: No right inguinal adenopathy. No left inguinal adenopathy.   Neurological:      Mental Status: She is alert and oriented to person, place, and time.   Skin:     General: Skin is warm and dry.   Psychiatric:         Mood and Affect: Mood normal.   Vitals reviewed.         Assessment/Plan:   1. Normal well-woman GYN exam.  2. Cervical cancer screening:  Normal cervical exam.  Has received HPV vaccine in the past recommend to patient the 9 valent HPV vaccine which has an additional 5 other types.  Patient agrees with above will receive a dose today and an additional dose in 6mos.        3. STD screening:  Patient declines.     4. Breast cancer screening:  Normal breast exam.    Reviewed breast self-awareness.   5. Depression Screening: Patient's depression screening was assessed with a PHQ-2 score of 0. Their PHQ-9 score was 2. Clinically patient does not have depression. No  treatment is required.  Referral placed for  consultation.   6. BMI Counseling: Body mass index is 20.24 kg/m².    7. Tobacco Cessation Counseling: Patient not ready to quit.     8. Contraception:  Tubal ligation   9. Return to office 1yr annual 6mos HPV vaccine.    Review[facility] with patient that [facility], but that they will not necessarily be reviewed by me immediately.  Explained that I will review results at my earliest opportunity and contact patient appropriately.

## 2024-09-04 NOTE — PROGRESS NOTES
Patient given initial HPV vaccine in left deltoid.     NDC# 2294-1758-81  LOT# [id number]  EXP# 5/15/2026

## 2024-09-22 NOTE — PATIENT INSTRUCTIONS
Pregnancy   WHAT YOU NEED TO KNOW:   What do I need to know about pregnancy? A normal pregnancy lasts about 40 weeks  The first trimester lasts from your last period through the 12th week of pregnancy  The second trimester lasts from the 13th week of your pregnancy through the 23rd week  The third trimester lasts from your 24th week of pregnancy until your baby is born  If you know the date of your last period, your healthcare provider can estimate your due date  You may give birth to your baby any time from 37 weeks to 2 weeks after your due date  What is prenatal care? Prenatal care is a series of visits with your healthcare provider throughout your pregnancy  Prenatal care can help prevent problems during pregnancy and childbirth  At each prenatal visit, your healthcare provider will weigh you and check your blood pressure  Your healthcare provider will also check your baby's heartbeat and growth  You may also need the following at some visits:  · A pelvic exam  allows your healthcare provider to see your cervix (the bottom part of your uterus)  Your healthcare provider uses a speculum to gently open your vagina  He will check the size and shape of your uterus  · Blood tests  may be done to check for gestational diabetes and anemia (low iron level)  You may need other blood tests, such as blood type, Rh factor, or tests to check for birth defects  · A fetal ultrasound  shows pictures of your baby inside your uterus  It shows your baby's development  The movement and position of your baby can also be seen  Your healthcare provider may be able to tell you what your baby's gender is during the ultrasound  What can I do to have a healthy pregnancy? · Eat a variety of healthy foods  Healthy foods include fruits, vegetables, whole-grain breads, low-fat dairy foods, beans, lean meats, and fish  Drink liquids as directed  Ask how much liquid to drink each day and which liquids are best for you   Limit caffeine to less than 200 milligrams each day  Limit your intake of fish to 2 servings each week  Choose fish low in mercury such as canned light tuna, shrimp, crab, salmon, cod, or tilapia  Do not  eat fish high in mercury such as swordfish, tilefish, reagan mackerel, and shark  · Take prenatal vitamins as directed  Your need for certain vitamins and minerals, such as folic acid, increases during pregnancy  Prenatal vitamins provide some of the extra vitamins and minerals you need  Prenatal vitamins may also help to decrease the risk of certain birth defects  · Ask how much weight you should gain during your pregnancy  Too much or too little weight gain can be unhealthy for you and your baby  · Talk to your healthcare provider about exercise  Moderate exercise can help you stay fit  Your healthcare provider will help you plan an exercise program that is safe for you during pregnancy  · Do not smoke  If you smoke, it is never too late to quit  Smoking increases your risk of a miscarriage and other health problems during your pregnancy  Smoking can cause your baby to be born too early or weigh less at birth  Ask your healthcare provider for information if you need help quitting  · Do not drink alcohol  Alcohol passes from your body to your baby through the placenta  It can affect your baby's brain development and cause fetal alcohol syndrome (FAS)  FAS is a group of conditions that causes mental, behavior, and growth problems  · Talk to your healthcare provider before you take any medicines  Many medicines may harm your baby if you take them when you are pregnant  Do not take any medicines, vitamins, herbs, or supplements without first talking to your healthcare provider  Never use illegal or street drugs (such as marijuana or cocaine) while you are pregnant  What body changes may happen during my pregnancy?    · Breast changes  you will experience include tenderness and tingling during the early part of your pregnancy  Your breasts will become larger  You may need to use a support bra  You may see a thin, yellow fluid, called colostrum, leak from your nipples during the second trimester  Colostrum is a liquid that changes to milk about 3 days after you give birth  · Skin changes and stretch marks  may occur during your pregnancy  You may have red marks, called stretch marks, on your skin  Stretch marks will usually fade after pregnancy  Use lotion if your skin is dry and itchy  The skin on your face, around your nipples, and below your belly button may darken  Most of the time, your skin will return to its normal color after your baby is born  · Morning sickness  is nausea and vomiting that can happen at any time of day  Avoid fatty and spicy foods  Eat small meals throughout the day instead of large meals  Angela may help to decrease nausea  Ask your healthcare provider about other ways of decreasing nausea and vomiting  · Heartburn  may be caused by changes in your hormones during pregnancy  Your growing uterus may also push your stomach upward and force stomach acid to back up into your esophagus  Eat 4 or 5 small meals each day instead of large meals  Avoid spicy foods  Avoid eating right before bedtime  · Constipation  may develop during your pregnancy  To treat constipation, eat foods high in fiber such as fiber cereals, beans, fruits, vegetables, whole-grain breads, and prune juice  Get regular exercise and drink plenty of water  Your healthcare provider may also suggest a fiber supplement to soften your bowel movements  Talk to your healthcare provider before you use any medicines to decrease constipation  · Hemorrhoids  are enlarged veins in the rectal area  They may cause pain, itching, and bright red bleeding from your rectum  To decrease your risk of hemorrhoids, prevent constipation and do not strain to have a bowel movement   If you have hemorrhoids, soak in a tub of warm water to ease discomfort  Ask your healthcare provider how you can treat hemorrhoids  · Leg cramps and swelling  may be caused by low calcium levels or the added weight of pregnancy  Raise your legs above the level of your heart to decrease swelling  During a leg cramp, stretch or massage the muscle that has the cramp  Heat may help decrease pain and muscle spasms  Apply heat on your muscle for 20 to 30 minutes every 2 hours for as many days as directed  · Back pain  may occur as your baby grows  Do not stand for long periods of time or lift heavy items  Use good posture while you stand, squat, or bend  Wear low-heeled shoes with good support  Rest may also help to relieve back pain  Ask your healthcare provider about exercises you can do to strengthen your back muscles  What are some safety tips during pregnancy? · Avoid hot tubs and saunas  Do not use a hot tub or sauna while you are pregnant, especially during your first trimester  Hot tubs and saunas may raise your baby's temperature and increase the risk of birth defects  · Avoid toxoplasmosis  This is an infection caused by eating raw meat or being around infected cat feces  It can cause birth defects, miscarriages, and other problems  Wash your hands after you touch raw meat  Make sure any meat is well-cooked before you eat it  Avoid raw eggs and unpasteurized milk  Use gloves or ask someone else to clean your cat's litter box while you are pregnant  · Ask your healthcare provider about travel  The most comfortable time to travel is during the second trimester  Ask your healthcare provider if you can travel after 36 weeks  You may not be able to travel in an airplane after 36 weeks  He may also recommend that you avoid long road trips  When should I seek immediate care? · You develop a severe headache that does not go away  · You have new or increased vision changes, such as blurred or spotted vision      · You have new or increased swelling in your face or hands  · You have pain or cramping in your abdomen or low back  · You have vaginal bleeding  When should I contact my healthcare provider? · You have abdominal cramps, pressure, or tightening  · You have a change in vaginal discharge  · You cannot keep food or drinks down, and you are losing weight  · You have chills or a fever  · You have vaginal itching, burning, or pain  · You have yellow, green, white, or foul-smelling vaginal discharge  · You have pain or burning when you urinate, less urine than usual, or pink or bloody urine  · You have questions or concerns about your condition or care  CARE AGREEMENT:   You have the right to help plan your care  Learn about your health condition and how it may be treated  Discuss treatment options with your caregivers to decide what care you want to receive  You always have the right to refuse treatment  The above information is an  only  It is not intended as medical advice for individual conditions or treatments  Talk to your doctor, nurse or pharmacist before following any medical regimen to see if it is safe and effective for you  © 2017 [employer]  Information is for End User's use only and may not be sold, redistributed or otherwise used for commercial purposes  All illustrations and images included in CareNotes® are the copyrighted property of [employer]  or Sorin Winchester  No

## 2025-03-07 ENCOUNTER — CLINICAL SUPPORT (OUTPATIENT)
Dept: OBGYN CLINIC | Facility: CLINIC | Age: 30
End: 2025-03-07

## 2025-03-07 VITALS — WEIGHT: 106 LBS | DIASTOLIC BLOOD PRESSURE: 74 MMHG | BODY MASS INDEX: 21.41 KG/M2 | SYSTOLIC BLOOD PRESSURE: 114 MMHG

## 2025-03-07 DIAGNOSIS — Z23 NEED FOR HPV VACCINATION: Primary | ICD-10-CM

## 2025-03-07 PROCEDURE — 90471 IMMUNIZATION ADMIN: CPT

## 2025-03-07 PROCEDURE — 90651 9VHPV VACCINE 2/3 DOSE IM: CPT

## 2025-06-18 ENCOUNTER — HOSPITAL ENCOUNTER (EMERGENCY)
Facility: HOSPITAL | Age: 30
Discharge: HOME | End: 2025-06-18
Attending: EMERGENCY MEDICINE
Payer: MEDICARE

## 2025-06-18 VITALS
RESPIRATION RATE: 16 BRPM | SYSTOLIC BLOOD PRESSURE: 109 MMHG | TEMPERATURE: 98.2 F | WEIGHT: 97.44 LBS | DIASTOLIC BLOOD PRESSURE: 67 MMHG | BODY MASS INDEX: 19.68 KG/M2 | HEART RATE: 88 BPM | OXYGEN SATURATION: 100 %

## 2025-06-18 DIAGNOSIS — R11.10 VOMITING: Primary | ICD-10-CM

## 2025-06-18 DIAGNOSIS — R51.9 HEADACHE: ICD-10-CM

## 2025-06-18 DIAGNOSIS — R19.7 DIARRHEA: ICD-10-CM

## 2025-06-18 LAB
ALBUMIN SERPL BCG-MCNC: 4.9 G/DL (ref 3.5–5)
ALP SERPL-CCNC: 134 U/L (ref 34–104)
ALT SERPL W P-5'-P-CCNC: 8 U/L (ref 7–52)
ANION GAP SERPL CALCULATED.3IONS-SCNC: 9 MMOL/L (ref 4–13)
AST SERPL W P-5'-P-CCNC: 13 U/L (ref 13–39)
BASOPHILS # BLD AUTO: 0.04 THOUSANDS/ÂΜL (ref 0–0.1)
BASOPHILS NFR BLD AUTO: 0 % (ref 0–1)
BILIRUB SERPL-MCNC: 0.66 MG/DL (ref 0.2–1)
BUN SERPL-MCNC: 6 MG/DL (ref 5–25)
CALCIUM SERPL-MCNC: 9.9 MG/DL (ref 8.4–10.2)
CHLORIDE SERPL-SCNC: 101 MMOL/L (ref 96–108)
CO2 SERPL-SCNC: 26 MMOL/L (ref 21–32)
CREAT SERPL-MCNC: 0.53 MG/DL (ref 0.6–1.3)
EOSINOPHIL # BLD AUTO: 0.09 THOUSAND/ÂΜL (ref 0–0.61)
EOSINOPHIL NFR BLD AUTO: 1 % (ref 0–6)
ERYTHROCYTE [DISTWIDTH] IN BLOOD BY AUTOMATED COUNT: 12.3 % (ref 11.6–15.1)
EXT PREGNANCY TEST URINE: NEGATIVE
EXT. CONTROL: NORMAL
GFR SERPL CREATININE-BSD FRML MDRD: 128 ML/MIN/1.73SQ M
GLUCOSE SERPL-MCNC: 100 MG/DL (ref 65–140)
HCT VFR BLD AUTO: 38.9 % (ref 34.8–46.1)
HGB BLD-MCNC: 13.5 G/DL (ref 11.5–15.4)
IMM GRANULOCYTES # BLD AUTO: 0.03 THOUSAND/UL (ref 0–0.2)
IMM GRANULOCYTES NFR BLD AUTO: 0 % (ref 0–2)
LIPASE SERPL-CCNC: <6 U/L (ref 11–82)
LYMPHOCYTES # BLD AUTO: 0.8 THOUSANDS/ÂΜL (ref 0.6–4.47)
LYMPHOCYTES NFR BLD AUTO: 8 % (ref 14–44)
MCH RBC QN AUTO: 32.8 PG (ref 26.8–34.3)
MCHC RBC AUTO-ENTMCNC: 34.7 G/DL (ref 31.4–37.4)
MCV RBC AUTO: 95 FL (ref 82–98)
MONOCYTES # BLD AUTO: 0.44 THOUSAND/ÂΜL (ref 0.17–1.22)
MONOCYTES NFR BLD AUTO: 4 % (ref 4–12)
NEUTROPHILS # BLD AUTO: 8.83 THOUSANDS/ÂΜL (ref 1.85–7.62)
NEUTS SEG NFR BLD AUTO: 87 % (ref 43–75)
NRBC BLD AUTO-RTO: 0 /100 WBCS
PLATELET # BLD AUTO: 236 THOUSANDS/UL (ref 149–390)
PMV BLD AUTO: 10 FL (ref 8.9–12.7)
POTASSIUM SERPL-SCNC: 3.9 MMOL/L (ref 3.5–5.3)
PROT SERPL-MCNC: 8.1 G/DL (ref 6.4–8.4)
RBC # BLD AUTO: 4.11 MILLION/UL (ref 3.81–5.12)
SODIUM SERPL-SCNC: 136 MMOL/L (ref 135–147)
WBC # BLD AUTO: 10.23 THOUSAND/UL (ref 4.31–10.16)

## 2025-06-18 PROCEDURE — 81025 URINE PREGNANCY TEST: CPT | Performed by: PHYSICIAN ASSISTANT

## 2025-06-18 PROCEDURE — 99283 EMERGENCY DEPT VISIT LOW MDM: CPT

## 2025-06-18 PROCEDURE — 96361 HYDRATE IV INFUSION ADD-ON: CPT

## 2025-06-18 PROCEDURE — 80053 COMPREHEN METABOLIC PANEL: CPT | Performed by: PHYSICIAN ASSISTANT

## 2025-06-18 PROCEDURE — 99284 EMERGENCY DEPT VISIT MOD MDM: CPT | Performed by: PHYSICIAN ASSISTANT

## 2025-06-18 PROCEDURE — 83690 ASSAY OF LIPASE: CPT | Performed by: PHYSICIAN ASSISTANT

## 2025-06-18 PROCEDURE — 96375 TX/PRO/DX INJ NEW DRUG ADDON: CPT

## 2025-06-18 PROCEDURE — 96374 THER/PROPH/DIAG INJ IV PUSH: CPT

## 2025-06-18 PROCEDURE — 36415 COLL VENOUS BLD VENIPUNCTURE: CPT | Performed by: PHYSICIAN ASSISTANT

## 2025-06-18 PROCEDURE — 85025 COMPLETE CBC W/AUTO DIFF WBC: CPT | Performed by: PHYSICIAN ASSISTANT

## 2025-06-18 RX ORDER — ONDANSETRON 4 MG/1
4 TABLET, ORALLY DISINTEGRATING ORAL EVERY 6 HOURS PRN
Qty: 10 TABLET | Refills: 0 | Status: SHIPPED | OUTPATIENT
Start: 2025-06-18

## 2025-06-18 RX ORDER — ONDANSETRON 2 MG/ML
4 INJECTION INTRAMUSCULAR; INTRAVENOUS ONCE
Status: COMPLETED | OUTPATIENT
Start: 2025-06-18 | End: 2025-06-18

## 2025-06-18 RX ADMIN — ONDANSETRON 4 MG: 2 INJECTION INTRAMUSCULAR; INTRAVENOUS at 12:55

## 2025-06-18 RX ADMIN — SODIUM CHLORIDE 1000 ML: 0.9 INJECTION, SOLUTION INTRAVENOUS at 12:55

## 2025-06-18 RX ADMIN — MORPHINE SULFATE 2 MG: 2 INJECTION, SOLUTION INTRAMUSCULAR; INTRAVENOUS at 14:25

## 2025-06-18 RX ADMIN — Medication 660 MG: at 13:51

## 2025-06-18 NOTE — ED PROVIDER NOTES
Time reflects when diagnosis was documented in both MDM as applicable and the Disposition within this note       Time User Action Codes Description Comment    6/18/2025  2:34 PM Pernell España Add [R11.10] Vomiting     6/18/2025  2:34 PM Pernell España Add [R19.7] Diarrhea     6/18/2025  2:34 PM Pernell España Add [R51.9] Headache           ED Disposition       ED Disposition   Discharge    Condition   Stable    Date/Time   Wed Jun 18, 2025  2:34 PM    Comment   Sherrie Ocasio discharge to home/self care.                   Assessment & Plan       Medical Decision Making  30-year-old female patient comes in with vomiting and diarrhea since yesterday unable to keep anything down without abdominal pain.  Subjective fever and chills no other symptoms differential diagnose include not limited to viral gastroenteritis, bacterial gastroenteritis less likely, cholecystitis unlikely    Problems Addressed:  Diarrhea: acute illness or injury     Details: Nonbloody resolved on its own  Headache: acute illness or injury     Details: Improvement medications  Vomiting: acute illness or injury     Details: Improved with Zofran able to tolerate fluids at time of discharge    Amount and/or Complexity of Data Reviewed  External Data Reviewed: notes.     Details: I did look at previous notes for comparison  Labs: ordered. Decision-making details documented in ED Course.     Details: All labs reviewed nothing acute that required immediate intervention  Discussion of management or test interpretation with external provider(s): Using joint decision-making patient was discharged home on Zofran follow-up with PCP return with any worsening symptoms questions comments or concerns verbalizes understanding and agreement    Risk  Prescription drug management.        ED Course as of 06/18/25 1522   Wed Jun 18, 2025   1433 Patient's symptoms are resolved in the room drinking water stable for discharge       Medications   sodium  chloride 0.9 % bolus 1,000 mL (0 mL Intravenous Stopped 6/18/25 1416)   ondansetron (ZOFRAN) injection 4 mg (4 mg Intravenous Given 6/18/25 1255)   acetaminophen (Ofirmev) IVPB 660 mg (0 mg Intravenous Stopped 6/18/25 1406)   morphine injection 2 mg (2 mg Intravenous Given 6/18/25 1425)       ED Risk Strat Scores                    No data recorded                            History of Present Illness       Chief Complaint   Patient presents with    Vomiting     Pt states nausea and vomiting since yesterday, unable to keep anything down, reports also body aches, denies sick contacts. Unable to keep tylenol down today.       Past Medical History[1]   Past Surgical History[2]   Family History[3]   Social History[4]   E-Cigarette/Vaping    E-Cigarette Use Never User       E-Cigarette/Vaping Substances    Nicotine No     THC No     CBD No     Flavoring No     Other No     Unknown No       I have reviewed and agree with the history as documented.     Is a 30-year-old female patient started with vomiting and diarrhea yesterday states she is unable open vomiting diarrhea nonbloody) some subjective fever and chills and some bodyaches.  Went to the room she was on her cell phone nontoxic in no acute distress joint count.  No cough congestion sore throat no urinary symptoms.  Nothing makes it better or worse tried nothing over-the-counter.  Mild diffuse nonfocal headache.  No blurred vision or double vision no stiff neck no rash        Review of Systems   Constitutional:  Negative for chills, diaphoresis, fatigue and fever.   HENT:  Negative for congestion, ear pain, nosebleeds and sore throat.    Eyes:  Negative for photophobia, pain, discharge and visual disturbance.   Respiratory:  Negative for cough, choking, chest tightness, shortness of breath and wheezing.    Cardiovascular:  Negative for chest pain and palpitations.   Gastrointestinal:  Positive for diarrhea, nausea and vomiting. Negative for abdominal distention,  abdominal pain, anal bleeding, blood in stool, constipation and rectal pain.   Genitourinary:  Negative for dysuria, flank pain, frequency and hematuria.   Musculoskeletal:  Negative for arthralgias, back pain, gait problem and joint swelling.   Skin:  Negative for color change and rash.   Neurological:  Positive for headaches. Negative for dizziness, seizures and syncope.   Psychiatric/Behavioral:  Negative for behavioral problems and confusion. The patient is not nervous/anxious.    All other systems reviewed and are negative.          Objective       ED Triage Vitals   Temperature Pulse Blood Pressure Respirations SpO2 Patient Position - Orthostatic VS   06/18/25 1244 06/18/25 1244 06/18/25 1244 06/18/25 1244 06/18/25 1244 06/18/25 1244   98.2 °F (36.8 °C) 67 100/72 18 99 % Sitting      Temp Source Heart Rate Source BP Location FiO2 (%) Pain Score    06/18/25 1244 06/18/25 1244 06/18/25 1244 -- 06/18/25 1246    Oral Monitor Right arm  (S) 10 - Worst Possible Pain      Vitals      Date and Time Temp Pulse SpO2 Resp BP Pain Score FACES Pain Rating User   06/18/25 1425 -- 88 100 % 16 109/67 8 -- SR   06/18/25 1246 -- -- -- -- --  10 - Worst Possible Pain -- CF   06/18/25 1244 98.2 °F (36.8 °C) 67 99 % 18 100/72 -- -- NZ            Physical Exam  Vitals and nursing note reviewed.   Constitutional:       General: She is not in acute distress.     Appearance: Normal appearance. She is not ill-appearing, toxic-appearing or diaphoretic.   HENT:      Head: Normocephalic and atraumatic.      Right Ear: Tympanic membrane, ear canal and external ear normal.      Left Ear: Tympanic membrane, ear canal and external ear normal.      Nose: Nose normal. No congestion or rhinorrhea.      Mouth/Throat:      Mouth: Mucous membranes are dry.      Pharynx: Oropharynx is clear. No oropharyngeal exudate or posterior oropharyngeal erythema.     Eyes:      Extraocular Movements: Extraocular movements intact.      Conjunctiva/sclera:  Conjunctivae normal.      Pupils: Pupils are equal, round, and reactive to light.       Cardiovascular:      Rate and Rhythm: Normal rate and regular rhythm.   Pulmonary:      Effort: Pulmonary effort is normal. No respiratory distress.      Breath sounds: Normal breath sounds.   Abdominal:      General: Bowel sounds are normal.      Palpations: Abdomen is soft.      Tenderness: There is no abdominal tenderness.     Musculoskeletal:         General: Normal range of motion.      Cervical back: Normal range of motion and neck supple. No rigidity or tenderness.      Right lower leg: No edema.      Left lower leg: No edema.   Lymphadenopathy:      Cervical: No cervical adenopathy.     Skin:     General: Skin is warm and dry.      Capillary Refill: Capillary refill takes less than 2 seconds.      Findings: No rash.     Neurological:      General: No focal deficit present.      Mental Status: She is alert and oriented to person, place, and time. Mental status is at baseline.     Psychiatric:         Mood and Affect: Mood normal.         Behavior: Behavior normal.         Results Reviewed       Procedure Component Value Units Date/Time    Comprehensive metabolic panel [[id number]]  (Abnormal) Collected: 06/18/25 1255    Lab Status: Final result Specimen: Blood from Arm, Right Updated: 06/18/25 1343     Sodium 136 mmol/L      Potassium 3.9 mmol/L      Chloride 101 mmol/L      CO2 26 mmol/L      ANION GAP 9 mmol/L      BUN 6 mg/dL      Creatinine 0.53 mg/dL      Glucose 100 mg/dL      Calcium 9.9 mg/dL      AST 13 U/L      ALT 8 U/L      Alkaline Phosphatase 134 U/L      Total Protein 8.1 g/dL      Albumin 4.9 g/dL      Total Bilirubin 0.66 mg/dL      eGFR 128 ml/min/1.73sq m     Narrative:      [employer] guidelines for Chronic Kidney Disease (CKD):     Stage 1 with normal or high GFR (GFR > 90 mL/min/1.73 square meters)    Stage 2 Mild CKD (GFR = 60-89 mL/min/1.73 square meters)    Stage 3A Moderate  CKD (GFR = 45-59 mL/min/1.73 square meters)    Stage 3B Moderate CKD (GFR = 30-44 mL/min/1.73 square meters)    Stage 4 Severe CKD (GFR = 15-29 mL/min/1.73 square meters)    Stage 5 End Stage CKD (GFR <15 mL/min/1.73 square meters)  Note: GFR calculation is accurate only with a steady state creatinine    Lipase [[id number]]  (Abnormal) Collected: 06/18/25 1255    Lab Status: Final result Specimen: Blood from Arm, Right Updated: 06/18/25 1343     Lipase <6 u/L     POCT pregnancy, urine [[id number]]  (Normal) Collected: 06/18/25 1340    Lab Status: Final result Updated: 06/18/25 1340     EXT Preg Test, Ur Negative     Control Valid    CBC and differential [[id number]]  (Abnormal) Collected: 06/18/25 1255    Lab Status: Final result Specimen: Blood from Arm, Right Updated: 06/18/25 1301     WBC 10.23 Thousand/uL      RBC 4.11 Million/uL      Hemoglobin 13.5 g/dL      Hematocrit 38.9 %      MCV 95 fL      MCH 32.8 pg      MCHC 34.7 g/dL      RDW 12.3 %      MPV 10.0 fL      Platelets 236 Thousands/uL      nRBC 0 /100 WBCs      Segmented % 87 %      Immature Grans % 0 %      Lymphocytes % 8 %      Monocytes % 4 %      Eosinophils Relative 1 %      Basophils Relative 0 %      Absolute Neutrophils 8.83 Thousands/µL      Absolute Immature Grans 0.03 Thousand/uL      Absolute Lymphocytes 0.80 Thousands/µL      Absolute Monocytes 0.44 Thousand/µL      Eosinophils Absolute 0.09 Thousand/µL      Basophils Absolute 0.04 Thousands/µL             No orders to display       Procedures    ED Medication and Procedure Management   Prior to Admission Medications   Prescriptions Last Dose Informant Patient Reported? Taking?   acetaminophen (TYLENOL) 500 mg tablet   No No   Sig: Take 1 tablet (500 mg total) by mouth every 6 (six) hours as needed for mild pain (pain)   docusate sodium (COLACE) 100 mg capsule   No No   Sig: Take 1 capsule (100 mg total) by mouth 2 (two) times a day   erythromycin (ILOTYCIN) ophthalmic ointment   No No   Sig:  Place a 1/2 inch ribbon of ointment into the lower eyelid.   Patient not taking: Reported on 2024      Facility-Administered Medications: None     Discharge Medication List as of 2025  2:35 PM        START taking these medications    Details   ondansetron (ZOFRAN-ODT) 4 mg disintegrating tablet Take 1 tablet (4 mg total) by mouth every 6 (six) hours as needed for nausea or vomiting, Starting Wed 2025, Normal           CONTINUE these medications which have NOT CHANGED    Details   acetaminophen (TYLENOL) 500 mg tablet Take 1 tablet (500 mg total) by mouth every 6 (six) hours as needed for mild pain (pain), Starting 2022, Normal           STOP taking these medications       docusate sodium (COLACE) 100 mg capsule Comments:   Reason for Stopping:         erythromycin (ILOTYCIN) ophthalmic ointment Comments:   Reason for Stopping:             No discharge procedures on file.  ED SEPSIS DOCUMENTATION   Time reflects when diagnosis was documented in both MDM as applicable and the Disposition within this note       Time User Action Codes Description Comment    2025  2:34 PM Pernell España Add [R11.10] Vomiting     2025  2:34 PM Pernell España Add [R19.7] Diarrhea     2025  2:34 PM Pernell España Add [R51.9] Headache                      [1]   Past Medical History:  Diagnosis Date    Chlamydia     GERD (gastroesophageal reflux disease)     has not req'd tx since 2019    Gestational diabetes     Request for sterilization 2022    - MA 31 signed 3/29/22 - Plan for tubal if  section indicated - Discussed with pt LARC methods of contraception including intrauterine device, Nexplanon, and Depo Provera in addition to surgical sterilization. Discussed the risks, benefits, and alternatives to each. Discussed that the Nexplanon has been proven to be the most effective form of contraception; pt desires permanence. Discussed    Status post bilateral salpingectomy  2022     (spontaneous vaginal delivery) 05/10/2022    Varicella     had chicken pox as child and vaccine   [2]   Past Surgical History:  Procedure Laterality Date    APPENDECTOMY      CHOLECYSTECTOMY      TX COLONOSCOPY FLX DX W/COLLJ SPEC WHEN PFRMD N/A 2017    Procedure: EGD AND COLONOSCOPY;  Surgeon: Anjana Cristina DO;  Location: [facility];  Service: Gastroenterology    TX LAPAROSCOPIC APPENDECTOMY N/A 2016    Procedure: APPENDECTOMY LAPAROSCOPIC;  Surgeon: Tavon Araiza DO;  Location: [facility];  Service: General    TX LAPAROSCOPY W/RMVL ADNEXAL STRUCTURES Bilateral 2022    Procedure: SALPINGECTOMY, LAPAROSCOPIC;  Surgeon: Kim Alcazar MD;  Location: [facility];  Service: Gynecology    TX LAPS ABD PRTM&OMENTUM DX W/WO SPEC BR/WA SPX N/A 3/13/2016    Procedure: LAPAROSCOPY DIAGNOSTIC;  Surgeon: Arnold Gaytan MD;  Location: [facility];  Service: General    RESECTION SMALL BOWEL LAPAROSCOPIC     [3]   Family History  Problem Relation Name Age of Onset    Asthma Mother      No Known Problems Father      No Known Problems Sister      No Known Problems Brother      Diabetes Maternal Grandmother      Hypothyroidism Maternal Grandmother      No Known Problems Daughter      No Known Problems Son      Breast cancer Maternal Aunt     [4]   Social History  Tobacco Use    Smoking status: Every Day     Types: Cigarettes    Smokeless tobacco: Never   Vaping Use    Vaping status: Never Used   Substance Use Topics    Alcohol use: Yes     Comment: socially    Drug use: Not Currently     Types: Marijuana     Comment: LAST USED 2022, daily         Pernell Damian PA-C  25 1522

## (undated) DEVICE — TRAY FOLEY 16FR URIMETER SILICONE SURESTEP

## (undated) DEVICE — ENSEAL LAPAROSCOPIC TISSUE SEALER G2 STRAIGHT JAW FOR USE WITH G2 GENERATOR 5MM DIAMETER 35CM SHAFT LENGTH: Brand: ENSEAL

## (undated) DEVICE — SCD SEQUENTIAL COMPRESSION COMFORT SLEEVE MEDIUM KNEE LENGTH: Brand: KENDALL SCD

## (undated) DEVICE — BLUE HEAT SCOPE WARMER

## (undated) DEVICE — UTERINE MANIPULATOR 4.5MM STRL

## (undated) DEVICE — GLOVE INDICATOR PI UNDERGLOVE SZ 6.5 BLUE

## (undated) DEVICE — BETHLEHEM UNIVERSAL GYN LAP PK: Brand: CARDINAL HEALTH

## (undated) DEVICE — INTENDED FOR TISSUE SEPARATION, AND OTHER PROCEDURES THAT REQUIRE A SHARP SURGICAL BLADE TO PUNCTURE OR CUT.: Brand: BARD-PARKER SAFETY BLADES SIZE 11, STERILE

## (undated) DEVICE — SUT MONOCRYL 4-0 PS-2 27 IN Y426H

## (undated) DEVICE — CHLORAPREP HI-LITE 26ML ORANGE

## (undated) DEVICE — TROCAR: Brand: KII® SLEEVE

## (undated) DEVICE — ENDOPATH PNEUMONEEDLE INSUFFLATION NEEDLES WITH LUER LOCK CONNECTORS 120MM: Brand: ENDOPATH

## (undated) DEVICE — [HIGH FLOW INSUFFLATOR,  DO NOT USE IF PACKAGE IS DAMAGED,  KEEP DRY,  KEEP AWAY FROM SUNLIGHT,  PROTECT FROM HEAT AND RADIOACTIVE SOURCES.]: Brand: PNEUMOSURE

## (undated) DEVICE — TROCAR: Brand: KII FIOS FIRST ENTRY

## (undated) DEVICE — ADHESIVE SKIN HIGH VISCOSITY EXOFIN 1ML

## (undated) DEVICE — DRAPE EQUIPMENT RF WAND

## (undated) DEVICE — PREMIUM DRY TRAY LF: Brand: MEDLINE INDUSTRIES, INC.

## (undated) DEVICE — GLOVE PI ULTRA TOUCH SZ 6